# Patient Record
Sex: MALE | Race: BLACK OR AFRICAN AMERICAN | NOT HISPANIC OR LATINO | URBAN - METROPOLITAN AREA
[De-identification: names, ages, dates, MRNs, and addresses within clinical notes are randomized per-mention and may not be internally consistent; named-entity substitution may affect disease eponyms.]

---

## 2023-03-15 LAB — PARATHYRIN INTACT (PG/ML) IN SER/PLAS: 792.5 PG/ML (ref 18.5–88)

## 2023-05-19 ENCOUNTER — HOSPITAL ENCOUNTER (OUTPATIENT)
Dept: DATA CONVERSION | Facility: HOSPITAL | Age: 38
End: 2023-05-19
Attending: SURGERY | Admitting: SURGERY
Payer: COMMERCIAL

## 2023-05-19 DIAGNOSIS — E78.5 HYPERLIPIDEMIA, UNSPECIFIED: ICD-10-CM

## 2023-05-19 DIAGNOSIS — Z87.11 PERSONAL HISTORY OF PEPTIC ULCER DISEASE: ICD-10-CM

## 2023-05-19 DIAGNOSIS — D64.9 ANEMIA, UNSPECIFIED: ICD-10-CM

## 2023-05-19 DIAGNOSIS — Z79.82 LONG TERM (CURRENT) USE OF ASPIRIN: ICD-10-CM

## 2023-05-19 DIAGNOSIS — K21.9 GASTRO-ESOPHAGEAL REFLUX DISEASE WITHOUT ESOPHAGITIS: ICD-10-CM

## 2023-05-19 DIAGNOSIS — I12.0 HYPERTENSIVE CHRONIC KIDNEY DISEASE WITH STAGE 5 CHRONIC KIDNEY DISEASE OR END STAGE RENAL DISEASE (MULTI): ICD-10-CM

## 2023-05-19 DIAGNOSIS — T82.858A STENOSIS OF OTHER VASCULAR PROSTHETIC DEVICES, IMPLANTS AND GRAFTS, INITIAL ENCOUNTER (CMS-HCC): ICD-10-CM

## 2023-05-19 DIAGNOSIS — Z87.891 PERSONAL HISTORY OF NICOTINE DEPENDENCE: ICD-10-CM

## 2023-05-19 DIAGNOSIS — N18.5 CHRONIC KIDNEY DISEASE, STAGE 5 (MULTI): ICD-10-CM

## 2023-05-19 DIAGNOSIS — T82.590A OTHER MECHANICAL COMPLICATION OF SURGICALLY CREATED ARTERIOVENOUS FISTULA, INITIAL ENCOUNTER (CMS-HCC): ICD-10-CM

## 2023-05-19 DIAGNOSIS — G47.33 OBSTRUCTIVE SLEEP APNEA (ADULT) (PEDIATRIC): ICD-10-CM

## 2023-05-19 DIAGNOSIS — Z99.89 DEPENDENCE ON OTHER ENABLING MACHINES AND DEVICES: ICD-10-CM

## 2023-05-22 LAB
MAGNESIUM (MG/DL) IN SER/PLAS: 2.41 MG/DL (ref 1.6–2.4)
PHOSPHATE (MG/DL) IN SER/PLAS: 3.8 MG/DL (ref 2.5–4.9)

## 2023-06-07 LAB
ALBUMIN (G/DL) IN SER/PLAS: 3.2 G/DL (ref 3.4–5)
ANION GAP IN SER/PLAS: 13 MMOL/L (ref 10–20)
APPEARANCE, URINE: CLEAR
BILIRUBIN, URINE: NEGATIVE
BLOOD, URINE: NEGATIVE
CALCIUM (MG/DL) IN SER/PLAS: 8.3 MG/DL (ref 8.6–10.6)
CARBON DIOXIDE, TOTAL (MMOL/L) IN SER/PLAS: 25 MMOL/L (ref 21–32)
CHLORIDE (MMOL/L) IN SER/PLAS: 108 MMOL/L (ref 98–107)
COLOR, URINE: ABNORMAL
CREATININE (MG/DL) IN SER/PLAS: 6.32 MG/DL (ref 0.5–1.3)
CREATININE (MG/DL) IN URINE: 50.7 MG/DL (ref 20–370)
ERYTHROCYTE DISTRIBUTION WIDTH (RATIO) BY AUTOMATED COUNT: 13.9 % (ref 11.5–14.5)
ERYTHROCYTE MEAN CORPUSCULAR HEMOGLOBIN CONCENTRATION (G/DL) BY AUTOMATED: 30.7 G/DL (ref 32–36)
ERYTHROCYTE MEAN CORPUSCULAR VOLUME (FL) BY AUTOMATED COUNT: 103 FL (ref 80–100)
ERYTHROCYTES (10*6/UL) IN BLOOD BY AUTOMATED COUNT: 2.44 X10E12/L (ref 4.5–5.9)
FERRITIN (UG/LL) IN SER/PLAS: 195 UG/L (ref 20–300)
GFR MALE: 11 ML/MIN/1.73M2
GLUCOSE (MG/DL) IN SER/PLAS: 260 MG/DL (ref 74–99)
GLUCOSE, URINE: ABNORMAL MG/DL
HEMATOCRIT (%) IN BLOOD BY AUTOMATED COUNT: 25.1 % (ref 41–52)
HEMOGLOBIN (G/DL) IN BLOOD: 7.7 G/DL (ref 13.5–17.5)
IRON (UG/DL) IN SER/PLAS: 33 UG/DL (ref 35–150)
IRON BINDING CAPACITY (UG/DL) IN SER/PLAS: 203 UG/DL (ref 240–445)
IRON SATURATION (%) IN SER/PLAS: 16 % (ref 25–45)
KETONES, URINE: NEGATIVE MG/DL
LEUKOCYTE ESTERASE, URINE: NEGATIVE
LEUKOCYTES (10*3/UL) IN BLOOD BY AUTOMATED COUNT: 14.1 X10E9/L (ref 4.4–11.3)
NITRITE, URINE: NEGATIVE
NRBC (PER 100 WBCS) BY AUTOMATED COUNT: 0.1 /100 WBC (ref 0–0)
PH, URINE: 5 (ref 5–8)
PHOSPHATE (MG/DL) IN SER/PLAS: 4 MG/DL (ref 2.5–4.9)
PLATELETS (10*3/UL) IN BLOOD AUTOMATED COUNT: 171 X10E9/L (ref 150–450)
POTASSIUM (MMOL/L) IN SER/PLAS: 4 MMOL/L (ref 3.5–5.3)
PROTEIN (MG/DL) IN URINE: 110 MG/DL (ref 5–25)
PROTEIN, URINE: ABNORMAL MG/DL
PROTEIN/CREATININE (MG/MG) IN URINE: 2.17 MG/MG CREAT (ref 0–0.17)
RBC, URINE: <1 /HPF (ref 0–5)
SODIUM (MMOL/L) IN SER/PLAS: 142 MMOL/L (ref 136–145)
SPECIFIC GRAVITY, URINE: 1.01 (ref 1–1.03)
SQUAMOUS EPITHELIAL CELLS, URINE: <1 /HPF
UREA NITROGEN (MG/DL) IN SER/PLAS: 58 MG/DL (ref 6–23)
UROBILINOGEN, URINE: <2 MG/DL (ref 0–1.9)
WBC, URINE: 4 /HPF (ref 0–5)

## 2023-06-09 LAB — PARATHYRIN INTACT (PG/ML) IN SER/PLAS: NORMAL

## 2023-07-14 ENCOUNTER — HOSPITAL ENCOUNTER (OUTPATIENT)
Dept: DATA CONVERSION | Facility: HOSPITAL | Age: 38
End: 2023-07-14

## 2023-08-16 LAB
ALBUMIN (G/DL) IN SER/PLAS: 3.1 G/DL (ref 3.4–5)
ANION GAP IN SER/PLAS: 19 MMOL/L (ref 10–20)
CALCIUM (MG/DL) IN SER/PLAS: 8.3 MG/DL (ref 8.6–10.6)
CARBON DIOXIDE, TOTAL (MMOL/L) IN SER/PLAS: 23 MMOL/L (ref 21–32)
CHLORIDE (MMOL/L) IN SER/PLAS: 102 MMOL/L (ref 98–107)
CREATININE (MG/DL) IN SER/PLAS: 7.82 MG/DL (ref 0.5–1.3)
GFR MALE: 8 ML/MIN/1.73M2
GLUCOSE (MG/DL) IN SER/PLAS: 264 MG/DL (ref 74–99)
PHOSPHATE (MG/DL) IN SER/PLAS: 5.4 MG/DL (ref 2.5–4.9)
POTASSIUM (MMOL/L) IN SER/PLAS: 3.4 MMOL/L (ref 3.5–5.3)
SODIUM (MMOL/L) IN SER/PLAS: 141 MMOL/L (ref 136–145)
UREA NITROGEN (MG/DL) IN SER/PLAS: 75 MG/DL (ref 6–23)

## 2023-08-23 ENCOUNTER — HOSPITAL ENCOUNTER (OUTPATIENT)
Dept: DATA CONVERSION | Facility: HOSPITAL | Age: 38
Discharge: HOME | End: 2023-08-25
Attending: SURGERY | Admitting: SURGERY
Payer: COMMERCIAL

## 2023-08-23 DIAGNOSIS — N18.9 CHRONIC KIDNEY DISEASE, UNSPECIFIED: ICD-10-CM

## 2023-08-23 DIAGNOSIS — T81.30XA DISRUPTION OF WOUND, UNSPECIFIED, INITIAL ENCOUNTER: ICD-10-CM

## 2023-08-23 DIAGNOSIS — Z99.2 DEPENDENCE ON RENAL DIALYSIS (CMS-HCC): ICD-10-CM

## 2023-08-23 DIAGNOSIS — Z79.899 OTHER LONG TERM (CURRENT) DRUG THERAPY: ICD-10-CM

## 2023-08-23 DIAGNOSIS — Z79.82 LONG TERM (CURRENT) USE OF ASPIRIN: ICD-10-CM

## 2023-08-24 LAB
ABO GROUP (TYPE) IN BLOOD: NORMAL
ALBUMIN (G/DL) IN SER/PLAS: 2.8 G/DL (ref 3.4–5)
ANION GAP IN SER/PLAS: 20 MMOL/L (ref 10–20)
ANTIBODY SCREEN: NORMAL
CALCIUM (MG/DL) IN SER/PLAS: 7.5 MG/DL (ref 8.6–10.6)
CARBON DIOXIDE, TOTAL (MMOL/L) IN SER/PLAS: 16 MMOL/L (ref 21–32)
CHLORIDE (MMOL/L) IN SER/PLAS: 104 MMOL/L (ref 98–107)
CREATININE (MG/DL) IN SER/PLAS: 8.94 MG/DL (ref 0.5–1.3)
ERYTHROCYTE DISTRIBUTION WIDTH (RATIO) BY AUTOMATED COUNT: 15.8 % (ref 11.5–14.5)
ERYTHROCYTE MEAN CORPUSCULAR HEMOGLOBIN CONCENTRATION (G/DL) BY AUTOMATED: 31.9 G/DL (ref 32–36)
ERYTHROCYTE MEAN CORPUSCULAR VOLUME (FL) BY AUTOMATED COUNT: 91 FL (ref 80–100)
ERYTHROCYTES (10*6/UL) IN BLOOD BY AUTOMATED COUNT: 2.24 X10E12/L (ref 4.5–5.9)
GFR MALE: 7 ML/MIN/1.73M2
GLUCOSE (MG/DL) IN SER/PLAS: 375 MG/DL (ref 74–99)
HEMATOCRIT (%) IN BLOOD BY AUTOMATED COUNT: 20.4 % (ref 41–52)
HEMOGLOBIN (G/DL) IN BLOOD: 6.5 G/DL (ref 13.5–17.5)
LEUKOCYTES (10*3/UL) IN BLOOD BY AUTOMATED COUNT: 13.4 X10E9/L (ref 4.4–11.3)
MAGNESIUM (MG/DL) IN SER/PLAS: 1.4 MG/DL (ref 1.6–2.4)
NRBC (PER 100 WBCS) BY AUTOMATED COUNT: 0 /100 WBC (ref 0–0)
PHOSPHATE (MG/DL) IN SER/PLAS: 6.1 MG/DL (ref 2.5–4.9)
PLATELETS (10*3/UL) IN BLOOD AUTOMATED COUNT: 207 X10E9/L (ref 150–450)
POTASSIUM (MMOL/L) IN SER/PLAS: 4.1 MMOL/L (ref 3.5–5.3)
RH FACTOR: NORMAL
SODIUM (MMOL/L) IN SER/PLAS: 136 MMOL/L (ref 136–145)
UREA NITROGEN (MG/DL) IN SER/PLAS: 95 MG/DL (ref 6–23)

## 2023-08-25 LAB
ERYTHROCYTE DISTRIBUTION WIDTH (RATIO) BY AUTOMATED COUNT: 15 % (ref 11.5–14.5)
ERYTHROCYTE MEAN CORPUSCULAR HEMOGLOBIN CONCENTRATION (G/DL) BY AUTOMATED: 32.8 G/DL (ref 32–36)
ERYTHROCYTE MEAN CORPUSCULAR VOLUME (FL) BY AUTOMATED COUNT: 91 FL (ref 80–100)
ERYTHROCYTES (10*6/UL) IN BLOOD BY AUTOMATED COUNT: 2.56 X10E12/L (ref 4.5–5.9)
HEMATOCRIT (%) IN BLOOD BY AUTOMATED COUNT: 23.2 % (ref 41–52)
HEMOGLOBIN (G/DL) IN BLOOD: 7.6 G/DL (ref 13.5–17.5)
LEUKOCYTES (10*3/UL) IN BLOOD BY AUTOMATED COUNT: 14.1 X10E9/L (ref 4.4–11.3)
NRBC (PER 100 WBCS) BY AUTOMATED COUNT: 0 /100 WBC (ref 0–0)
PLATELETS (10*3/UL) IN BLOOD AUTOMATED COUNT: 229 X10E9/L (ref 150–450)

## 2023-08-28 PROBLEM — L02.91 ABSCESS: Status: ACTIVE | Noted: 2023-08-28

## 2023-08-28 PROBLEM — E55.9 VITAMIN D DEFICIENCY: Status: ACTIVE | Noted: 2023-08-28

## 2023-08-28 PROBLEM — E78.5 HYPERLIPIDEMIA: Status: ACTIVE | Noted: 2023-08-28

## 2023-08-28 PROBLEM — G47.19 EXCESSIVE DAYTIME SLEEPINESS: Status: ACTIVE | Noted: 2023-08-28

## 2023-08-28 PROBLEM — I10 PRIMARY HYPERTENSION: Status: ACTIVE | Noted: 2023-08-28

## 2023-08-28 PROBLEM — E11.9 TYPE 2 DIABETES MELLITUS (MULTI): Status: ACTIVE | Noted: 2023-08-28

## 2023-08-28 PROBLEM — N25.81 HYPERPARATHYROIDISM, SECONDARY RENAL (MULTI): Status: ACTIVE | Noted: 2023-08-28

## 2023-08-28 PROBLEM — F12.90 CANNABIS USE, UNCOMPLICATED: Status: ACTIVE | Noted: 2023-08-28

## 2023-08-28 PROBLEM — N18.6 ESRD (END STAGE RENAL DISEASE) ON DIALYSIS (MULTI): Status: ACTIVE | Noted: 2023-08-28

## 2023-08-28 PROBLEM — N18.9: Status: ACTIVE | Noted: 2017-06-12

## 2023-08-28 PROBLEM — C64.9 RENAL CELL CARCINOMA (MULTI): Status: ACTIVE | Noted: 2023-08-28

## 2023-08-28 PROBLEM — D64.9 ANEMIA: Status: ACTIVE | Noted: 2023-08-28

## 2023-08-28 PROBLEM — R60.0 EDEMA OF LEFT UPPER EXTREMITY: Status: ACTIVE | Noted: 2023-08-28

## 2023-08-28 PROBLEM — T82.9XXA COMPLICATION OF ARTERIOVENOUS DIALYSIS FISTULA: Status: ACTIVE | Noted: 2023-08-28

## 2023-08-28 PROBLEM — D84.9 IMMUNOCOMPROMISED (MULTI): Status: ACTIVE | Noted: 2023-08-28

## 2023-08-28 PROBLEM — K21.9 GERD (GASTROESOPHAGEAL REFLUX DISEASE): Status: ACTIVE | Noted: 2023-08-28

## 2023-08-28 PROBLEM — G47.33 OBSTRUCTIVE SLEEP APNEA SYNDROME, SEVERE: Status: ACTIVE | Noted: 2023-08-28

## 2023-08-28 PROBLEM — R79.89 LOW VITAMIN D LEVEL: Status: ACTIVE | Noted: 2023-08-28

## 2023-08-28 PROBLEM — M25.50 ARTHRALGIA: Status: ACTIVE | Noted: 2023-08-28

## 2023-08-28 PROBLEM — I77.0 ANEURYSM OF ARTERIOVENOUS FISTULA (CMS-HCC): Status: ACTIVE | Noted: 2023-08-28

## 2023-08-28 PROBLEM — N18.5 STAGE 5 CHRONIC KIDNEY DISEASE (MULTI): Status: ACTIVE | Noted: 2023-08-28

## 2023-08-28 PROBLEM — H53.8 BLURRY VISION, BILATERAL: Status: ACTIVE | Noted: 2023-08-28

## 2023-08-28 PROBLEM — E61.1 IRON DEFICIENCY: Status: ACTIVE | Noted: 2023-08-28

## 2023-08-28 PROBLEM — Z99.2 ESRD (END STAGE RENAL DISEASE) ON DIALYSIS (MULTI): Status: ACTIVE | Noted: 2023-08-28

## 2023-08-28 PROBLEM — T82.898A OTHER SPECIFIED COMPLICATION OF VASCULAR PROSTHETIC DEVICES, IMPLANTS AND GRAFTS, INITIAL ENCOUNTER (CMS-HCC): Status: ACTIVE | Noted: 2017-10-23

## 2023-08-28 PROBLEM — Z01.818 PRE-TRANSPLANT EVALUATION FOR KIDNEY TRANSPLANT: Status: ACTIVE | Noted: 2023-08-28

## 2023-08-28 PROBLEM — E11.319 ADVANCED DIABETIC RETINAL DISEASE (MULTI): Status: ACTIVE | Noted: 2023-08-28

## 2023-08-28 PROBLEM — R06.81 WITNESSED APNEIC SPELLS: Status: ACTIVE | Noted: 2023-08-28

## 2023-08-28 PROBLEM — Z87.11 HISTORY OF GASTRIC ULCER: Status: ACTIVE | Noted: 2023-08-28

## 2023-08-28 RX ORDER — IRON POLYSACCHARIDE COMPLEX 150 MG
1 CAPSULE ORAL NIGHTLY
Status: ON HOLD | COMMUNITY
End: 2023-11-21 | Stop reason: SDUPTHER

## 2023-08-28 RX ORDER — OLMESARTAN MEDOXOMIL 40 MG/1
1 TABLET ORAL DAILY
COMMUNITY
Start: 2023-05-24 | End: 2023-10-03 | Stop reason: ALTCHOICE

## 2023-08-28 RX ORDER — CARVEDILOL 25 MG/1
1 TABLET ORAL 2 TIMES DAILY
Status: ON HOLD | COMMUNITY
Start: 2023-07-24 | End: 2023-11-21 | Stop reason: SDUPTHER

## 2023-08-28 RX ORDER — PREDNISONE 5 MG/1
1 TABLET ORAL DAILY
COMMUNITY
Start: 2023-01-03 | End: 2023-10-03 | Stop reason: ALTCHOICE

## 2023-08-28 RX ORDER — OXYCODONE HYDROCHLORIDE 5 MG/1
1 TABLET ORAL
COMMUNITY
Start: 2023-05-23 | End: 2023-10-03 | Stop reason: ALTCHOICE

## 2023-08-28 RX ORDER — ASPIRIN 81 MG/1
81 TABLET ORAL DAILY
Status: ON HOLD | COMMUNITY
End: 2023-10-17 | Stop reason: ALTCHOICE

## 2023-08-28 RX ORDER — ACETAMINOPHEN 500 MG
2 TABLET ORAL EVERY 8 HOURS PRN
Status: ON HOLD | COMMUNITY
End: 2023-10-17 | Stop reason: ALTCHOICE

## 2023-08-28 RX ORDER — CHOLECALCIFEROL (VITAMIN D3) 125 MCG
1 CAPSULE ORAL DAILY
Status: ON HOLD | COMMUNITY
End: 2023-11-21 | Stop reason: SDUPTHER

## 2023-08-28 RX ORDER — AMLODIPINE BESYLATE 10 MG/1
1 TABLET ORAL DAILY
Status: ON HOLD | COMMUNITY
End: 2023-11-21 | Stop reason: SDUPTHER

## 2023-08-28 RX ORDER — CABOZANTINIB 40 MG/1
TABLET ORAL
Status: ON HOLD | COMMUNITY
Start: 2023-04-24 | End: 2023-10-18 | Stop reason: SDUPTHER

## 2023-08-28 RX ORDER — ATORVASTATIN CALCIUM 20 MG/1
1 TABLET, FILM COATED ORAL NIGHTLY
Status: ON HOLD | COMMUNITY
End: 2023-11-21 | Stop reason: SDUPTHER

## 2023-08-28 RX ORDER — ANTISEPTIC SURGICAL SCRUB 0.04 MG/ML
SOLUTION TOPICAL
Status: ON HOLD | COMMUNITY
Start: 2023-05-11 | End: 2023-10-17 | Stop reason: ALTCHOICE

## 2023-08-28 RX ORDER — HYDRALAZINE HYDROCHLORIDE 100 MG/1
1 TABLET, FILM COATED ORAL 3 TIMES DAILY
Status: ON HOLD | COMMUNITY
End: 2023-11-21 | Stop reason: SDUPTHER

## 2023-08-28 RX ORDER — PREDNISONE 20 MG/1
2 TABLET ORAL DAILY
COMMUNITY
End: 2023-10-03 | Stop reason: ALTCHOICE

## 2023-08-28 RX ORDER — GLIPIZIDE 10 MG/1
1 TABLET, FILM COATED, EXTENDED RELEASE ORAL DAILY
Status: ON HOLD | COMMUNITY
Start: 2022-10-24 | End: 2023-10-17 | Stop reason: ALTCHOICE

## 2023-08-28 RX ORDER — TORSEMIDE 20 MG/1
3 TABLET ORAL DAILY
Status: ON HOLD | COMMUNITY
End: 2023-11-21 | Stop reason: SDUPTHER

## 2023-08-28 RX ORDER — LORATADINE 10 MG/1
1 TABLET ORAL DAILY
Status: ON HOLD | COMMUNITY
End: 2023-11-21 | Stop reason: SDUPTHER

## 2023-08-28 RX ORDER — CALCITRIOL 0.25 UG/1
1 CAPSULE ORAL NIGHTLY
Status: ON HOLD | COMMUNITY
End: 2023-11-21 | Stop reason: SDUPTHER

## 2023-08-28 RX ORDER — FAMOTIDINE 20 MG/1
1 TABLET, FILM COATED ORAL 2 TIMES DAILY PRN
Status: ON HOLD | COMMUNITY
Start: 2023-05-20 | End: 2023-11-21 | Stop reason: SDUPTHER

## 2023-08-28 RX ORDER — LABETALOL 300 MG/1
3 TABLET, FILM COATED ORAL 2 TIMES DAILY
Status: ON HOLD | COMMUNITY
End: 2023-11-21 | Stop reason: WASHOUT

## 2023-08-28 RX ORDER — OMEPRAZOLE 40 MG/1
1 CAPSULE, DELAYED RELEASE ORAL DAILY
Status: ON HOLD | COMMUNITY
Start: 2023-08-18 | End: 2023-11-21 | Stop reason: SDUPTHER

## 2023-08-28 RX ORDER — LEVOTHYROXINE SODIUM 25 UG/1
1 TABLET ORAL DAILY
COMMUNITY
Start: 2023-05-20 | End: 2023-10-03 | Stop reason: ALTCHOICE

## 2023-08-28 RX ORDER — OMEPRAZOLE 20 MG/1
1 CAPSULE, DELAYED RELEASE ORAL
COMMUNITY
End: 2023-10-03 | Stop reason: ALTCHOICE

## 2023-08-28 RX ORDER — LISINOPRIL 40 MG/1
1 TABLET ORAL DAILY
Status: ON HOLD | COMMUNITY
Start: 2016-02-02 | End: 2023-11-21 | Stop reason: SDUPTHER

## 2023-08-28 RX ORDER — CHLORHEXIDINE GLUCONATE ORAL RINSE 1.2 MG/ML
SOLUTION DENTAL
Status: ON HOLD | COMMUNITY
Start: 2023-05-10 | End: 2023-10-17 | Stop reason: ALTCHOICE

## 2023-08-28 RX ORDER — PREDNISONE 10 MG/1
3 TABLET ORAL DAILY
Status: ON HOLD | COMMUNITY
Start: 2023-08-01 | End: 2023-10-16 | Stop reason: SDUPTHER

## 2023-09-07 VITALS — WEIGHT: 197.31 LBS | HEIGHT: 66 IN | BODY MASS INDEX: 31.71 KG/M2

## 2023-09-16 DIAGNOSIS — C64.9 RENAL CELL CARCINOMA, UNSPECIFIED LATERALITY (MULTI): Primary | ICD-10-CM

## 2023-09-29 VITALS
HEIGHT: 66 IN | RESPIRATION RATE: 16 BRPM | SYSTOLIC BLOOD PRESSURE: 145 MMHG | BODY MASS INDEX: 28.98 KG/M2 | WEIGHT: 180.34 LBS | HEART RATE: 85 BPM | DIASTOLIC BLOOD PRESSURE: 86 MMHG | TEMPERATURE: 98.4 F

## 2023-09-30 NOTE — DISCHARGE SUMMARY
Send Summary:   Discharge Summary Providers:  Provider Role Provider Name   · Attending Bernadette Duran   · Referring Bernadette Duran       Note Recipients: Bernadette Duran MD       Discharge:    Summary:   Admission Date: .23-Aug-2023 06:47:00   Discharge Date: 25-Aug-2023   Attending Physician at Discharge: Dr. Duran   Admission Reason: Left upper extremity wound   Final Discharge Diagnoses: Left upper extremity wound   Procedures: Date: 23-Aug-2023 10:27:00  Procedure Name: Left upper extremity wound debridement down to subcutaneous tissue and vessels   Vital Signs:        T   P  R  BP   MAP  SpO2   Value  36.5  74  18  166/97      98%  Date/Time 8/25 7:49 8/25 7:49 8/25 7:49 8/25 7:49    8/25 7:49  Range  (36.5C - 37.4C )  (74 - 86 )  (18 - 20 )  (134 - 166 )/ (74 - 97 )    (95% - 100% )  Highest temp of 37.4 C was recorded at 8/24 14:20    Date:            Weight/Scale Type:  Height:   23-Aug-2023 18:37  81.8  kg / standing  167.5  cm  Physical Exam:    Constitutional: Lying in bed; not in acute distress  Eyes: Sclera non- icteric  Head/Neck: Atraumatic  Resp: nonlabored breathing on room air  CV: regular pulse  GI: Abdomen soft, non-distended, non-tender  : No terry; voiding without difficulty  Ext: Warm and well perfused; LUE incision intact and without drainage or erythema; palpable left radial pulse; palpable thrill  MSK: APONTE x4  Neuro: AAOx3; no focal deficits  Skin: warm & dry  Psych: appropriate mood & behavior   Hospital Course:    Patient is a 38 year old male with past medical history of CKD (pre-dialysis with creation of left radiocephalic fistula in 2017 without use). At clinic follow up,  he was noted to have mild skin breakdown at the previous incision. He underwent, left upper extremity wound debridement. The patient tolerated the procedure well, and he was transferred to the regular nursing floor after recovering in the PACU. While  on the nursing floor, the patient continued to progress as  expected. His pain was controlled. He tolerated a regular diet. He is able to ambulate with issue. The patient was discharged home with home care on ASA. He has outpatient follow up with vascular  surgery as scheduled.    >30 minutes spent on day of discharge assessment and plan including time spent in discharge education and coordination         Discharge Information:    and Continuing Care:   Lab Results - Pending:    None  Radiology Results - Pending: None   Discharge Instructions:    Activity:           activity as tolerated.          May shower..            May not drive until follow-up visit.            No pushing, pulling, or lifting objects greater than 10 pounds until follow-up visit.            Weight-bearing Instructions: weight-bearing as tolerated.      Nutrition/Diet:           resume normal diet    Wound Care:           Wound Site:   left arm          Wound Type:   surgical incision          Instructions:   no lotions, creams, or tub soaks          Other Instructions:   cover incision with xeroform, cover with dry dressing, and wrap with kerlix daily and as needed   pat incision dry after showering          Do NOT allow anyone but the Vascular Surgeon to remove staples or sutures.    Infectious Disease:           PPD Status:   not given          MRSA:   no          VRE:   no          C. Diff:   no          Other Resistant Organism:   no          Isolation Type:   none    Home Care Certification:           Skilled Disciplines Ordered:   RN/LPN    Home Care Services:           Home Care Skilled Service:   assessment,  wound care    Follow Up Appointments:    Follow-Up - Vascular Surgeon:           Physician/Dept./Service:   Vascular Surgeon    Follow-Up Appointment 01:           Physician/Dept/Service:   Dr. Bernadette Duran, vascular surgeon          Reason for Referral:   postop visit          Scheduled Date/Time:   31-Aug-2023 10:00          Location:   [facility] [address], [address] [place] [address]  [place] [address]          Phone Number:   [phone]    Follow-Up Appointment 02:           Physician/Dept/Service:   Dr. Engle - Dermatology          Scheduled Date/Time:   28-Aug-2023 10:15          Location:   [place] [address]          Phone Number:   [phone]    Discharge Medications: Home Medication   amLODIPine 10 mg oral tablet - 1 tab(s) oral once a day  atorvastatin 20 mg oral tablet - 1 tab(s) oral once a day  Vitamin D3 125 mcg (5000 intl units) oral tablet - 1 tab(s) oral once a day  omeprazole 40 mg oral delayed release capsule - 1 cap(s) oral once a day  calcitriol 0.25 mcg oral capsule - 1 cap(s) oral once a day  carvedilol 25 mg oral tablet - 1 tab(s) oral 2 times a day  hydrALAZINE 100 mg oral tablet - 1 tab(s) oral 3 times a day  olmesartan 40 mg oral tablet - 1 tab(s) oral once a day  Synthroid 25 mcg (0.025 mg) oral tablet - 1 tab(s) oral once a day  Cabometyx 40 mg oral tablet - 1 tab(s) orally once a day   doxycycline hyclate 100 mg oral capsule - 1 cap(s) orally once a day   predniSONE 10 mg oral tablet - 3 tab(s) orally once a day  aspirin 81 mg oral tablet - 1 tab(s) orally once a day  torsemide 20 mg oral tablet - 3 tab(s) orally once a day     PRN Medication   acetaminophen 325 mg oral tablet - 2 tab(s) orally every 6 hours, As Needed - Mild (1-3)  oxyCODONE 5 mg oral tablet - 1 tab(s) orally every 6 hours x 3 days, As Needed -for acute postoperative pain G89.18  - .Meds to Beds - .ADOD 8/24/2023 - .Patient Location   loratadine 10 mg oral tablet - 1 tab(s) oral once a day as needed allergies  famotidine 20 mg oral tablet - 1 tab(s) orally 2 times a day, As Needed heartburn     DNR Status:   ·  Code Status Code Status order at time of discharge: Full Code       Electronic Signatures:  Arcelia Costa (APRN-CNP)  (Signed 25-Aug-2023 10:19)   Authored: Send Summary, Summary Content, Ongoing Care,  DNR Status, Note  Completion      Last Updated: 25-Aug-2023 10:19 by Arcelia Costa (APRN-CNP)

## 2023-09-30 NOTE — H&P
History of Present Illness:   History Present Illness:  Reason for surgery: Wound over fistula   HPI:    38M with wound over LUE fistula. Here for wound debridement. Possible flap with plastics if needed.    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 :    Metastatic papillary carcinoma to lymph node:    Intra-abdominal lymphadenopathy:     Allergies:        Allergies:  ·  No Known Allergies :     Home Medication Review:   Home Medications Reviewed: yes     Impression/Procedure:   ·  Impression and Planned Procedure: Wound over LUE fistula; wound debridement, possible flap       ERAS (Enhanced Recovery After Surgery):  ·  ERAS Patient: no     Review of Systems:   Review of Systems:  Constitutional: NEGATIVE: Fever, Chills         Vital Signs:  Temperature C: 36.9 degrees C   Temperature F: 98.4 degrees F   Heart Rate: 85 beats per minute   Respiratory Rate: 16 breath per minute   Blood Pressure Systolic: 145 mm/Hg   Blood Pressure Diastolic: 86 mm/Hg     Physical Exam by System:    Respiratory/Thorax: Patent airways, CTAB, normal  breath sounds with good chest expansion, thorax symmetric   Cardiovascular: Regular, rate and rhythm, no murmurs,  2+ equal pulses of the extremities, normal S 1and S 2     Consent:   COVID-19 Consent:  ·  COVID-19 Risk Consent Surgeon has reviewed key risks related to the risk of murtaza COVID-19 and if they contract COVID-19 what the risks are.     Attestation:   Note Completion:  I am a:  Resident/Fellow   Attending Attestation I saw and evaluated the patient.  I personally obtained the key and critical portions of the history and physical exam or was physically present for key and  critical portions performed by the resident/fellow. I reviewed the resident/fellow?s documentation and discussed the patient with the resident/fellow.  I agree with the resident/fellow?s medical decision making as documented  in the note.     I personally evaluated the patient on 23-Aug-2023         Electronic Signatures:  Bernadette Duran)  (Signed 23-Aug-2023 16:14)   Authored: Note Completion   Co-Signer: History of Present Illness, Past Medical History, Allergies, Home Medication Review, Impression/Procedure, ERAS, Review of Systems,  Physical Exam, Consent, Note Completion  Panchito Mora (Resident))  (Signed 23-Aug-2023 08:35)   Authored: History of Present Illness, Past Medical History,  Allergies, Home Medication Review, Impression/Procedure, ERAS, Review of Systems, Physical Exam, Consent, Note Completion      Last Updated: 23-Aug-2023 16:14 by Bernadette Duran)

## 2023-10-01 NOTE — OP NOTE
PROCEDURE DETAILS    Preoperative Diagnosis:  Left upper extremity wound  Postoperative Diagnosis:  Left upper extremity wound  Surgeon: Bernadette Duran MD   Resident/Fellow/Other Assistant: Panchito Mora MD    Procedure:  Left upper extremity wound debridement down to subcutaneous tissue and vessels  Estimated Blood Loss: 30cc  Findings: Wound with scab. After debridement of wound, underlying tissue healthy with good bleeding. No purulence noted. Good wound approximation. Strong, palpable thrill and palpable radial  pulse.    Additional Details: Wound measures 8 x 2 x 1 cm        Operative Report:   INDICATIONS: 38M with CKD pre-dialysis with creation of a left radiocephalic fistula in 2017 without use. He has metastatic RCC and is on antineoplastic drugs.  He presented to Dr. Daigle with significant aneurysmal degeneration and outflow stenosis s/p aneurysmorrhapy with outflow tract PTA on 5/19/23. On clinic followup, he was noted to have mild skin breakdown at the previous incision. He presents today for  debridement and coverage.    OPERATIVE NOTE: Informed consent obtained from patient and verified in the preoperative holding area. Patient was transported to the surgical theater and transferred to the operating bed in supine position. A time out with nursing, anesthesia, surgical  staff and faculty was performed with confirmation of site laterality (LEFT ARM). Patient was prepped with chloraprep and sterilely draped.    #15 blade used to open the previous incision with removal of prior sutures. Sharp debridement with Metzenbaum scissors used to excise the areas of eschar. There was some fibrinous exudate underneath this area but no gross purulence. The area of previous  bovine pericardial patch was visualized at the more distal end of the incision, but was fully intact with no issues with the suture line. Debridement continued down to healthy tissue including a thin margin of epidermis. There was  robust bleeding from  the skin perforators. There were no areas concerning for necrosis.     At this point, the incision was in a Y-shaped fashion and measured 8 x 2 x 1 cm. Dr. Vazquez of plastic surgery was called in for his opinion on  best closure method with recommendations for flap mobilization and local tissue rearrangement with primary closure. We mobilized some flaps with care for appropriate thickness. Wound was thorougly irrigated with irrisept and electrocautery used for adequate  hemostasis. The flaps were closed with special attention to completely cover the bovine pericardial patch with 3-0 Vicryl suture. Skin was closed with 4-0 Nylon vertical mattress sutures. Xeroform placed over incision followed by Kerlix. Patient tolerated  procedure well and was taken to the recovery room.            VQI Procedure:  no              Attestation:   Note Completion:  Attending Attestation I was present for the entire procedure    I am a: Resident/Fellow         Electronic Signatures:  Bernadette Duran)  (Signed 24-Aug-2023 09:55)   Authored: Post-Operative Note, VQI, Chart Review, Note  Completion   Co-Signer: Post-Operative Note, VQI, Chart Review, Note Completion  Panchito Mora (Resident))  (Signed 23-Aug-2023 10:33)   Authored: Post-Operative Note, Chart Review, Note Completion      Last Updated: 24-Aug-2023 09:55 by Bernadette Duran)

## 2023-10-02 NOTE — OP NOTE
PROCEDURE DETAILS    Preoperative Diagnosis:  History of radiocephalic AVF with aneurysmal degeneration and venous outflow obstruction  Postoperative Diagnosis:  Same  Surgeon: Dr. Daigle.   Resident/Fellow/Other Assistant: Hilario Mallory PGY-6 Vascular Surgery Fellow, Santino Tomas PGY-1     Procedure:  L radiocephalic aneurysmorrhaphy with patch angioplasty, USG PTA/DCB of venous outflow stenosis.   Anesthesia: GETA  Estimated Blood Loss: 50cc  Findings: thrill present at end of case.   Specimens(s) Collected: no,     Complications: No immediate complications.   Patient Returned To/Condition: PACU in stable condition.     Date of Dictation: 19-May-2023  Dictated By: Pilo HAIRSTON Procedure:  no              Attestation:   Note Completion:  Attending Attestation I was present for the entire procedure    I am a: Resident/Fellow         Electronic Signatures:  Val Daigle)  (Signed 22-May-2023 18:27)   Authored: Post-Operative Note, Chart Review, Note Completion   Co-Signer: Post-Operative Note, VQI, Chart Review, Note Completion  Hilario Mallory (Fellow))  (Signed 19-May-2023 14:57)   Authored: Post-Operative Note, VQI, Chart Review, Note  Completion      Last Updated: 22-May-2023 18:27 by Val Daigle)

## 2023-10-03 ENCOUNTER — OFFICE VISIT (OUTPATIENT)
Dept: HEMATOLOGY/ONCOLOGY | Facility: HOSPITAL | Age: 38
End: 2023-10-03
Payer: COMMERCIAL

## 2023-10-03 ENCOUNTER — CLINICAL SUPPORT (OUTPATIENT)
Dept: HEMATOLOGY/ONCOLOGY | Facility: HOSPITAL | Age: 38
End: 2023-10-03
Payer: COMMERCIAL

## 2023-10-03 ENCOUNTER — APPOINTMENT (OUTPATIENT)
Dept: HEMATOLOGY/ONCOLOGY | Facility: HOSPITAL | Age: 38
End: 2023-10-03
Payer: COMMERCIAL

## 2023-10-03 VITALS
WEIGHT: 178.57 LBS | HEART RATE: 83 BPM | HEIGHT: 65 IN | SYSTOLIC BLOOD PRESSURE: 171 MMHG | BODY MASS INDEX: 29.75 KG/M2 | OXYGEN SATURATION: 100 % | TEMPERATURE: 98.2 F | DIASTOLIC BLOOD PRESSURE: 108 MMHG | RESPIRATION RATE: 18 BRPM

## 2023-10-03 DIAGNOSIS — C64.9 RENAL CELL CARCINOMA, UNSPECIFIED LATERALITY (MULTI): Primary | ICD-10-CM

## 2023-10-03 DIAGNOSIS — T50.905A HYPERGLYCEMIA, DRUG-INDUCED: ICD-10-CM

## 2023-10-03 DIAGNOSIS — N18.5 STAGE 5 CHRONIC KIDNEY DISEASE (MULTI): ICD-10-CM

## 2023-10-03 DIAGNOSIS — M79.604 PAIN IN BOTH LOWER EXTREMITIES: ICD-10-CM

## 2023-10-03 DIAGNOSIS — M79.605 PAIN IN BOTH LOWER EXTREMITIES: ICD-10-CM

## 2023-10-03 DIAGNOSIS — G89.3 CANCER ASSOCIATED PAIN: ICD-10-CM

## 2023-10-03 DIAGNOSIS — R73.9 HYPERGLYCEMIA, DRUG-INDUCED: ICD-10-CM

## 2023-10-03 PROCEDURE — 3077F SYST BP >= 140 MM HG: CPT | Performed by: STUDENT IN AN ORGANIZED HEALTH CARE EDUCATION/TRAINING PROGRAM

## 2023-10-03 PROCEDURE — 3008F BODY MASS INDEX DOCD: CPT | Performed by: STUDENT IN AN ORGANIZED HEALTH CARE EDUCATION/TRAINING PROGRAM

## 2023-10-03 PROCEDURE — 3080F DIAST BP >= 90 MM HG: CPT | Performed by: STUDENT IN AN ORGANIZED HEALTH CARE EDUCATION/TRAINING PROGRAM

## 2023-10-03 PROCEDURE — 99215 OFFICE O/P EST HI 40 MIN: CPT | Performed by: STUDENT IN AN ORGANIZED HEALTH CARE EDUCATION/TRAINING PROGRAM

## 2023-10-03 PROCEDURE — 3066F NEPHROPATHY DOC TX: CPT | Performed by: STUDENT IN AN ORGANIZED HEALTH CARE EDUCATION/TRAINING PROGRAM

## 2023-10-03 PROCEDURE — 3046F HEMOGLOBIN A1C LEVEL >9.0%: CPT | Performed by: STUDENT IN AN ORGANIZED HEALTH CARE EDUCATION/TRAINING PROGRAM

## 2023-10-03 PROCEDURE — 1036F TOBACCO NON-USER: CPT | Performed by: STUDENT IN AN ORGANIZED HEALTH CARE EDUCATION/TRAINING PROGRAM

## 2023-10-03 PROCEDURE — 4010F ACE/ARB THERAPY RXD/TAKEN: CPT | Performed by: STUDENT IN AN ORGANIZED HEALTH CARE EDUCATION/TRAINING PROGRAM

## 2023-10-03 RX ORDER — OXYCODONE HYDROCHLORIDE 5 MG/1
5 CAPSULE ORAL EVERY 12 HOURS PRN
Qty: 30 CAPSULE | Refills: 0 | Status: ON HOLD | OUTPATIENT
Start: 2023-10-03 | End: 2023-10-16 | Stop reason: CLARIF

## 2023-10-03 ASSESSMENT — ENCOUNTER SYMPTOMS: MYALGIAS: 1

## 2023-10-03 ASSESSMENT — PAIN SCALES - GENERAL: PAINLEVEL: 6

## 2023-10-03 NOTE — ASSESSMENT & PLAN NOTE
-prednisone 30 mg is not improving pain  -we will do short dose of oxy 5 mg q12 and then phone visit in 1 week to discuss the pain.

## 2023-10-03 NOTE — ASSESSMENT & PLAN NOTE
-Hx of DM and being on insulin 8-9 yrs ago  -currently on prednisone 30 mg [for arthritis 2/2 nivo] which is leading to hyperglycemia  -recently restarted on insulin by an EM physician    Plan:  -continue the same dose as pt is still complaining of LE pain and limited ROS  -continue insulin  -advised to find a PCP who can manage his hyperglycemia.

## 2023-10-03 NOTE — ASSESSMENT & PLAN NOTE
-metastatic papRCC to LN and bones s/p cabo and nivo  -nivo was stopped due to adverse effect of arthritis  -cabo was held in 6/23 due to HS surgery; restarted 9/23  -scans [9/23] continue to show SD     Plan:  -continue with cabo 40 mg; might consider increasing the dose to 60 mg if PD on scans in the future  -continue with prednisone 30 mg.   -phone visit in 1 week to discuss LE pain  -will order surveillance scans at the time of phone visit.

## 2023-10-03 NOTE — PROGRESS NOTES
Patient ID: Siddhartha Orellana is a 38 y.o. male.  Diagnosis: metastatic papillary carcinoma.  Referral:     Past Medical History: Siddhartha has a past medical history of Dorsalgia, unspecified (01/04/2023), End stage renal disease (CMS/HCC) (01/04/2023), Essential (primary) hypertension (01/04/2023), Iron deficiency (02/23/2020), and Other specified postprocedural states.    Past Medical History:   1. Lymphadenopathy and a pre surgical consult for kidney transplant  2. Stage V CKD  3. Type II DM  3. HTN  5. HLD  6. Parathyroidism   7. Anemia with NARGIS component   8. Gastric ulcer  9. SHIVA  10. Vitamin D deficiency   11. Morbid obesity      Oncologic History:   11/7/22 - CT-guided biopsy of retroperitoneal lymph node revealed findings consistent with metastatic papillary carcinoma.  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  2/7/23 - On Prednisone 20mg daily  2/17/23 - Scans show SD (RECIST) with some mild tumor shrinkage RPLN  2/21/23 - Increase Prednisone 40mg daily for continued arthralgia; continue Cabo 40 mg daily  3/15/23 - Continue Pred 40mg PO daily, continue Cabo  End March - hidradenitis suppurativa  4/4/23 - Hold Cabo; prednisone 30 mg daily  4/25/23 - Resume cabo  5/18/23 - Hold Cabo 2/2 vascular fistula repair  5/23/23 - Continue hold due to extensive edema, blistering  6/2023 - resumed cabo. Nivo on hold with PDN 25 mg  8/16/23 - continue cabo, reduce PDN to 20 mg daily  8/23/23 - Left upper extremity wound debridement down to subcutaneous tissue and vessels- Cabo on hold  8/31/23 - continue to hold cabo, PDN back at 30mg   9/15/23 - resumed cabozantinib 40 mg   Mid Sep 23 - admission hidradenitis suppurativa    Family History:    Family History  "  Problem Relation Name Age of Onset    Hypertension Mother      Cancer Father      Hypertension Father      Other (renal transplant recipient) Mother's Brother       Family Oncology History:  Cancer-related family history includes Cancer in his father.    HISTORY OF PRESENT ILLNESS:  Siddhartha Orellana is a 38 y.o. male who was referred by No ref. provider found and presents with Follow-up    HPI  Pt is here for a follow-up visit. He is complaining of b/l leg pain, stating that this the same pain as he experienced after Nivo and the steroids are not improving his pain. He is also concerned about his Hyperglycemia and has been recently started on insulin; wants to discuss stopping/changing prednisone to another medication.  He used to be on insulin 8-9 years ago; lost weight which improved his DM and he has been off insulin until recently.     He denies changes in sleep,appetite, energy. He has no other complaints.    Review of Systems   Musculoskeletal:  Positive for myalgias.        B/l Leg pain   All other systems reviewed and are negative.      OBJECTIVE:    VS / Pain:  BP (!) 171/108 (BP Location: Right arm, Patient Position: Sitting, BP Cuff Size: Adult)   Pulse 83   Temp 36.8 °C (98.2 °F) (Temporal)   Resp 18   Ht 1.639 m (5' 4.53\")   Wt 81 kg (178 lb 9.2 oz)   SpO2 100%   BMI 30.15 kg/m²   BSA: 1.92 meters squared    Performance Status:       Diagnostic Results     === 09/29/23 ===    CT CHEST WO IV CONTRAST  - Impression -  1.  New approximate 4 mm ground-glass nodular density anterior right  upper lobe. Unchanged appearance of pleural-based  nodularity/thickening anteriorly on the left. Focal  consolidation/atelectasis in the lingula. Continued attention on  follow-up imaging is recommended.  2. The focal ground-glass opacity anteriorly on the right present  previously has resolved.  3. There is some dependent material within the trachea and left  mainstem bronchus suggesting sequela of aspiration.  4. " Partially imaged minimal stranding suggested about the paraceliac  region and pancreatic head unchanged in appearance.  5. Similar appearance of multifocal mixed lytic/sclerotic osseous  lesions involving multiple ribs.  6. New subcutaneous nodularity/thickening at the left axillary region  as described, could be sequela of infectious/inflammatory process  such as hidradenitis suppurativa. Underlying malignant/metastatic  involvement cannot be entirely excluded. Clinical correlation and  follow-up advised.  7. Lobulated appearance visualized upper kidneys bilaterally with  suggestion of approximate 11 mm isodense lesion upper pole left  kidney, suboptimally evaluated without IV contrast. Correlation with  ultrasound may be considered for further assessment.  8. Additional findings as above.    MACRO:  None     Assessment/Plan     37yo AA man with hx of stage IV CKD with metastatic papRCC to LNs now on cabo/nivo. He has CKD and planned for kidney dialysis. Known proteinuria +++ (known). We  discussed this with Dr Nguyen (Nephrology) and he has well known diabetic nephropathy with grade 5 CKD with chronic proteinuria. On cabo (nivo on hold d/t arthritis (PDN) 30 mg PDN. SD in recent scans.                 I personally saw patient and counselled all visit  on his diagnosis, maria luisa history and coordination of his care.   Please do not hesitate to reach out with any questions. Thank you.   -------------------------------------------------------------------------------------------------------------------------------  Renan Thomson MD, MSc  Co-Leader Genitourinary () Disease Team  Director of  Medical Oncology Research Program   [facility]  Associate Professor of Medicine  [facility] [address], [address]  [place] [address]  Phone: [phone]  [email or web address]

## 2023-10-03 NOTE — PROGRESS NOTES
Patient ID: Siddhartha Orellana is a 38 y.o. male.  Diagnosis: metastatic papillary carcinoma.  Referral:     Past Medical History: Siddhartha has a past medical history of Dorsalgia, unspecified (01/04/2023), End stage renal disease (CMS/HCC) (01/04/2023), Essential (primary) hypertension (01/04/2023), Iron deficiency (02/23/2020), and Other specified postprocedural states.    Past Medical History:   1. Lymphadenopathy and a pre surgical consult for kidney transplant  2. Stage V CKD  3. Type II DM  3. HTN  5. HLD  6. Parathyroidism   7. Anemia with NARGIS component   8. Gastric ulcer  9. SHIVA  10. Vitamin D deficiency   11. Morbid obesity      Oncologic History:   11/7/22 - CT-guided biopsy of retroperitoneal lymph node revealed findings consistent with metastatic papillary carcinoma.  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  2/7/23 - On Prednisone 20mg daily  2/17/23 - Scans show SD (RECIST) with some mild tumor shrinkage RPLN  2/21/23 - Increase Prednisone 40mg daily for continued arthralgia; continue Cabo 40 mg daily  3/15/23 - Continue Pred 40mg PO daily, continue Cabo  End March - hidradenitis suppurativa  4/4/23 - Hold Cabo; prednisone 30 mg daily  4/25/23 - Resume cabo  5/18/23 - Hold Cabo 2/2 vascular fistula repair  5/23/23 - Continue hold due to extensive edema, blistering  6/2023 - resumed cabo. Nivo on hold with PDN 25 mg  8/16/23 - continue cabo, reduce PDN to 20 mg daily  8/23/23 - Left upper extremity wound debridement down to subcutaneous tissue and vessels- Cabo on hold  8/31/23 - continue to hold cabo, PDN back at 30mg   9/15/23 - resumed cabozantinib 40 mg   Mid Sep 23 - admission hidradenitis suppurativa    Family History:    Family History  "  Problem Relation Name Age of Onset    Hypertension Mother      Cancer Father      Hypertension Father      Other (renal transplant recipient) Mother's Brother       Family Oncology History:  Cancer-related family history includes Cancer in his father.    HISTORY OF PRESENT ILLNESS:  Siddhartha Orellana is a 38 y.o. male who was referred by No ref. provider found and presents with Follow-up    HPI  Pt is here for a follow-up visit. He is complaining of b/l leg pain, stating that this the same pain as he experienced after Nivo and the steroids are not improving his pain. He is also concerned about his Hyperglycemia and has been recently started on insulin; wants to discuss stopping/changing prednisone to another medication.  He used to be on insulin 8-9 years ago; lost weight which improved his DM and he has been off insulin until recently.     He denies changes in sleep,appetite, energy. He has no other complaints.    Review of Systems   Musculoskeletal:  Positive for myalgias.        B/l Leg pain   All other systems reviewed and are negative.      OBJECTIVE:    VS / Pain:  BP (!) 171/108 (BP Location: Right arm, Patient Position: Sitting, BP Cuff Size: Adult)   Pulse 83   Temp 36.8 °C (98.2 °F) (Temporal)   Resp 18   Ht 1.639 m (5' 4.53\")   Wt 81 kg (178 lb 9.2 oz)   SpO2 100%   BMI 30.15 kg/m²   BSA: 1.92 meters squared    Performance Status:       Diagnostic Results     === 09/29/23 ===    CT CHEST WO IV CONTRAST  - Impression -  1.  New approximate 4 mm ground-glass nodular density anterior right  upper lobe. Unchanged appearance of pleural-based  nodularity/thickening anteriorly on the left. Focal  consolidation/atelectasis in the lingula. Continued attention on  follow-up imaging is recommended.  2. The focal ground-glass opacity anteriorly on the right present  previously has resolved.  3. There is some dependent material within the trachea and left  mainstem bronchus suggesting sequela of aspiration.  4. " Partially imaged minimal stranding suggested about the paraceliac  region and pancreatic head unchanged in appearance.  5. Similar appearance of multifocal mixed lytic/sclerotic osseous  lesions involving multiple ribs.  6. New subcutaneous nodularity/thickening at the left axillary region  as described, could be sequela of infectious/inflammatory process  such as hidradenitis suppurativa. Underlying malignant/metastatic  involvement cannot be entirely excluded. Clinical correlation and  follow-up advised.  7. Lobulated appearance visualized upper kidneys bilaterally with  suggestion of approximate 11 mm isodense lesion upper pole left  kidney, suboptimally evaluated without IV contrast. Correlation with  ultrasound may be considered for further assessment.  8. Additional findings as above.    MACRO:  None     Assessment/Plan     39yo AA man with hx of stage IV CKD with metastatic papRCC to LNs now on cabo/nivo. He has CKD and planned for kidney dialysis. Known proteinuria +++ (known). We  discussed this with Dr Nguyen (Nephrology) and he has well known diabetic nephropathy with grade 5 CKD with chronic proteinuria. On cabo (nivo on hold d/t arthritis (PDN) 30 mg PDN. SD in recent scans.   Pt is worried about hyperglycemia due to prednisone and he has continued LE pain.     #metastatic papRCC  Plan:  -cabo was held in 6/23 due to HS surgery; restarted 9/23, continue. might consider increasing the dose to 60 mg if PD on scans in the future  -scans [9/23] continue to show SD   -continue with prednisone 30 mg.   -phone visit in 1 week to discuss LE pain  -will order surveillance scans at the time of phone visit.      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

## 2023-10-04 ENCOUNTER — DOCUMENTATION (OUTPATIENT)
Dept: BEHAVIORAL HEALTH | Facility: CLINIC | Age: 38
End: 2023-10-04
Payer: COMMERCIAL

## 2023-10-05 ENCOUNTER — OFFICE VISIT (OUTPATIENT)
Dept: VASCULAR SURGERY | Facility: HOSPITAL | Age: 38
End: 2023-10-05
Payer: COMMERCIAL

## 2023-10-05 VITALS
SYSTOLIC BLOOD PRESSURE: 151 MMHG | HEIGHT: 66 IN | WEIGHT: 182.4 LBS | OXYGEN SATURATION: 97 % | DIASTOLIC BLOOD PRESSURE: 91 MMHG | BODY MASS INDEX: 29.32 KG/M2 | HEART RATE: 80 BPM

## 2023-10-05 DIAGNOSIS — N18.30 STAGE 3 CHRONIC KIDNEY DISEASE, UNSPECIFIED WHETHER STAGE 3A OR 3B CKD (MULTI): Primary | ICD-10-CM

## 2023-10-05 PROCEDURE — 1036F TOBACCO NON-USER: CPT | Performed by: SURGERY

## 2023-10-05 PROCEDURE — 3080F DIAST BP >= 90 MM HG: CPT | Performed by: SURGERY

## 2023-10-05 PROCEDURE — 3046F HEMOGLOBIN A1C LEVEL >9.0%: CPT | Performed by: SURGERY

## 2023-10-05 PROCEDURE — 3066F NEPHROPATHY DOC TX: CPT | Performed by: SURGERY

## 2023-10-05 PROCEDURE — 3077F SYST BP >= 140 MM HG: CPT | Performed by: SURGERY

## 2023-10-05 PROCEDURE — 99214 OFFICE O/P EST MOD 30 MIN: CPT | Performed by: SURGERY

## 2023-10-05 PROCEDURE — 3008F BODY MASS INDEX DOCD: CPT | Performed by: SURGERY

## 2023-10-05 PROCEDURE — 4010F ACE/ARB THERAPY RXD/TAKEN: CPT | Performed by: SURGERY

## 2023-10-05 ASSESSMENT — LIFESTYLE VARIABLES
AUDIT-C TOTAL SCORE: 0
HOW OFTEN DO YOU HAVE SIX OR MORE DRINKS ON ONE OCCASION: NEVER
HOW OFTEN DO YOU HAVE A DRINK CONTAINING ALCOHOL: NEVER
SKIP TO QUESTIONS 9-10: 1
HOW MANY STANDARD DRINKS CONTAINING ALCOHOL DO YOU HAVE ON A TYPICAL DAY: PATIENT DOES NOT DRINK

## 2023-10-05 ASSESSMENT — PATIENT HEALTH QUESTIONNAIRE - PHQ9
1. LITTLE INTEREST OR PLEASURE IN DOING THINGS: NOT AT ALL
2. FEELING DOWN, DEPRESSED OR HOPELESS: NOT AT ALL
SUM OF ALL RESPONSES TO PHQ9 QUESTIONS 1 & 2: 0

## 2023-10-05 ASSESSMENT — PAIN SCALES - GENERAL: PAINLEVEL: 0-NO PAIN

## 2023-10-05 ASSESSMENT — ENCOUNTER SYMPTOMS
LOSS OF SENSATION IN FEET: 0
DEPRESSION: 0

## 2023-10-05 NOTE — PROGRESS NOTES
38M PMHx CKD IV/V s/p L radiocephalic AVF made in 2017 (never used), subsequent diagnosis of RCC requiring chemotherapy. Developed aneurysmal degeneration of AVF s/p open aneurysmorrhaphy and patch angioplasty, DCB of outflow stenosis with Dr. Daigle 5/19/23. This subsequently healed poorly and ended up with wound overlying AVF that was debrided and closed primarily 8/23/23.   Was followed in clinic for wound healing progression and eventually sutures removed 9/20/23; subsequent clinic visits with continued poor wound healing and now with separation of prior surgical incision.    Denies fevers, chills, nightsweats, pain in L hand or arm. Some mild pain at AVF wound. Recent ED admission end of September for hiddratenitis flare for which he is on clindamycin.     Has resumed cabometyx end of September.     Vitals:    10/05/23 1201   BP: (!) 151/91   Pulse: 80   SpO2: 97%     Physical exam:  Sitting in chair, appears comfortable  NCAT  RRR  Breathing comfortably on room air  Abdomen soft, nontender  LUE with radiocephalic AVF, strong palpable thrill along length of AVF. Wound overlying distal AVF at wrist due to separation of prior surgical incision, visible silk sutures which were removed, partially granulated base with some fibrinous base. No exposed fistula/patch. Proximal and distal aspects of incision healed fairly well.   No associated erythema, drainage    Assessment:  38M PMHx CKD IV/V s/p L radiocephalic AVF made in 2017 (never used), subsequent diagnosis of RCC requiring chemotherapy. Developed aneurysmal degeneration of AVF s/p open aneurysmorrhaphy and patch angioplasty, DCB of outflow stenosis with Dr. Daigle 5/19/23. This subsequently healed poorly and ended up with wound overlying AVF that was debrided and closed primarily 8/23/23.     Continued poor healing, separation of surgical incision and incompletely granulated base. Will benefit from plastic surgical evaluation for possible graft coverage. AVF is  covered and protected currently, remains functional. No signs/symptoms of concurrent infection.     Plan:  Will discuss with plastic surgery regarding coverage options. Will schedule surgical grafting/debridement following plastic surgical assessment. Will call patient with further plans    Amos Deshpande MD  PGY5 - Integrated Vascular Surgery      Patient seen and examined with above student/resident/fellow. Key portions of history and physical exam personally reviewed and verified. I reviewed most updated imaging and objective data related to this patient's hospital stay. Agree with assessment and plan.     ATTENDING ADDENDUM:  Patient was seen today as he is again having trouble healing this incision.  We could feel the fistula just medial to the edge of the wound where its breaking down.  No bleeding.  No signs of gross infection but certainly delayed wound healing which has not improving.  He is back on his chemo medicine.  I gave him instructions to continue his clindamycin which he is on for another issue of hidradenitis in another location.  I will reach out to Dr. Vazquez from plastic surgery to consider proceeding with graft coverage of this.  He will wash the wound with soap and water and apply Promogran and dry dressing daily.  We will give him a call as soon as we come up with a plan.    Val Daigle MD, MHS   Surgery  Vascular Surgery

## 2023-10-05 NOTE — PATIENT INSTRUCTIONS
It was a pleasure taking care of you today and appreciate your seeing us at our [facility].     Today's plan is as follows:  I gave him instructions to continue his clindamycin which he is on for another issue of hidradenitis in another location.  I will reach out to Dr. Vazquez from plastic surgery to consider proceeding with graft coverage of this.  He will wash the wound with soap and water and apply Promogran and dry dressing daily.  We will give him a call as soon as we come up with a plan.      Please call the office with any questions at [phone].   You can speak to our secretaries or our clinical nurses for specific questions.   For [facility] specific questions, you can also call [phone] or email at [email or web address]  If you need coordinating your appointments and testing you can do these at the  or by calling my office shortly after your visit.

## 2023-10-09 ENCOUNTER — TELEPHONE (OUTPATIENT)
Dept: VASCULAR MEDICINE | Facility: HOSPITAL | Age: 38
End: 2023-10-09
Payer: COMMERCIAL

## 2023-10-09 ENCOUNTER — HOSPITAL ENCOUNTER (OUTPATIENT)
Facility: HOSPITAL | Age: 38
Setting detail: OUTPATIENT SURGERY
End: 2023-10-09
Attending: SURGERY | Admitting: SURGERY
Payer: COMMERCIAL

## 2023-10-09 ENCOUNTER — PREP FOR PROCEDURE (OUTPATIENT)
Dept: VASCULAR MEDICINE | Facility: HOSPITAL | Age: 38
End: 2023-10-09
Payer: COMMERCIAL

## 2023-10-09 DIAGNOSIS — N18.6 ESRD (END STAGE RENAL DISEASE) (MULTI): Primary | ICD-10-CM

## 2023-10-09 NOTE — TELEPHONE ENCOUNTER
Patient given pre-op instructions for 10/11 surgery including NPO after midnight night prior to surgery; should take Amlodipine, Carvedilol, Hydralazine, Levothyroxine, Omeprazole, Prednisone, and Torsemide medications with small sip of water the a.m. of surgery; needs transportation.  Patient has labs from 9/22/23.  Patient verbalized understanding of all instructions.  Encouraged him to call with questions/concerns.

## 2023-10-10 ENCOUNTER — OFFICE VISIT (OUTPATIENT)
Dept: HEMATOLOGY/ONCOLOGY | Facility: HOSPITAL | Age: 38
End: 2023-10-10
Payer: COMMERCIAL

## 2023-10-10 DIAGNOSIS — C64.9 RENAL CELL CARCINOMA, UNSPECIFIED LATERALITY (MULTI): Primary | ICD-10-CM

## 2023-10-10 PROCEDURE — 3008F BODY MASS INDEX DOCD: CPT | Performed by: STUDENT IN AN ORGANIZED HEALTH CARE EDUCATION/TRAINING PROGRAM

## 2023-10-10 PROCEDURE — 99213 OFFICE O/P EST LOW 20 MIN: CPT | Performed by: STUDENT IN AN ORGANIZED HEALTH CARE EDUCATION/TRAINING PROGRAM

## 2023-10-10 PROCEDURE — 1036F TOBACCO NON-USER: CPT | Performed by: STUDENT IN AN ORGANIZED HEALTH CARE EDUCATION/TRAINING PROGRAM

## 2023-10-10 PROCEDURE — 4010F ACE/ARB THERAPY RXD/TAKEN: CPT | Performed by: STUDENT IN AN ORGANIZED HEALTH CARE EDUCATION/TRAINING PROGRAM

## 2023-10-10 PROCEDURE — 3046F HEMOGLOBIN A1C LEVEL >9.0%: CPT | Performed by: STUDENT IN AN ORGANIZED HEALTH CARE EDUCATION/TRAINING PROGRAM

## 2023-10-10 PROCEDURE — 3066F NEPHROPATHY DOC TX: CPT | Performed by: STUDENT IN AN ORGANIZED HEALTH CARE EDUCATION/TRAINING PROGRAM

## 2023-10-10 ASSESSMENT — ENCOUNTER SYMPTOMS: MYALGIAS: 1

## 2023-10-10 NOTE — PROGRESS NOTES
Patient ID: Siddhartha Orellana is a 38 y.o. male.  Diagnosis: metastatic papillary carcinoma.  Referral:     Past Medical History: Siddhartha has a past medical history of Dorsalgia, unspecified (01/04/2023), End stage renal disease (CMS/HCC) (01/04/2023), Essential (primary) hypertension (01/04/2023), Iron deficiency (02/23/2020), and Other specified postprocedural states.    Past Medical History:   1. Lymphadenopathy and a pre surgical consult for kidney transplant  2. Stage V CKD  3. Type II DM  3. HTN  5. HLD  6. Parathyroidism   7. Anemia with NARGIS component   8. Gastric ulcer  9. SHIVA  10. Vitamin D deficiency   11. Morbid obesity      Oncologic History:   11/7/22 - CT-guided biopsy of retroperitoneal lymph node revealed findings consistent with metastatic papillary carcinoma.  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  2/7/23 - On Prednisone 20mg daily  2/17/23 - Scans show SD (RECIST) with some mild tumor shrinkage RPLN  2/21/23 - Increase Prednisone 40mg daily for continued arthralgia; continue Cabo 40 mg daily  3/15/23 - Continue Pred 40mg PO daily, continue Cabo  End March - hidradenitis suppurativa  4/4/23 - Hold Cabo; prednisone 30 mg daily  4/25/23 - Resume cabo  5/18/23 - Hold Cabo 2/2 vascular fistula repair  5/23/23 - Continue hold due to extensive edema, blistering  6/2023 - resumed cabo. Nivo on hold with PDN 25 mg  8/16/23 - continue cabo, reduce PDN to 20 mg daily  8/23/23 - Left upper extremity wound debridement down to subcutaneous tissue and vessels- Cabo on hold  8/31/23 - continue to hold cabo, PDN back at 30mg   9/15/23 - resumed cabozantinib 40 mg   Mid Sep 23 - admission hidradenitis suppurativa  10/10/23 - cabo on hold. Will start PDN  20 mg    Family History:    Family History   Problem Relation Name Age of Onset    Hypertension Mother      Cancer Father      Hypertension Father      Other (renal transplant recipient) Mother's Brother       Family Oncology History:  Cancer-related family history includes Cancer in his father.    HISTORY OF PRESENT ILLNESS:  Siddhartha Orellana is a 38 y.o. male who was referred by Renan Thomson MD and presents with No chief complaint on file.    HPI  Pt is here for a follow-up visit. He is complaining of b/l leg pain, stating that this the same pain as he experienced after Nivo and the steroids are not improving his pain. He is also concerned about his Hyperglycemia and has been recently started on insulin; wants to discuss stopping/changing prednisone to another medication.  He used to be on insulin 8-9 years ago; lost weight which improved his DM and he has been off insulin until recently.     He denies changes in sleep,appetite, energy. He has no other complaints.    Review of Systems   Musculoskeletal:  Positive for myalgias.        B/l Leg pain   All other systems reviewed and are negative.      OBJECTIVE:    VS / Pain:  There were no vitals taken for this visit.  BSA: There is no height or weight on file to calculate BSA.    Performance Status:       Diagnostic Results     === 09/29/23 ===    CT CHEST WO IV CONTRAST  - Impression -  1.  New approximate 4 mm ground-glass nodular density anterior right  upper lobe. Unchanged appearance of pleural-based  nodularity/thickening anteriorly on the left. Focal  consolidation/atelectasis in the lingula. Continued attention on  follow-up imaging is recommended.  2. The focal ground-glass opacity anteriorly on the right present  previously has resolved.  3. There is some dependent material within the trachea and left  mainstem bronchus suggesting sequela of aspiration.  4. Partially imaged minimal stranding suggested about the paraceliac  region and pancreatic head  unchanged in appearance.  5. Similar appearance of multifocal mixed lytic/sclerotic osseous  lesions involving multiple ribs.  6. New subcutaneous nodularity/thickening at the left axillary region  as described, could be sequela of infectious/inflammatory process  such as hidradenitis suppurativa. Underlying malignant/metastatic  involvement cannot be entirely excluded. Clinical correlation and  follow-up advised.  7. Lobulated appearance visualized upper kidneys bilaterally with  suggestion of approximate 11 mm isodense lesion upper pole left  kidney, suboptimally evaluated without IV contrast. Correlation with  ultrasound may be considered for further assessment.  8. Additional findings as above.    MACRO:  None     Assessment/Plan   I personally saw patient and counselled all visit  on his diagnosis, maria luisa history and coordination of his care.     37yo AA man with hx of stage IV CKD with metastatic papRCC to LNs now on cabo/nivo. He has CKD and planned for kidney dialysis. Known proteinuria +++ (known). Still with sig joint pains and dealing with hyperglycemias. On cabo (nivo on hold d/t arthritis (PDN) 30 mg PDN. SD in recent scans. Will add opioid meds. And check in few days - we might have to increase steroids if not better   Pain is much better on opioid so will taper off prednisone to 20 mg and check in next week as his arthralgias might not be fully IO-related.   Will go for surgical repair of his wound tomorrow - likely a graft will be needed. Will check with us about 1 week after the surgery to decide when can go back on cabozantinib    I sincerely appreciate the opportunity to see this patient and will contact the referring provider by phone or communicate directly through the electronic chart.  All patient's questions were answered and contact information provided to contact us with any issues.  Renan Thomson MD   in Medicine [employer]  Director Clinical [facility]  Oncology Research Program   [facility]  Cell [phone]  Office [phone]

## 2023-10-11 ENCOUNTER — HOSPITAL ENCOUNTER (OUTPATIENT)
Facility: HOSPITAL | Age: 38
Setting detail: SURGERY ADMIT
Discharge: HOME | End: 2023-10-11
Attending: SURGERY | Admitting: SURGERY
Payer: COMMERCIAL

## 2023-10-11 VITALS
TEMPERATURE: 97.7 F | WEIGHT: 184 LBS | HEIGHT: 66 IN | OXYGEN SATURATION: 99 % | BODY MASS INDEX: 29.57 KG/M2 | HEART RATE: 84 BPM | DIASTOLIC BLOOD PRESSURE: 93 MMHG | SYSTOLIC BLOOD PRESSURE: 158 MMHG

## 2023-10-11 LAB — Lab: NORMAL

## 2023-10-11 PROCEDURE — 2720000007 HC OR 272 NO HCPCS: Performed by: SURGERY

## 2023-10-11 ASSESSMENT — COLUMBIA-SUICIDE SEVERITY RATING SCALE - C-SSRS
1. IN THE PAST MONTH, HAVE YOU WISHED YOU WERE DEAD OR WISHED YOU COULD GO TO SLEEP AND NOT WAKE UP?: NO
2. HAVE YOU ACTUALLY HAD ANY THOUGHTS OF KILLING YOURSELF?: NO
6. HAVE YOU EVER DONE ANYTHING, STARTED TO DO ANYTHING, OR PREPARED TO DO ANYTHING TO END YOUR LIFE?: NO

## 2023-10-16 DIAGNOSIS — G89.3 CANCER ASSOCIATED PAIN: ICD-10-CM

## 2023-10-16 DIAGNOSIS — C64.9 RENAL CELL CARCINOMA, UNSPECIFIED LATERALITY (MULTI): ICD-10-CM

## 2023-10-16 RX ORDER — OXYCODONE HYDROCHLORIDE 5 MG/1
5 TABLET ORAL EVERY 6 HOURS PRN
Qty: 120 TABLET | Refills: 0 | Status: SHIPPED | OUTPATIENT
Start: 2023-10-16 | End: 2023-11-16 | Stop reason: SDUPTHER

## 2023-10-16 RX ORDER — PREDNISONE 10 MG/1
20 TABLET ORAL DAILY
Qty: 50 TABLET | Refills: 2 | Status: SHIPPED | OUTPATIENT
Start: 2023-10-16 | End: 2023-10-25 | Stop reason: DRUGHIGH

## 2023-10-16 NOTE — PROGRESS NOTES
Received email from patient's spouse. Patient almost out of pain medication and steroids. Patient was supposed to be scheduled for follow up with Dr. Thomson this week. Appt request in but was not scheduled. I added patient on to my schedule tomorrow afternoon and sent scripts. Email returned with this information.

## 2023-10-16 NOTE — PROGRESS NOTES
Patient ID: Siddhartha Orellana is a 38 y.o. male.  Diagnosis:  Papillary RCC  MedOnc: Dr. Thomson  Nephrologist: Dr. Grover  Vascular Surgeon: Dr. Daigle    Current Therapy: Cabozantinib     ONCOLOGIC HISTORY  11/7/22 - CT-guided biopsy of retroperitoneal lymph node revealed findings consistent with metastatic papillary carcinoma.  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  2/7/23 - On Prednisone 20mg daily  2/17/23 - Scans show SD (RECIST) with some mild tumor shrinkage RPLN  2/21/23 - Increase Prednisone 40mg daily for continued arthralgia; continue Cabo 40 mg daily  3/15/23 - Continue Pred 40mg PO daily, continue Cabo  End March - hidradenitis suppurativa  4/4/23 - Hold Cabo; prednisone 30 mg daily  4/25/23 - Resume cabo  5/18/23 - Hold Cabo 2/2 vascular fistula repair  5/23/23 - Continue hold due to extensive edema, blistering  6/2023 - resumed cabo. Nivo on hold with PDN 25 mg  8/16/23 - continue cabo, reduce PDN to 20 mg daily  8/23/23 - Left upper extremity wound debridement down to subcutaneous tissue and vessels- Cabo on hold  8/31/23 - continue to hold cabo, PDN back at 30mg   9/15/23 - resumed cabozantinib 40 mg   Mid Sep 23 - admission hidradenitis suppurativa  10/10/23 - cabo on hold. Will start PDN 20 mg  10/17/23 - cabo on hold for graft, PDN 20mg    Oncology History   Renal cell carcinoma (CMS/HCC)   12/1/2022 -  Chemotherapy    Cabozantinib, 28 Day Cycles     8/28/2023 Initial Diagnosis    Renal cell carcinoma (CMS/HCC)        Past Medical History:   1. Lymphadenopathy and a pre surgical consult for kidney transplant  2. Stage V CKD  3. Type II DM  3. HTN  5. HLD  6. Parathyroidism   7. Anemia with NARGIS component   8. Gastric ulcer  9. SHIVA  10.  Vitamin D deficiency   11. Morbid obesity  Past Medical History: Siddhartha has a past medical history of Dorsalgia, unspecified (01/04/2023), End stage renal disease (CMS/HCC) (01/04/2023), Essential (primary) hypertension (01/04/2023), Iron deficiency (02/23/2020), and Other specified postprocedural states.  Surgical History:  Siddhartha has a past surgical history that includes Other surgical history (04/11/2018).  Social History:  Siddhartha reports that he has quit smoking. His smoking use included cigarettes. He has never used smokeless tobacco. He reports that he does not currently use alcohol. He reports current drug use. Drug: Marijuana.  Family History:    Family History   Problem Relation Name Age of Onset    Hypertension Mother      Cancer Father      Hypertension Father      Other (renal transplant recipient) Mother's Brother       Family Oncology History:  Cancer-related family history includes Cancer in his father.    SUBJECTIVE:    History of Present Illness:  Siddhartha Orellana is a 38 y.o. male who presents today for follow up of papillary RCC. Patient of Renan Thomson MD currently on cabozantinib, nivolumab has been on hold since January due to arthralgias. He has had multiple flares of hidradenitis suppurativa and issues with his fistula healing. He has also had issues with elevated blood glucose related to long term steroid use. Now on insulin with dexcom meter. Blood sigars have been controlled on medication. He went down to PDN 20mg and needed to go back up to 30mg due to ankle pain and swelling. Taking oxycodone 2 x a day, ran out. As of today, back on 20mg of PDN and going to try oxycodone if pain returns. He showed up for surgery last Wednesday but was sent home from the intake area without follow up. Still waiting to hear from vascular surgeon team. He is still off cabozantinib. He states the fistula revision site is continuing to heal, now mostly scabbed, no pain, no bleeding. His whole arm is  "swollen. His HS is controlled right now, off doxy. \"Everything is going in the right direction.\"     ROS: All other systems have been reviewed and are negative for complaint unless otherwise stated in the HPI.    Allergies  No Known Allergies     Medications  Current Outpatient Medications   Medication Instructions    amLODIPine (Norvasc) 10 mg tablet 1 tablet, oral, Daily    atorvastatin (Lipitor) 20 mg tablet 1 tablet, oral, Nightly    BD Insulin Syringe Ultra-Fine 0.5 mL 30 gauge x 1/2\" syringe USE AS DIRECTED WITH LANTUS AND INSULIN LISPRO INJECTIONS    Cabometyx 40 mg tablet     cabozantinib (Cabometyx) 40 mg tablet TAKE ONE (1) TABLET BY MOUTH ONCE A DAY    calcitriol (Rocaltrol) 0.25 mcg capsule 1 capsule, oral, Nightly    carvedilol (Coreg) 25 mg tablet 1 tablet, oral, 2 times daily    cholecalciferol (Vitamin D-3) 125 MCG (5000 UT) capsule 1 capsule, oral, Daily    clindamycin (Cleocin T) 1 % lotion     clindamycin (CLEOCIN) 600 mg, oral, Every 8 hours    Dexcom G7  misc FOR CONTINUOUS GLUCOSE MONITORING    Dexcom G7 Sensor device FOR CONTINUOUS GLUCOSE MONITORING, CHANGE EVERY 10 DAYS    doxycycline (Vibra-Tabs) 100 mg tablet 1 TABLET TWICE A DAY BY MOUTH WITH FOOD AND A GLASS OF WATER TAKE FOR 2-3 MONTHS USE SUN PROTECTION    famotidine (Pepcid) 20 mg tablet 1 tablet, oral, 2 times daily PRN    hydrALAZINE (Apresoline) 100 mg tablet 1 tablet, oral, 3 times daily, Take with food    hydroCHLOROthiazide (HYDRODIURIL) 25 mg, oral, Daily RT    hydrocortisone 2.5 % cream 1 APPLICATION TWICE A DAY TO AFFECTED AREA OF FACE (AVOID EYES) TO DARK SPOTS FOR 2-4 WEEKS    insulin lispro (HumaLOG) 100 unit/mL injection PLEASE SEE ATTACHED FOR DETAILED DIRECTIONS    labetalol (Normodyne) 300 mg tablet 3 tablets, 2 times daily, *EMERGENCY REFILL*    Lantus U-100 Insulin 100 unit/mL injection 12 UNIT(S) SUBCUTANEOUS ONCE (AT BEDTIME)    lisinopril 40 mg tablet 1 tablet, oral, Daily    loratadine (Claritin) 10 mg " tablet 1 tablet, oral, Daily    Nu-Iron 150 mg iron capsule 1 capsule, oral, Nightly    omeprazole (PriLOSEC) 40 mg DR capsule 1 capsule, oral, Daily    ondansetron (Zofran) 8 mg tablet     OneTouch Delica Plus Lancet 33 gauge misc FOR GLUCOSE TESTING 3 TIMES DAILY    OneTouch Verio Flex meter misc FOR GLUCOSE TESTING 3 TIMES DAILY    OneTouch Verio test strips strip USE AS DIRECTED 3 TIMES A DAY    oxyCODONE (ROXICODONE) 5 mg, oral, Every 6 hours PRN    polyethylene glycol (Glycolax, Miralax) 17 gram packet 17 GRAM(S) ORALLY ONCE A DAY    predniSONE (DELTASONE) 20 mg, oral, Daily    torsemide (Demadex) 20 mg tablet 3 tablets, oral, Daily    triamcinolone (Kenalog) 0.1 % cream PLEASE SEE ATTACHED FOR DETAILED DIRECTIONS        OBJECTIVE:    VS / Pain:  There were no vitals taken for this visit.  BSA: There is no height or weight on file to calculate BSA.  Wt Readings from Last 5 Encounters:   10/11/23 83.5 kg (184 lb)   10/05/23 82.7 kg (182 lb 6.4 oz)   10/03/23 81 kg (178 lb 9.2 oz)   06/15/23 86.4 kg (190 lb 8 oz)   06/07/23 91.6 kg (202 lb)      Pain Scale: 3    Physical Exam: Alert and oriented x3, no signs of distress, alert and cooperative, voice is clear, no slurred speech, speaking in full sentences with organized thought pattern. No coughing or audible wheezing during phone visit.    Performance Status:  ECOG Score: 1- Restricted in physically strenuous activity.  Carries out light duty.  Karnofsky Score: 80 - Normal activity with effort; some signs or symptoms of disease    Diagnostic Results     No results found for this or any previous visit (from the past 96 hour(s)).    Assessment/Plan   39yo AA man with hx of stage IV CKD with metastatic papRCC to LNs now on cabo/nivo. He has CKD and planned for kidney dialysis. Known proteinuria +++ (known). Still with sig joint pains and dealing with hyperglycemias. On cabo (nivo on hold d/t arthritis (PDN) 30 mg PDN. SD in recent scans. Will add opioid meds. And  check in few days - we might have to increase steroids if not better   Pain is much better on opioid so will taper off prednisone to 20 mg and check in next week as his arthralgias might not be fully IO-related.   Will go for surgical repair of his wound tomorrow - likely a graft will be needed. Will check with us about 1 week after the surgery to decide when can go back on cabozantinib.    Surgery was canceled last week. Patient waiting on follow up from surgery team. I will message Dr. Daigle.   Oxycodone and PND refilled - Try PND 20mg daily and use oxycodone for breakthrough pain.   Cabozantinib on hold. Surgery pending.   Follow up next week with phone visit.      Diagnoses and all orders for this visit:  Renal cell carcinoma, unspecified laterality (CMS/HCC) (Primary)  Arthralgia, unspecified joint      Treatment Plan:  Cabozantinib, 28 Day Cycles      Verbal consent was requested and obtained from patient on this date for a telehealth visit.    Patient verbalizes understanding of above plan. Time provided for patient's questions. All questions answered to patient's satisfaction in office. Patient instructed to reach out for any new concerning issues at [phone].    Tatiana Rodriguez MSN, APRN, A-GNP-C, OCN   Oncology   [facility]  [address] [place] [id number]   [facility]  Phone: [phone]  [email or web address]

## 2023-10-17 ENCOUNTER — PATIENT OUTREACH (OUTPATIENT)
Dept: BEHAVIORAL HEALTH | Facility: CLINIC | Age: 38
End: 2023-10-17
Payer: COMMERCIAL

## 2023-10-17 ENCOUNTER — TELEMEDICINE (OUTPATIENT)
Dept: HEMATOLOGY/ONCOLOGY | Facility: HOSPITAL | Age: 38
End: 2023-10-17
Payer: COMMERCIAL

## 2023-10-17 DIAGNOSIS — C64.9 RENAL CELL CARCINOMA, UNSPECIFIED LATERALITY (MULTI): Primary | ICD-10-CM

## 2023-10-17 DIAGNOSIS — I73.9 PAD (PERIPHERAL ARTERY DISEASE) (CMS-HCC): Primary | ICD-10-CM

## 2023-10-17 DIAGNOSIS — M25.50 ARTHRALGIA, UNSPECIFIED JOINT: ICD-10-CM

## 2023-10-17 PROCEDURE — 99442 PR PHYS/QHP TELEPHONE EVALUATION 11-20 MIN: CPT

## 2023-10-17 RX ORDER — HYDROCHLOROTHIAZIDE 25 MG/1
25 TABLET ORAL
Status: ON HOLD | COMMUNITY
End: 2023-10-18

## 2023-10-17 RX ORDER — INSULIN LISPRO 100 [IU]/ML
INJECTION, SOLUTION INTRAVENOUS; SUBCUTANEOUS
Status: ON HOLD | COMMUNITY
Start: 2023-09-22 | End: 2023-11-21 | Stop reason: SDUPTHER

## 2023-10-17 RX ORDER — BLOOD-GLUCOSE METER
EACH MISCELLANEOUS
Status: ON HOLD | COMMUNITY
Start: 2023-09-22 | End: 2023-11-21 | Stop reason: SDUPTHER

## 2023-10-17 RX ORDER — BLOOD-GLUCOSE,RECEIVER,CONT
EACH MISCELLANEOUS
Status: ON HOLD | COMMUNITY
Start: 2023-09-22 | End: 2023-11-21 | Stop reason: SDUPTHER

## 2023-10-17 RX ORDER — HYDROCORTISONE 25 MG/G
CREAM TOPICAL
Status: ON HOLD | COMMUNITY
Start: 2023-09-24 | End: 2023-11-21 | Stop reason: SDUPTHER

## 2023-10-17 RX ORDER — POLYETHYLENE GLYCOL 3350 17 G/17G
POWDER, FOR SOLUTION ORAL
Status: ON HOLD | COMMUNITY
Start: 2023-09-22 | End: 2023-11-21 | Stop reason: SDUPTHER

## 2023-10-17 RX ORDER — INSULIN GLARGINE 100 [IU]/ML
INJECTION, SOLUTION SUBCUTANEOUS
Status: ON HOLD | COMMUNITY
Start: 2023-09-22 | End: 2023-11-21 | Stop reason: SDUPTHER

## 2023-10-17 RX ORDER — CLINDAMYCIN HYDROCHLORIDE 300 MG/1
600 CAPSULE ORAL EVERY 8 HOURS
Status: ON HOLD | COMMUNITY
Start: 2023-09-22 | End: 2023-10-19 | Stop reason: ALTCHOICE

## 2023-10-17 RX ORDER — LANCETS 30 GAUGE
EACH MISCELLANEOUS
Status: ON HOLD | COMMUNITY
Start: 2023-09-22 | End: 2023-11-21 | Stop reason: SDUPTHER

## 2023-10-17 RX ORDER — TRIAMCINOLONE ACETONIDE 1 MG/G
CREAM TOPICAL
Status: ON HOLD | COMMUNITY
Start: 2023-09-24 | End: 2023-11-21 | Stop reason: SDUPTHER

## 2023-10-17 RX ORDER — CLINDAMYCIN PHOSPHATE 10 UG/ML
LOTION TOPICAL
COMMUNITY
Start: 2023-10-16 | End: 2024-01-24 | Stop reason: HOSPADM

## 2023-10-17 RX ORDER — DOXYCYCLINE HYCLATE 100 MG
TABLET ORAL
Status: ON HOLD | COMMUNITY
Start: 2023-09-25 | End: 2023-11-21 | Stop reason: SDUPTHER

## 2023-10-17 RX ORDER — ONDANSETRON HYDROCHLORIDE 8 MG/1
TABLET, FILM COATED ORAL
Status: ON HOLD | COMMUNITY
Start: 2023-10-12 | End: 2023-11-21 | Stop reason: SDUPTHER

## 2023-10-17 RX ORDER — BLOOD-GLUCOSE SENSOR
EACH MISCELLANEOUS
Status: ON HOLD | COMMUNITY
Start: 2023-09-22 | End: 2023-11-21 | Stop reason: SDUPTHER

## 2023-10-17 RX ORDER — PEN NEEDLE, DIABETIC 29 G X1/2"
NEEDLE, DISPOSABLE MISCELLANEOUS
Status: ON HOLD | COMMUNITY
Start: 2023-09-23 | End: 2023-11-21 | Stop reason: SDUPTHER

## 2023-10-17 RX ORDER — LANCETS 33 GAUGE
EACH MISCELLANEOUS
Status: ON HOLD | COMMUNITY
Start: 2023-09-22 | End: 2023-11-21 | Stop reason: SDUPTHER

## 2023-10-17 NOTE — PROGRESS NOTES
Writer contacted patient via phone to provide engagement related to enrollment in [facility] program.     Patient states that he is feeling pretty good these days. He is polite and allows Writer to explain details of [facility] program, but does not present as interested in participating.     Writer to outreach again should patient's circumstances change.

## 2023-10-18 ENCOUNTER — LAB REQUISITION (OUTPATIENT)
Dept: LAB | Facility: HOSPITAL | Age: 38
End: 2023-10-18
Payer: COMMERCIAL

## 2023-10-18 ENCOUNTER — OFFICE VISIT (OUTPATIENT)
Dept: NEPHROLOGY | Facility: CLINIC | Age: 38
End: 2023-10-18
Payer: COMMERCIAL

## 2023-10-18 ENCOUNTER — LAB (OUTPATIENT)
Dept: LAB | Facility: LAB | Age: 38
End: 2023-10-18
Payer: COMMERCIAL

## 2023-10-18 VITALS
BODY MASS INDEX: 29.7 KG/M2 | HEIGHT: 66 IN | HEART RATE: 79 BPM | DIASTOLIC BLOOD PRESSURE: 88 MMHG | SYSTOLIC BLOOD PRESSURE: 139 MMHG

## 2023-10-18 DIAGNOSIS — N18.6 ESRD (END STAGE RENAL DISEASE) (MULTI): ICD-10-CM

## 2023-10-18 DIAGNOSIS — C64.9 RENAL CELL CARCINOMA, UNSPECIFIED LATERALITY (MULTI): ICD-10-CM

## 2023-10-18 DIAGNOSIS — N18.6 END STAGE RENAL DISEASE (MULTI): ICD-10-CM

## 2023-10-18 DIAGNOSIS — N18.5 CHRONIC KIDNEY DISEASE, STAGE 5 (MULTI): Primary | ICD-10-CM

## 2023-10-18 DIAGNOSIS — N18.5 CHRONIC KIDNEY DISEASE, STAGE 5 (MULTI): ICD-10-CM

## 2023-10-18 LAB
ABO GROUP (TYPE) IN BLOOD: NORMAL
ALBUMIN SERPL BCP-MCNC: 3 G/DL (ref 3.4–5)
ALBUMIN SERPL BCP-MCNC: 3 G/DL (ref 3.4–5)
ALP SERPL-CCNC: 94 U/L (ref 33–120)
ALT SERPL W P-5'-P-CCNC: 24 U/L (ref 10–52)
ANION GAP SERPL CALC-SCNC: 20 MMOL/L (ref 10–20)
ANION GAP SERPL CALC-SCNC: 20 MMOL/L (ref 10–20)
ANTIBODY SCREEN: NORMAL
APPEARANCE UR: CLEAR
AST SERPL W P-5'-P-CCNC: 12 U/L (ref 9–39)
BASOPHILS # BLD AUTO: 0 X10*3/UL (ref 0–0.1)
BASOPHILS NFR BLD AUTO: 0 %
BILIRUB SERPL-MCNC: 0.4 MG/DL (ref 0–1.2)
BILIRUB UR STRIP.AUTO-MCNC: NEGATIVE MG/DL
BUN SERPL-MCNC: 79 MG/DL (ref 6–23)
BUN SERPL-MCNC: 79 MG/DL (ref 6–23)
CALCIUM SERPL-MCNC: 7.3 MG/DL (ref 8.6–10.6)
CALCIUM SERPL-MCNC: 7.3 MG/DL (ref 8.6–10.6)
CHLORIDE SERPL-SCNC: 107 MMOL/L (ref 98–107)
CHLORIDE SERPL-SCNC: 107 MMOL/L (ref 98–107)
CO2 SERPL-SCNC: 21 MMOL/L (ref 21–32)
CO2 SERPL-SCNC: 21 MMOL/L (ref 21–32)
COLOR UR: ABNORMAL
CREAT SERPL-MCNC: 6.87 MG/DL (ref 0.5–1.3)
CREAT SERPL-MCNC: 6.87 MG/DL (ref 0.5–1.3)
CREAT UR-MCNC: 59.4 MG/DL (ref 20–370)
EOSINOPHIL # BLD AUTO: 0.02 X10*3/UL (ref 0–0.7)
EOSINOPHIL NFR BLD AUTO: 0.2 %
ERYTHROCYTE [DISTWIDTH] IN BLOOD BY AUTOMATED COUNT: 16.2 % (ref 11.5–14.5)
FERRITIN SERPL-MCNC: 382 NG/ML (ref 20–300)
GFR SERPL CREATININE-BSD FRML MDRD: 10 ML/MIN/1.73M*2
GFR SERPL CREATININE-BSD FRML MDRD: 10 ML/MIN/1.73M*2
GLUCOSE SERPL-MCNC: 167 MG/DL (ref 74–99)
GLUCOSE SERPL-MCNC: 167 MG/DL (ref 74–99)
GLUCOSE UR STRIP.AUTO-MCNC: ABNORMAL MG/DL
HCT VFR BLD AUTO: 23.9 % (ref 41–52)
HGB BLD-MCNC: 7.7 G/DL (ref 13.5–17.5)
IMM GRANULOCYTES # BLD AUTO: 0.09 X10*3/UL (ref 0–0.7)
IMM GRANULOCYTES NFR BLD AUTO: 1 % (ref 0–0.9)
IRON SATN MFR SERPL: 31 % (ref 25–45)
IRON SERPL-MCNC: 55 UG/DL (ref 35–150)
KETONES UR STRIP.AUTO-MCNC: NEGATIVE MG/DL
LDH SERPL L TO P-CCNC: 452 U/L (ref 84–246)
LEUKOCYTE ESTERASE UR QL STRIP.AUTO: NEGATIVE
LYMPHOCYTES # BLD AUTO: 0.55 X10*3/UL (ref 1.2–4.8)
LYMPHOCYTES NFR BLD AUTO: 6 %
MCHC RBC AUTO-ENTMCNC: 32.2 G/DL (ref 32–36)
MCV RBC AUTO: 91 FL (ref 80–100)
MICROALBUMIN UR-MCNC: 578.1 MG/L
MICROALBUMIN/CREAT UR: 973.2 UG/MG CREAT
MONOCYTES # BLD AUTO: 0.31 X10*3/UL (ref 0.1–1)
MONOCYTES NFR BLD AUTO: 3.4 %
MUCOUS THREADS #/AREA URNS AUTO: NORMAL /LPF
NEUTROPHILS # BLD AUTO: 8.19 X10*3/UL (ref 1.2–7.7)
NEUTROPHILS NFR BLD AUTO: 89.4 %
NITRITE UR QL STRIP.AUTO: NEGATIVE
PH UR STRIP.AUTO: 5 [PH]
PHOSPHATE SERPL-MCNC: 6.6 MG/DL (ref 2.5–4.9)
PLATELET # BLD AUTO: 174 X10*3/UL (ref 150–450)
POTASSIUM SERPL-SCNC: 3.7 MMOL/L (ref 3.5–5.3)
POTASSIUM SERPL-SCNC: 3.7 MMOL/L (ref 3.5–5.3)
PROT SERPL-MCNC: 4.9 G/DL (ref 6.4–8.2)
PROT UR STRIP.AUTO-MCNC: ABNORMAL MG/DL
RBC # BLD AUTO: 2.63 X10*6/UL (ref 4.5–5.9)
RBC # UR STRIP.AUTO: NEGATIVE /UL
RBC #/AREA URNS AUTO: NORMAL /HPF
RH FACTOR (ANTIGEN D): NORMAL
SODIUM SERPL-SCNC: 144 MMOL/L (ref 136–145)
SODIUM SERPL-SCNC: 144 MMOL/L (ref 136–145)
SP GR UR STRIP.AUTO: 1.01
TIBC SERPL-MCNC: 180 UG/DL (ref 240–445)
UIBC SERPL-MCNC: 125 UG/DL (ref 110–370)
UROBILINOGEN UR STRIP.AUTO-MCNC: <2 MG/DL
WBC # BLD AUTO: 9.2 X10*3/UL (ref 4.4–11.3)
WBC #/AREA URNS AUTO: NORMAL /HPF

## 2023-10-18 PROCEDURE — 3079F DIAST BP 80-89 MM HG: CPT | Performed by: INTERNAL MEDICINE

## 2023-10-18 PROCEDURE — 3075F SYST BP GE 130 - 139MM HG: CPT | Performed by: INTERNAL MEDICINE

## 2023-10-18 PROCEDURE — 86901 BLOOD TYPING SEROLOGIC RH(D): CPT

## 2023-10-18 PROCEDURE — 82043 UR ALBUMIN QUANTITATIVE: CPT

## 2023-10-18 PROCEDURE — 82570 ASSAY OF URINE CREATININE: CPT

## 2023-10-18 PROCEDURE — 80053 COMPREHEN METABOLIC PANEL: CPT

## 2023-10-18 PROCEDURE — 36415 COLL VENOUS BLD VENIPUNCTURE: CPT

## 2023-10-18 PROCEDURE — 83540 ASSAY OF IRON: CPT

## 2023-10-18 PROCEDURE — 83550 IRON BINDING TEST: CPT

## 2023-10-18 PROCEDURE — 85025 COMPLETE CBC W/AUTO DIFF WBC: CPT

## 2023-10-18 PROCEDURE — 4010F ACE/ARB THERAPY RXD/TAKEN: CPT | Performed by: INTERNAL MEDICINE

## 2023-10-18 PROCEDURE — 82728 ASSAY OF FERRITIN: CPT

## 2023-10-18 PROCEDURE — 3046F HEMOGLOBIN A1C LEVEL >9.0%: CPT | Performed by: INTERNAL MEDICINE

## 2023-10-18 PROCEDURE — 3066F NEPHROPATHY DOC TX: CPT | Performed by: INTERNAL MEDICINE

## 2023-10-18 PROCEDURE — 83615 LACTATE (LD) (LDH) ENZYME: CPT

## 2023-10-18 PROCEDURE — 86850 RBC ANTIBODY SCREEN: CPT

## 2023-10-18 PROCEDURE — 99215 OFFICE O/P EST HI 40 MIN: CPT | Performed by: INTERNAL MEDICINE

## 2023-10-18 PROCEDURE — 3008F BODY MASS INDEX DOCD: CPT | Performed by: INTERNAL MEDICINE

## 2023-10-18 PROCEDURE — 83970 ASSAY OF PARATHORMONE: CPT

## 2023-10-18 PROCEDURE — 86900 BLOOD TYPING SEROLOGIC ABO: CPT

## 2023-10-18 PROCEDURE — 81001 URINALYSIS AUTO W/SCOPE: CPT

## 2023-10-18 PROCEDURE — 3062F POS MACROALBUMINURIA REV: CPT | Performed by: INTERNAL MEDICINE

## 2023-10-18 PROCEDURE — 1036F TOBACCO NON-USER: CPT | Performed by: INTERNAL MEDICINE

## 2023-10-18 ASSESSMENT — PAIN SCALES - GENERAL: PAINLEVEL: 4

## 2023-10-19 ENCOUNTER — APPOINTMENT (OUTPATIENT)
Dept: VASCULAR MEDICINE | Facility: HOSPITAL | Age: 38
End: 2023-10-19
Payer: COMMERCIAL

## 2023-10-19 ENCOUNTER — HOSPITAL ENCOUNTER (OUTPATIENT)
Dept: VASCULAR MEDICINE | Facility: HOSPITAL | Age: 38
Discharge: HOME | End: 2023-10-19
Payer: COMMERCIAL

## 2023-10-19 ENCOUNTER — OFFICE VISIT (OUTPATIENT)
Dept: VASCULAR SURGERY | Facility: HOSPITAL | Age: 38
End: 2023-10-19
Payer: COMMERCIAL

## 2023-10-19 VITALS
BODY MASS INDEX: 30.07 KG/M2 | HEART RATE: 87 BPM | HEIGHT: 66 IN | WEIGHT: 187.13 LBS | SYSTOLIC BLOOD PRESSURE: 153 MMHG | DIASTOLIC BLOOD PRESSURE: 91 MMHG | OXYGEN SATURATION: 98 %

## 2023-10-19 DIAGNOSIS — T81.30XA WOUND DEHISCENCE: Primary | ICD-10-CM

## 2023-10-19 DIAGNOSIS — D50.8 OTHER IRON DEFICIENCY ANEMIA: Primary | ICD-10-CM

## 2023-10-19 DIAGNOSIS — I73.9 PAD (PERIPHERAL ARTERY DISEASE) (CMS-HCC): ICD-10-CM

## 2023-10-19 DIAGNOSIS — N18.5 CHRONIC KIDNEY DISEASE, STAGE 5 (MULTI): ICD-10-CM

## 2023-10-19 PROBLEM — D50.9 IRON DEFICIENCY ANEMIA: Status: ACTIVE | Noted: 2023-10-19

## 2023-10-19 LAB — PTH-INTACT SERPL-MCNC: 907.4 PG/ML (ref 18.5–88)

## 2023-10-19 PROCEDURE — 3062F POS MACROALBUMINURIA REV: CPT | Performed by: SURGERY

## 2023-10-19 PROCEDURE — 4010F ACE/ARB THERAPY RXD/TAKEN: CPT | Performed by: SURGERY

## 2023-10-19 PROCEDURE — 99214 OFFICE O/P EST MOD 30 MIN: CPT | Performed by: SURGERY

## 2023-10-19 PROCEDURE — 93922 UPR/L XTREMITY ART 2 LEVELS: CPT | Performed by: SURGERY

## 2023-10-19 PROCEDURE — 3080F DIAST BP >= 90 MM HG: CPT | Performed by: SURGERY

## 2023-10-19 PROCEDURE — 3077F SYST BP >= 140 MM HG: CPT | Performed by: SURGERY

## 2023-10-19 PROCEDURE — 3066F NEPHROPATHY DOC TX: CPT | Performed by: SURGERY

## 2023-10-19 PROCEDURE — 93922 UPR/L XTREMITY ART 2 LEVELS: CPT

## 2023-10-19 PROCEDURE — 93930 UPPER EXTREMITY STUDY: CPT | Performed by: SURGERY

## 2023-10-19 PROCEDURE — 3046F HEMOGLOBIN A1C LEVEL >9.0%: CPT | Performed by: SURGERY

## 2023-10-19 PROCEDURE — 3008F BODY MASS INDEX DOCD: CPT | Performed by: SURGERY

## 2023-10-19 RX ORDER — DIPHENHYDRAMINE HYDROCHLORIDE 50 MG/ML
50 INJECTION INTRAMUSCULAR; INTRAVENOUS AS NEEDED
Status: CANCELLED | OUTPATIENT
Start: 2023-10-20

## 2023-10-19 RX ORDER — FAMOTIDINE 10 MG/ML
20 INJECTION INTRAVENOUS ONCE AS NEEDED
Status: CANCELLED | OUTPATIENT
Start: 2023-10-20

## 2023-10-19 ASSESSMENT — ENCOUNTER SYMPTOMS: DEPRESSION: 0

## 2023-10-19 ASSESSMENT — PAIN SCALES - GENERAL: PAINLEVEL: 9

## 2023-10-19 NOTE — PATIENT INSTRUCTIONS
It was a pleasure taking care of you today and appreciate your seeing us at our [facility].     Today's plan is as follows:  1) we will do soap and water to the area of the left arm wound daily - pat it dry  2) apply the medihoney into the wound as shown and then a dry dressing and your tubigrip cover as shown  3) I will confirm any plans for next Wednesday with Dr. Vazquez but for now continue to hold your chemo meds and we will confirm plans      Please call the office with any questions at [phone].   You can speak to our secretaries or our clinical nurses for specific questions.   For [facility] specific questions, you can also call [phone] or email at [email or web address]  If you need coordinating your appointments and testing you can do these at the  or by calling my office shortly after your visit.

## 2023-10-19 NOTE — PROGRESS NOTES
"Siddhartha Orellana is a 38 y.o. male here in follow up for CKD stage 5, not on dialysis    Subjective   Last seen by me a few months ago. 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 Now Cabo on hold. Also on steroids for irAE arthralgia, now down to 20 mg daily. Had a few episodes of hydradenitis suppurativa requiring hospitalization. He sees now dermatology for that and also for Xerosis cutis (Dr. Laura Engle)- prescribed some topicals and doxycycline. Developed insulin dependent diabetes and was started on insult by the ED physician, as patient does not have a PCP currently - the earliest appointment  he could schedule was January; I encouraged him to take it.  Lastly, he was seen in renal treatment clinic once or twice for iron infusion, but cancelled other appointment due to above multiple issues.     His appetite is just not there, but he tries to eat. BM regular, no dysuria, denies hypoglycemic episodes(?), reports good home BP control now. Some increased leg swelling    ROS  -negative x 10 other than above    Objective   Vital signs  /88   Pulse 79   Ht 1.676 m (5' 6\")   BMI 29.70 kg/m²     Physical Exam  Constitutional:  NAD  Psychiatric:  appropriate mood and behavior    HEENT: NC/AT, clear sclerae, moist mucous membranes  Cardiovascular: normal S1, S2, RRR,  no murmurs, gallop or rubs  Pulmonary:  Normal breath sounds  Extremities: no edema  Skin:  warm and dry    Meds      Current Outpatient Medications   Medication Instructions    amLODIPine (Norvasc) 10 mg tablet 1 tablet, oral, Daily    atorvastatin (Lipitor) 20 mg tablet 1 tablet, oral, Nightly    BD Insulin Syringe Ultra-Fine 0.5 mL 30 gauge x 1/2\" syringe USE AS DIRECTED WITH LANTUS AND INSULIN LISPRO INJECTIONS    " cabozantinib (Cabometyx) 40 mg tablet TAKE ONE (1) TABLET BY MOUTH ONCE A DAY    calcitriol (Rocaltrol) 0.25 mcg capsule 1 capsule, oral, Nightly    carvedilol (Coreg) 25 mg tablet 1 tablet, oral, 2 times daily    cholecalciferol (Vitamin D-3) 125 MCG (5000 UT) capsule 1 capsule, oral, Daily    clindamycin (Cleocin T) 1 % lotion     clindamycin (CLEOCIN) 600 mg, oral, Every 8 hours    Dexcom G7  misc FOR CONTINUOUS GLUCOSE MONITORING    Dexcom G7 Sensor device FOR CONTINUOUS GLUCOSE MONITORING, CHANGE EVERY 10 DAYS    doxycycline (Vibra-Tabs) 100 mg tablet 1 TABLET TWICE A DAY BY MOUTH WITH FOOD AND A GLASS OF WATER TAKE FOR 2-3 MONTHS USE SUN PROTECTION    famotidine (Pepcid) 20 mg tablet 1 tablet, oral, 2 times daily PRN    hydrALAZINE (Apresoline) 100 mg tablet 1 tablet, oral, 3 times daily, Take with food    hydrocortisone 2.5 % cream 1 APPLICATION TWICE A DAY TO AFFECTED AREA OF FACE (AVOID EYES) TO DARK SPOTS FOR 2-4 WEEKS    insulin lispro (HumaLOG) 100 unit/mL injection PLEASE SEE ATTACHED FOR DETAILED DIRECTIONS    labetalol (Normodyne) 300 mg tablet 3 tablets, 2 times daily, *EMERGENCY REFILL*    Lantus U-100 Insulin 100 unit/mL injection 12 UNIT(S) SUBCUTANEOUS ONCE (AT BEDTIME)    lisinopril 40 mg tablet 1 tablet, oral, Daily    loratadine (Claritin) 10 mg tablet 1 tablet, oral, Daily    Nu-Iron 150 mg iron capsule 1 capsule, oral, Nightly    omeprazole (PriLOSEC) 40 mg DR capsule 1 capsule, oral, Daily    ondansetron (Zofran) 8 mg tablet     OneTouch Delica Plus Lancet 33 gauge misc FOR GLUCOSE TESTING 3 TIMES DAILY    OneTouch Verio Flex meter misc FOR GLUCOSE TESTING 3 TIMES DAILY    OneTouch Verio test strips strip USE AS DIRECTED 3 TIMES A DAY    oxyCODONE (ROXICODONE) 5 mg, oral, Every 6 hours PRN    polyethylene glycol (Glycolax, Miralax) 17 gram packet 17 GRAM(S) ORALLY ONCE A DAY    predniSONE (DELTASONE) 20 mg, oral, Daily    torsemide (Demadex) 20 mg tablet 3 tablets, oral, Daily     triamcinolone (Kenalog) 0.1 % cream PLEASE SEE ATTACHED FOR DETAILED DIRECTIONS        Allergies  No Known Allergies     Results  No results found for this or any previous visit (from the past 24 hour(s)).   Results from last 7 days   Lab Units 10/18/23  1328   SODIUM mmol/L 144  144   POTASSIUM mmol/L 3.7  3.7   CHLORIDE mmol/L 107  107   CO2 mmol/L 21  21   BUN mg/dL 79*  79*   CREATININE mg/dL 6.87*  6.87*   EGFR mL/min/1.73m*2 10*  10*   GLUCOSE mg/dL 167*  167*   CALCIUM mg/dL 7.3*  7.3*   PHOSPHORUS mg/dL 6.6*     Results from last 7 days   Lab Units 10/18/23  1328   WBC AUTO x10*3/uL 9.2   RBC AUTO x10*6/uL 2.63*   HEMOGLOBIN g/dL 7.7*   HEMATOCRIT % 23.9*     Results from last 7 days   Lab Units 10/18/23  1328   COLOR U  Straw   PH U  5.0   SPEC GRAV UR  1.009   PROTEIN U mg/dL 100 (2+)*   BLOOD UR  NEGATIVE   NITRITE U  NEGATIVE   WBC UR /HPF 1-5       Imaging results  CT chest wo IV contrast    Result Date: 9/29/2023  Interpreted By:  LAURIE NICHOLE DO MRN: [id number] Patient Name: CARMELINA WISEMAN  STUDY: CT CHEST WO CONTRAST;  9/29/2023 3:29 pm    Impression:  1.  New approximate 4 mm ground-glass nodular density anterior right upper lobe. Unchanged appearance of pleural-based nodularity/thickening anteriorly on the left. Focal consolidation/atelectasis in the lingula. Continued attention on follow-up imaging is recommended. 2. The focal ground-glass opacity anteriorly on the right present previously has resolved. 3. There is some dependent material within the trachea and left mainstem bronchus suggesting sequela of aspiration. 4. Partially imaged minimal stranding suggested about the paraceliac region and pancreatic head unchanged in appearance. 5. Similar appearance of multifocal mixed lytic/sclerotic osseous lesions involving multiple ribs. 6. New subcutaneous nodularity/thickening at the left axillary region as described, could be sequela of infectious/inflammatory process such as hidradenitis  suppurativa. Underlying malignant/metastatic involvement cannot be entirely excluded. Clinical correlation and follow-up advised. 7. Lobulated appearance visualized upper kidneys bilaterally with suggestion of approximate 11 mm isodense lesion upper pole left kidney, suboptimally evaluated without IV contrast. Correlation with ultrasound may be considered for further assessment. 8. Additional findings as above.  MACRO: None    CT abdomen pelvis wo IV contrast  Impression:  1. There is continued improvement in gluteal cleft soft tissue swelling suggestive of hidradenitis suppurativa. There is no perineal gas or deep soft tissue extension identified. Incomplete visualization/assessment of the medial thigh regions bilaterally. 2. Similar appearance of retroperitoneal lymphadenopathy and osseous metastatic disease in the setting of known metastatic papillary renal cell carcinoma. 3. Moderate pan colonic stool burden, correlate for constipation. 4. No additional superimposed acute process within the abdomen and pelvis.       Assessment and Plan    1. CKD stage G5/A3, presumed hypertensive nephrosclerosis +/- diabetic renal disease and/or secondary FSGS . Serum creatinine today 6.87 mg/dl, rendering a very low, but stable estimated GFR of ~ 10 ml/min/1.73 m2. Does not endorse any uremic symptoms, though low appetite is concerning. He was advised to call me with any new symptoms. Continue monthly labs in [facility].   S/p L radiocephalic fistula placed by Dr. Parada; s/p revision in May by Dr. Daigle. F/u surgery with Dr. Daigle later this month for repair of skin breakdown near AVF.    2. HTN. BP on target here today. E/o volume overload on exam. Instructed to up the torsemide tabs by 1 tab for 3 days (to help leg edema), then resume current dose. Continue all other antihypertensives as are, along with low salt and exercise.     3. H/o DLD, on low dose Atorvastatin. Continue current regimen.    4. Secondary hyperparathyroidism.  On calcitriol 25 mcg Q HS. PTH today 900. Encourage low phos diet, consider phos binders.    5. Anemia with relative iron deficiency, on Ferrex 150 mg HS. H/H low. Will reorder parenteral iron in renal treatment clinic, and consider starting low dose NIGEL (previously discussed with Dr. Thomson - mutually agreed to try NIGEL for Hemoglobin below 9).     6. Papillary RCC, s/p Nivolumab. Chemo regimen as directed by Dr. Thomson.          7. DM, on insulin per ED physician recent order. Will try urgent referral to endocrinology.         RTC in 1-2 months       Tatiana Grover MD

## 2023-10-20 NOTE — PROGRESS NOTES
Patient ID: Siddhartha Orellana is a 38 y.o. male.  Diagnosis:  Papillary RCC  MedOnc: Dr. Thomson  Nephrologist: Dr. Grover  Vascular Surgeon: Dr. Daigle    Current Therapy: Cabozantinib     ONCOLOGIC HISTORY  11/7/22 - CT-guided biopsy of retroperitoneal lymph node revealed findings consistent with metastatic papillary carcinoma.  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  2/7/23 - On Prednisone 20mg daily  2/17/23 - Scans show SD (RECIST) with some mild tumor shrinkage RPLN  2/21/23 - Increase Prednisone 40mg daily for continued arthralgia; continue Cabo 40 mg daily  3/15/23 - Continue Pred 40mg PO daily, continue Cabo  End March - hidradenitis suppurativa  4/4/23 - Hold Cabo; prednisone 30 mg daily  4/25/23 - Resume cabo  5/18/23 - Hold Cabo 2/2 vascular fistula repair  5/23/23 - Continue hold due to extensive edema, blistering  6/2023 - resumed cabo. Nivo on hold with PDN 25 mg  8/16/23 - continue cabo, reduce PDN to 20 mg daily  8/23/23 - Left upper extremity wound debridement down to subcutaneous tissue and vessels- Cabo on hold  8/31/23 - continue to hold cabo, PDN back at 30mg   9/15/23 - resumed cabozantinib 40 mg   Mid Sep 23 - admission hidradenitis suppurativa  10/10/23 - cabo on hold. Will start PDN 20 mg  10/17/23 - cabo on hold for graft, PDN 20 mg  10/25/23 - cabo on hold for graft, PDN 15 mg daily    Oncology History   Renal cell carcinoma (CMS/HCC)   12/1/2022 -  Chemotherapy    Cabozantinib, 28 Day Cycles     8/28/2023 Initial Diagnosis    Renal cell carcinoma (CMS/HCC)        Past Medical History:   1. Lymphadenopathy and a pre surgical consult for kidney transplant  2. Stage V CKD  3. Type II DM  3. HTN  5. HLD  6. Parathyroidism   7. Anemia  "with NARGIS component   8. Gastric ulcer  9. SHIVA  10. Vitamin D deficiency   11. Morbid obesity  Past Medical History: Siddhartha has a past medical history of Dorsalgia, unspecified (01/04/2023), End stage renal disease (CMS/HCC) (01/04/2023), Essential (primary) hypertension (01/04/2023), Iron deficiency (02/23/2020), and Other specified postprocedural states.  Surgical History:  Siddhartha has a past surgical history that includes Other surgical history (04/11/2018).  Social History:  Siddhartha reports that he has never smoked. He does not have any smokeless tobacco history on file. He reports that he does not drink alcohol and does not use drugs.  Family History:    Family History   Problem Relation Name Age of Onset    Hypertension Mother      Cancer Father      Hypertension Father      Other (renal transplant recipient) Mother's Brother       Family Oncology History:  Cancer-related family history includes Cancer in his father.    SUBJECTIVE:    History of Present Illness:  Siddhartha Orellana is a 38 y.o. male who presents today for follow up of papillary RCC. Patient of Dr. Thomson currently on cabozantinib, nivolumab has been on hold since January due to arthralgias. He has had multiple flares of hidradenitis suppurativa and issues with his fistula healing. Presents today via phone for follow up of fistula healing - checking need for graft. Has been off of cabozantinib for anticipated surgery. Also checking on pain with decreased prednisone and addition of oxycodone. Dr. Daigle recently saw pictures and patient is waiting to hear about surgery vs no surgery. Siddhartha states that the wound is still healing but still open, getting better. He is holding cabozantinib. Prednisone dose was decreased to 20m mg daily  - pain has been okay. Since the additional of pain pills to go with the steroid he is \"pretty much cool\". Still has issues going up steps. Takes oxycodone as needed and it works, about 2-3 times a day depending on how " "much activity he does. Walked through mall 2 hours with no issues. When doing stairs it feels like his legs are bricks, not throbbing pain. Lidocaine patches at night help as well. He has appt with PCP soon, is running out of insulin, likely before that appt. He will call for refill or sooner appt.     ROS: All other systems have been reviewed and are negative for complaint unless otherwise stated in the HPI.    Allergies  No Known Allergies     Medications  Current Outpatient Medications   Medication Instructions    amLODIPine (Norvasc) 10 mg tablet 1 tablet, oral, Daily    atorvastatin (Lipitor) 20 mg tablet 1 tablet, oral, Nightly    BD Insulin Syringe Ultra-Fine 0.5 mL 30 gauge x 1/2\" syringe USE AS DIRECTED WITH LANTUS AND INSULIN LISPRO INJECTIONS    cabozantinib (Cabometyx) 40 mg tablet TAKE ONE (1) TABLET BY MOUTH ONCE A DAY    calcitriol (Rocaltrol) 0.25 mcg capsule 1 capsule, oral, Nightly    carvedilol (Coreg) 25 mg tablet 1 tablet, oral, 2 times daily    cholecalciferol (Vitamin D-3) 125 MCG (5000 UT) capsule 1 capsule, oral, Daily    clindamycin (Cleocin T) 1 % lotion     Dexcom G7  misc FOR CONTINUOUS GLUCOSE MONITORING    Dexcom G7 Sensor device FOR CONTINUOUS GLUCOSE MONITORING, CHANGE EVERY 10 DAYS    doxycycline (Vibra-Tabs) 100 mg tablet 1 TABLET TWICE A DAY BY MOUTH WITH FOOD AND A GLASS OF WATER TAKE FOR 2-3 MONTHS USE SUN PROTECTION    famotidine (Pepcid) 20 mg tablet 1 tablet, oral, 2 times daily PRN    hydrALAZINE (Apresoline) 100 mg tablet 1 tablet, oral, 3 times daily, Take with food    hydrocortisone 2.5 % cream 1 APPLICATION TWICE A DAY TO AFFECTED AREA OF FACE (AVOID EYES) TO DARK SPOTS FOR 2-4 WEEKS    insulin lispro (HumaLOG) 100 unit/mL injection PLEASE SEE ATTACHED FOR DETAILED DIRECTIONS    labetalol (Normodyne) 300 mg tablet 3 tablets, 2 times daily, *EMERGENCY REFILL*    Lantus U-100 Insulin 100 unit/mL injection 12 UNIT(S) SUBCUTANEOUS ONCE (AT BEDTIME)    lidocaine HCL 4 " % adhesive patch,medicated 1 patch, topical (top), Daily    lisinopril 40 mg tablet 1 tablet, oral, Daily    loratadine (Claritin) 10 mg tablet 1 tablet, oral, Daily    Nu-Iron 150 mg iron capsule 1 capsule, oral, Nightly    omeprazole (PriLOSEC) 40 mg DR capsule 1 capsule, oral, Daily    ondansetron (Zofran) 8 mg tablet     OneTouch Delica Plus Lancet 33 gauge misc FOR GLUCOSE TESTING 3 TIMES DAILY    OneTouch Verio Flex meter misc FOR GLUCOSE TESTING 3 TIMES DAILY    OneTouch Verio test strips strip USE AS DIRECTED 3 TIMES A DAY    oxyCODONE (ROXICODONE) 5 mg, oral, Every 6 hours PRN    polyethylene glycol (Glycolax, Miralax) 17 gram packet 17 GRAM(S) ORALLY ONCE A DAY    predniSONE (DELTASONE) 15 mg, oral, Daily    torsemide (Demadex) 20 mg tablet 3 tablets, oral, Daily    triamcinolone (Kenalog) 0.1 % cream PLEASE SEE ATTACHED FOR DETAILED DIRECTIONS        OBJECTIVE:    VS / Pain:  There were no vitals taken for this visit.  BSA: There is no height or weight on file to calculate BSA.  Wt Readings from Last 5 Encounters:   10/11/23 83.5 kg (184 lb)   10/19/23 84.9 kg (187 lb 2 oz)   10/05/23 82.7 kg (182 lb 6.4 oz)   10/03/23 81 kg (178 lb 9.2 oz)   06/15/23 86.4 kg (190 lb 8 oz)      Pain Scale: 3    Physical Exam: Alert and oriented x3, no signs of distress, alert and cooperative, voice is clear, no slurred speech, speaking in full sentences with organized thought pattern. No coughing or audible wheezing during phone visit.    Performance Status:  ECOG Score: 1- Restricted in physically strenuous activity.  Carries out light duty.  Karnofsky Score: 80 - Normal activity with effort; some signs or symptoms of disease    Diagnostic Results     Assessment/Plan   39yo AA man with hx of stage IV CKD with metastatic papRCC to LNs now on cabo/nivo. He has CKD and planned for kidney dialysis. Known proteinuria +++ (known). Still with sig joint pains and dealing with hyperglycemias. On cabo (nivo on hold d/t arthritis  "(PDN) 30 mg PDN. SD in recent scans. Will add opioid meds. And check in few days - we might have to increase steroids if not better   Pain is much better on opioid so will taper off prednisone to 20 mg and check in next week as his arthralgias might not be fully IO-related.    Surgery was canceled. Patient waiting on follow up from surgery team. May be able to hold off on surgery/graft. I have been in touch with Dr. Diagle. He continues to hold cabozantinib. Instructed to restart as soon as he hears \"no surgery\" from Dr. Daigle.     Will decrease prednisone to 15 mg daily. Tolerating 20 mg daily dose this past week with oxycodone for breakthrough.   Sent prednisone 5 mg tabs.       Diagnoses and all orders for this visit:  Arthralgia, unspecified joint (Primary)  -     Discontinue: lidocaine HCL 4 % adhesive patch,medicated; Apply 1 patch topically once daily.  -     Discontinue: predniSONE (Deltasone) 5 mg tablet; Take 3 tablets (15 mg) by mouth once daily.  -     predniSONE (Deltasone) 5 mg tablet; Take 3 tablets (15 mg) by mouth once daily.  -     lidocaine HCL 4 % adhesive patch,medicated; Apply 1 patch topically once daily.  Renal cell carcinoma, unspecified laterality (CMS/HCC)  -     Clinic Appointment Request Follow Up; [facility]; [facility]  -     Clinic Appointment Request Follow Up; [facility] ([facility]); Future  -     Discontinue: lidocaine HCL 4 % adhesive patch,medicated; Apply 1 patch topically once daily.  -     Discontinue: predniSONE (Deltasone) 5 mg tablet; Take 3 tablets (15 mg) by mouth once daily.  -     predniSONE (Deltasone) 5 mg tablet; Take 3 tablets (15 mg) by mouth once daily.  -     lidocaine HCL 4 % adhesive patch,medicated; Apply 1 patch topically once daily.        Treatment Plan:  Cabozantinib, 28 Day Cycles  - Hold cabozantinib in anticipation of surgery  - Start cabozantinib if Dr. Daigle says no surgery is needed  - Decrease to prednisone 15 mg daily  - " Continue oxycodone for breakthrough pain  - Lidocaine patches as needed  - Follow up visit in 3 weeks at [facility].      Verbal consent was requested and obtained from patient on this date for a telehealth visit.    Patient verbalizes understanding of above plan. Time provided for patient's questions. All questions answered to patient's satisfaction in office. Patient instructed to reach out for any new concerning issues at [phone].    Tatiana Rodriguez MSN, APRN, A-GNP-C, OCN   Oncology   [address] [address]   [facility]  Phone: [phone]  [email or web address]\[email or web address]".[facility]

## 2023-10-23 ENCOUNTER — PHARMACY VISIT (OUTPATIENT)
Dept: PHARMACY | Facility: CLINIC | Age: 38
End: 2023-10-23
Payer: MEDICAID

## 2023-10-24 ENCOUNTER — APPOINTMENT (OUTPATIENT)
Dept: HEMATOLOGY/ONCOLOGY | Facility: HOSPITAL | Age: 38
End: 2023-10-24
Payer: COMMERCIAL

## 2023-10-25 ENCOUNTER — TELEMEDICINE (OUTPATIENT)
Dept: HEMATOLOGY/ONCOLOGY | Facility: HOSPITAL | Age: 38
End: 2023-10-25
Payer: COMMERCIAL

## 2023-10-25 ENCOUNTER — TELEPHONE (OUTPATIENT)
Dept: VASCULAR SURGERY | Facility: CLINIC | Age: 38
End: 2023-10-25
Payer: COMMERCIAL

## 2023-10-25 DIAGNOSIS — C64.9 RENAL CELL CARCINOMA, UNSPECIFIED LATERALITY (MULTI): ICD-10-CM

## 2023-10-25 DIAGNOSIS — M25.50 ARTHRALGIA, UNSPECIFIED JOINT: Primary | ICD-10-CM

## 2023-10-25 PROCEDURE — 99442 PR PHYS/QHP TELEPHONE EVALUATION 11-20 MIN: CPT

## 2023-10-25 RX ORDER — PREDNISONE 5 MG/1
15 TABLET ORAL DAILY
Qty: 90 TABLET | Refills: 1 | Status: SHIPPED | OUTPATIENT
Start: 2023-10-25 | End: 2023-11-21 | Stop reason: SDUPTHER

## 2023-10-25 RX ORDER — PREDNISONE 5 MG/1
15 TABLET ORAL DAILY
Qty: 90 TABLET | Refills: 1 | Status: SHIPPED | OUTPATIENT
Start: 2023-10-25 | End: 2023-10-25 | Stop reason: SDUPTHER

## 2023-10-30 ENCOUNTER — OFFICE VISIT (OUTPATIENT)
Dept: DERMATOLOGY | Facility: HOSPITAL | Age: 38
End: 2023-10-30
Payer: COMMERCIAL

## 2023-10-30 DIAGNOSIS — L30.4 INTERTRIGO: Primary | ICD-10-CM

## 2023-10-30 DIAGNOSIS — L73.2 HIDRADENITIS SUPPURATIVA: ICD-10-CM

## 2023-10-30 DIAGNOSIS — R21 RASH AND OTHER NONSPECIFIC SKIN ERUPTION: ICD-10-CM

## 2023-10-30 PROCEDURE — 99214 OFFICE O/P EST MOD 30 MIN: CPT | Performed by: DERMATOLOGY

## 2023-10-30 PROCEDURE — 3008F BODY MASS INDEX DOCD: CPT | Performed by: DERMATOLOGY

## 2023-10-30 PROCEDURE — 3077F SYST BP >= 140 MM HG: CPT | Performed by: DERMATOLOGY

## 2023-10-30 PROCEDURE — 11104 PUNCH BX SKIN SINGLE LESION: CPT | Mod: GC

## 2023-10-30 PROCEDURE — 3062F POS MACROALBUMINURIA REV: CPT | Performed by: DERMATOLOGY

## 2023-10-30 PROCEDURE — 3066F NEPHROPATHY DOC TX: CPT | Performed by: DERMATOLOGY

## 2023-10-30 PROCEDURE — 3046F HEMOGLOBIN A1C LEVEL >9.0%: CPT | Performed by: DERMATOLOGY

## 2023-10-30 PROCEDURE — 88305 TISSUE EXAM BY PATHOLOGIST: CPT | Performed by: DERMATOLOGY

## 2023-10-30 PROCEDURE — 3080F DIAST BP >= 90 MM HG: CPT | Performed by: DERMATOLOGY

## 2023-10-30 PROCEDURE — 11104 PUNCH BX SKIN SINGLE LESION: CPT

## 2023-10-30 PROCEDURE — 99214 OFFICE O/P EST MOD 30 MIN: CPT | Mod: GC,25 | Performed by: DERMATOLOGY

## 2023-10-30 PROCEDURE — 88312 SPECIAL STAINS GROUP 1: CPT | Performed by: DERMATOLOGY

## 2023-10-30 PROCEDURE — 4010F ACE/ARB THERAPY RXD/TAKEN: CPT | Performed by: DERMATOLOGY

## 2023-10-30 RX ORDER — KETOCONAZOLE 20 MG/G
CREAM TOPICAL
Qty: 60 G | Refills: 3 | Status: SHIPPED | OUTPATIENT
Start: 2023-10-30 | End: 2023-11-21 | Stop reason: SDUPTHER

## 2023-10-30 ASSESSMENT — ITCH NUMERIC RATING SCALE
HOW SEVERE IS YOUR ITCHING?: 0
HOW SEVERE IS YOUR ITCHING?: 8

## 2023-10-30 ASSESSMENT — DERMATOLOGY PATIENT ASSESSMENT
HAVE YOU HAD OR DO YOU HAVE A STAPH INFECTION: NO
DO YOU USE A TANNING BED: NO
ARE YOU AN ORGAN TRANSPLANT RECIPIENT: NO
DO YOU HAVE ANY NEW OR CHANGING LESIONS: NO
HAVE YOU HAD OR DO YOU HAVE VASCULAR DISEASE: NO

## 2023-10-30 ASSESSMENT — DERMATOLOGY QUALITY OF LIFE (QOL) ASSESSMENT
ARE THERE EXCLUSIONS OR EXCEPTIONS FOR THE QUALITY OF LIFE ASSESSMENT: NO
DATE THE QUALITY-OF-LIFE ASSESSMENT WAS COMPLETED: 66777
RATE HOW BOTHERED YOU ARE BY SYMPTOMS OF YOUR SKIN PROBLEM (EG, ITCHING, STINGING BURNING, HURTING OR SKIN IRRITATION): 1
RATE HOW EMOTIONALLY BOTHERED YOU ARE BY YOUR SKIN PROBLEM (FOR EXAMPLE, WORRY, EMBARRASSMENT, FRUSTRATION): 1
RATE HOW BOTHERED YOU ARE BY EFFECTS OF YOUR SKIN PROBLEMS ON YOUR ACTIVITIES (EG, GOING OUT, ACCOMPLISHING WHAT YOU WANT, WORK ACTIVITIES OR YOUR RELATIONSHIPS WITH OTHERS): 1

## 2023-10-30 ASSESSMENT — PATIENT GLOBAL ASSESSMENT (PGA): PATIENT GLOBAL ASSESSMENT: PATIENT GLOBAL ASSESSMENT:  2 - MILD

## 2023-10-31 NOTE — PROGRESS NOTES
I was present for the entirety of the procedure(s).  I saw and evaluated the patient. I personally obtained the key and critical portions of the history and physical exam or was physically present for key and critical portions performed by the resident/fellow. I reviewed the resident/fellow's documentation and discussed the patient with the resident/fellow. I agree with the resident/fellow's medical decision making as documented in the note.      Laura Engle MD

## 2023-10-31 NOTE — PROGRESS NOTES
Subjective     Siddhartha Orellana is a 38 y.o. male who presents for the following: Hidradenitis Suppurativa and Rash (Present for 2 wks).     Patient has papillary renal cell carcinoma, previously on Nivolumab and Cabozantinib. Patient was last seen in clinic in August 2023 for Hidradenitis Suppurativa. At that time he was started on doxycycline 100mg BID and topical clindamycin + benzoyl peroxide wash.  Patient was seen in the ED on 9/28/23 and prescribed a 10 day course of Clindamycin for HS. He has not resumed the doxcycline since completing that course of treatment. Since that time patient reports that his HS has been stable without any new lesions appearing.    Patient also presents for evaluation of several rashes that he reports are new. Over the past month the patient states that he has developed a rash on his chest, back and upper legs. The rash developed rather quickly over a couple days. He reports that the rash is very itchy. He has not tried any medications for this, however he has been on prednisone 20mg daily for several weeks.     The patient also reports having a painful rash in his groin that has been present for several weeks. He is currently using a barrier cream that he was told to use, with only mild improvement.     Review of Systems:  No other skin or systemic complaints other than what is documented elsewhere in the note.    The following portions of the chart were reviewed this encounter and updated as appropriate:  Tobacco  Allergies  Meds  Problems  Med Hx  Surg Hx       Skin Cancer History  No skin cancer on file.    Specialty Problems    None    Past Medical History:  Siddhartha Orellana  has a past medical history of Dorsalgia, unspecified (01/04/2023), End stage renal disease (CMS/HCC) (01/04/2023), Essential (primary) hypertension (01/04/2023), Iron deficiency (02/23/2020), and Other specified postprocedural states.    Past Surgical History:  Siddhartha Orellana  has a past surgical history  that includes Other surgical history (04/11/2018).    Family History:  Patient family history includes Cancer in his father; Hypertension in his father and mother; renal transplant recipient in his mother's brother.    Social History:  Siddhartha Orellana  reports that he has never smoked. He does not have any smokeless tobacco history on file. He reports that he does not drink alcohol and does not use drugs.    Allergies:  Patient has no known allergies.    Current Medications / CAM's:    Current Outpatient Medications:     ketoconazole (NIZOral) 2 % cream, Apply twice daily to the painful rash in the groin, Disp: 60 g, Rfl: 3    lidocaine HCL 4 % adhesive patch,medicated, Apply 1 patch topically once daily., Disp: 15 patch, Rfl: 3    oxyCODONE (Roxicodone) 5 mg immediate release tablet, Take 1 tablet (5 mg) by mouth every 6 hours if needed for moderate pain (4 - 6)., Disp: 120 tablet, Rfl: 0    predniSONE (Deltasone) 5 mg tablet, Take 3 tablets (15 mg) by mouth once daily., Disp: 90 tablet, Rfl: 1     Objective   Well appearing patient in no apparent distress; mood and affect are within normal limits.    A full examination was performed including scalp, head, eyes, ears, nose, lips, neck, chest, axillae, abdomen, back, buttocks, bilateral upper extremities, bilateral lower extremities, hands, feet, fingers, toes, fingernails, and toenails. All findings within normal limits unless otherwise noted below.    Assessment/Plan   1. Intertrigo  Left Medial Thigh, Right Medial Thigh  Erythematous  and macerated plaques with satellite papules in the bilateral inguinal folds       Intertrigo - Bilateral Inguinal Folds  - Discussed the chronic, inflammatory, and potentially recurrent nature of the condition.  - START Ketoconazole 2% cream to be applied twice daily to the affected areas until the condition is improved.  - Discussed that the condition is worsened by skin-to-skin contact and moisture. Recommend to keep the area as  dry as possible.  - Advised the patient to discontinue use of topical triamcinolone cream as this can have adverse effects when applied frequently in the skin folds.    Related Medications  ketoconazole (NIZOral) 2 % cream  Apply twice daily to the painful rash in the groin    2. Rash and other nonspecific skin eruption  Left Abdomen (side) - Lower, Left Abdomen (side) - Upper, Left Breast, Left Lower Back, Left Upper Back, Right Abdomen (side) - Lower, Right Abdomen (side) - Upper, Right Breast, Right Lower Back, Right Upper Back  Some annular, some round erythematous to copper brown plaques with areas of central and peripheral scale                      Dermatitis  Clinical differential diagnosis includes SCLE versus pityriasis rosea versus psoriasis versus drug eruption versus syphilis  - Patient has developed a persistent rash that is pruritic while on daily prednisone (at least 20mg based on chart review). The morphology is most suggestive of SCLE, potentially drug-induced. He has been off immunotherapy since Jan 2023, therefore unlikely that this would be an immunotherapy induced cutaneous reaction.  - Discussed the differential diagnosis with the patient and recommended that we obtain a punch biopsy of the rash in order to obtain further diagnostic information.   - Discussed the risks and benefits of a punch biopsy, patient expresses understanding and would like to proceed with a skin biopsy today.    Lesion biopsy - Left Abdomen (side) - Lower, Left Abdomen (side) - Upper, Left Breast, Left Lower Back, Left Upper Back, Right Abdomen (side) - Lower, Right Abdomen (side) - Upper, Right Breast, Right Lower Back, Right Upper Back  Type of biopsy: punch    Informed consent: discussed and consent obtained    Timeout: patient name, date of birth, surgical site, and procedure verified    Procedure prep:  Patient was prepped and draped  Anesthesia: the lesion was anesthetized in a standard fashion    Anesthetic:  1%  lidocaine w/ epinephrine 1-100,000 local infiltration  Punch size:  4 mm  Suture size:  4-0  Suture type: Prolene (polypropylene)    Suture removal (days):  14  Hemostasis achieved with: suture    Outcome: patient tolerated procedure well    Post-procedure details: sterile dressing applied and wound care instructions given    Dressing type: bandage and petrolatum      Specimen 1 - Dermatopathology- DERM LAB  Differential Diagnosis: SCLE versus Pityriasis Rosea versus Psoriasis versus Other Papulosquamous versus Syphilis  Check Margins Yes/No?:    Comments:  39 yo M with hx of kidney cancer previously on immunotherapy and tyrosine kinase inhibitor - off therapy for 1 month  Dermpath Lab: Routine Histopathology (formalin-fixed tissue)    Related Procedures  Follow Up In Dermatology - Established Patient    3. Hidradenitis suppurativa  Left Axilla, Right Axilla  Open sinus tracts present in the bilateral axilla with cribriform scarring.      Hidradenitis Suppurativa - longstanding history of HS  - RESTART doxycycline 100mg BID today  - Discussed with patient that clindamycin taken by mouth as monotherapy is not advised for HS due to high prevalence of antibiotic resistance  - Continue topical clindamycin 1% lotion, to be applied in all affected regions where he has HS  - Continue benzoyl peroxide wash, to be applied to affected areas of HS - advised to avoid in the inguinal folds at this time given his intertrigo, can resume once well-healed  - He is not currently on immunotherapy, which was a triggering factor for his prior flare    RTC in 2 weeks for suture removal and follow up of dermatitis and intertrigo

## 2023-11-01 LAB
LABORATORY COMMENT REPORT: NORMAL
PATH REPORT.FINAL DX SPEC: NORMAL
PATH REPORT.GROSS SPEC: NORMAL
PATH REPORT.RELEVANT HX SPEC: NORMAL
PATH REPORT.TOTAL CANCER: NORMAL

## 2023-11-03 NOTE — PROGRESS NOTES
F/U REASON: wound check    CURRENT ENCOUNTER:  Siddhartha Orellana is 38 y.o. male here for follow up of wound check of left arm AV fistula.   Since his last visit, we started Medihoney.  Patient has sent over images to us noted here in the note.  Things appear to be drying up and doing well.  He remains off his chemo.  I think at this point we can continue to do wound care and close monitoring and try to avoid major flaps or surgeries.  I think overall giving the tissue a little more time with better wound care will be a good solution.  We discussed the risk of failure.  Patient understands and is agreeable with the plan.          Meds:   Current Outpatient Medications:     oxyCODONE (Roxicodone) 5 mg immediate release tablet, Take 1 tablet (5 mg) by mouth every 6 hours if needed for moderate pain (4 - 6)., Disp: 120 tablet, Rfl: 0    ketoconazole (NIZOral) 2 % cream, Apply twice daily to the painful rash in the groin, Disp: 60 g, Rfl: 3    lidocaine HCL 4 % adhesive patch,medicated, Apply 1 patch topically once daily., Disp: 15 patch, Rfl: 3    predniSONE (Deltasone) 5 mg tablet, Take 3 tablets (15 mg) by mouth once daily., Disp: 90 tablet, Rfl: 1    Allergies:   No Known Allergies    ROS:  Review of Systems otherwise unremarkable    Objective:  Vitals:  Vitals:    10/19/23 1538   BP: (!) 153/91   Pulse: 87   SpO2: 98%      Images as above    Labs:  Lab Results   Component Value Date    WBC 9.2 10/18/2023    WBC CANCELED 09/23/2023    WBC 13.8 (H) 09/22/2023    HGB 7.7 (L) 10/18/2023    HGB CANCELED 09/23/2023    HGB 7.6 (L) 09/22/2023    HCT 23.9 (L) 10/18/2023    HCT CANCELED 09/23/2023    HCT 22.8 (L) 09/22/2023    MCV 91 10/18/2023    MCV CANCELED 09/23/2023    MCV 87 09/22/2023     10/18/2023     Lab Results   Component Value Date    CREATININE 6.87 (H) 10/18/2023    CREATININE 6.87 (H) 10/18/2023    CREATININE CANCELED 09/23/2023    BUN 79 (H) 10/18/2023    BUN 79 (H) 10/18/2023    BUN CANCELED 09/23/2023      10/18/2023     10/18/2023    NA CANCELED 09/23/2023    K 3.7 10/18/2023    K 3.7 10/18/2023    K CANCELED 09/23/2023     10/18/2023     10/18/2023    CL CANCELED 09/23/2023    CO2 21 10/18/2023    CO2 21 10/18/2023    CO2 CANCELED 09/23/2023         Assessment & Plan:  Siddhartha Orellana is 38 y.o. male here for follow up of wound check of left arm AV fistula.   Since his last visit, we started Medihoney.  Patient has sent over images to us noted here in the note.  Things appear to be drying up and doing well.  He remains off his chemo.  I think at this point we can continue to do wound care and close monitoring and try to avoid major flaps or surgeries.  I think overall giving the tissue a little more time with better wound care will be a good solution.  We discussed the risk of failure.  Patient understands and is agreeable with the plan.    Will plan to see him in a couple of weeks    Val Daigle MD, MHS, RPVI  , [employer]  Director, [facility], [facility]  Co-Director, Vascular Laboratories, [facility]  Division of Vascular Surgery and Endovascular Therapy  [facility]

## 2023-11-07 ENCOUNTER — TELEPHONE (OUTPATIENT)
Dept: HEMATOLOGY/ONCOLOGY | Facility: HOSPITAL | Age: 38
End: 2023-11-07
Payer: COMMERCIAL

## 2023-11-07 NOTE — TELEPHONE ENCOUNTER
Called patient to check on wound healing at AVF site. Continuing to heal. Off cabozantinib. Scheduled to see me on 11/16. Will assess site and likely restart cabozantinib.   Tatiana Rodriguez, JAIME-CNP

## 2023-11-11 ENCOUNTER — PHARMACY VISIT (OUTPATIENT)
Dept: PHARMACY | Facility: CLINIC | Age: 38
End: 2023-11-11
Payer: MEDICAID

## 2023-11-11 ENCOUNTER — SPECIALTY PHARMACY (OUTPATIENT)
Dept: PHARMACY | Facility: CLINIC | Age: 38
End: 2023-11-11

## 2023-11-11 PROCEDURE — RXMED WILLOW AMBULATORY MEDICATION CHARGE

## 2023-11-13 ENCOUNTER — OFFICE VISIT (OUTPATIENT)
Dept: DERMATOLOGY | Facility: HOSPITAL | Age: 38
End: 2023-11-13
Payer: COMMERCIAL

## 2023-11-13 DIAGNOSIS — L40.9 PSORIASIS: ICD-10-CM

## 2023-11-13 DIAGNOSIS — L73.2 HIDRADENITIS SUPPURATIVA: Primary | ICD-10-CM

## 2023-11-13 PROCEDURE — 3066F NEPHROPATHY DOC TX: CPT | Performed by: DERMATOLOGY

## 2023-11-13 PROCEDURE — 3077F SYST BP >= 140 MM HG: CPT | Performed by: DERMATOLOGY

## 2023-11-13 PROCEDURE — 3080F DIAST BP >= 90 MM HG: CPT | Performed by: DERMATOLOGY

## 2023-11-13 PROCEDURE — 3062F POS MACROALBUMINURIA REV: CPT | Performed by: DERMATOLOGY

## 2023-11-13 PROCEDURE — 99214 OFFICE O/P EST MOD 30 MIN: CPT | Performed by: DERMATOLOGY

## 2023-11-13 PROCEDURE — 3008F BODY MASS INDEX DOCD: CPT | Performed by: DERMATOLOGY

## 2023-11-13 PROCEDURE — 3046F HEMOGLOBIN A1C LEVEL >9.0%: CPT | Performed by: DERMATOLOGY

## 2023-11-13 PROCEDURE — 4010F ACE/ARB THERAPY RXD/TAKEN: CPT | Performed by: DERMATOLOGY

## 2023-11-13 ASSESSMENT — DERMATOLOGY QUALITY OF LIFE (QOL) ASSESSMENT
ARE THERE EXCLUSIONS OR EXCEPTIONS FOR THE QUALITY OF LIFE ASSESSMENT: NO
RATE HOW BOTHERED YOU ARE BY SYMPTOMS OF YOUR SKIN PROBLEM (EG, ITCHING, STINGING BURNING, HURTING OR SKIN IRRITATION): 1
RATE HOW BOTHERED YOU ARE BY EFFECTS OF YOUR SKIN PROBLEMS ON YOUR ACTIVITIES (EG, GOING OUT, ACCOMPLISHING WHAT YOU WANT, WORK ACTIVITIES OR YOUR RELATIONSHIPS WITH OTHERS): 1
RATE HOW EMOTIONALLY BOTHERED YOU ARE BY YOUR SKIN PROBLEM (FOR EXAMPLE, WORRY, EMBARRASSMENT, FRUSTRATION): 1
DATE THE QUALITY-OF-LIFE ASSESSMENT WAS COMPLETED: 66791

## 2023-11-13 ASSESSMENT — DERMATOLOGY PATIENT ASSESSMENT
ARE YOU AN ORGAN TRANSPLANT RECIPIENT: NO
HAVE YOU HAD OR DO YOU HAVE A STAPH INFECTION: YES
DO YOU USE A TANNING BED: NO
DO YOU HAVE ANY NEW OR CHANGING LESIONS: NO
HAVE YOU HAD OR DO YOU HAVE VASCULAR DISEASE: NO

## 2023-11-13 ASSESSMENT — ITCH NUMERIC RATING SCALE: HOW SEVERE IS YOUR ITCHING?: 6

## 2023-11-13 ASSESSMENT — PATIENT GLOBAL ASSESSMENT (PGA): PATIENT GLOBAL ASSESSMENT: PATIENT GLOBAL ASSESSMENT:  1 - CLEAR

## 2023-11-13 NOTE — PATIENT INSTRUCTIONS
Triamcinolone ointment   - keep using 2x per day to any new spots  - reduce to 3x per week ( MWF ) for old areas  - use your moisturizer on other days  - Use Eucerin CREAM

## 2023-11-13 NOTE — PROGRESS NOTES
Subjective     Siddhartha Orellana is a 38 y.o. male who presents for the following: Rash.     Patient has papillary renal cell carcinoma, previously on Nivolumab and Cabozantinib. Nivolumab has been on hold since 1/2023. Cabozanitinib has also been held for graft; last dose 9/15/23    Rash started Sept - Oct 2023. Biopsied 10/30/23 demonstrated parakeratosis and orthokeratosis with neutrophils most consistent with psoriasis based on the clinical presentation. The path could also fit with pityriasis rubra pilaris; this could be reconsidered if he is worsening or not responding.        Review of Systems:  No other skin or systemic complaints other than what is documented elsewhere in the note.    The following portions of the chart were reviewed this encounter and updated as appropriate:  Tobacco  Allergies  Meds  Problems  Med Hx  Surg Hx       Skin Cancer History  No skin cancer on file.    Specialty Problems          Dermatology Problems    Psoriasis     Rash started Sept - Oct 2023. Biopsied 10/30/23 demonstrated parakeratosis and orthokeratosis with neutrophils most consistent with psoriasis based on the clinical presentation. The path could also fit with pityriasis rubra pilaris; this could be reconsidered if he is worsening or not responding    ?Triggered by cabozantinib. Could also consider hypertension medication such as beta-blocker          Past Medical History:  Siddhartha Orellana  has a past medical history of Dorsalgia, unspecified (01/04/2023), End stage renal disease (CMS/HCC) (01/04/2023), Essential (primary) hypertension (01/04/2023), Iron deficiency (02/23/2020), and Other specified postprocedural states.    Past Surgical History:  Siddhartha Orellana  has a past surgical history that includes Other surgical history (04/11/2018).    Family History:  Patient family history includes Cancer in his father; Hypertension in his father and mother; renal transplant recipient in his mother's brother.    Social  History:  Siddhartha Orellana  reports that he has never smoked. He does not have any smokeless tobacco history on file. He reports that he does not drink alcohol and does not use drugs.    Allergies:  Patient has no known allergies.    Current Medications / CAM's:    Current Outpatient Medications:     eucerin cream, Apply topically if needed for dry skin., Disp: 453 g, Rfl: 11    ketoconazole (NIZOral) 2 % cream, Apply twice daily to the painful rash in the groin, Disp: 60 g, Rfl: 3    lidocaine HCL 4 % adhesive patch,medicated, Apply 1 patch topically once daily., Disp: 15 patch, Rfl: 3    oxyCODONE (Roxicodone) 5 mg immediate release tablet, Take 1 tablet (5 mg) by mouth every 6 hours if needed for moderate pain (4 - 6)., Disp: 120 tablet, Rfl: 0    predniSONE (Deltasone) 5 mg tablet, Take 3 tablets (15 mg) by mouth once daily., Disp: 90 tablet, Rfl: 1     Objective   Well appearing patient in no apparent distress; mood and affect are within normal limits.    A full examination was performed including scalp, head, eyes, ears, nose, lips, neck, chest, axillae, abdomen, back, buttocks, bilateral upper extremities, bilateral lower extremities, hands, feet, fingers, toes, fingernails, and toenails. All findings within normal limits unless otherwise noted below.    Assessment/Plan   1. Hidradenitis suppurativa  Intertriginous    Continue plan from last visit of doxy PO, BP wash, and clinda lotion  Eval at next visit    2. Psoriasis  Mostly dry desquamation. A few remaining pink papules on upper arms. Significantly improved. Active lesions involved 2-3% BSA. Dry desquamation involves 10-12% BSA.     Favor drug-induced psoriasis, cabozantinib  - Biopsied 10/30/23 demonstrated parakeratosis and orthokeratosis with neutrophils most consistent with psoriasis based on the clinical presentation. The path could also fit with pityriasis rubra pilaris; this could be reconsidered if he is worsening or not responding  - improving with  topical steroids, he is still on prednisone PO for his graft failure    - decrease the topical steroids as instructed in AVS  - switch eucerin from lotion to cream  - warned that rash may flare with prednisone taper, will monitor closely    Related Procedures  Follow Up In Dermatology - Established Patient  Follow Up In Dermatology - Established Patient    Related Medications  eucerin cream  Apply topically if needed for dry skin.    Return to clinic 4 weeks for close follow up of psoriasis and HS

## 2023-11-13 NOTE — PROGRESS NOTES
Patient ID: Siddhartha Orellana is a 38 y.o. male.  Diagnosis:  Papillary RCC  MedOnc: Dr. Thomson  Nephrologist: Dr. Grover  Vascular Surgeon: Dr. Daigle    Current Therapy: Cabozantinib     ONCOLOGIC HISTORY  11/7/22 - CT-guided biopsy of retroperitoneal lymph node revealed findings consistent with metastatic papillary carcinoma.  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  2/7/23 - On Prednisone 20mg daily  2/17/23 - Scans show SD (RECIST) with some mild tumor shrinkage RPLN  2/21/23 - Increase Prednisone 40mg daily for continued arthralgia; continue Cabo 40 mg daily  3/15/23 - Continue Pred 40mg PO daily, continue Cabo  End March - hidradenitis suppurativa  4/4/23 - Hold Cabo; prednisone 30 mg daily  4/25/23 - Resume cabo  5/18/23 - Hold Cabo 2/2 vascular fistula repair  5/23/23 - Continue hold due to extensive edema, blistering  6/2023 - resumed cabo. Nivo on hold with PDN 25 mg  8/16/23 - continue cabo, reduce PDN to 20 mg daily  8/23/23 - Left upper extremity wound debridement down to subcutaneous tissue and vessels- Cabo on hold  8/31/23 - continue to hold cabo, PDN back at 30mg   9/15/23 - resumed cabozantinib 40 mg   Mid Sep 23 - admission hidradenitis suppurativa  10/10/23 - cabo on hold. Will start PDN 20 mg  10/17/23 - cabo on hold for graft, PDN 20 mg  10/25/23 - cabo on hold for graft, PDN 15 mg daily  11/16/23 - continue cabozantinib hold, PDN 10 mg daily    Oncology History   Renal cell carcinoma (CMS/HCC)   12/1/2022 -  Chemotherapy    Cabozantinib, 28 Day Cycles     8/28/2023 Initial Diagnosis    Renal cell carcinoma (CMS/HCC)        Past Medical History:   1. Lymphadenopathy and a pre surgical consult for kidney transplant  2. Stage V CKD  3.  Type II DM  3. HTN  5. HLD  6. Parathyroidism   7. Anemia with NARGIS component   8. Gastric ulcer  9. SHIVA  10. Vitamin D deficiency   11. Morbid obesity  Past Medical History: Siddhartha has a past medical history of Dorsalgia, unspecified (01/04/2023), End stage renal disease (CMS/HCC) (01/04/2023), Essential (primary) hypertension (01/04/2023), Iron deficiency (02/23/2020), and Other specified postprocedural states.  Surgical History:  Siddhartha has a past surgical history that includes Other surgical history (04/11/2018).  Social History:  Siddhartha reports that he has quit smoking. His smoking use included cigars. He does not have any smokeless tobacco history on file. He reports that he does not drink alcohol and does not use drugs.  Family History:    Family History   Problem Relation Name Age of Onset    Hypertension Mother      Cancer Father      Hypertension Father      Other (renal transplant recipient) Mother's Brother       Family Oncology History:  Cancer-related family history includes Cancer in his father.    SUBJECTIVE:    History of Present Illness:  Siddhartha Orellana is a 38 y.o. male who presents today for follow up of papillary RCC. Patient of Dr. Thomson currently on cabozantinib, nivolumab has been on hold since January due to arthralgias. He has had multiple flares of hidradenitis suppurativa and issues with his fistula healing. Patient presents today continues to be on a break from cabozantinib for poor healing of his fistula repair that was done in May. Cabozantinib hold began in the beginning of October, a graft was anticipated. Following with Dr. Daigle who saw Mr. Orellana today, trying to avoid surgical graft and revision. Wound continues to heal per patient.  Added oxycodone in attempts to decrease prednisone dose to taper off. He has been on prednisone since January, most recently patient was instructed to decrease to 15 mg daily. Had been on 20 mg and pain was controlled with oxycodone. He is struggling  on 15 mg and taking more oxycodone now, 3-4 a day. He ran out of oxycodone so he took 30 mg of prednisone that day. Mostly the pain is in his legs, describes it as feeling like bricks. Especially hard when he needs to do a lot in a day and on stairs.  Lidocaine patches at night help with leg pain. Since our last visit he was diagnosed with psoriasis by Dr. Alexandra. No treatment at this time. His HS is not currently flaring. He is having issues with nausea. Every morning when he wakes up he is nauseated. Zofran doesn't help. He is asking for the antiemetic he had in the hospital but we were unsure of what it was. He states his blood sugars are normally in the 60's when he wakes up, he does not endorse shakiness or diaphoresis, just nausea. We discussed potential causes such as medication induced, CKD, hypoglycemia in the AM, etc. He had been losing weight and having a poor appetite, that has resolved. No current signs and symptoms of infection. His energy level isn't great, no longer working, doesn't get out of the house much.      Review of Systems   Constitutional:  Negative for appetite change, chills, diaphoresis, fatigue, fever and unexpected weight change.   HENT:   Negative for mouth sores, sore throat and voice change.    Eyes:  Negative for eye problems.   Respiratory:  Negative for chest tightness, cough and shortness of breath.    Cardiovascular:  Negative for chest pain and leg swelling.   Gastrointestinal:  Positive for nausea. Negative for abdominal pain, constipation, diarrhea and vomiting.   Musculoskeletal:  Positive for arthralgias, gait problem and myalgias.   Skin:  Positive for wound. Negative for itching and rash.   Neurological:  Positive for extremity weakness and gait problem. Negative for dizziness and light-headedness.   Psychiatric/Behavioral: Negative.         Allergies  No Known Allergies     Medications  Current Outpatient Medications   Medication Instructions    amLODIPine (Norvasc)  "10 mg tablet 1 tablet, oral, Daily    atorvastatin (Lipitor) 20 mg tablet 1 tablet, oral, Nightly    BD Insulin Syringe Ultra-Fine 0.5 mL 30 gauge x 1/2\" syringe USE AS DIRECTED WITH LANTUS AND INSULIN LISPRO INJECTIONS    cabozantinib (Cabometyx) 40 mg tablet TAKE ONE (1) TABLET BY MOUTH ONCE A DAY    calcitriol (Rocaltrol) 0.25 mcg capsule 1 capsule, oral, Nightly    carvedilol (Coreg) 25 mg tablet 1 tablet, oral, 2 times daily    cholecalciferol (Vitamin D-3) 125 MCG (5000 UT) capsule 1 capsule, oral, Daily    clindamycin (Cleocin T) 1 % lotion     Dexcom G7  misc FOR CONTINUOUS GLUCOSE MONITORING    Dexcom G7 Sensor device FOR CONTINUOUS GLUCOSE MONITORING, CHANGE EVERY 10 DAYS    doxycycline (Vibra-Tabs) 100 mg tablet 1 TABLET TWICE A DAY BY MOUTH WITH FOOD AND A GLASS OF WATER TAKE FOR 2-3 MONTHS USE SUN PROTECTION    eucerin cream Topical, As needed    famotidine (Pepcid) 20 mg tablet 1 tablet, oral, 2 times daily PRN    hydrALAZINE (Apresoline) 100 mg tablet 1 tablet, oral, 3 times daily, Take with food    hydrocortisone 2.5 % cream 1 APPLICATION TWICE A DAY TO AFFECTED AREA OF FACE (AVOID EYES) TO DARK SPOTS FOR 2-4 WEEKS    insulin lispro (HumaLOG) 100 unit/mL injection PLEASE SEE ATTACHED FOR DETAILED DIRECTIONS    ketoconazole (NIZOral) 2 % cream Apply twice daily to the painful rash in the groin    labetalol (Normodyne) 300 mg tablet 3 tablets, 2 times daily, *EMERGENCY REFILL*    Lantus U-100 Insulin 100 unit/mL injection 12 UNIT(S) SUBCUTANEOUS ONCE (AT BEDTIME)    lidocaine HCL 4 % adhesive patch,medicated 1 patch, topical (top), Daily    lisinopril 40 mg tablet 1 tablet, oral, Daily    loratadine (Claritin) 10 mg tablet 1 tablet, oral, Daily    Nu-Iron 150 mg iron capsule 1 capsule, oral, Nightly    omeprazole (PriLOSEC) 40 mg DR capsule 1 capsule, oral, Daily    ondansetron (Zofran) 8 mg tablet     OneTouch Delica Plus Lancet 33 gauge misc FOR GLUCOSE TESTING 3 TIMES DAILY    OneTouch Verio " Flex meter misc FOR GLUCOSE TESTING 3 TIMES DAILY    OneTouch Verio test strips strip USE AS DIRECTED 3 TIMES A DAY    oxyCODONE (ROXICODONE) 5 mg, oral, Every 6 hours PRN    polyethylene glycol (Glycolax, Miralax) 17 gram packet 17 GRAM(S) ORALLY ONCE A DAY    predniSONE (DELTASONE) 15 mg, oral, Daily    prochlorperazine (COMPAZINE) 10 mg, oral, Every 6 hours PRN    torsemide (Demadex) 20 mg tablet 3 tablets, oral, Daily    triamcinolone (Kenalog) 0.1 % cream PLEASE SEE ATTACHED FOR DETAILED DIRECTIONS        OBJECTIVE:    VS / Pain:  /82   Temp 36.3 °C (97.3 °F)   Wt 86.6 kg (191 lb)   SpO2 99%   BMI 30.83 kg/m²   BSA: 2.01 meters squared  Wt Readings from Last 5 Encounters:   10/11/23 83.5 kg (184 lb)   11/16/23 86.6 kg (191 lb)   11/16/23 86.2 kg (190 lb)   10/19/23 84.9 kg (187 lb 2 oz)   10/05/23 82.7 kg (182 lb 6.4 oz)      Pain Scale: 3    Physical Exam  Constitutional:       Appearance: Normal appearance. He is normal weight.   HENT:      Head: Normocephalic and atraumatic.   Eyes:      Pupils: Pupils are equal, round, and reactive to light.   Pulmonary:      Effort: Pulmonary effort is normal.   Musculoskeletal:         General: No swelling. Normal range of motion.      Cervical back: Normal range of motion.   Skin:     General: Skin is warm and dry.      Findings: Rash present.      Comments: Left arm wound with dressing, hypopigmentation, HS in groin was not assessed   Neurological:      General: No focal deficit present.      Mental Status: He is alert and oriented to person, place, and time.   Psychiatric:         Mood and Affect: Mood normal.         Behavior: Behavior normal.         Thought Content: Thought content normal.         Judgment: Judgment normal.         Performance Status:  ECOG Score: 1- Restricted in physically strenuous activity.  Carries out light duty.  Karnofsky Score: 80 - Normal activity with effort; some signs or symptoms of disease    Diagnostic Results     No results  found for this or any previous visit (from the past 96 hour(s)).      Assessment/Plan   39yo AA man with hx of stage IV CKD with metastatic papRCC to LNs now on cabo/nivo. He has CKD and planned for kidney dialysis. Known proteinuria +++ (known). Still with sig joint pains and dealing with hyperglycemias. On cabo (nivo on hold d/t arthritis (PDN) 30 mg PDN. SD in recent scans. Will add opioid meds. Pain is much better on opioids so will taper off prednisone as his arthralgias might not be fully IO-related.    Fistula repair site continues to heal off cabozantinib. Still with open wound. Original repair was in May. Cabo on hold since October. Dr. Daigle is following - attempting to avoid graft and revision. We will stay in touch and remain off of cabozantinib.     Tolerating prednisone at 15 mg daily. Using more oxycodone because of the taper, 3-4 times a day. No issues with side effects from oxycodone. Discussed attempted a further taper to 10 mg of prednisone daily. His symptoms of heavy legs have me concerned about muscle loss vs continued IO related arthralgia. Will consider PT. Will also monitor usage of narcotics and consider referral to supportive onc in the future if long- acting pain medication is required.     Hidradenitis suppurativa currently without flare. On antibiotic treatment with Dr. Alexandra. He was recently diagnosed by biopsy with psoriasis. No treatment at this time. He has hypopigmentation, likely from cabozantinib.     He is having issues with AM nausea. Zofran doesn't help. We discussed potential causes such as medication induced, CKD, hypoglycemia in the AM, etc. Will try Compazine. Instructed to attempt to eat upon awakening to bring blood sugars up. Also instructed to take antibiotics, oxycodone, and prednisone with food.     Siddhartha was seen today for follow-up.  Diagnoses and all orders for this visit:  Chemotherapy induced nausea and vomiting (Primary)  -     prochlorperazine (Compazine)  10 mg tablet; Take 1 tablet (10 mg) by mouth every 6 hours if needed for nausea.  Renal cell carcinoma, unspecified laterality (CMS/HCC)  -     Clinic Appointment Request Follow Up; OCT[facility] ([facility])  -     oxyCODONE (Roxicodone) 5 mg immediate release tablet; Take 1 tablet (5 mg) by mouth every 6 hours if needed for moderate pain (4 - 6).  -     prochlorperazine (Compazine) 10 mg tablet; Take 1 tablet (10 mg) by mouth every 6 hours if needed for nausea.  -     CBC and Auto Differential; Future  -     Comprehensive Metabolic Panel; Future  Cancer associated pain  -     oxyCODONE (Roxicodone) 5 mg immediate release tablet; Take 1 tablet (5 mg) by mouth every 6 hours if needed for moderate pain (4 - 6).    Treatment Plan:  Cabozantinib, 28 Day Cycles    # RCC  - Continue to hold cabozantinib   - Monitor fistula repair wound healing   - follow up with Dr. Daigle    # IO related arthralgia   - Decrease prednisone to 10 mg daily  - Continue oxycodone as needed  - Continue lidocaine patches  - Consider long acting narcotics per support onc in the future   - Continue PPI  - Consider PT for muscle weakness    # Nausea  - Continue zofran prn  - Add compazine prn     # Hidradenitis Suppurativa, Psoriasis  - Continue follow up with Dr. Alexandra     Has follow up with Dr. Daigle on 11/30 at [facility].    Follow up with me - phone visit - on 11/30 afternoon; obtain labs CBC and CMP if restarting cabozantinib.    Patient verbalizes understanding of above plan. Time provided for patient's questions. All questions answered to patient's satisfaction in office. Patient instructed to reach out for any new concerning issues at [phone].    Octavia Fraser MSN, APRN, A-GNP-C, OCN   Oncology   [facility] [address] [address]   [facility]  Phone: [phone]  [email or web address]

## 2023-11-16 ENCOUNTER — OFFICE VISIT (OUTPATIENT)
Dept: HEMATOLOGY/ONCOLOGY | Facility: CLINIC | Age: 38
End: 2023-11-16
Payer: COMMERCIAL

## 2023-11-16 ENCOUNTER — OFFICE VISIT (OUTPATIENT)
Dept: VASCULAR SURGERY | Facility: HOSPITAL | Age: 38
End: 2023-11-16
Payer: COMMERCIAL

## 2023-11-16 VITALS
HEART RATE: 84 BPM | SYSTOLIC BLOOD PRESSURE: 151 MMHG | DIASTOLIC BLOOD PRESSURE: 87 MMHG | BODY MASS INDEX: 30.67 KG/M2 | WEIGHT: 190 LBS | OXYGEN SATURATION: 98 %

## 2023-11-16 VITALS
OXYGEN SATURATION: 99 % | WEIGHT: 191 LBS | TEMPERATURE: 97.3 F | SYSTOLIC BLOOD PRESSURE: 134 MMHG | BODY MASS INDEX: 30.83 KG/M2 | DIASTOLIC BLOOD PRESSURE: 82 MMHG

## 2023-11-16 DIAGNOSIS — I12.9 HYPERTENSIVE CHRONIC KIDNEY DISEASE WITH STAGE 1 THROUGH STAGE 4 CHRONIC KIDNEY DISEASE, OR UNSPECIFIED CHRONIC KIDNEY DISEASE: Primary | ICD-10-CM

## 2023-11-16 DIAGNOSIS — T45.1X5A CHEMOTHERAPY INDUCED NAUSEA AND VOMITING: Primary | ICD-10-CM

## 2023-11-16 DIAGNOSIS — C64.9 RENAL CELL CARCINOMA, UNSPECIFIED LATERALITY (MULTI): ICD-10-CM

## 2023-11-16 DIAGNOSIS — G89.3 CANCER ASSOCIATED PAIN: ICD-10-CM

## 2023-11-16 DIAGNOSIS — R11.2 CHEMOTHERAPY INDUCED NAUSEA AND VOMITING: Primary | ICD-10-CM

## 2023-11-16 DIAGNOSIS — T81.30XA WOUND DEHISCENCE: Primary | ICD-10-CM

## 2023-11-16 DIAGNOSIS — N18.30 STAGE 3 CHRONIC KIDNEY DISEASE, UNSPECIFIED WHETHER STAGE 3A OR 3B CKD (MULTI): ICD-10-CM

## 2023-11-16 PROCEDURE — 3077F SYST BP >= 140 MM HG: CPT | Performed by: SURGERY

## 2023-11-16 PROCEDURE — 99214 OFFICE O/P EST MOD 30 MIN: CPT | Performed by: SURGERY

## 2023-11-16 PROCEDURE — 3066F NEPHROPATHY DOC TX: CPT

## 2023-11-16 PROCEDURE — 3046F HEMOGLOBIN A1C LEVEL >9.0%: CPT

## 2023-11-16 PROCEDURE — 3008F BODY MASS INDEX DOCD: CPT

## 2023-11-16 PROCEDURE — 99215 OFFICE O/P EST HI 40 MIN: CPT

## 2023-11-16 PROCEDURE — 3079F DIAST BP 80-89 MM HG: CPT

## 2023-11-16 PROCEDURE — 99214 OFFICE O/P EST MOD 30 MIN: CPT | Mod: 27 | Performed by: SURGERY

## 2023-11-16 PROCEDURE — 3046F HEMOGLOBIN A1C LEVEL >9.0%: CPT | Performed by: SURGERY

## 2023-11-16 PROCEDURE — 3079F DIAST BP 80-89 MM HG: CPT | Performed by: SURGERY

## 2023-11-16 PROCEDURE — 3075F SYST BP GE 130 - 139MM HG: CPT

## 2023-11-16 PROCEDURE — 3008F BODY MASS INDEX DOCD: CPT | Performed by: SURGERY

## 2023-11-16 PROCEDURE — 3062F POS MACROALBUMINURIA REV: CPT

## 2023-11-16 PROCEDURE — 3062F POS MACROALBUMINURIA REV: CPT | Performed by: SURGERY

## 2023-11-16 PROCEDURE — 3066F NEPHROPATHY DOC TX: CPT | Performed by: SURGERY

## 2023-11-16 PROCEDURE — 4010F ACE/ARB THERAPY RXD/TAKEN: CPT | Performed by: SURGERY

## 2023-11-16 PROCEDURE — 4010F ACE/ARB THERAPY RXD/TAKEN: CPT

## 2023-11-16 RX ORDER — OXYCODONE HYDROCHLORIDE 5 MG/1
5 TABLET ORAL EVERY 6 HOURS PRN
Qty: 120 TABLET | Refills: 0 | Status: SHIPPED | OUTPATIENT
Start: 2023-11-16 | End: 2023-11-21 | Stop reason: SDUPTHER

## 2023-11-16 RX ORDER — ASPIRIN 81 MG/1
81 TABLET ORAL DAILY
Status: ON HOLD | COMMUNITY
Start: 2023-10-18 | End: 2023-11-16 | Stop reason: ALTCHOICE

## 2023-11-16 RX ORDER — ASPIRIN 81 MG/1
81 TABLET ORAL DAILY
Qty: 30 TABLET | Refills: 10 | OUTPATIENT
Start: 2023-11-16

## 2023-11-16 RX ORDER — PROCHLORPERAZINE MALEATE 10 MG
10 TABLET ORAL EVERY 6 HOURS PRN
Qty: 30 TABLET | Refills: 3 | Status: SHIPPED | OUTPATIENT
Start: 2023-11-16 | End: 2023-11-21 | Stop reason: SDUPTHER

## 2023-11-16 ASSESSMENT — PATIENT HEALTH QUESTIONNAIRE - PHQ9
2. FEELING DOWN, DEPRESSED OR HOPELESS: NOT AT ALL
SUM OF ALL RESPONSES TO PHQ9 QUESTIONS 1 AND 2: 0
1. LITTLE INTEREST OR PLEASURE IN DOING THINGS: NOT AT ALL

## 2023-11-16 ASSESSMENT — PAIN SCALES - GENERAL
PAINLEVEL: 2
PAINLEVEL: 4

## 2023-11-16 NOTE — PROGRESS NOTES
F/U REASON: wound check    CURRENT ENCOUNTER:  Siddhartha Orellana is 38 y.o. male here for follow up of left arm AV fistula revision wound.  He has continued to do fairly diligent wound care at home.  The base of the wounds are pink and granulating as shown in the images.  Overall size has decreased significantly.  Granulation laterally away from the fistula has continued to improve so I am encouraged by this.  He remains off his chemo oral pills still for this wound healing delays.  Overall I think we have made some good progress and the patient and significant other are also pleased.    MEASURES 1X1.5CM   PINK GRANULATING BASE          Meds:     Current Outpatient Medications:     eucerin cream, Apply topically if needed for dry skin., Disp: 453 g, Rfl: 11    ketoconazole (NIZOral) 2 % cream, Apply twice daily to the painful rash in the groin, Disp: 60 g, Rfl: 3    lidocaine HCL 4 % adhesive patch,medicated, Apply 1 patch topically once daily., Disp: 15 patch, Rfl: 3    predniSONE (Deltasone) 5 mg tablet, Take 3 tablets (15 mg) by mouth once daily., Disp: 90 tablet, Rfl: 1    Allergies:   No Known Allergies    ROS:  Review of Systems otherwise unremarkable    Objective:  Vitals:  Vitals:    11/16/23 1226   BP: 151/87   Pulse: 84   SpO2: 98%        Exam:  Please see images above.  There is a good thrill over the fistula.  The hand is warm and well-perfused.  There is no edema.  There is no signs of infection.        Labs:  Lab Results   Component Value Date    WBC 9.2 10/18/2023    WBC CANCELED 09/23/2023    WBC 13.8 (H) 09/22/2023    HGB 7.7 (L) 10/18/2023    HGB CANCELED 09/23/2023    HGB 7.6 (L) 09/22/2023    HCT 23.9 (L) 10/18/2023    HCT CANCELED 09/23/2023    HCT 22.8 (L) 09/22/2023    MCV 91 10/18/2023    MCV CANCELED 09/23/2023    MCV 87 09/22/2023     10/18/2023     Lab Results   Component Value Date    CREATININE 6.87 (H) 10/18/2023    CREATININE 6.87 (H) 10/18/2023    CREATININE CANCELED 09/23/2023     BUN 79 (H) 10/18/2023    BUN 79 (H) 10/18/2023    BUN CANCELED 09/23/2023     10/18/2023     10/18/2023    NA CANCELED 09/23/2023    K 3.7 10/18/2023    K 3.7 10/18/2023    K CANCELED 09/23/2023     10/18/2023     10/18/2023    CL CANCELED 09/23/2023    CO2 21 10/18/2023    CO2 21 10/18/2023    CO2 CANCELED 09/23/2023           Assessment & Plan:  Siddhartha Orellana is 38 y.o. male here for follow up of left arm AV fistula revision wound.       Overall things are healing well and we are making progress.  No indication for surgical revision.  We will switch from the Medihoney alginate to the Medihoney gel with daily dressings and skin care as instructed to the patient and I will see him back in 2 weeks.    I hope at that time to clear him to go back to his oral chemo medicines.    Val Daigle MD, MHS, RPVI  , [employer]  Director, [facility], [facility]  Co-Director, Vascular Laboratories, [facility] [place]  Division of Vascular Surgery and Endovascular Therapy  [facility]

## 2023-11-16 NOTE — PATIENT INSTRUCTIONS
It was a pleasure taking care of you today and appreciate your seeing us at our [facility].     Today's plan is as follows:  1) I WANT TO SEE YOU BACK ON THE 30TH FOR A WOUND CHECK  2) CONTINUE MEDIHONEY WITH DAILY DRESSINGS AS INSTRUCTED; WASH WITH SOAP AND WATER DAILY   3) CONTINUE TO HOLD YOUR CHEMO MEDS FOR NOW      Please call the office with any questions at [phone].   You can speak to our secretaries or our clinical nurses for specific questions.   For [facility] specific questions, you can also call [phone] or email at [email or web address]  If you need coordinating your appointments and testing you can do these at the  or by calling my office shortly after your visit.

## 2023-11-17 ASSESSMENT — ENCOUNTER SYMPTOMS
CHILLS: 0
PSYCHIATRIC NEGATIVE: 1
DIZZINESS: 0
EYE PROBLEMS: 0
SORE THROAT: 0
VOMITING: 0
WOUND: 1
CHEST TIGHTNESS: 0
LIGHT-HEADEDNESS: 0
ARTHRALGIAS: 1
ABDOMINAL PAIN: 0
MYALGIAS: 1
CONSTIPATION: 0
FATIGUE: 0
COUGH: 0
FEVER: 0
DIAPHORESIS: 0
UNEXPECTED WEIGHT CHANGE: 0
VOICE CHANGE: 0
SHORTNESS OF BREATH: 0
EXTREMITY WEAKNESS: 1
LEG SWELLING: 0
APPETITE CHANGE: 0
NAUSEA: 1
DIARRHEA: 0

## 2023-11-21 ENCOUNTER — OFFICE VISIT (OUTPATIENT)
Dept: PRIMARY CARE | Facility: HOSPITAL | Age: 38
End: 2023-11-21
Payer: COMMERCIAL

## 2023-11-21 VITALS
HEIGHT: 66 IN | WEIGHT: 187 LBS | BODY MASS INDEX: 30.05 KG/M2 | HEART RATE: 82 BPM | OXYGEN SATURATION: 100 % | DIASTOLIC BLOOD PRESSURE: 95 MMHG | TEMPERATURE: 97.5 F | SYSTOLIC BLOOD PRESSURE: 161 MMHG

## 2023-11-21 DIAGNOSIS — I10 PRIMARY HYPERTENSION: ICD-10-CM

## 2023-11-21 DIAGNOSIS — N18.5 CHRONIC KIDNEY DISEASE, STAGE 5 (MULTI): ICD-10-CM

## 2023-11-21 DIAGNOSIS — C64.9 RENAL CELL CARCINOMA, UNSPECIFIED LATERALITY (MULTI): Primary | ICD-10-CM

## 2023-11-21 DIAGNOSIS — L30.4 INTERTRIGO: ICD-10-CM

## 2023-11-21 DIAGNOSIS — R11.2 CHEMOTHERAPY INDUCED NAUSEA AND VOMITING: ICD-10-CM

## 2023-11-21 DIAGNOSIS — L40.9 PSORIASIS: ICD-10-CM

## 2023-11-21 DIAGNOSIS — T50.905A HYPERGLYCEMIA, DRUG-INDUCED: ICD-10-CM

## 2023-11-21 DIAGNOSIS — D64.9 ANEMIA, UNSPECIFIED TYPE: ICD-10-CM

## 2023-11-21 DIAGNOSIS — E55.9 VITAMIN D DEFICIENCY: ICD-10-CM

## 2023-11-21 DIAGNOSIS — G89.3 CANCER ASSOCIATED PAIN: ICD-10-CM

## 2023-11-21 DIAGNOSIS — M25.50 ARTHRALGIA, UNSPECIFIED JOINT: ICD-10-CM

## 2023-11-21 DIAGNOSIS — K21.9 GASTROESOPHAGEAL REFLUX DISEASE, UNSPECIFIED WHETHER ESOPHAGITIS PRESENT: ICD-10-CM

## 2023-11-21 DIAGNOSIS — R73.9 HYPERGLYCEMIA, DRUG-INDUCED: ICD-10-CM

## 2023-11-21 DIAGNOSIS — T45.1X5A CHEMOTHERAPY INDUCED NAUSEA AND VOMITING: ICD-10-CM

## 2023-11-21 DIAGNOSIS — E78.5 HYPERLIPIDEMIA, UNSPECIFIED HYPERLIPIDEMIA TYPE: ICD-10-CM

## 2023-11-21 LAB
CHOLEST SERPL-MCNC: 187 MG/DL (ref 0–199)
CHOLESTEROL/HDL RATIO: 3.6
ERYTHROCYTE [DISTWIDTH] IN BLOOD BY AUTOMATED COUNT: 15.7 % (ref 11.5–14.5)
EST. AVERAGE GLUCOSE BLD GHB EST-MCNC: 166 MG/DL
FERRITIN SERPL-MCNC: 250 NG/ML (ref 20–300)
FOLATE SERPL-MCNC: 10.4 NG/ML
HBA1C MFR BLD: 7.4 %
HCT VFR BLD AUTO: 27 % (ref 41–52)
HDLC SERPL-MCNC: 52.4 MG/DL
HGB BLD-MCNC: 8.4 G/DL (ref 13.5–17.5)
IRON SATN MFR SERPL: 25 % (ref 25–45)
IRON SERPL-MCNC: 62 UG/DL (ref 35–150)
MCH RBC QN AUTO: 29.7 PG (ref 26–34)
MCHC RBC AUTO-ENTMCNC: 31.1 G/DL (ref 32–36)
MCV RBC AUTO: 95 FL (ref 80–100)
NON-HDL CHOLESTEROL: 135 MG/DL (ref 0–149)
NRBC BLD-RTO: 0.2 /100 WBCS (ref 0–0)
PLATELET # BLD AUTO: 192 X10*3/UL (ref 150–450)
RBC # BLD AUTO: 2.83 X10*6/UL (ref 4.5–5.9)
T4 FREE SERPL-MCNC: 0.99 NG/DL (ref 0.78–1.48)
TIBC SERPL-MCNC: 248 UG/DL (ref 240–445)
TSH SERPL-ACNC: 5.4 MIU/L (ref 0.44–3.98)
UIBC SERPL-MCNC: 186 UG/DL (ref 110–370)
VIT B12 SERPL-MCNC: 425 PG/ML (ref 211–911)
WBC # BLD AUTO: 14.4 X10*3/UL (ref 4.4–11.3)

## 2023-11-21 PROCEDURE — 3051F HG A1C>EQUAL 7.0%<8.0%: CPT

## 2023-11-21 PROCEDURE — 83036 HEMOGLOBIN GLYCOSYLATED A1C: CPT

## 2023-11-21 PROCEDURE — 3062F POS MACROALBUMINURIA REV: CPT

## 2023-11-21 PROCEDURE — 99214 OFFICE O/P EST MOD 30 MIN: CPT

## 2023-11-21 PROCEDURE — 82746 ASSAY OF FOLIC ACID SERUM: CPT

## 2023-11-21 PROCEDURE — 85027 COMPLETE CBC AUTOMATED: CPT

## 2023-11-21 PROCEDURE — 83540 ASSAY OF IRON: CPT

## 2023-11-21 PROCEDURE — 84439 ASSAY OF FREE THYROXINE: CPT

## 2023-11-21 PROCEDURE — 84443 ASSAY THYROID STIM HORMONE: CPT

## 2023-11-21 PROCEDURE — 3080F DIAST BP >= 90 MM HG: CPT

## 2023-11-21 PROCEDURE — 82728 ASSAY OF FERRITIN: CPT

## 2023-11-21 PROCEDURE — 4010F ACE/ARB THERAPY RXD/TAKEN: CPT

## 2023-11-21 PROCEDURE — 36415 COLL VENOUS BLD VENIPUNCTURE: CPT

## 2023-11-21 PROCEDURE — 3046F HEMOGLOBIN A1C LEVEL >9.0%: CPT

## 2023-11-21 PROCEDURE — 83718 ASSAY OF LIPOPROTEIN: CPT

## 2023-11-21 PROCEDURE — 3008F BODY MASS INDEX DOCD: CPT

## 2023-11-21 PROCEDURE — 80069 RENAL FUNCTION PANEL: CPT

## 2023-11-21 PROCEDURE — 99214 OFFICE O/P EST MOD 30 MIN: CPT | Mod: GC

## 2023-11-21 PROCEDURE — 3077F SYST BP >= 140 MM HG: CPT

## 2023-11-21 PROCEDURE — 3066F NEPHROPATHY DOC TX: CPT

## 2023-11-21 PROCEDURE — 82465 ASSAY BLD/SERUM CHOLESTEROL: CPT

## 2023-11-21 PROCEDURE — 82607 VITAMIN B-12: CPT

## 2023-11-21 PROCEDURE — 1036F TOBACCO NON-USER: CPT

## 2023-11-21 RX ORDER — CARVEDILOL 25 MG/1
25 TABLET ORAL 2 TIMES DAILY
Qty: 30 TABLET | Refills: 3 | Status: SHIPPED | OUTPATIENT
Start: 2023-11-21 | End: 2024-01-24 | Stop reason: HOSPADM

## 2023-11-21 RX ORDER — PROCHLORPERAZINE MALEATE 10 MG
10 TABLET ORAL EVERY 6 HOURS PRN
Qty: 30 TABLET | Refills: 3 | Status: SHIPPED | OUTPATIENT
Start: 2023-11-21 | End: 2024-01-24 | Stop reason: HOSPADM

## 2023-11-21 RX ORDER — CHOLECALCIFEROL (VITAMIN D3) 125 MCG
125 CAPSULE ORAL DAILY
Qty: 30 CAPSULE | Refills: 3 | Status: SHIPPED | OUTPATIENT
Start: 2023-11-21 | End: 2024-01-24 | Stop reason: HOSPADM

## 2023-11-21 RX ORDER — BLOOD-GLUCOSE,RECEIVER,CONT
EACH MISCELLANEOUS
Qty: 1 EACH | Refills: 3 | Status: SHIPPED | OUTPATIENT
Start: 2023-11-21 | End: 2024-03-13 | Stop reason: ALTCHOICE

## 2023-11-21 RX ORDER — AMLODIPINE BESYLATE 10 MG/1
10 TABLET ORAL DAILY
Qty: 30 TABLET | Refills: 3 | Status: SHIPPED | OUTPATIENT
Start: 2023-11-21 | End: 2024-01-24 | Stop reason: HOSPADM

## 2023-11-21 RX ORDER — HYDRALAZINE HYDROCHLORIDE 100 MG/1
100 TABLET, FILM COATED ORAL 3 TIMES DAILY
Qty: 30 TABLET | Refills: 3 | Status: SHIPPED | OUTPATIENT
Start: 2023-11-21 | End: 2024-01-24 | Stop reason: HOSPADM

## 2023-11-21 RX ORDER — DOXYCYCLINE HYCLATE 100 MG
TABLET ORAL
Qty: 30 TABLET | Refills: 0 | Status: SHIPPED | OUTPATIENT
Start: 2023-11-21 | End: 2023-12-06 | Stop reason: ALTCHOICE

## 2023-11-21 RX ORDER — FAMOTIDINE 20 MG/1
20 TABLET, FILM COATED ORAL 2 TIMES DAILY PRN
Qty: 30 TABLET | Refills: 3 | Status: SHIPPED | OUTPATIENT
Start: 2023-11-21 | End: 2024-01-24 | Stop reason: HOSPADM

## 2023-11-21 RX ORDER — INSULIN LISPRO 100 [IU]/ML
INJECTION, SOLUTION INTRAVENOUS; SUBCUTANEOUS
Qty: 10 ML | Refills: 3 | Status: SHIPPED | OUTPATIENT
Start: 2023-11-21 | End: 2023-11-22

## 2023-11-21 RX ORDER — LANCETS 33 GAUGE
EACH MISCELLANEOUS
Qty: 30 EACH | Refills: 3 | Status: SHIPPED | OUTPATIENT
Start: 2023-11-21 | End: 2024-03-13 | Stop reason: ALTCHOICE

## 2023-11-21 RX ORDER — BLOOD-GLUCOSE METER
EACH MISCELLANEOUS
Qty: 30 STRIP | Refills: 3 | Status: SHIPPED | OUTPATIENT
Start: 2023-11-21 | End: 2024-01-24 | Stop reason: HOSPADM

## 2023-11-21 RX ORDER — ATORVASTATIN CALCIUM 20 MG/1
20 TABLET, FILM COATED ORAL NIGHTLY
Qty: 30 TABLET | Refills: 3 | Status: SHIPPED | OUTPATIENT
Start: 2023-11-21 | End: 2024-01-24 | Stop reason: HOSPADM

## 2023-11-21 RX ORDER — PEN NEEDLE, DIABETIC 29 G X1/2"
NEEDLE, DISPOSABLE MISCELLANEOUS
Qty: 100 EACH | Refills: 3 | Status: SHIPPED | OUTPATIENT
Start: 2023-11-21 | End: 2024-03-13 | Stop reason: ALTCHOICE

## 2023-11-21 RX ORDER — IRON POLYSACCHARIDE COMPLEX 150 MG
150 CAPSULE ORAL NIGHTLY
Qty: 30 CAPSULE | Refills: 3 | Status: SHIPPED | OUTPATIENT
Start: 2023-11-21 | End: 2023-12-06 | Stop reason: ALTCHOICE

## 2023-11-21 RX ORDER — BLOOD-GLUCOSE SENSOR
EACH MISCELLANEOUS
Qty: 3 EACH | Refills: 2 | Status: SHIPPED | OUTPATIENT
Start: 2023-11-21 | End: 2024-03-13 | Stop reason: ALTCHOICE

## 2023-11-21 RX ORDER — OMEPRAZOLE 40 MG/1
40 CAPSULE, DELAYED RELEASE ORAL DAILY
Qty: 30 CAPSULE | Refills: 3 | Status: SHIPPED | OUTPATIENT
Start: 2023-11-21 | End: 2024-01-24 | Stop reason: HOSPADM

## 2023-11-21 RX ORDER — LORATADINE 10 MG/1
10 TABLET ORAL DAILY
Qty: 30 TABLET | Refills: 3 | Status: SHIPPED | OUTPATIENT
Start: 2023-11-21 | End: 2024-01-24 | Stop reason: HOSPADM

## 2023-11-21 RX ORDER — OXYCODONE HYDROCHLORIDE 5 MG/1
5 TABLET ORAL EVERY 6 HOURS PRN
Qty: 120 TABLET | Refills: 0 | Status: SHIPPED | OUTPATIENT
Start: 2023-11-21 | End: 2023-11-30 | Stop reason: SDUPTHER

## 2023-11-21 RX ORDER — KETOCONAZOLE 20 MG/G
CREAM TOPICAL
Qty: 60 G | Refills: 3 | Status: SHIPPED | OUTPATIENT
Start: 2023-11-21 | End: 2024-01-24 | Stop reason: HOSPADM

## 2023-11-21 RX ORDER — LISINOPRIL 40 MG/1
40 TABLET ORAL DAILY
Qty: 30 TABLET | Refills: 3 | Status: SHIPPED | OUTPATIENT
Start: 2023-11-21 | End: 2024-01-24 | Stop reason: HOSPADM

## 2023-11-21 RX ORDER — CALCITRIOL 0.25 UG/1
0.25 CAPSULE ORAL NIGHTLY
Qty: 30 CAPSULE | Refills: 3 | Status: SHIPPED | OUTPATIENT
Start: 2023-11-21 | End: 2024-01-24 | Stop reason: HOSPADM

## 2023-11-21 RX ORDER — HYDROCORTISONE 25 MG/G
CREAM TOPICAL
Qty: 1 G | Refills: 3 | Status: SHIPPED | OUTPATIENT
Start: 2023-11-21 | End: 2024-01-24 | Stop reason: HOSPADM

## 2023-11-21 RX ORDER — POLYETHYLENE GLYCOL 3350 17 G/17G
POWDER, FOR SOLUTION ORAL
Qty: 30 PACKET | Refills: 3 | Status: SHIPPED | OUTPATIENT
Start: 2023-11-21 | End: 2023-12-06 | Stop reason: ALTCHOICE

## 2023-11-21 RX ORDER — ONDANSETRON HYDROCHLORIDE 8 MG/1
TABLET, FILM COATED ORAL
Qty: 20 TABLET | Refills: 3 | Status: SHIPPED | OUTPATIENT
Start: 2023-11-21 | End: 2023-11-22

## 2023-11-21 RX ORDER — INSULIN GLARGINE 100 [IU]/ML
INJECTION, SOLUTION SUBCUTANEOUS
Qty: 10 ML | Refills: 3 | Status: SHIPPED | OUTPATIENT
Start: 2023-11-21 | End: 2023-11-21 | Stop reason: WASHOUT

## 2023-11-21 RX ORDER — LANCETS 30 GAUGE
EACH MISCELLANEOUS
Qty: 30 EACH | Refills: 3 | Status: SHIPPED | OUTPATIENT
Start: 2023-11-21 | End: 2024-03-13 | Stop reason: ALTCHOICE

## 2023-11-21 RX ORDER — TRIAMCINOLONE ACETONIDE 1 MG/G
CREAM TOPICAL
Qty: 30 G | Refills: 3 | Status: SHIPPED | OUTPATIENT
Start: 2023-11-21 | End: 2023-11-22

## 2023-11-21 RX ORDER — TORSEMIDE 20 MG/1
60 TABLET ORAL DAILY
Qty: 30 TABLET | Refills: 3 | Status: SHIPPED | OUTPATIENT
Start: 2023-11-21 | End: 2024-01-24 | Stop reason: HOSPADM

## 2023-11-21 RX ORDER — PREDNISONE 5 MG/1
15 TABLET ORAL DAILY
Qty: 90 TABLET | Refills: 1 | Status: SHIPPED | OUTPATIENT
Start: 2023-11-21 | End: 2023-12-06 | Stop reason: ALTCHOICE

## 2023-11-21 ASSESSMENT — PATIENT HEALTH QUESTIONNAIRE - PHQ9
SUM OF ALL RESPONSES TO PHQ9 QUESTIONS 1 AND 2: 0
2. FEELING DOWN, DEPRESSED OR HOPELESS: NOT AT ALL
1. LITTLE INTEREST OR PLEASURE IN DOING THINGS: NOT AT ALL

## 2023-11-21 ASSESSMENT — ENCOUNTER SYMPTOMS
DEPRESSION: 0
LOSS OF SENSATION IN FEET: 0
OCCASIONAL FEELINGS OF UNSTEADINESS: 0

## 2023-11-21 ASSESSMENT — PAIN SCALES - GENERAL: PAINLEVEL: 6

## 2023-11-21 NOTE — PROGRESS NOTES
Chief complaint:    HPI:  Siddhartha Orellana is a 38 y.o. male with PMH of renal cell carcinoma, Stage V CKD not on dialysis, DM2, HTN, HLD, parathyroidism, anemia, SHIVA who presents to the clinic to establish care.     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 Patient has CKD, and has a L AV fistula for planned dialysis. Patient was previously on cabo/nivo, but was placed on hold due to significant arthritis side effects. Course has been complicated by multiple bouts of hidradenitis suppurativa, but currently is without flare. He states his oncologists is currently attempting to taper prednisone.     Patient denies any worsening of his acute complaints. He reports difficulty ambulating due to arthralgias. He requests a disability placard. He reports occasional nausea that has improved with compazine. He follows closely with his oncologist Dr. Daigle and his nephrologist Dr. Grover.     Health maintenance:    Social- no alcohol, marijuana, no smoking. Lives at home with his wife.    Health Maintenance   Topic Date Due    Yearly Adult Physical  Never done    Hepatitis B Vaccines (1 of 3 - 3-dose series) Never done    Pneumococcal Vaccine: Pediatrics (0 to 5 Years) and At-Risk Patients (6 to 64 Years) (1 - PCV) Never done    Diabetes: Foot Exam  Never done    Diabetes: Retinopathy Screening  Never done    Varicella Vaccines (2 of 2 - 13+ 2-dose series) 05/20/1999    Zoster Vaccines (1 of 2) 03/12/2002    DTaP/Tdap/Td Vaccines (1 - Tdap) Never done    COVID-19 Vaccine (1) 03/06/2022    Influenza Vaccine (1) 09/01/2023    Diabetes: Hemoglobin A1C  12/25/2023    Lipid Panel  03/31/2024    TSH Level  08/16/2024    MMR Vaccines  Completed    HIV Screening  Completed    Hepatitis C Screening  Completed    HIB Vaccines  Aged Out    IPV Vaccines  Aged Out    Hepatitis A Vaccines  Aged Out    Meningococcal Vaccine  Aged Out    Rotavirus Vaccines  Aged Out    HPV Vaccines  Aged Out        Medications:    Current Outpatient Medications:     eucerin cream, Apply topically if needed for dry skin., Disp: 453 g, Rfl: 11    ketoconazole (NIZOral) 2 % cream, Apply twice daily to the painful rash in the groin, Disp: 60 g, Rfl: 3    lidocaine HCL 4 % adhesive patch,medicated, Apply 1 patch topically once daily., Disp: 15 patch, Rfl: 3    oxyCODONE (Roxicodone) 5 mg immediate release tablet, Take 1 tablet (5 mg) by mouth every 6 hours if needed for moderate pain (4 - 6)., Disp: 120 tablet, Rfl: 0    predniSONE (Deltasone) 5 mg tablet, Take 3 tablets (15 mg) by mouth once daily., Disp: 90 tablet, Rfl: 1    prochlorperazine (Compazine) 10 mg tablet, Take 1 tablet (10 mg) by mouth every 6 hours if needed for nausea., Disp: 30 tablet, Rfl: 3    Allergies:  No Known Allergies    Past medical history:  Past Medical History:   Diagnosis Date    Dorsalgia, unspecified 01/04/2023    Back pain    End stage renal disease (CMS/HCC) 01/04/2023    ESRD (end stage renal disease) on dialysis    Essential (primary) hypertension 01/04/2023    Hypertension    Iron deficiency 02/23/2020    Iron deficiency    Other specified postprocedural states     H/O endoscopy       Surgical history:  Past Surgical History:   Procedure Laterality Date    OTHER SURGICAL HISTORY  04/11/2018    Creation Of A-V Graft Fistula For Dialysis       Family history:  Family History   Problem Relation Name Age of Onset    Hypertension Mother      Cancer Father      Hypertension Father      Other (renal transplant recipient) Mother's Brother         Social history:   reports that he has quit smoking. His smoking use included cigars. He has never used smokeless tobacco. He reports current alcohol use. He reports that he does not use drugs.    Social History     Social History Narrative    Not on file       Review of systems:  Constitutional: negative for fevers, chills, weight loss, weight gain, change in appetite, fatigue, weakness.  HEENT: negative  for headache, changes in vision or hearing, congestion, sore throat.  Respiratory: negative for SOB, cough, hemoptysis, wheezing  Cardiovascular: negative for chest pain, palpitations, orthopnea, PND  GI: negative for dysphagia, abdominal pain, nausea, vomiting, diarrhea, constipation, melena, hematochezia, BRBPR  : negative for frequency, urgency, dysuria, hematuria, incontinence  MSK: negative for myalgia, arthralgia, decreased joint ROM, LE swelling  Skin: negative for rash, wounds  Heme/lymph: negative for easy bruising, bleeding, epistaxis  Neuro: negative for LOC, numbness, tingling, tremor, vertigo, dizziness    Vitals:  Vitals:    11/21/23 1325   BP: (!) 161/95   Pulse: 82   Temp: 36.4 °C (97.5 °F)   SpO2: 100%       Physical exam:  Constitutional: Well-developed male in no acute distress.  HEENT: Normocephalic, atraumatic. PERRL. EOMI. No cervical lymphadenopathy.  Respiratory: CTA bilaterally. No wheezes, rales, or rhonchi. Normal respiratory effort.  Cardiovascular: RRR. No murmurs, gallops, or rubs. No JVD. Radial pulses 2+.  Abdominal: Soft, nondistended, nontender to palpation. Bowel sounds present. No hepatosplenomegaly or masses. No CVA tenderness.  Neuro: CN II-XII intact. UE and LE strength 5/5 bilaterally and sensation intact. Normal FTN testing.  MSK: 2+ LE edema bilaterally.  Skin: Warm, dry. No rashes or wounds.  Psych: Appropriate mood and affect.    Labs:  No results found for this or any previous visit (from the past 24 hour(s)).    Imaging:  No results found.    Assessment and plan:  Siddhartha Orellana is a 38 y.o. male with PMH of renal cell carcinoma, Stage V CKD not on dialysis, DM2, HTN, HLD, parathyroidism, anemia, SHIVA who presents to the clinic to establish care.     # HTN  - BP elevated at 161/95 in office  - Pt did not take BP meds this morning  - Home BP readings at 120-130s systolics   - Continue Amlodipine 10 mg, Coreg 25 mg, hydralazine 100 mg, Lisinopril 40 mg, torsemide 20 mg      #Steroid induced hyperglycemia  - Pt with previous history of diabetes, but after 80 pound weight loss he was no longer requiring medications  - Currently on insulin sliding scale after being started on steroids  - Monitors sugars at home with Dexcom  - HgA1c obtained     #Anemia  - Likely related to gastric ulcer vs CKD  - Pt not started on EPO before  - Will obtain iron studies, TSH, B12, folate,   - Continue to follow with nephrology     # RCC  - Continue to hold cabozantinib   - Monitor fistula repair wound healing   - Continue to follow up with Dr. Daigle     # Chemo related arthralgia   - Decrease prednisone to 10 mg daily, continue to taper   - Continue oxycodone as needed  - Continue lidocaine patches  - Continue PPI  - Disability placard ordered      # Health maintenance  Will obtain HgA1c, lipid, CBC, CMP  Infectious: Hep C, HIV negative   Vaccinations: Tdap done within last 10 years, flu 09/23, Covid 2021    Follow-up in 4 months.    Patient and plan discussed with attending physician Dr. Lanier.    Davina Pratt MD  PGY-1 Internal Medicine  [facility]

## 2023-11-21 NOTE — PATIENT INSTRUCTIONS
,    It was our pleasure taking care of you in the [facility] today! Here are the things we discussed:     We talked about your diabetes medicines. We are going to check some lab values before adjusting any of your medications. Hopefully once you come off of the steroids we won't need the medicines anymore.   2. For your anemia, we teresa some labs to check on possible causes. It is likely related to your kidney disease.   3. Your blood pressure was a little high in office, continue taking your medications as stated at home and keep track of your blood pressure.  4. Given your diabetes, we would like for you to see an eye doctor and a foot doctor.     Please reach out to the clinic with any questions or concerns at [phone]. We will see you back in clinic in 4 months.

## 2023-11-22 DIAGNOSIS — T50.905A HYPERGLYCEMIA, DRUG-INDUCED: ICD-10-CM

## 2023-11-22 DIAGNOSIS — N18.5 CHRONIC KIDNEY DISEASE, STAGE 5 (MULTI): Primary | ICD-10-CM

## 2023-11-22 DIAGNOSIS — R73.9 HYPERGLYCEMIA, DRUG-INDUCED: ICD-10-CM

## 2023-11-22 LAB
ALBUMIN SERPL BCP-MCNC: 3.6 G/DL (ref 3.4–5)
ANION GAP SERPL CALC-SCNC: 24 MMOL/L (ref 10–20)
BUN SERPL-MCNC: 91 MG/DL (ref 6–23)
CALCIUM SERPL-MCNC: 8.6 MG/DL (ref 8.6–10.6)
CHLORIDE SERPL-SCNC: 105 MMOL/L (ref 98–107)
CO2 SERPL-SCNC: 23 MMOL/L (ref 21–32)
CREAT SERPL-MCNC: 8.26 MG/DL (ref 0.5–1.3)
GFR SERPL CREATININE-BSD FRML MDRD: 8 ML/MIN/1.73M*2
GLUCOSE SERPL-MCNC: 150 MG/DL (ref 74–99)
PHOSPHATE SERPL-MCNC: 6.3 MG/DL (ref 2.5–4.9)
POTASSIUM SERPL-SCNC: 3.8 MMOL/L (ref 3.5–5.3)
SODIUM SERPL-SCNC: 148 MMOL/L (ref 136–145)

## 2023-11-22 RX ORDER — INSULIN LISPRO 100 [IU]/ML
INJECTION, SOLUTION INTRAVENOUS; SUBCUTANEOUS
Qty: 10 ML | Refills: 0 | Status: SHIPPED | OUTPATIENT
Start: 2023-11-22 | End: 2023-12-06 | Stop reason: ALTCHOICE

## 2023-11-27 DIAGNOSIS — R73.9 HYPERGLYCEMIA, DRUG-INDUCED: ICD-10-CM

## 2023-11-27 DIAGNOSIS — T50.905A HYPERGLYCEMIA, DRUG-INDUCED: ICD-10-CM

## 2023-11-27 RX ORDER — INSULIN LISPRO 100 [IU]/ML
INJECTION, SOLUTION INTRAVENOUS; SUBCUTANEOUS
Qty: 10 ML | Refills: 0 | Status: SHIPPED | OUTPATIENT
Start: 2023-11-27 | End: 2024-01-24 | Stop reason: HOSPADM

## 2023-11-27 NOTE — PROGRESS NOTES
I saw and evaluated the patient. I personally obtained the key and critical portions of the history and physical exam or was physically present for key and critical portions performed by the resident/fellow. I reviewed the resident/fellow's documentation and discussed the patient with the resident/fellow. I agree with the resident/fellow's medical decision making as documented in the note.    ATTENDING TEMPLATE [facility]  NAME: Siddhartha Orellana  HPI:  38 year old male here for followup after a 5 year hiatus as oncologist said come back for med refills.  He has lost weight of 100 pounds over the past year and that helped with his sleep apnea.  He had lifestyle modifications primarily but was also impacted by his cancer treatment.  He was incapable of completing the cancer treatment due to side effects.  His AM sugars have been in the 60s. He did not take his BP med this morning as felt nauseated this AM.    PMX:  renal cell CA, stage 4 CKD, parathyroid, anemia, HTN, SHIVA, hidradenitis suppurative  MEDS: amlodipine, Lipitor, calitriol, coreg, vitamin D3, clindamycin, doxycycline, eucerin, Pepcid, hydralazine, ketoconazole 2%, lantus insulin 12 units QHS, lispro, Lisinopril, Claritin, iron, omeprazole, Zofran, oxycodone, prednisone taper, Compazine, torsemide  ALLERGIES:   SOC HX: no smoke, no drink, on disability, lives with his wife  FAM HX:    PE:  161/95, P=82, hemoglobin 7.7, BUN/Cre=79/6.7  RECOMMEND:  1. ongoing care by oncology as very complicated scenario  2.  His anemia is likely related to his renal status.    3.  He had hidradenitis suppurative flares on his chemo and is on steroids with hyperglycemia  4.  Weaning down the steroids.    5.  Off all insulin at this time  6.  He has CGM  7.  unsure why he has so much nausea but will defer to his cancer team.    8.  immunizations addressed,  prevention current.    9.  Unusual arthralgias felt secondary to chemo.    Ramya Lanier MD

## 2023-11-28 ASSESSMENT — ENCOUNTER SYMPTOMS
DIARRHEA: 0
UNEXPECTED WEIGHT CHANGE: 0
NAUSEA: 1
VOICE CHANGE: 0
EXTREMITY WEAKNESS: 1
WOUND: 1
DIAPHORESIS: 0
DIZZINESS: 0
ABDOMINAL PAIN: 0
LIGHT-HEADEDNESS: 0
PSYCHIATRIC NEGATIVE: 1
COUGH: 0
EYE PROBLEMS: 0
LEG SWELLING: 0
CHILLS: 0
SHORTNESS OF BREATH: 0
FEVER: 0
ARTHRALGIAS: 1
CHEST TIGHTNESS: 0
CONSTIPATION: 0
MYALGIAS: 1
SORE THROAT: 0

## 2023-11-28 NOTE — PROGRESS NOTES
Patient ID: Siddhartha Orellana is a 38 y.o. male.  Diagnosis:  Papillary RCC  MedOnc: Dr. Thomson  Nephrologist: Dr. Grover  Vascular Surgeon: Dr. Dailge    Current Therapy: Cabozantinib     ONCOLOGIC HISTORY  11/7/22 - CT-guided biopsy of retroperitoneal lymph node revealed findings consistent with metastatic papillary carcinoma.  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  2/7/23 - On Prednisone 20mg daily  2/17/23 - Scans show SD (RECIST) with some mild tumor shrinkage RPLN  2/21/23 - Increase Prednisone 40mg daily for continued arthralgia; continue Cabo 40 mg daily  3/15/23 - Continue Pred 40mg PO daily, continue Cabo  End March - hidradenitis suppurativa  4/4/23 - Hold Cabo; prednisone 30 mg daily  4/25/23 - Resume cabo  5/18/23 - Hold Cabo 2/2 vascular fistula repair  5/23/23 - Continue hold due to extensive edema, blistering  6/2023 - resumed cabo. Nivo on hold with PDN 25 mg  8/16/23 - continue cabo, reduce PDN to 20 mg daily  8/23/23 - Left upper extremity wound debridement down to subcutaneous tissue and vessels- Cabo on hold  8/31/23 - continue to hold cabo, PDN back at 30mg   9/15/23 - resumed cabozantinib 40 mg   Mid Sep 23 - admission hidradenitis suppurativa  10/10/23 - cabo on hold. Will start PDN 20 mg  10/17/23 - cabo on hold for graft, PDN 20 mg  10/25/23 - cabo on hold for graft, PDN 15 mg daily  11/16/23 - continue cabozantinib hold, PDN 10 mg daily    Oncology History   Renal cell carcinoma (CMS/HCC)   12/1/2022 -  Chemotherapy    Cabozantinib, 28 Day Cycles     8/28/2023 Initial Diagnosis    Renal cell carcinoma (CMS/HCC)        Past Medical History:   1. Lymphadenopathy and a pre surgical consult for kidney transplant  2. Stage V CKD  3.  Type II DM  3. HTN  5. HLD  6. Parathyroidism   7. Anemia with NARGIS component   8. Gastric ulcer  9. SHIVA  10. Vitamin D deficiency   11. Morbid obesity  Past Medical History: Siddhartha has a past medical history of Dorsalgia, unspecified (01/04/2023), End stage renal disease (CMS/HCC) (01/04/2023), Essential (primary) hypertension (01/04/2023), Iron deficiency (02/23/2020), and Other specified postprocedural states.  Surgical History:  Siddhartha has a past surgical history that includes Other surgical history (04/11/2018).  Social History:  Siddhartha reports that he has quit smoking. His smoking use included cigars. He has never used smokeless tobacco. He reports current alcohol use. He reports that he does not use drugs.  Family History:    Family History   Problem Relation Name Age of Onset    Hypertension Mother      Cancer Father      Hypertension Father      Other (renal transplant recipient) Mother's Brother       Family Oncology History:  Cancer-related family history includes Cancer in his father.    SUBJECTIVE:    History of Present Illness:  Siddhartha Orellana is a 38 y.o. male who presents today for follow up of papillary RCC. Patient of Dr. Thomson currently on cabozantinib, nivolumab has been on hold since January due to arthralgias. He has had multiple flares of hidradenitis suppurativa and issues with his fistula healing. Patient presents today continues to be on a break from cabozantinib for poor healing of his fistula repair that was done in May. Cabozantinib hold began in the beginning of October, a graft was anticipated. Following with Dr. Daigle who saw Mr. Orellana today, trying to avoid surgical graft and revision. Wound continues to heal per patient and Dr. Daigle.  Added oxycodone in attempts to decrease prednisone dose to taper off. Last visit I stress the long term issues with continuing steroids, patient decided to stop prednisone cold turkey. He has now been off for 3 weeks. He is having fatigue and weakness  "and appetite changes. He fell once. Feels like things get better each day. Feels like AVF is almost healed. He is off the HS abx, no flare right now. He continues to have morning nausea and vomiting. Has tried many things with adjusting meds and diet. He was unable to get zofran because of insurance. His blood sugars have been good off of prednisone. His pain is controlled on oxycodone, needing 10 mg at a time sometimes, taking about 3 times a day, \"dealing with it\". No current signs and symptoms of infection. His energy level isn't great but was able to enjoy the holiday.     Review of Systems   Constitutional:  Positive for appetite change and fatigue. Negative for chills, diaphoresis, fever and unexpected weight change.   HENT:   Negative for mouth sores, sore throat and voice change.    Eyes:  Negative for eye problems.   Respiratory:  Negative for chest tightness, cough and shortness of breath.    Cardiovascular:  Negative for chest pain and leg swelling.   Gastrointestinal:  Positive for nausea and vomiting. Negative for abdominal pain, constipation and diarrhea.        Gas pains   Musculoskeletal:  Positive for arthralgias, gait problem and myalgias.   Skin:  Positive for wound. Negative for itching and rash.   Neurological:  Positive for extremity weakness and gait problem. Negative for dizziness and light-headedness.   Psychiatric/Behavioral: Negative.         Allergies  No Known Allergies     Medications  Current Outpatient Medications   Medication Instructions    amLODIPine (NORVASC) 10 mg, oral, Daily    atorvastatin (LIPITOR) 20 mg, oral, Nightly    BD Insulin Syringe Ultra-Fine 0.5 mL 30 gauge x 1/2\" syringe USE AS DIRECTED WITH LANTUS AND INSULIN LISPRO INJECTIONS    cabozantinib (Cabometyx) 40 mg tablet TAKE ONE (1) TABLET BY MOUTH ONCE A DAY    calcitriol (ROCALTROL) 0.25 mcg, oral, Nightly    carvedilol (COREG) 25 mg, oral, 2 times daily    cholecalciferol (VITAMIN D-3) 125 mcg, oral, Daily    " clindamycin (Cleocin T) 1 % lotion     Dexcom G7  misc FOR CONTINUOUS GLUCOSE MONITORING    Dexcom G7 Sensor device FOR CONTINUOUS GLUCOSE MONITORING, CHANGE EVERY 10 DAYS    doxycycline (Vibra-Tabs) 100 mg tablet 1 TABLET TWICE A DAY BY MOUTH WITH FOOD AND A GLASS OF WATER TAKE FOR 2-3 MONTHS USE SUN PROTECTION    eucerin cream Topical, As needed    famotidine (PEPCID) 20 mg, oral, 2 times daily PRN    hydrALAZINE (APRESOLINE) 100 mg, oral, 3 times daily, Take with food    hydrocortisone 2.5 % cream 1 APPLICATION TWICE A DAY TO AFFECTED AREA OF FACE (AVOID EYES) TO DARK SPOTS FOR 2-4 WEEKS    insulin lispro (HumaLOG) 100 unit/mL injection Take as directed per insulin instructions.    insulin lispro (HumaLOG) 100 unit/mL injection TAKE AS DIRECTED PER INSULIN INSTRUCTIONS.    ketoconazole (NIZOral) 2 % cream Apply twice daily to the painful rash in the groin    lidocaine HCL 4 % adhesive patch,medicated 1 patch, topical (top), Daily    lisinopril 40 mg, oral, Daily    loratadine (CLARITIN) 10 mg, oral, Daily    Nu-Iron 150 mg, oral, Nightly    omeprazole (PRILOSEC) 40 mg, oral, Daily    ondansetron (ZOFRAN) 8 mg, oral, 2 times daily PRN    OneTouch Delica Plus Lancet 33 gauge misc FOR GLUCOSE TESTING 3 TIMES DAILY    OneTouch Verio Flex meter misc FOR GLUCOSE TESTING 3 TIMES DAILY    OneTouch Verio test strips strip USE AS DIRECTED 3 TIMES A DAY    oxyCODONE (ROXICODONE) 5 mg, oral, Every 6 hours PRN    polyethylene glycol (Glycolax, Miralax) 17 gram packet 17 GRAM(S) ORALLY ONCE A DAY    predniSONE (DELTASONE) 15 mg, oral, Daily    prochlorperazine (COMPAZINE) 10 mg, oral, Every 6 hours PRN    simethicone 42 mg, oral, 4 times daily PRN    torsemide (DEMADEX) 60 mg, oral, Daily    triamcinolone (Kenalog) 0.1 % cream PLEASE SEE ATTACHED FOR DETAILED DIRECTIONS        OBJECTIVE:    VS / Pain:  /83   Pulse 78   Temp 37 °C (98.6 °F)   Resp 18   Wt 86.9 kg (191 lb 9.3 oz)   SpO2 100%   BMI 30.92 kg/m²    BSA: 2.01 meters squared  Wt Readings from Last 5 Encounters:   10/11/23 83.5 kg (184 lb)   11/30/23 86.9 kg (191 lb 9.3 oz)   11/30/23 85.7 kg (189 lb)   11/21/23 84.8 kg (187 lb)   11/16/23 86.6 kg (191 lb)      Pain Scale: 3    Physical Exam  Constitutional:       Appearance: Normal appearance. He is normal weight.   HENT:      Head: Normocephalic and atraumatic.   Eyes:      Pupils: Pupils are equal, round, and reactive to light.   Pulmonary:      Effort: Pulmonary effort is normal.   Musculoskeletal:         General: No swelling. Normal range of motion.      Cervical back: Normal range of motion.   Skin:     General: Skin is warm and dry.      Findings: Rash present.      Comments: Left arm wound with dressing, hypopigmentation, HS in groin was not assessed   Neurological:      General: No focal deficit present.      Mental Status: He is alert and oriented to person, place, and time.   Psychiatric:         Mood and Affect: Mood normal.         Behavior: Behavior normal.         Thought Content: Thought content normal.         Judgment: Judgment normal.         Performance Status:  ECOG Score: 1- Restricted in physically strenuous activity.  Carries out light duty.  Karnofsky Score: 80 - Normal activity with effort; some signs or symptoms of disease    Diagnostic Results     No results found for this or any previous visit (from the past 96 hour(s)).      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 He has CKD and planned for kidney dialysis. Known proteinuria +++ (known). Still with sig joint pains and dealing with hyperglycemias. On cabo (nivo on hold d/t arthritis (PDN) 30 mg PDN. SD in recent scans. Will add opioid meds. Pain is much better on opioids so will taper off prednisone as his arthralgias might not be fully IO-related.    Fistula repair site continues to heal off cabozantinib. Still with open wound. Original repair was in May. Cabo on hold  since October. Dr. Daigle is following - attempting to avoid graft and revision. We will stay in touch and remain off of cabozantinib.     Tolerating prednisone at 15 mg daily. Using more oxycodone because of the taper, 3-4 times a day. No issues with side effects from oxycodone. Discussed attempted a further taper to 10 mg of prednisone daily. His symptoms of heavy legs have me concerned about muscle loss vs continued IO related arthralgia. Will consider PT. Will also monitor usage of narcotics and consider referral to supportive onc in the future if long- acting pain medication is required. Patient self discontinued his prednisone 3 weeks ago. I will not restart it at this time.     Hidradenitis suppurativa currently without flare. On antibiotic treatment with Dr. Alexandra. He was recently diagnosed by biopsy with psoriasis. No treatment at this time. He has hypopigmentation, likely from cabozantinib.     He is having issues with AM nausea and vomiting, every morning. He has tried diet and medication changes. I suspect it is CKD related at this point.     Siddhartha was seen today for follow-up.  Diagnoses and all orders for this visit:  Cancer associated pain (Primary)  -     oxyCODONE (Roxicodone) 5 mg immediate release tablet; Take 1 tablet (5 mg) by mouth every 6 hours if needed for moderate pain (4 - 6).  Chemotherapy induced nausea and vomiting  -     ondansetron (Zofran) 4 mg tablet; Take 2 tablets (8 mg) by mouth 2 times a day as needed for nausea.  -     simethicone 42 mg chewable tablet; Chew 1 tablet (42 mg) 4 times a day as needed (for gas pains).  Renal cell carcinoma, unspecified laterality (CMS/HCC)  -     ondansetron (Zofran) 4 mg tablet; Take 2 tablets (8 mg) by mouth 2 times a day as needed for nausea.  -     oxyCODONE (Roxicodone) 5 mg immediate release tablet; Take 1 tablet (5 mg) by mouth every 6 hours if needed for moderate pain (4 - 6).  -     simethicone 42 mg chewable tablet; Chew 1 tablet (42  mg) 4 times a day as needed (for gas pains).  -     Clinic Appointment Request Follow Up; MARYLOU THOMSON; [facility] [id number]; Future  Gas pain  -     simethicone 42 mg chewable tablet; Chew 1 tablet (42 mg) 4 times a day as needed (for gas pains).    Treatment Plan:  Cabozantinib, 28 Day Cycles    # RCC  - Continue to hold cabozantinib   - Monitor fistula repair wound healing   - follow up with Dr. Daigle in 2 weeks     # IO related arthralgia   - Self discontinued prednisone 3 weeks ago, stay off  - Continue oxycodone as needed, Ok for 10 mg TID PRN - script sent  - Continue lidocaine patches  - Consider long acting narcotics per support onc in the future   - Continue PPI  - Consider PT for muscle weakness    # Nausea  - Continue zofran prn - new script sent, couldn't get because of insurance   - Add compazine prn     # Hidradenitis Suppurativa, Psoriasis  - Continue follow up with Dr. Alexandra     Has follow up with Dr. Daigle in 2 more weeks      Follow up in 3 weeks with Dr. Thomson.    Patient verbalizes understanding of above plan. Time provided for patient's questions. All questions answered to patient's satisfaction in office. Patient instructed to reach out for any new concerning issues at [phone].    Tatiana Rodriguez MSN, APRN, A-GNP-C, OCN   Oncology   [facility]  [address] [place] [id number]   [facility]  Phone: [phone]  [email or web address]

## 2023-11-30 ENCOUNTER — OFFICE VISIT (OUTPATIENT)
Dept: VASCULAR SURGERY | Facility: HOSPITAL | Age: 38
End: 2023-11-30
Payer: COMMERCIAL

## 2023-11-30 ENCOUNTER — OFFICE VISIT (OUTPATIENT)
Dept: HEMATOLOGY/ONCOLOGY | Facility: CLINIC | Age: 38
End: 2023-11-30
Payer: COMMERCIAL

## 2023-11-30 VITALS
BODY MASS INDEX: 30.37 KG/M2 | WEIGHT: 189 LBS | HEIGHT: 66 IN | HEART RATE: 81 BPM | OXYGEN SATURATION: 98 % | DIASTOLIC BLOOD PRESSURE: 87 MMHG | SYSTOLIC BLOOD PRESSURE: 148 MMHG

## 2023-11-30 VITALS
SYSTOLIC BLOOD PRESSURE: 132 MMHG | DIASTOLIC BLOOD PRESSURE: 83 MMHG | WEIGHT: 191.58 LBS | OXYGEN SATURATION: 100 % | HEART RATE: 78 BPM | TEMPERATURE: 98.6 F | BODY MASS INDEX: 30.92 KG/M2 | RESPIRATION RATE: 18 BRPM

## 2023-11-30 DIAGNOSIS — R11.2 CHEMOTHERAPY INDUCED NAUSEA AND VOMITING: ICD-10-CM

## 2023-11-30 DIAGNOSIS — N18.30 STAGE 3 CHRONIC KIDNEY DISEASE, UNSPECIFIED WHETHER STAGE 3A OR 3B CKD (MULTI): ICD-10-CM

## 2023-11-30 DIAGNOSIS — R14.1 GAS PAIN: ICD-10-CM

## 2023-11-30 DIAGNOSIS — C64.9 RENAL CELL CARCINOMA, UNSPECIFIED LATERALITY (MULTI): ICD-10-CM

## 2023-11-30 DIAGNOSIS — T45.1X5A CHEMOTHERAPY INDUCED NAUSEA AND VOMITING: ICD-10-CM

## 2023-11-30 DIAGNOSIS — G89.3 CANCER ASSOCIATED PAIN: Primary | ICD-10-CM

## 2023-11-30 DIAGNOSIS — T81.30XA WOUND DEHISCENCE: Primary | ICD-10-CM

## 2023-11-30 PROCEDURE — 3075F SYST BP GE 130 - 139MM HG: CPT

## 2023-11-30 PROCEDURE — 3079F DIAST BP 80-89 MM HG: CPT

## 2023-11-30 PROCEDURE — 3051F HG A1C>EQUAL 7.0%<8.0%: CPT | Performed by: SURGERY

## 2023-11-30 PROCEDURE — 3079F DIAST BP 80-89 MM HG: CPT | Performed by: SURGERY

## 2023-11-30 PROCEDURE — 99215 OFFICE O/P EST HI 40 MIN: CPT | Mod: 25,27

## 2023-11-30 PROCEDURE — 3077F SYST BP >= 140 MM HG: CPT | Performed by: SURGERY

## 2023-11-30 PROCEDURE — 4010F ACE/ARB THERAPY RXD/TAKEN: CPT | Performed by: SURGERY

## 2023-11-30 PROCEDURE — 3051F HG A1C>EQUAL 7.0%<8.0%: CPT

## 2023-11-30 PROCEDURE — 3062F POS MACROALBUMINURIA REV: CPT | Performed by: SURGERY

## 2023-11-30 PROCEDURE — 99214 OFFICE O/P EST MOD 30 MIN: CPT | Performed by: SURGERY

## 2023-11-30 PROCEDURE — 3008F BODY MASS INDEX DOCD: CPT | Performed by: SURGERY

## 2023-11-30 PROCEDURE — 1036F TOBACCO NON-USER: CPT | Performed by: SURGERY

## 2023-11-30 PROCEDURE — 3008F BODY MASS INDEX DOCD: CPT

## 2023-11-30 PROCEDURE — 1036F TOBACCO NON-USER: CPT

## 2023-11-30 PROCEDURE — 3066F NEPHROPATHY DOC TX: CPT

## 2023-11-30 PROCEDURE — 99215 OFFICE O/P EST HI 40 MIN: CPT

## 2023-11-30 PROCEDURE — 3062F POS MACROALBUMINURIA REV: CPT

## 2023-11-30 PROCEDURE — 4010F ACE/ARB THERAPY RXD/TAKEN: CPT

## 2023-11-30 PROCEDURE — 3066F NEPHROPATHY DOC TX: CPT | Performed by: SURGERY

## 2023-11-30 RX ORDER — OXYCODONE HYDROCHLORIDE 5 MG/1
5 TABLET ORAL EVERY 6 HOURS PRN
Qty: 120 TABLET | Refills: 0 | Status: SHIPPED | OUTPATIENT
Start: 2023-11-30 | End: 2023-12-01 | Stop reason: DRUGHIGH

## 2023-11-30 RX ORDER — ONDANSETRON 4 MG/1
8 TABLET, FILM COATED ORAL 2 TIMES DAILY PRN
Qty: 30 TABLET | Refills: 3 | Status: SHIPPED | OUTPATIENT
Start: 2023-11-30 | End: 2024-01-24 | Stop reason: HOSPADM

## 2023-11-30 ASSESSMENT — ENCOUNTER SYMPTOMS
APPETITE CHANGE: 1
VOMITING: 1
FATIGUE: 1
ROS GI COMMENTS: GAS PAINS

## 2023-11-30 ASSESSMENT — PAIN SCALES - GENERAL
PAINLEVEL: 0-NO PAIN
PAINLEVEL: 6

## 2023-11-30 NOTE — PROGRESS NOTES
"F/U REASON: wound check    CURRENT ENCOUNTER:  Siddhartha Orellana is 38 y.o. male here for follow up of left arm AV fistula revision wound.  He has continued to do fairly diligent wound care at home.  have essentially healed except for a1x1mm pit that I exposed after removing his medihoney crust. Remainder of wound is epithelializing. AVF remains open with thrill. Remains off chemo meds.     Meds:   Current Outpatient Medications:     amLODIPine (Norvasc) 10 mg tablet, Take 1 tablet (10 mg) by mouth once daily., Disp: 30 tablet, Rfl: 3    atorvastatin (Lipitor) 20 mg tablet, Take 1 tablet (20 mg) by mouth once daily at bedtime., Disp: 30 tablet, Rfl: 3    BD Insulin Syringe Ultra-Fine 0.5 mL 30 gauge x 1/2\" syringe, USE AS DIRECTED WITH LANTUS AND INSULIN LISPRO INJECTIONS, Disp: 100 each, Rfl: 3    calcitriol (Rocaltrol) 0.25 mcg capsule, Take 1 capsule (0.25 mcg) by mouth once daily at bedtime., Disp: 30 capsule, Rfl: 3    carvedilol (Coreg) 25 mg tablet, Take 1 tablet (25 mg) by mouth 2 times a day., Disp: 30 tablet, Rfl: 3    cholecalciferol (Vitamin D-3) 125 MCG (5000 UT) capsule, Take 1 capsule (125 mcg) by mouth once daily., Disp: 30 capsule, Rfl: 3    Dexcom G7  misc, FOR CONTINUOUS GLUCOSE MONITORING, Disp: 1 each, Rfl: 3    Dexcom G7 Sensor device, FOR CONTINUOUS GLUCOSE MONITORING, CHANGE EVERY 10 DAYS, Disp: 3 each, Rfl: 2    doxycycline (Vibra-Tabs) 100 mg tablet, 1 TABLET TWICE A DAY BY MOUTH WITH FOOD AND A GLASS OF WATER TAKE FOR 2-3 MONTHS USE SUN PROTECTION, Disp: 30 tablet, Rfl: 0    eucerin cream, Apply topically if needed for dry skin., Disp: 453 g, Rfl: 11    famotidine (Pepcid) 20 mg tablet, Take 1 tablet (20 mg) by mouth 2 times a day as needed for indigestion or heartburn., Disp: 30 tablet, Rfl: 3    hydrALAZINE (Apresoline) 100 mg tablet, Take 1 tablet (100 mg) by mouth 3 times a day. Take with food, Disp: 30 tablet, Rfl: 3    hydrocortisone 2.5 % cream, 1 APPLICATION TWICE A DAY TO " Nutrition Assessment     Assessment Type: Follow up    Reason for Visit: LOS    Referral Requested By: N/A    Chart Medications Lab Results Reviewed:  yes    Nutritional Risk Factors: Poor PO over LOS    Current Diet Order: Dysphagia II, NTL - presently NPO  Diet Tolerance: tolerated  Food Allergies: No ( does not take gelatein for Uatsdin purposes)   Priority Points: Status 2    Demographic/Anthropometrics Information  Gender: male   Patient Age: 65 year old   Height:    Ht Readings from Last 1 Encounters:   05/14/21 5' 8\" (1.727 m)      Weight:   Wt Readings from Last 1 Encounters:   05/14/21 60.7 kg       BMI:   BMI Readings from Last 1 Encounters:   05/14/21 20.34 kg/m²      Physical Appearance: appropriate for age    Weight Classification: normal     Nutrition Diagnosis (PES)  Inadequate oral intake related to Decreased intake as evidenced by Documented/reported poor oral intake    Nutrition Plan  Recommended Nutrition Intervention: Coordination of nutrition care by a nutrition professional, Meals and snacks  and nutrition supplemental therapy  Monitor: Biochemical data, medical tests, procedures, Food and beverage intake and Weight    Care Plan Discussed With: Family    Goals: Increase oral intake to >/=50%of meals and supplements  Goal Progress: Extended  Timeframe to Achieve Goal: Ongoing    Dietitian Notes/Impressions/Recommendations:  Dx: Left craniotomy for SDH Evacuation 5/10/21  Cerebral angiogram with left MMA embolization with Roxana material  PMH Afib (on coumadin), bilateral glaucoma (left eye blind, right eye slight vision) presenting with left sided subdural hematoma.    Weight Hx: at baseline     NFPE: unremarkable    Diet- currently NPO- prior to this, pt with fluctuating oral intake, avoids Gelatein.     TREATMENT PLAN: Monitoring & Interventions  1. Diet: Recommend as per SLP  - Frequent meals   - Encourage good oral intake.    2. Oral nutrition supplement: Will send Magic cup tid when diet  AFFECTED AREA OF FACE (AVOID EYES) TO DARK SPOTS FOR 2-4 WEEKS, Disp: 1 g, Rfl: 3    insulin lispro (HumaLOG) 100 unit/mL injection, Take as directed per insulin instructions., Disp: 10 mL, Rfl: 0    insulin lispro (HumaLOG) 100 unit/mL injection, TAKE AS DIRECTED PER INSULIN INSTRUCTIONS., Disp: 10 mL, Rfl: 0    ketoconazole (NIZOral) 2 % cream, Apply twice daily to the painful rash in the groin, Disp: 60 g, Rfl: 3    lidocaine HCL 4 % adhesive patch,medicated, Apply 1 patch topically once daily., Disp: 15 patch, Rfl: 3    lisinopril 40 mg tablet, Take 1 tablet (40 mg) by mouth once daily., Disp: 30 tablet, Rfl: 3    loratadine (Claritin) 10 mg tablet, Take 1 tablet (10 mg) by mouth once daily., Disp: 30 tablet, Rfl: 3    Nu-Iron 150 mg iron capsule, Take 1 capsule (150 mg) by mouth once daily at bedtime., Disp: 30 capsule, Rfl: 3    omeprazole (PriLOSEC) 40 mg DR capsule, Take 1 capsule (40 mg) by mouth once daily., Disp: 30 capsule, Rfl: 3    ondansetron (Zofran) 8 mg tablet, PRN FOR NAUSEA, Disp: 20 tablet, Rfl: 0    OneTouch Delica Plus Lancet 33 gauge [facility], FOR GLUCOSE TESTING 3 TIMES DAILY, Disp: 30 each, Rfl: 3    OneTouch Verio Flex meter misc, FOR GLUCOSE TESTING 3 TIMES DAILY, Disp: 30 each, Rfl: 3    OneTouch Verio test strips strip, USE AS DIRECTED 3 TIMES A DAY, Disp: 30 strip, Rfl: 3    oxyCODONE (Roxicodone) 5 mg immediate release tablet, Take 1 tablet (5 mg) by mouth every 6 hours if needed for moderate pain (4 - 6)., Disp: 120 tablet, Rfl: 0    polyethylene glycol (Glycolax, Miralax) 17 gram packet, 17 GRAM(S) ORALLY ONCE A DAY, Disp: 30 packet, Rfl: 3    predniSONE (Deltasone) 5 mg tablet, Take 3 tablets (15 mg) by mouth once daily., Disp: 90 tablet, Rfl: 1    prochlorperazine (Compazine) 10 mg tablet, Take 1 tablet (10 mg) by mouth every 6 hours if needed for nausea., Disp: 30 tablet, Rfl: 3    torsemide (Demadex) 20 mg tablet, Take 3 tablets (60 mg) by mouth once daily., Disp: 30 tablet, Rfl:  restarts    3.  Further recommendations based on clinical course.    Malnutrition Status: at risk    Maegan Muniz RD, LDN  Clinical Dietitian  Contact via Epic secure chat or phone          3    triamcinolone (Kenalog) 0.1 % cream, PLEASE SEE ATTACHED FOR DETAILED DIRECTIONS, Disp: 30 g, Rfl: 0    Allergies:   No Known Allergies    ROS:  Review of Systems otherwise unremarkable    Objective:  Vitals:  Vitals:    11/30/23 0921   BP: 148/87   Pulse: 81   SpO2: 98%        Exam:        Labs:  Lab Results   Component Value Date    WBC 14.4 (H) 11/21/2023    WBC 9.2 10/18/2023    WBC CANCELED 09/23/2023    HGB 8.4 (L) 11/21/2023    HGB 7.7 (L) 10/18/2023    HGB CANCELED 09/23/2023    HCT 27.0 (L) 11/21/2023    HCT 23.9 (L) 10/18/2023    HCT CANCELED 09/23/2023    MCV 95 11/21/2023    MCV 91 10/18/2023    MCV CANCELED 09/23/2023     11/21/2023     Lab Results   Component Value Date    CREATININE 8.26 (H) 11/21/2023    CREATININE 6.87 (H) 10/18/2023    CREATININE 6.87 (H) 10/18/2023    BUN 91 (HH) 11/21/2023    BUN 79 (H) 10/18/2023    BUN 79 (H) 10/18/2023     (H) 11/21/2023     10/18/2023     10/18/2023    K 3.8 11/21/2023    K 3.7 10/18/2023    K 3.7 10/18/2023     11/21/2023     10/18/2023     10/18/2023    CO2 23 11/21/2023    CO2 21 10/18/2023    CO2 21 10/18/2023         Assessment & Plan:  Siddhartha Orellana is 38 y.o. male here for follow up of left arm AV fistula revision wound.      Overall things are healing well and we are making progress.    One more small area left to heal  Will see back in 2 weeks  Will continue isaiah Daigle MD, MHS, RPVI  , [employer]  Director, [facility], [facility]  Co-Director, Vascular Laboratories, [facility]  Division of Vascular Surgery and Endovascular Therapy  [facility]

## 2023-12-01 RX ORDER — OXYCODONE HYDROCHLORIDE 5 MG/1
10 TABLET ORAL EVERY 4 HOURS PRN
Qty: 120 TABLET | Refills: 0 | Status: SHIPPED | OUTPATIENT
Start: 2023-12-14 | End: 2023-12-08 | Stop reason: DRUGHIGH

## 2023-12-06 ENCOUNTER — OFFICE VISIT (OUTPATIENT)
Dept: NEPHROLOGY | Facility: CLINIC | Age: 38
End: 2023-12-06
Payer: COMMERCIAL

## 2023-12-06 VITALS
WEIGHT: 191 LBS | HEART RATE: 82 BPM | DIASTOLIC BLOOD PRESSURE: 82 MMHG | BODY MASS INDEX: 30.83 KG/M2 | SYSTOLIC BLOOD PRESSURE: 137 MMHG

## 2023-12-06 DIAGNOSIS — N18.9 ANEMIA OF CHRONIC RENAL FAILURE, UNSPECIFIED CKD STAGE: ICD-10-CM

## 2023-12-06 DIAGNOSIS — N25.81 SECONDARY HYPERPARATHYROIDISM OF RENAL ORIGIN (MULTI): ICD-10-CM

## 2023-12-06 DIAGNOSIS — D63.1 ANEMIA OF CHRONIC RENAL FAILURE, UNSPECIFIED CKD STAGE: ICD-10-CM

## 2023-12-06 DIAGNOSIS — E87.70 HYPERVOLEMIA, UNSPECIFIED HYPERVOLEMIA TYPE: ICD-10-CM

## 2023-12-06 DIAGNOSIS — D50.8 OTHER IRON DEFICIENCY ANEMIA: Primary | ICD-10-CM

## 2023-12-06 DIAGNOSIS — N18.5 CHRONIC KIDNEY DISEASE, STAGE 5 (MULTI): ICD-10-CM

## 2023-12-06 DIAGNOSIS — N18.5 CHRONIC KIDNEY DISEASE, STAGE 5 (MULTI): Primary | ICD-10-CM

## 2023-12-06 PROCEDURE — 3075F SYST BP GE 130 - 139MM HG: CPT | Performed by: INTERNAL MEDICINE

## 2023-12-06 PROCEDURE — 3008F BODY MASS INDEX DOCD: CPT | Performed by: INTERNAL MEDICINE

## 2023-12-06 PROCEDURE — 4010F ACE/ARB THERAPY RXD/TAKEN: CPT | Performed by: INTERNAL MEDICINE

## 2023-12-06 PROCEDURE — 3051F HG A1C>EQUAL 7.0%<8.0%: CPT | Performed by: INTERNAL MEDICINE

## 2023-12-06 PROCEDURE — 3079F DIAST BP 80-89 MM HG: CPT | Performed by: INTERNAL MEDICINE

## 2023-12-06 PROCEDURE — 99214 OFFICE O/P EST MOD 30 MIN: CPT | Performed by: INTERNAL MEDICINE

## 2023-12-06 PROCEDURE — 3066F NEPHROPATHY DOC TX: CPT | Performed by: INTERNAL MEDICINE

## 2023-12-06 PROCEDURE — 3062F POS MACROALBUMINURIA REV: CPT | Performed by: INTERNAL MEDICINE

## 2023-12-06 PROCEDURE — 1036F TOBACCO NON-USER: CPT | Performed by: INTERNAL MEDICINE

## 2023-12-06 NOTE — PROGRESS NOTES
"Siddhartha Orellana is a 38 y.o. male here in follow up for CKD; comes accompanied by his wife    Subjective   No appetite, feeling weak. Experiencing some nausea and vomiting.  Leg swelling present, though better than a few days ago. Off Prednisone to facilitate arm wound healing and still off Cabozantinib. Due to see Dr. Daigle and also Dr. Thomson on 12/21. No visits to renal treatment clinic since August. Will reorder NIGEL and iron, though likely only for a short duration- we discuss starting dialysis as soon as we receive the \"OK to use access\" from Dr. Daigle, unless miraculously improved clinically and by labs in the interim. I explained the usual hospital admission for 2-3 days to initiate dialysis, incremental, in the hospital. For outpatient dialysis, the patient would prefer [facility].        ROS  All the rest reviewed and negative    Objective     Vital signs    /82   Pulse 82   Wt 86.6 kg (191 lb)   BMI 30.83 kg/m²     Physical Exam  Constitutional:  well appearing, in NAD  Psychiatric:  appropriate mood and behavior    HEENT: NC/AT, clear sclerae, moist mucous membranes  Cardiovascular: regular, tachycardic, no pericardial friction rub  Pulmonary:  Normal breath sounds bilateral  Extremities: no edema  Skin:  warm and dry; fistula revision wound dry  ACCESS: LFA AVF good thrill    Meds    Current Outpatient Medications:     amLODIPine (Norvasc) 10 mg tablet, Take 1 tablet (10 mg) by mouth once daily., Disp: 30 tablet, Rfl: 3    atorvastatin (Lipitor) 20 mg tablet, Take 1 tablet (20 mg) by mouth once daily at bedtime., Disp: 30 tablet, Rfl: 3    BD Insulin Syringe Ultra-Fine 0.5 mL 30 gauge x 1/2\" syringe, USE AS DIRECTED WITH LANTUS AND INSULIN LISPRO INJECTIONS, Disp: 100 each, Rfl: 3    calcitriol (Rocaltrol) 0.25 mcg capsule, Take 1 capsule (0.25 mcg) by mouth once daily at bedtime., Disp: 30 capsule, Rfl: 3    carvedilol (Coreg) 25 mg tablet, Take 1 tablet (25 mg) by mouth 2 times a day., Disp: " 30 tablet, Rfl: 3    cholecalciferol (Vitamin D-3) 125 MCG (5000 UT) capsule, Take 1 capsule (125 mcg) by mouth once daily., Disp: 30 capsule, Rfl: 3    Dexcom G7  misc, FOR CONTINUOUS GLUCOSE MONITORING, Disp: 1 each, Rfl: 3    Dexcom G7 Sensor device, FOR CONTINUOUS GLUCOSE MONITORING, CHANGE EVERY 10 DAYS, Disp: 3 each, Rfl: 2    eucerin cream, Apply topically if needed for dry skin., Disp: 453 g, Rfl: 11    famotidine (Pepcid) 20 mg tablet, Take 1 tablet (20 mg) by mouth 2 times a day as needed for indigestion or heartburn., Disp: 30 tablet, Rfl: 3    hydrALAZINE (Apresoline) 100 mg tablet, Take 1 tablet (100 mg) by mouth 3 times a day. Take with food, Disp: 30 tablet, Rfl: 3    hydrocortisone 2.5 % cream, 1 APPLICATION TWICE A DAY TO AFFECTED AREA OF FACE (AVOID EYES) TO DARK SPOTS FOR 2-4 WEEKS, Disp: 1 g, Rfl: 3    insulin lispro (HumaLOG) 100 unit/mL injection, TAKE AS DIRECTED PER INSULIN INSTRUCTIONS., Disp: 10 mL, Rfl: 0    ketoconazole (NIZOral) 2 % cream, Apply twice daily to the painful rash in the groin, Disp: 60 g, Rfl: 3    lidocaine HCL 4 % adhesive patch,medicated, Apply 1 patch topically once daily., Disp: 15 patch, Rfl: 3    lisinopril 40 mg tablet, Take 1 tablet (40 mg) by mouth once daily., Disp: 30 tablet, Rfl: 3    loratadine (Claritin) 10 mg tablet, Take 1 tablet (10 mg) by mouth once daily., Disp: 30 tablet, Rfl: 3    omeprazole (PriLOSEC) 40 mg DR capsule, Take 1 capsule (40 mg) by mouth once daily., Disp: 30 capsule, Rfl: 3    ondansetron (Zofran) 4 mg tablet, Take 2 tablets (8 mg) by mouth 2 times a day as needed for nausea., Disp: 30 tablet, Rfl: 3    OneTouch Delica Plus Lancet 33 gauge misc, FOR GLUCOSE TESTING 3 TIMES DAILY, Disp: 30 each, Rfl: 3    OneTouch Verio Flex meter misc, FOR GLUCOSE TESTING 3 TIMES DAILY, Disp: 30 each, Rfl: 3    OneTouch Verio test strips strip, USE AS DIRECTED 3 TIMES A DAY, Disp: 30 strip, Rfl: 3    [START ON 12/14/2023] oxyCODONE (Roxicodone) 5  mg immediate release tablet, Take 2 tablets (10 mg) by mouth every 4 hours if needed for moderate pain (4 - 6). Do not start before December 14, 2023., Disp: 120 tablet, Rfl: 0    prochlorperazine (Compazine) 10 mg tablet, Take 1 tablet (10 mg) by mouth every 6 hours if needed for nausea., Disp: 30 tablet, Rfl: 3    simethicone 42 mg chewable tablet, Chew 1 tablet (42 mg) 4 times a day as needed (for gas pains)., Disp: 30 tablet, Rfl: 3    torsemide (Demadex) 20 mg tablet, Take 3 tablets (60 mg) by mouth once daily., Disp: 30 tablet, Rfl: 3    triamcinolone (Kenalog) 0.1 % cream, PLEASE SEE ATTACHED FOR DETAILED DIRECTIONS, Disp: 30 g, Rfl: 0     Allergies  No Known Allergies     Results  Recent Results (from the past 672 hour(s))   CBC    Collection Time: 11/21/23  2:48 PM   Result Value Ref Range    WBC 14.4 (H) 4.4 - 11.3 x10*3/uL    nRBC 0.2 (H) 0.0 - 0.0 /100 WBCs    RBC 2.83 (L) 4.50 - 5.90 x10*6/uL    Hemoglobin 8.4 (L) 13.5 - 17.5 g/dL    Hematocrit 27.0 (L) 41.0 - 52.0 %    MCV 95 80 - 100 fL    MCH 29.7 26.0 - 34.0 pg    MCHC 31.1 (L) 32.0 - 36.0 g/dL    RDW 15.7 (H) 11.5 - 14.5 %    Platelets 192 150 - 450 x10*3/uL   Hemoglobin A1C    Collection Time: 11/21/23  2:48 PM   Result Value Ref Range    Hemoglobin A1C 7.4 (H) see below %    Estimated Average Glucose 166 Not Established mg/dL   Lipid Panel Non-Fasting    Collection Time: 11/21/23  2:48 PM   Result Value Ref Range    Cholesterol 187 0 - 199 mg/dL    HDL-Cholesterol 52.4 mg/dL    Cholesterol/HDL Ratio 3.6     Non-HDL Cholesterol 135 0 - 149 mg/dL   TSH with reflex to Free T4 if abnormal    Collection Time: 11/21/23  2:48 PM   Result Value Ref Range    Thyroid Stimulating Hormone 5.40 (H) 0.44 - 3.98 mIU/L   Iron and TIBC    Collection Time: 11/21/23  2:48 PM   Result Value Ref Range    Iron 62 35 - 150 ug/dL    UIBC 186 110 - 370 ug/dL    TIBC 248 240 - 445 ug/dL    % Saturation 25 25 - 45 %   Vitamin B12    Collection Time: 11/21/23  2:48 PM  "  Result Value Ref Range    Vitamin B12 425 211 - 911 pg/mL   Folate    Collection Time: 11/21/23  2:48 PM   Result Value Ref Range    Folate, Serum 10.4 >5.0 ng/mL   Ferritin    Collection Time: 11/21/23  2:48 PM   Result Value Ref Range    Ferritin 250 20 - 300 ng/mL   Thyroxine, Free    Collection Time: 11/21/23  2:48 PM   Result Value Ref Range    Thyroxine, Free 0.99 0.78 - 1.48 ng/dL   Renal function panel    Collection Time: 11/21/23  2:48 PM   Result Value Ref Range    Glucose 150 (H) 74 - 99 mg/dL    Sodium 148 (H) 136 - 145 mmol/L    Potassium 3.8 3.5 - 5.3 mmol/L    Chloride 105 98 - 107 mmol/L    Bicarbonate 23 21 - 32 mmol/L    Anion Gap 24 (H) 10 - 20 mmol/L    Urea Nitrogen 91 (HH) 6 - 23 mg/dL    Creatinine 8.26 (H) 0.50 - 1.30 mg/dL    eGFR 8 (L) >60 mL/min/1.73m*2    Calcium 8.6 8.6 - 10.6 mg/dL    Phosphorus 6.3 (H) 2.5 - 4.9 mg/dL    Albumin 3.6 3.4 - 5.0 g/dL     Assessment and Plan     1. CKD stage G5/A3, presumed hypertensive nephrosclerosis +/- diabetic renal disease and/or secondary FSGS . Serum creatinine 11/21 at 8.26 mg/dl, rendering a very low estimated GFR of ~ 8 ml/min/1.73 m2. C/f uremic symptoms: low appetite, N/V, fatigue. Wt stable.   S/p L radiocephalic fistula placed by Dr. Parada; s/p revision in May by Dr. Daigle. F/u surgery with Dr. Daigle later this month for repair of skin breakdown near AVF. Awaiting complete healing and OK to use from Dr. Daigle. Explained to patient we may start dialysis immediately after Annika if getting the \"OK to use\". They are in agreement.      2. HTN. BP on target here today. E/o volume overload on exam. Instructed to up the torsemide tabs by 1 tab for 3 days (to help leg edema), then resume current dose. Continue all other antihypertensives as are, along with low salt and exercise.      3. H/o DLD, on low dose Atorvastatin. Continue current regimen.     4. Secondary hyperparathyroidism. On calcitriol 0.25 mcg Q HS. PTH  900 in October. Encourage " low phos diet, consider phos binders.     5. Anemia with relative iron deficiency. H/H low. Will reorder parenteral iron in renal treatment clinic, and low dose NIGEL (previously discussed with Dr. Thomson - mutually agreed to try NIGEL for Hemoglobin below 9).      6. Papillary RCC, s/p Nivolumab. Chemo regimen as directed by Dr. Thomson.     RTC 1/31/2024    Tatiana Grover MD

## 2023-12-06 NOTE — PATIENT INSTRUCTIONS
I will try to set you up for iron and NIGEL in renal treatment clinic  Labs later this month, when seeing Janel Daigle and Berto.  Start IHD once fistula OK to use, by Dr. Daigle.  See you back in follow up in late January.

## 2023-12-08 ENCOUNTER — TELEPHONE (OUTPATIENT)
Dept: HEMATOLOGY/ONCOLOGY | Facility: HOSPITAL | Age: 38
End: 2023-12-08
Payer: COMMERCIAL

## 2023-12-08 DIAGNOSIS — C64.9 RENAL CELL CARCINOMA, UNSPECIFIED LATERALITY (MULTI): ICD-10-CM

## 2023-12-08 DIAGNOSIS — G89.3 CANCER ASSOCIATED PAIN: ICD-10-CM

## 2023-12-08 DIAGNOSIS — C64.9 RENAL CELL CARCINOMA, UNSPECIFIED LATERALITY (MULTI): Primary | ICD-10-CM

## 2023-12-08 RX ORDER — OXYCODONE HYDROCHLORIDE 5 MG/1
10 TABLET ORAL EVERY 4 HOURS PRN
Qty: 360 TABLET | Refills: 0 | Status: SHIPPED | OUTPATIENT
Start: 2023-12-08 | End: 2023-12-08 | Stop reason: SDUPTHER

## 2023-12-08 RX ORDER — OXYCODONE HYDROCHLORIDE 10 MG/1
10 TABLET ORAL EVERY 4 HOURS PRN
Qty: 180 TABLET | Refills: 0 | Status: SHIPPED | OUTPATIENT
Start: 2023-12-08 | End: 2023-12-10 | Stop reason: SDUPTHER

## 2023-12-08 NOTE — TELEPHONE ENCOUNTER
Wife states that insurance would not cover the early fill. I attempted to get [facility] to do a prior auth. Meanwhile wife called insurance company and received approval for an early fill due to cancer pain. Script was resent back to [facility]. Changed to 10 mg tabs 1 tab every 4 hours as needed for a total of 180 tabs instead of 5 mg tabs with a total of 360 tabs in hopes to get this script filled and covered today. We increased his dose at my last office visit with him on 11/30/23 in order to keep him off of steroids.   Tatiana Rodriguez, APRN-CNP

## 2023-12-08 NOTE — TELEPHONE ENCOUNTER
Per email from patient's wife patient is out of oxycodone due to increasing dose and frequency as prescribed by me. Calling [facility] to see if we can do an early script or short course until script is available on 12/14/23. Can send a new script with a note about the dose change please fill early and they can do it. Sending a new script now. Wife will be updated by email. Patient should not restart steroids.   Tatiana Rodriguez, JAIME-CNP

## 2023-12-10 DIAGNOSIS — C64.9 RENAL CELL CARCINOMA, UNSPECIFIED LATERALITY (MULTI): ICD-10-CM

## 2023-12-10 DIAGNOSIS — G89.3 CANCER ASSOCIATED PAIN: ICD-10-CM

## 2023-12-10 RX ORDER — OXYCODONE HYDROCHLORIDE 5 MG/1
10 TABLET ORAL EVERY 4 HOURS PRN
Qty: 180 TABLET | Refills: 0 | Status: SHIPPED | OUTPATIENT
Start: 2023-12-10 | End: 2024-01-02 | Stop reason: SDUPTHER

## 2023-12-11 NOTE — TELEPHONE ENCOUNTER
Email received from wife that [facility] does not carry oxycodone 10 mg tabs. I am now resending the script with 5 mg tabs, take 2 tabs every 4 hours as needed for pain. I'm sending only 180 tabs in hopes to get this filled and covered for the patient. It will only be a 15 day supply if patient is requiring max dose.   Tatiana Rodriguez, APRN-CNP

## 2023-12-22 ENCOUNTER — OFFICE VISIT (OUTPATIENT)
Dept: HEMATOLOGY/ONCOLOGY | Facility: CLINIC | Age: 38
End: 2023-12-22
Payer: COMMERCIAL

## 2023-12-22 VITALS
HEART RATE: 70 BPM | SYSTOLIC BLOOD PRESSURE: 138 MMHG | DIASTOLIC BLOOD PRESSURE: 86 MMHG | RESPIRATION RATE: 18 BRPM | TEMPERATURE: 97 F | WEIGHT: 187.83 LBS | OXYGEN SATURATION: 100 % | BODY MASS INDEX: 30.32 KG/M2

## 2023-12-22 DIAGNOSIS — C64.9 RENAL CELL CARCINOMA, UNSPECIFIED LATERALITY (MULTI): Primary | ICD-10-CM

## 2023-12-22 PROCEDURE — 3066F NEPHROPATHY DOC TX: CPT | Performed by: STUDENT IN AN ORGANIZED HEALTH CARE EDUCATION/TRAINING PROGRAM

## 2023-12-22 PROCEDURE — 4010F ACE/ARB THERAPY RXD/TAKEN: CPT | Performed by: STUDENT IN AN ORGANIZED HEALTH CARE EDUCATION/TRAINING PROGRAM

## 2023-12-22 PROCEDURE — 1036F TOBACCO NON-USER: CPT | Performed by: STUDENT IN AN ORGANIZED HEALTH CARE EDUCATION/TRAINING PROGRAM

## 2023-12-22 PROCEDURE — 99214 OFFICE O/P EST MOD 30 MIN: CPT | Performed by: STUDENT IN AN ORGANIZED HEALTH CARE EDUCATION/TRAINING PROGRAM

## 2023-12-22 PROCEDURE — 3008F BODY MASS INDEX DOCD: CPT | Performed by: STUDENT IN AN ORGANIZED HEALTH CARE EDUCATION/TRAINING PROGRAM

## 2023-12-22 PROCEDURE — 3051F HG A1C>EQUAL 7.0%<8.0%: CPT | Performed by: STUDENT IN AN ORGANIZED HEALTH CARE EDUCATION/TRAINING PROGRAM

## 2023-12-22 PROCEDURE — 3075F SYST BP GE 130 - 139MM HG: CPT | Performed by: STUDENT IN AN ORGANIZED HEALTH CARE EDUCATION/TRAINING PROGRAM

## 2023-12-22 PROCEDURE — 3079F DIAST BP 80-89 MM HG: CPT | Performed by: STUDENT IN AN ORGANIZED HEALTH CARE EDUCATION/TRAINING PROGRAM

## 2023-12-22 PROCEDURE — 3062F POS MACROALBUMINURIA REV: CPT | Performed by: STUDENT IN AN ORGANIZED HEALTH CARE EDUCATION/TRAINING PROGRAM

## 2023-12-22 ASSESSMENT — ENCOUNTER SYMPTOMS
LIGHT-HEADEDNESS: 0
PSYCHIATRIC NEGATIVE: 1
VOICE CHANGE: 0
DIAPHORESIS: 0
APPETITE CHANGE: 1
LOSS OF SENSATION IN FEET: 0
FATIGUE: 1
LEG SWELLING: 0
CHEST TIGHTNESS: 0
EYE PROBLEMS: 0
CHILLS: 0
SORE THROAT: 0
EXTREMITY WEAKNESS: 1
COUGH: 0
CONSTIPATION: 0
DIARRHEA: 0
WOUND: 1
DIZZINESS: 0
ABDOMINAL PAIN: 0
UNEXPECTED WEIGHT CHANGE: 0
MYALGIAS: 1
ARTHRALGIAS: 1
DEPRESSION: 0
NAUSEA: 1
SHORTNESS OF BREATH: 0
ROS GI COMMENTS: GAS PAINS
FEVER: 0
VOMITING: 1

## 2023-12-22 ASSESSMENT — PAIN SCALES - GENERAL: PAINLEVEL: 6

## 2023-12-22 NOTE — PROGRESS NOTES
Patient ID: Siddhartha Orellana is a 38 y.o. male.  Diagnosis:  Papillary RCC  MedOnc: Dr. Thomson  Nephrologist: Dr. Grover  Vascular Surgeon: Dr. Daigle    Current Therapy: Cabozantinib     ONCOLOGIC HISTORY  11/7/22 - CT-guided biopsy of retroperitoneal lymph node revealed findings consistent with metastatic papillary carcinoma.  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  2/7/23 - On Prednisone 20mg daily  2/17/23 - Scans show SD (RECIST) with some mild tumor shrinkage RPLN  2/21/23 - Increase Prednisone 40mg daily for continued arthralgia; continue Cabo 40 mg daily  3/15/23 - Continue Pred 40mg PO daily, continue Cabo  End March - hidradenitis suppurativa  4/4/23 - Hold Cabo; prednisone 30 mg daily  4/25/23 - Resume cabo  5/18/23 - Hold Cabo 2/2 vascular fistula repair  5/23/23 - Continue hold due to extensive edema, blistering  6/2023 - resumed cabo. Nivo on hold with PDN 25 mg  8/16/23 - continue cabo, reduce PDN to 20 mg daily  8/23/23 - Left upper extremity wound debridement down to subcutaneous tissue and vessels- Cabo on hold  8/31/23 - continue to hold cabo, PDN back at 30mg   9/15/23 - resumed cabozantinib 40 mg   Mid Sep 23 - admission hidradenitis suppurativa  10/10/23 - cabo on hold. Will start PDN 20 mg  10/17/23 - cabo on hold for graft, PDN 20 mg  10/25/23 - cabo on hold for graft, PDN 15 mg daily  11/16/23 - continue cabozantinib hold, PDN 10 mg daily    Oncology History   Renal cell carcinoma (CMS/HCC)   12/1/2022 -  Chemotherapy    Cabozantinib, 28 Day Cycles     8/28/2023 Initial Diagnosis    Renal cell carcinoma (CMS/HCC)        Past Medical History:   1. Lymphadenopathy and a pre surgical consult for kidney transplant  2. Stage V CKD  3.  Type II DM  3. HTN  5. HLD  6. Parathyroidism   7. Anemia with NARGIS component   8. Gastric ulcer  9. SHIVA  10. Vitamin D deficiency   11. Morbid obesity  Past Medical History: Siddhartha has a past medical history of Dorsalgia, unspecified (01/04/2023), End stage renal disease (CMS/HCC) (01/04/2023), Essential (primary) hypertension (01/04/2023), Iron deficiency (02/23/2020), and Other specified postprocedural states.  Surgical History:  Siddhartha has a past surgical history that includes Other surgical history (04/11/2018).  Social History:  Siddhartha reports that he has quit smoking. His smoking use included cigars. He has never used smokeless tobacco. He reports current alcohol use. He reports that he does not use drugs.  Family History:    Family History   Problem Relation Name Age of Onset    Hypertension Mother      Cancer Father      Hypertension Father      Other (renal transplant recipient) Mother's Brother       Family Oncology History:  Cancer-related family history includes Cancer in his father.    SUBJECTIVE:    History of Present Illness:  Siddhartha Orellana is a 38 y.o. male who presents today for follow up of papillary RCC. Patient of Dr. Thomson currently on cabozantinib, nivolumab has been on hold since January due to arthralgias. He has had multiple flares of hidradenitis suppurativa and issues with his fistula healing. Patient presents today continues to be on a break from cabozantinib for poor healing of his fistula repair that was done in May. Cabozantinib hold began in the beginning of October, a graft was anticipated. Following with Dr. Daigle who saw Mr. Orellana today, trying to avoid surgical graft and revision. Wound continues to heal per patient and Dr. Daigle.  Added oxycodone in attempts to decrease prednisone dose to taper off. Last visit I stress the long term issues with continuing steroids, patient decided to stop prednisone cold turkey. He has now been off for 3 weeks. He is having fatigue and weakness  "and appetite changes. He fell once. Feels like things get better each day. Feels like AVF is almost healed. He is off the HS abx, no flare right now. He continues to have morning nausea and vomiting. Has tried many things with adjusting meds and diet. He was unable to get zofran because of insurance. His blood sugars have been good off of prednisone. His pain is controlled on oxycodone, needing 10 mg at a time sometimes, taking about 3 times a day, \"dealing with it\". No current signs and symptoms of infection. His energy level isn't great but was able to enjoy the holiday.     Review of Systems   Constitutional:  Positive for appetite change and fatigue. Negative for chills, diaphoresis, fever and unexpected weight change.   HENT:   Negative for mouth sores, sore throat and voice change.    Eyes:  Negative for eye problems.   Respiratory:  Negative for chest tightness, cough and shortness of breath.    Cardiovascular:  Negative for chest pain and leg swelling.   Gastrointestinal:  Positive for nausea and vomiting. Negative for abdominal pain, constipation and diarrhea.        Gas pains   Musculoskeletal:  Positive for arthralgias, gait problem and myalgias.   Skin:  Positive for wound. Negative for itching and rash.   Neurological:  Positive for extremity weakness and gait problem. Negative for dizziness and light-headedness.   Psychiatric/Behavioral: Negative.         Allergies  No Known Allergies     Medications  Current Outpatient Medications   Medication Instructions    amLODIPine (NORVASC) 10 mg, oral, Daily    atorvastatin (LIPITOR) 20 mg, oral, Nightly    BD Insulin Syringe Ultra-Fine 0.5 mL 30 gauge x 1/2\" syringe USE AS DIRECTED WITH LANTUS AND INSULIN LISPRO INJECTIONS    cabozantinib (Cabometyx) 40 mg tablet TAKE ONE (1) TABLET BY MOUTH ONCE A DAY    calcitriol (ROCALTROL) 0.25 mcg, oral, Nightly    carvedilol (COREG) 25 mg, oral, 2 times daily    cholecalciferol (VITAMIN D-3) 125 mcg, oral, Daily    " clindamycin (Cleocin T) 1 % lotion     Dexcom G7  misc FOR CONTINUOUS GLUCOSE MONITORING    Dexcom G7 Sensor device FOR CONTINUOUS GLUCOSE MONITORING, CHANGE EVERY 10 DAYS    eucerin cream Topical, As needed    famotidine (PEPCID) 20 mg, oral, 2 times daily PRN    hydrALAZINE (APRESOLINE) 100 mg, oral, 3 times daily, Take with food    hydrocortisone 2.5 % cream 1 APPLICATION TWICE A DAY TO AFFECTED AREA OF FACE (AVOID EYES) TO DARK SPOTS FOR 2-4 WEEKS    insulin lispro (HumaLOG) 100 unit/mL injection TAKE AS DIRECTED PER INSULIN INSTRUCTIONS.    ketoconazole (NIZOral) 2 % cream Apply twice daily to the painful rash in the groin    lidocaine HCL 4 % adhesive patch,medicated 1 patch, topical (top), Daily    lisinopril 40 mg, oral, Daily    loratadine (CLARITIN) 10 mg, oral, Daily    omeprazole (PRILOSEC) 40 mg, oral, Daily    ondansetron (ZOFRAN) 8 mg, oral, 2 times daily PRN    OneTouch Delica Plus Lancet 33 gauge misc FOR GLUCOSE TESTING 3 TIMES DAILY    OneTouch Verio Flex meter misc FOR GLUCOSE TESTING 3 TIMES DAILY    OneTouch Verio test strips strip USE AS DIRECTED 3 TIMES A DAY    oxyCODONE (ROXICODONE) 10 mg, oral, Every 4 hours PRN    prochlorperazine (COMPAZINE) 10 mg, oral, Every 6 hours PRN    simethicone 42 mg, oral, 4 times daily PRN    torsemide (DEMADEX) 60 mg, oral, Daily    triamcinolone (Kenalog) 0.1 % cream PLEASE SEE ATTACHED FOR DETAILED DIRECTIONS        OBJECTIVE:    VS / Pain:  /86   Pulse 70   Temp 36.1 °C (97 °F)   Resp 18   Wt 85.2 kg (187 lb 13.3 oz) Comment: pt refused to take shoes off  SpO2 100%   BMI 30.32 kg/m²   BSA: 1.99 meters squared  Wt Readings from Last 5 Encounters:   10/11/23 83.5 kg (184 lb)   12/22/23 85.2 kg (187 lb 13.3 oz)   12/06/23 86.6 kg (191 lb)   11/30/23 86.9 kg (191 lb 9.3 oz)   11/30/23 85.7 kg (189 lb)      Pain Scale: 3    Physical Exam  Constitutional:       Appearance: Normal appearance. He is normal weight.   HENT:      Head:  Normocephalic and atraumatic.   Eyes:      Pupils: Pupils are equal, round, and reactive to light.   Pulmonary:      Effort: Pulmonary effort is normal.   Musculoskeletal:         General: No swelling. Normal range of motion.      Cervical back: Normal range of motion.   Skin:     General: Skin is warm and dry.      Findings: Rash present.      Comments: Left arm wound with dressing, hypopigmentation, HS in groin was not assessed   Neurological:      General: No focal deficit present.      Mental Status: He is alert and oriented to person, place, and time.   Psychiatric:         Mood and Affect: Mood normal.         Behavior: Behavior normal.         Thought Content: Thought content normal.         Judgment: Judgment normal.         Performance Status:  ECOG Score: 1- Restricted in physically strenuous activity.  Carries out light duty.  Karnofsky Score: 80 - Normal activity with effort; some signs or symptoms of disease    Diagnostic Results     No results found for this or any previous visit (from the past 96 hour(s)).      Assessment/Plan   I personally saw patient and counselled all visit  on his diagnosis, maria luisa history and coordination of his care.     37yo AA man with hx of stage IV CKD with metastatic papRCC to LNs now on cabo/nivo. He has CKD and planned for kidney dialysis. Known proteinuria +++ (known). Still with sig joint pains and dealing with hyperglycemias. On cabo on hold due to healing fistula and nivo on hold d/t arthritis (PDN) on 30 mg PDN BUT pt self discontinued his prednisone and arthralgias are actually better. Pain is under control on opioids and arthralgias might not be fully IO-related. Scans soon. He will remain off RCC treatment until disease progression.   Starting dialysis early 2024.   I sincerely appreciate the opportunity to see this patient and will contact the referring provider by phone or communicate directly through the electronic chart.  All patient's questions were answered  and contact information provided to contact us with any issues.  Renan Thomson MD MSc FACP   in Medicine [facility] [employer]  Director Clinical  Medical Oncology Research Program   [facility] / [facility]  Cell [phone]  Office [phone]

## 2024-01-02 DIAGNOSIS — C64.9 RENAL CELL CARCINOMA, UNSPECIFIED LATERALITY (MULTI): ICD-10-CM

## 2024-01-02 DIAGNOSIS — G89.3 CANCER ASSOCIATED PAIN: ICD-10-CM

## 2024-01-02 RX ORDER — OXYCODONE HYDROCHLORIDE 5 MG/1
10 TABLET ORAL EVERY 4 HOURS PRN
Qty: 180 TABLET | Refills: 0 | Status: SHIPPED | OUTPATIENT
Start: 2024-01-02 | End: 2024-01-18 | Stop reason: SDUPTHER

## 2024-01-17 ENCOUNTER — ANCILLARY PROCEDURE (OUTPATIENT)
Dept: RADIOLOGY | Facility: CLINIC | Age: 39
End: 2024-01-17
Payer: COMMERCIAL

## 2024-01-17 DIAGNOSIS — C64.9 RENAL CELL CARCINOMA, UNSPECIFIED LATERALITY (MULTI): ICD-10-CM

## 2024-01-17 PROCEDURE — 74176 CT ABD & PELVIS W/O CONTRAST: CPT | Performed by: RADIOLOGY

## 2024-01-17 PROCEDURE — 74176 CT ABD & PELVIS W/O CONTRAST: CPT

## 2024-01-17 PROCEDURE — 71250 CT THORAX DX C-: CPT | Performed by: RADIOLOGY

## 2024-01-17 ASSESSMENT — ENCOUNTER SYMPTOMS
COUGH: 0
DIARRHEA: 0
ARTHRALGIAS: 1
DIAPHORESIS: 0
UNEXPECTED WEIGHT CHANGE: 0
MYALGIAS: 1
EXTREMITY WEAKNESS: 1
VOMITING: 1
SORE THROAT: 0
CHILLS: 0
FEVER: 0
WOUND: 1
APPETITE CHANGE: 1
ABDOMINAL PAIN: 0
DIZZINESS: 0
SHORTNESS OF BREATH: 0
CONSTIPATION: 0
PSYCHIATRIC NEGATIVE: 1
VOICE CHANGE: 0
EYE PROBLEMS: 0
FATIGUE: 1
CHEST TIGHTNESS: 0
NAUSEA: 1
LIGHT-HEADEDNESS: 0
LEG SWELLING: 0
ROS GI COMMENTS: GAS PAINS

## 2024-01-17 NOTE — PROGRESS NOTES
Patient ID: Siddhartha Orellana is a 38 y.o. male.  Diagnosis:  Papillary RCC  MedOnc: Dr. Thomson  Nephrologist: Dr. Grover  Vascular Surgeon: Dr. Daigle    Current Therapy: Cabozantinib     ONCOLOGIC HISTORY  11/7/22 - CT-guided biopsy of retroperitoneal lymph node revealed findings consistent with metastatic papillary carcinoma.  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  2/7/23 - On Prednisone 20mg daily  2/17/23 - Scans show SD (RECIST) with some mild tumor shrinkage RPLN  2/21/23 - Increase Prednisone 40mg daily for continued arthralgia; continue Cabo 40 mg daily  3/15/23 - Continue Pred 40mg PO daily, continue Cabo  End March - hidradenitis suppurativa  4/4/23 - Hold Cabo; prednisone 30 mg daily  4/25/23 - Resume cabo  5/18/23 - Hold Cabo 2/2 vascular fistula repair  5/23/23 - Continue hold due to extensive edema, blistering  6/2023 - resumed cabo. Nivo on hold with PDN 25 mg  8/16/23 - continue cabo, reduce PDN to 20 mg daily  8/23/23 - Left upper extremity wound debridement down to subcutaneous tissue and vessels- Cabo on hold  8/31/23 - continue to hold cabo, PDN back at 30mg   9/15/23 - resumed cabozantinib 40 mg   Mid Sep 23 - admission hidradenitis suppurativa  10/10/23 - cabo on hold. Will start PDN 20 mg  10/17/23 - cabo on hold for graft, PDN 20 mg  10/25/23 - cabo on hold for graft, PDN 15 mg daily  11/16/23 - continue cabozantinib hold, PDN 10 mg daily  1/18/24 - mild inguinal/iliac LN growth + stable bone mets in staging scans.     Oncology History   Renal cell carcinoma (CMS/HCC)   12/1/2022 -  Chemotherapy    Cabozantinib, 28 Day Cycles     8/28/2023 Initial Diagnosis    Renal cell carcinoma (CMS/HCC)        Past Medical History:   1.  Lymphadenopathy and a pre surgical consult for kidney transplant  2. Stage V CKD  3. Type II DM  3. HTN  5. HLD  6. Parathyroidism   7. Anemia with NARGIS component   8. Gastric ulcer  9. SHIVA  10. Vitamin D deficiency   11. Morbid obesity  Past Medical History: Siddhartha has a past medical history of Dorsalgia, unspecified (01/04/2023), End stage renal disease (CMS/HCC) (01/04/2023), Essential (primary) hypertension (01/04/2023), Iron deficiency (02/23/2020), and Other specified postprocedural states.  Surgical History:  Siddhartha has a past surgical history that includes Other surgical history (04/11/2018).  Social History:  Siddhartha reports that he has quit smoking. His smoking use included cigars. He has never used smokeless tobacco. He reports current alcohol use. He reports that he does not use drugs.  Family History:    Family History   Problem Relation Name Age of Onset    Hypertension Mother      Cancer Father      Hypertension Father      Other (renal transplant recipient) Mother's Brother       Family Oncology History:  Cancer-related family history includes Cancer in his father.    SUBJECTIVE:    History of Present Illness:  Siddhartha Orellana is a 38 y.o. male who presents today for follow up of papillary RCC. Patient of Dr. Thomson currently on cabozantinib, nivolumab has been on hold since January due to arthralgias. He has had multiple flares of hidradenitis suppurativa and issues with his fistula healing. Patient presents today continues to be on a break from cabozantinib for poor healing of his fistula repair that was done in May. Cabozantinib hold began in the beginning of October, a graft was anticipated. Following with Dr. Daigle who saw Mr. Orellana today, trying to avoid surgical graft and revision. Wound continues to heal per patient and Dr. Daigle.  Added oxycodone in attempts to decrease prednisone dose to taper off. Last visit I stress the long term issues with continuing steroids, patient decided to stop  "prednisone cold turkey. He has now been off for 3 weeks. He is having fatigue and weakness and appetite changes. He fell once. Feels like things get better each day. Feels like AVF is almost healed. He is off the HS abx, no flare right now. He continues to have morning nausea and vomiting. Has tried many things with adjusting meds and diet. He was unable to get zofran because of insurance. His blood sugars have been good off of prednisone. His pain is controlled on oxycodone, needing 10 mg at a time sometimes, taking about 3 times a day, \"dealing with it\". No current signs and symptoms of infection. His energy level isn't great but was able to enjoy the holiday.     Review of Systems   Constitutional:  Positive for appetite change and fatigue. Negative for chills, diaphoresis, fever and unexpected weight change.   HENT:   Negative for mouth sores, sore throat and voice change.    Eyes:  Negative for eye problems.   Respiratory:  Negative for chest tightness, cough and shortness of breath.    Cardiovascular:  Negative for chest pain and leg swelling.   Gastrointestinal:  Positive for nausea and vomiting. Negative for abdominal pain, constipation and diarrhea.        Gas pains   Musculoskeletal:  Positive for arthralgias, gait problem and myalgias.   Skin:  Positive for wound. Negative for itching and rash.   Neurological:  Positive for extremity weakness and gait problem. Negative for dizziness and light-headedness.   Psychiatric/Behavioral: Negative.         Allergies  No Known Allergies     Medications  Current Outpatient Medications   Medication Instructions    amLODIPine (NORVASC) 10 mg, oral, Daily    atorvastatin (LIPITOR) 20 mg, oral, Nightly    BD Insulin Syringe Ultra-Fine 0.5 mL 30 gauge x 1/2\" syringe USE AS DIRECTED WITH LANTUS AND INSULIN LISPRO INJECTIONS    cabozantinib (Cabometyx) 40 mg tablet TAKE ONE (1) TABLET BY MOUTH ONCE A DAY    calcitriol (ROCALTROL) 0.25 mcg, oral, Nightly    carvedilol " (COREG) 25 mg, oral, 2 times daily    cholecalciferol (VITAMIN D-3) 125 mcg, oral, Daily    clindamycin (Cleocin T) 1 % lotion     Dexcom G7  misc FOR CONTINUOUS GLUCOSE MONITORING    Dexcom G7 Sensor device FOR CONTINUOUS GLUCOSE MONITORING, CHANGE EVERY 10 DAYS    eucerin cream Topical, As needed    famotidine (PEPCID) 20 mg, oral, 2 times daily PRN    hydrALAZINE (APRESOLINE) 100 mg, oral, 3 times daily, Take with food    hydrocortisone 2.5 % cream 1 APPLICATION TWICE A DAY TO AFFECTED AREA OF FACE (AVOID EYES) TO DARK SPOTS FOR 2-4 WEEKS    insulin lispro (HumaLOG) 100 unit/mL injection TAKE AS DIRECTED PER INSULIN INSTRUCTIONS.    ketoconazole (NIZOral) 2 % cream Apply twice daily to the painful rash in the groin    lidocaine HCL 4 % adhesive patch,medicated 1 patch, topical (top), Daily    lisinopril 40 mg, oral, Daily    loratadine (CLARITIN) 10 mg, oral, Daily    omeprazole (PRILOSEC) 40 mg, oral, Daily    ondansetron (ZOFRAN) 8 mg, oral, 2 times daily PRN    OneTouch Delica Plus Lancet 33 gauge misc FOR GLUCOSE TESTING 3 TIMES DAILY    OneTouch Verio Flex meter misc FOR GLUCOSE TESTING 3 TIMES DAILY    OneTouch Verio test strips strip USE AS DIRECTED 3 TIMES A DAY    oxyCODONE (ROXICODONE) 10 mg, oral, Every 4 hours PRN    prochlorperazine (COMPAZINE) 10 mg, oral, Every 6 hours PRN    simethicone 42 mg, oral, 4 times daily PRN    torsemide (DEMADEX) 60 mg, oral, Daily    triamcinolone (Kenalog) 0.1 % cream PLEASE SEE ATTACHED FOR DETAILED DIRECTIONS        OBJECTIVE:    VS / Pain:  There were no vitals taken for this visit.  BSA: There is no height or weight on file to calculate BSA.  Wt Readings from Last 5 Encounters:   10/11/23 83.5 kg (184 lb)   12/22/23 85.2 kg (187 lb 13.3 oz)   12/06/23 86.6 kg (191 lb)   11/30/23 86.9 kg (191 lb 9.3 oz)   11/30/23 85.7 kg (189 lb)      Pain Scale: 3    Physical Exam  Constitutional:       Appearance: Normal appearance. He is normal weight.   HENT:      Head:  Normocephalic and atraumatic.   Eyes:      Pupils: Pupils are equal, round, and reactive to light.   Pulmonary:      Effort: Pulmonary effort is normal.   Musculoskeletal:         General: No swelling. Normal range of motion.      Cervical back: Normal range of motion.   Skin:     General: Skin is warm and dry.      Findings: Rash present.      Comments: Left arm wound with dressing, hypopigmentation, HS in groin was not assessed   Neurological:      General: No focal deficit present.      Mental Status: He is alert and oriented to person, place, and time.   Psychiatric:         Mood and Affect: Mood normal.         Behavior: Behavior normal.         Thought Content: Thought content normal.         Judgment: Judgment normal.         Performance Status:  ECOG Score: 1- Restricted in physically strenuous activity.  Carries out light duty.  Karnofsky Score: 80 - Normal activity with effort; some signs or symptoms of disease    Diagnostic Results     No results found for this or any previous visit (from the past 96 hour(s)).      Assessment/Plan   I personally saw patient and counselled all visit  on his diagnosis, maria luisa history and coordination of his care.   39yo AA man with hx of stage IV CKD with metastatic papRCC to LNs now on cabo/nivo. He has CKD and planned for kidney dialysis. Known proteinuria +++ (known). Still with sig joint pains and dealing with hyperglycemias. On cabo on hold due to healing fistula and nivo on hold d/t arthritis (PDN) on 30 mg PDN BUT pt self discontinued his prednisone and arthralgias are actually better. Pain is under control on opioids and arthralgias might not be fully IO-related. Scans showing overall disease stable. His fistula-related wound in left forearm almost 100% healed. Will give him another 2-3 weeks and then resume cabozantinib. Will start dialysis soon.     Scans soon. He will remain off RCC treatment until disease progression.   Starting dialysis early 2024.   I sincerely  appreciate the opportunity to see this patient and will contact the referring provider by phone or communicate directly through the electronic chart.  All patient's questions were answered and contact information provided to contact us with any issues.  Renan Thomson MD MSc FACP   in Medicine [facility] [employer]  Director Clinical  Medical Oncology Research Program   [facility] / [facility]  Cell [phone]  Office [phone]

## 2024-01-18 ENCOUNTER — OFFICE VISIT (OUTPATIENT)
Dept: VASCULAR SURGERY | Facility: HOSPITAL | Age: 39
End: 2024-01-18
Payer: COMMERCIAL

## 2024-01-18 ENCOUNTER — SPECIALTY PHARMACY (OUTPATIENT)
Dept: PHARMACY | Facility: CLINIC | Age: 39
End: 2024-01-18

## 2024-01-18 ENCOUNTER — OFFICE VISIT (OUTPATIENT)
Dept: HEMATOLOGY/ONCOLOGY | Facility: CLINIC | Age: 39
End: 2024-01-18
Payer: COMMERCIAL

## 2024-01-18 VITALS
OXYGEN SATURATION: 99 % | SYSTOLIC BLOOD PRESSURE: 141 MMHG | WEIGHT: 189.15 LBS | DIASTOLIC BLOOD PRESSURE: 80 MMHG | RESPIRATION RATE: 18 BRPM | BODY MASS INDEX: 30.53 KG/M2 | HEART RATE: 72 BPM | TEMPERATURE: 98.2 F

## 2024-01-18 VITALS
SYSTOLIC BLOOD PRESSURE: 156 MMHG | BODY MASS INDEX: 30.37 KG/M2 | DIASTOLIC BLOOD PRESSURE: 91 MMHG | RESPIRATION RATE: 16 BRPM | OXYGEN SATURATION: 97 % | WEIGHT: 189 LBS | HEIGHT: 66 IN | HEART RATE: 78 BPM

## 2024-01-18 DIAGNOSIS — S41.102S: ICD-10-CM

## 2024-01-18 DIAGNOSIS — Z99.2 ESRD (END STAGE RENAL DISEASE) ON DIALYSIS (MULTI): Primary | ICD-10-CM

## 2024-01-18 DIAGNOSIS — C64.9 RENAL CELL CARCINOMA, UNSPECIFIED LATERALITY (MULTI): ICD-10-CM

## 2024-01-18 DIAGNOSIS — G89.3 CANCER ASSOCIATED PAIN: ICD-10-CM

## 2024-01-18 DIAGNOSIS — N18.6 ESRD (END STAGE RENAL DISEASE) ON DIALYSIS (MULTI): Primary | ICD-10-CM

## 2024-01-18 PROBLEM — I73.9 PERIPHERAL ARTERIAL DISEASE (CMS-HCC): Status: ACTIVE | Noted: 2023-10-19

## 2024-01-18 PROBLEM — T82.9XXA COMPLICATION ASSOCIATED WITH VASCULAR DEVICE: Status: ACTIVE | Noted: 2017-10-23

## 2024-01-18 PROBLEM — L73.2 HIDRADENITIS SUPPURATIVA: Status: ACTIVE | Noted: 2023-03-31

## 2024-01-18 PROBLEM — Z20.822 CONTACT WITH AND (SUSPECTED) EXPOSURE TO COVID-19: Status: ACTIVE | Noted: 2023-04-02

## 2024-01-18 PROBLEM — Z99.89 DEPENDENCE ON ENABLING MACHINE: Status: ACTIVE | Noted: 2023-05-19

## 2024-01-18 PROBLEM — M79.602 PAIN OF LEFT UPPER EXTREMITY: Status: ACTIVE | Noted: 2023-08-31

## 2024-01-18 PROBLEM — R11.2 CHEMOTHERAPY-INDUCED NAUSEA AND VOMITING: Status: ACTIVE | Noted: 2023-11-16

## 2024-01-18 PROBLEM — R06.83 SNORING: Status: ACTIVE | Noted: 2024-01-18

## 2024-01-18 PROBLEM — G89.18 ACUTE POSTOPERATIVE PAIN: Status: ACTIVE | Noted: 2024-01-18

## 2024-01-18 PROBLEM — R21 RASH AND OTHER NONSPECIFIC SKIN ERUPTION: Status: ACTIVE | Noted: 2023-10-30

## 2024-01-18 PROBLEM — F19.21 HISTORY OF SUBSTANCE DEPENDENCE (MULTI): Status: ACTIVE | Noted: 2023-05-19

## 2024-01-18 PROBLEM — M54.9 BACK PAIN: Status: ACTIVE | Noted: 2024-01-18

## 2024-01-18 PROBLEM — T45.1X5A CHEMOTHERAPY-INDUCED NAUSEA AND VOMITING: Status: ACTIVE | Noted: 2023-11-16

## 2024-01-18 PROBLEM — T81.30XA DEHISCENCE OF WOUND: Status: ACTIVE | Noted: 2023-08-25

## 2024-01-18 PROBLEM — M79.673 PAIN OF FOOT: Status: ACTIVE | Noted: 2024-01-18

## 2024-01-18 PROBLEM — J30.2 SEASONAL ALLERGIES: Status: ACTIVE | Noted: 2024-01-18

## 2024-01-18 PROBLEM — F17.290 CIGAR SMOKER: Status: ACTIVE | Noted: 2024-01-18

## 2024-01-18 PROBLEM — R14.1 ABDOMINAL GAS PAIN: Status: ACTIVE | Noted: 2023-11-30

## 2024-01-18 PROBLEM — L30.4 INTERTRIGO: Status: ACTIVE | Noted: 2023-10-30

## 2024-01-18 PROBLEM — R25.2 MUSCLE CRAMPS: Status: ACTIVE | Noted: 2024-01-18

## 2024-01-18 PROCEDURE — 4010F ACE/ARB THERAPY RXD/TAKEN: CPT | Performed by: STUDENT IN AN ORGANIZED HEALTH CARE EDUCATION/TRAINING PROGRAM

## 2024-01-18 PROCEDURE — 3066F NEPHROPATHY DOC TX: CPT | Performed by: STUDENT IN AN ORGANIZED HEALTH CARE EDUCATION/TRAINING PROGRAM

## 2024-01-18 PROCEDURE — 3079F DIAST BP 80-89 MM HG: CPT | Performed by: STUDENT IN AN ORGANIZED HEALTH CARE EDUCATION/TRAINING PROGRAM

## 2024-01-18 PROCEDURE — 1036F TOBACCO NON-USER: CPT | Performed by: STUDENT IN AN ORGANIZED HEALTH CARE EDUCATION/TRAINING PROGRAM

## 2024-01-18 PROCEDURE — 3008F BODY MASS INDEX DOCD: CPT | Performed by: STUDENT IN AN ORGANIZED HEALTH CARE EDUCATION/TRAINING PROGRAM

## 2024-01-18 PROCEDURE — 3077F SYST BP >= 140 MM HG: CPT | Performed by: SURGERY

## 2024-01-18 PROCEDURE — 99214 OFFICE O/P EST MOD 30 MIN: CPT | Performed by: STUDENT IN AN ORGANIZED HEALTH CARE EDUCATION/TRAINING PROGRAM

## 2024-01-18 PROCEDURE — 99214 OFFICE O/P EST MOD 30 MIN: CPT | Performed by: SURGERY

## 2024-01-18 PROCEDURE — 3008F BODY MASS INDEX DOCD: CPT | Performed by: SURGERY

## 2024-01-18 PROCEDURE — 4010F ACE/ARB THERAPY RXD/TAKEN: CPT | Performed by: SURGERY

## 2024-01-18 PROCEDURE — 3080F DIAST BP >= 90 MM HG: CPT | Performed by: SURGERY

## 2024-01-18 PROCEDURE — 3066F NEPHROPATHY DOC TX: CPT | Performed by: SURGERY

## 2024-01-18 PROCEDURE — 1036F TOBACCO NON-USER: CPT | Performed by: SURGERY

## 2024-01-18 PROCEDURE — 3077F SYST BP >= 140 MM HG: CPT | Performed by: STUDENT IN AN ORGANIZED HEALTH CARE EDUCATION/TRAINING PROGRAM

## 2024-01-18 RX ORDER — OXYCODONE HYDROCHLORIDE 5 MG/1
10 TABLET ORAL EVERY 4 HOURS PRN
Qty: 180 TABLET | Refills: 0 | Status: SHIPPED | OUTPATIENT
Start: 2024-01-18 | End: 2024-01-24 | Stop reason: HOSPADM

## 2024-01-18 ASSESSMENT — PAIN SCALES - GENERAL
PAINLEVEL: 8
PAINLEVEL: 6

## 2024-01-18 NOTE — PATIENT INSTRUCTIONS
It was a pleasure taking care of you today and appreciate your seeing us at our [facility] and [facility].     Today's plan is as follows:  1) you are ok to start using your fistula as marked and cleared for use.   2) you are also ok to resume your renal cancer medications as well  3) see me or call me with any issues.       Please call the office with any questions at [phone].   You can speak to our secretaries or our clinical nurses for specific questions.   For [facility] specific questions, you can also call [phone] or email at [email or web address]  If you need coordinating your appointments and testing you can do these at the  or by calling my office shortly after your visit.

## 2024-01-22 DIAGNOSIS — N18.6 END-STAGE RENAL DISEASE NEEDING DIALYSIS (MULTI): Primary | ICD-10-CM

## 2024-01-22 DIAGNOSIS — Z99.2 END-STAGE RENAL DISEASE NEEDING DIALYSIS (MULTI): Primary | ICD-10-CM

## 2024-01-23 PROCEDURE — 99222 1ST HOSP IP/OBS MODERATE 55: CPT | Performed by: NURSE PRACTITIONER

## 2024-01-23 SDOH — SOCIAL STABILITY: SOCIAL INSECURITY: DO YOU FEEL UNSAFE GOING BACK TO THE PLACE WHERE YOU ARE LIVING?: NO

## 2024-01-23 SDOH — SOCIAL STABILITY: SOCIAL INSECURITY: DOES ANYONE TRY TO KEEP YOU FROM HAVING/CONTACTING OTHER FRIENDS OR DOING THINGS OUTSIDE YOUR HOME?: NO

## 2024-01-23 SDOH — SOCIAL STABILITY: SOCIAL INSECURITY: HAVE YOU HAD THOUGHTS OF HARMING ANYONE ELSE?: NO

## 2024-01-23 SDOH — SOCIAL STABILITY: SOCIAL INSECURITY: HAS ANYONE EVER THREATENED TO HURT YOUR FAMILY OR YOUR PETS?: NO

## 2024-01-23 SDOH — SOCIAL STABILITY: SOCIAL INSECURITY: ARE YOU OR HAVE YOU BEEN THREATENED OR ABUSED PHYSICALLY, EMOTIONALLY, OR SEXUALLY BY ANYONE?: NO

## 2024-01-23 SDOH — SOCIAL STABILITY: SOCIAL INSECURITY: ARE THERE ANY APPARENT SIGNS OF INJURIES/BEHAVIORS THAT COULD BE RELATED TO ABUSE/NEGLECT?: NO

## 2024-01-23 SDOH — SOCIAL STABILITY: SOCIAL INSECURITY: ABUSE: ADULT

## 2024-01-23 SDOH — SOCIAL STABILITY: SOCIAL INSECURITY: DO YOU FEEL ANYONE HAS EXPLOITED OR TAKEN ADVANTAGE OF YOU FINANCIALLY OR OF YOUR PERSONAL PROPERTY?: NO

## 2024-01-23 SDOH — SOCIAL STABILITY: SOCIAL INSECURITY: WERE YOU ABLE TO COMPLETE ALL THE BEHAVIORAL HEALTH SCREENINGS?: YES

## 2024-01-23 ASSESSMENT — ENCOUNTER SYMPTOMS
NAUSEA: 0
SHORTNESS OF BREATH: 0
DIARRHEA: 0
ABDOMINAL PAIN: 0
FREQUENCY: 0
HEMATURIA: 0
CHILLS: 0
FEVER: 0
CONSTIPATION: 0
PALPITATIONS: 0
DYSURIA: 0
FLANK PAIN: 0
VOMITING: 0

## 2024-01-23 ASSESSMENT — ACTIVITIES OF DAILY LIVING (ADL)
HEARING - RIGHT EAR: FUNCTIONAL
HEARING - LEFT EAR: FUNCTIONAL
GROOMING: INDEPENDENT
LACK_OF_TRANSPORTATION: NO
WALKS IN HOME: INDEPENDENT
JUDGMENT_ADEQUATE_SAFELY_COMPLETE_DAILY_ACTIVITIES: YES
FEEDING YOURSELF: INDEPENDENT
ADEQUATE_TO_COMPLETE_ADL: YES
DRESSING YOURSELF: NEEDS ASSISTANCE
TOILETING: INDEPENDENT
PATIENT'S MEMORY ADEQUATE TO SAFELY COMPLETE DAILY ACTIVITIES?: YES
BATHING: INDEPENDENT

## 2024-01-23 ASSESSMENT — COGNITIVE AND FUNCTIONAL STATUS - GENERAL
CLIMB 3 TO 5 STEPS WITH RAILING: A LITTLE
MOBILITY SCORE: 23
DAILY ACTIVITIY SCORE: 23
PATIENT BASELINE BEDBOUND: NO
DRESSING REGULAR LOWER BODY CLOTHING: A LITTLE

## 2024-01-23 ASSESSMENT — PATIENT HEALTH QUESTIONNAIRE - PHQ9
1. LITTLE INTEREST OR PLEASURE IN DOING THINGS: SEVERAL DAYS
2. FEELING DOWN, DEPRESSED OR HOPELESS: NOT AT ALL
SUM OF ALL RESPONSES TO PHQ9 QUESTIONS 1 & 2: 1

## 2024-01-23 ASSESSMENT — LIFESTYLE VARIABLES
HOW OFTEN DO YOU HAVE 6 OR MORE DRINKS ON ONE OCCASION: NEVER
AUDIT-C TOTAL SCORE: 0
AUDIT-C TOTAL SCORE: 0
SKIP TO QUESTIONS 9-10: 1
HOW MANY STANDARD DRINKS CONTAINING ALCOHOL DO YOU HAVE ON A TYPICAL DAY: PATIENT DOES NOT DRINK
HOW OFTEN DO YOU HAVE A DRINK CONTAINING ALCOHOL: NEVER

## 2024-01-23 ASSESSMENT — PAIN SCALES - GENERAL: PAINLEVEL_OUTOF10: 8

## 2024-01-23 ASSESSMENT — PAIN - FUNCTIONAL ASSESSMENT: PAIN_FUNCTIONAL_ASSESSMENT: 0-10

## 2024-01-23 NOTE — PROGRESS NOTES
"F/U REASON: eval of avf wound and marking     CURRENT ENCOUNTER:  Siddhartha Orellana is 38 y.o. male here for follow up of  eval of avf wound and marking .    Has been doing very well.  Did not to follow-up 2 weeks following last visit so slight delay today.  His wound has completely healed.  It appears that he has still not returned to his chemo medication.  He is nearing initiation of dialysis and is also here to dai the fistula.  He has not had a maturation evaluation in some time.    Meds:   Current Outpatient Medications:     amLODIPine (Norvasc) 10 mg tablet, Take 1 tablet (10 mg) by mouth once daily., Disp: 30 tablet, Rfl: 3    atorvastatin (Lipitor) 20 mg tablet, Take 1 tablet (20 mg) by mouth once daily at bedtime., Disp: 30 tablet, Rfl: 3    BD Insulin Syringe Ultra-Fine 0.5 mL 30 gauge x 1/2\" syringe, USE AS DIRECTED WITH LANTUS AND INSULIN LISPRO INJECTIONS, Disp: 100 each, Rfl: 3    calcitriol (Rocaltrol) 0.25 mcg capsule, Take 1 capsule (0.25 mcg) by mouth once daily at bedtime., Disp: 30 capsule, Rfl: 3    carvedilol (Coreg) 25 mg tablet, Take 1 tablet (25 mg) by mouth 2 times a day., Disp: 30 tablet, Rfl: 3    cholecalciferol (Vitamin D-3) 125 MCG (5000 UT) capsule, Take 1 capsule (125 mcg) by mouth once daily., Disp: 30 capsule, Rfl: 3    Dexcom G7  misc, FOR CONTINUOUS GLUCOSE MONITORING, Disp: 1 each, Rfl: 3    Dexcom G7 Sensor device, FOR CONTINUOUS GLUCOSE MONITORING, CHANGE EVERY 10 DAYS, Disp: 3 each, Rfl: 2    eucerin cream, Apply topically if needed for dry skin., Disp: 453 g, Rfl: 11    famotidine (Pepcid) 20 mg tablet, Take 1 tablet (20 mg) by mouth 2 times a day as needed for indigestion or heartburn., Disp: 30 tablet, Rfl: 3    hydrALAZINE (Apresoline) 100 mg tablet, Take 1 tablet (100 mg) by mouth 3 times a day. Take with food, Disp: 30 tablet, Rfl: 3    hydrocortisone 2.5 % cream, 1 APPLICATION TWICE A DAY TO AFFECTED AREA OF FACE (AVOID EYES) TO DARK SPOTS FOR 2-4 WEEKS, Disp: 1 " g, Rfl: 3    insulin lispro (HumaLOG) 100 unit/mL injection, TAKE AS DIRECTED PER INSULIN INSTRUCTIONS., Disp: 10 mL, Rfl: 0    ketoconazole (NIZOral) 2 % cream, Apply twice daily to the painful rash in the groin, Disp: 60 g, Rfl: 3    lidocaine HCL 4 % adhesive patch,medicated, Apply 1 patch topically once daily., Disp: 15 patch, Rfl: 3    lisinopril 40 mg tablet, Take 1 tablet (40 mg) by mouth once daily., Disp: 30 tablet, Rfl: 3    loratadine (Claritin) 10 mg tablet, Take 1 tablet (10 mg) by mouth once daily., Disp: 30 tablet, Rfl: 3    omeprazole (PriLOSEC) 40 mg DR capsule, Take 1 capsule (40 mg) by mouth once daily., Disp: 30 capsule, Rfl: 3    ondansetron (Zofran) 4 mg tablet, Take 2 tablets (8 mg) by mouth 2 times a day as needed for nausea., Disp: 30 tablet, Rfl: 3    OneTouch Delica Plus Lancet 33 gauge misc, FOR GLUCOSE TESTING 3 TIMES DAILY, Disp: 30 each, Rfl: 3    OneTouch Verio Flex meter misc, FOR GLUCOSE TESTING 3 TIMES DAILY, Disp: 30 each, Rfl: 3    OneTouch Verio test strips strip, USE AS DIRECTED 3 TIMES A DAY, Disp: 30 strip, Rfl: 3    oxyCODONE (Roxicodone) 5 mg immediate release tablet, Take 2 tablets (10 mg) by mouth every 4 hours if needed for moderate pain (4 - 6)., Disp: 180 tablet, Rfl: 0    simethicone 42 mg chewable tablet, Chew 1 tablet (42 mg) 4 times a day as needed (for gas pains)., Disp: 30 tablet, Rfl: 3    torsemide (Demadex) 20 mg tablet, Take 3 tablets (60 mg) by mouth once daily., Disp: 30 tablet, Rfl: 3    triamcinolone (Kenalog) 0.1 % cream, PLEASE SEE ATTACHED FOR DETAILED DIRECTIONS, Disp: 30 g, Rfl: 0    Allergies:   No Known Allergies    ROS:  Review of Systems    otherwise unremarkable    Objective:  Vitals:  Vitals:    01/18/24 1248   BP: (!) 156/91   Pulse: 78   Resp: 16   SpO2: 97%        Exam:  No distress.  Been comfortably.  Left arm without edema.  Wound well-healed as shown in images.  His AV fistula was marked after evaluating it clinically and with ultrasound  at the bedside as images show below.  Neuro intact.            Labs:  Lab Results   Component Value Date    WBC 14.4 (H) 11/21/2023    WBC 9.2 10/18/2023    WBC CANCELED 09/23/2023    HGB 8.4 (L) 11/21/2023    HGB 7.7 (L) 10/18/2023    HGB CANCELED 09/23/2023    HCT 27.0 (L) 11/21/2023    HCT 23.9 (L) 10/18/2023    HCT CANCELED 09/23/2023    MCV 95 11/21/2023    MCV 91 10/18/2023    MCV CANCELED 09/23/2023     11/21/2023     Lab Results   Component Value Date    CREATININE 8.26 (H) 11/21/2023    CREATININE 6.87 (H) 10/18/2023    CREATININE 6.87 (H) 10/18/2023    BUN 91 (HH) 11/21/2023    BUN 79 (H) 10/18/2023    BUN 79 (H) 10/18/2023     (H) 11/21/2023     10/18/2023     10/18/2023    K 3.8 11/21/2023    K 3.7 10/18/2023    K 3.7 10/18/2023     11/21/2023     10/18/2023     10/18/2023    CO2 23 11/21/2023    CO2 21 10/18/2023    CO2 21 10/18/2023         Assessment & Plan:  Siddhartha Orellana is 38 y.o. male here for follow up of left arm AV fistula revision wound.     5/19/2023 Procedure:  L radiocephalic aneurysmorrhaphy with patch angioplasty, USG PTA/DCB of venous outflow stenosis.   8/23/23  Procedure:  Left upper extremity wound debridement down to subcutaneous tissue and vessels    1) you are ok to start using your fistula as marked and cleared for use.   2) you are also ok to resume your renal cancer medications as well  3) see me or call me with any issues.     Val Daigle MD, MHS, RPVI  , [employer]  Director, [facility], [facility]  Co-Director, Vascular Laboratories, [facility]  Division of Vascular Surgery and Endovascular Therapy  [facility]

## 2024-01-24 ENCOUNTER — HOSPITAL ENCOUNTER (INPATIENT)
Facility: HOSPITAL | Age: 39
LOS: 5 days | Discharge: HOME | End: 2024-01-29
Attending: STUDENT IN AN ORGANIZED HEALTH CARE EDUCATION/TRAINING PROGRAM | Admitting: NURSE PRACTITIONER
Payer: COMMERCIAL

## 2024-01-24 ENCOUNTER — APPOINTMENT (OUTPATIENT)
Dept: DIALYSIS | Facility: HOSPITAL | Age: 39
End: 2024-01-24
Payer: COMMERCIAL

## 2024-01-24 DIAGNOSIS — R14.1 GAS PAIN: ICD-10-CM

## 2024-01-24 DIAGNOSIS — C64.9 RENAL CELL CARCINOMA, UNSPECIFIED LATERALITY (MULTI): ICD-10-CM

## 2024-01-24 DIAGNOSIS — E78.5 HYPERLIPIDEMIA, UNSPECIFIED HYPERLIPIDEMIA TYPE: ICD-10-CM

## 2024-01-24 DIAGNOSIS — G89.3 CANCER ASSOCIATED PAIN: ICD-10-CM

## 2024-01-24 DIAGNOSIS — I10 PRIMARY HYPERTENSION: ICD-10-CM

## 2024-01-24 DIAGNOSIS — E55.9 VITAMIN D DEFICIENCY: ICD-10-CM

## 2024-01-24 DIAGNOSIS — L73.2 HIDRADENITIS SUPPURATIVA: ICD-10-CM

## 2024-01-24 DIAGNOSIS — N18.6 ESRD (END STAGE RENAL DISEASE) (MULTI): Primary | ICD-10-CM

## 2024-01-24 DIAGNOSIS — R11.2 CHEMOTHERAPY INDUCED NAUSEA AND VOMITING: ICD-10-CM

## 2024-01-24 DIAGNOSIS — L40.9 PSORIASIS: ICD-10-CM

## 2024-01-24 DIAGNOSIS — T45.1X5A CHEMOTHERAPY INDUCED NAUSEA AND VOMITING: ICD-10-CM

## 2024-01-24 DIAGNOSIS — K21.9 GASTROESOPHAGEAL REFLUX DISEASE, UNSPECIFIED WHETHER ESOPHAGITIS PRESENT: ICD-10-CM

## 2024-01-24 LAB
ABO GROUP (TYPE) IN BLOOD: NORMAL
ANION GAP SERPL CALC-SCNC: 16 MMOL/L (ref 10–20)
ANION GAP SERPL CALC-SCNC: 17 MMOL/L (ref 10–20)
ANTIBODY SCREEN: NORMAL
BUN SERPL-MCNC: 64 MG/DL (ref 6–23)
BUN SERPL-MCNC: 64 MG/DL (ref 6–23)
CALCIUM SERPL-MCNC: 8 MG/DL (ref 8.6–10.6)
CALCIUM SERPL-MCNC: 8.2 MG/DL (ref 8.6–10.6)
CHLORIDE SERPL-SCNC: 105 MMOL/L (ref 98–107)
CHLORIDE SERPL-SCNC: 106 MMOL/L (ref 98–107)
CO2 SERPL-SCNC: 24 MMOL/L (ref 21–32)
CO2 SERPL-SCNC: 24 MMOL/L (ref 21–32)
CREAT SERPL-MCNC: 10.37 MG/DL (ref 0.5–1.3)
CREAT SERPL-MCNC: 10.94 MG/DL (ref 0.5–1.3)
EGFRCR SERPLBLD CKD-EPI 2021: 6 ML/MIN/1.73M*2
EGFRCR SERPLBLD CKD-EPI 2021: 6 ML/MIN/1.73M*2
ERYTHROCYTE [DISTWIDTH] IN BLOOD BY AUTOMATED COUNT: 13.5 % (ref 11.5–14.5)
ERYTHROCYTE [DISTWIDTH] IN BLOOD BY AUTOMATED COUNT: 13.6 % (ref 11.5–14.5)
FERRITIN SERPL-MCNC: 189 NG/ML (ref 20–300)
GLUCOSE BLD MANUAL STRIP-MCNC: 106 MG/DL (ref 74–99)
GLUCOSE BLD MANUAL STRIP-MCNC: 109 MG/DL (ref 74–99)
GLUCOSE BLD MANUAL STRIP-MCNC: 88 MG/DL (ref 74–99)
GLUCOSE SERPL-MCNC: 113 MG/DL (ref 74–99)
GLUCOSE SERPL-MCNC: 89 MG/DL (ref 74–99)
HBV SURFACE AB SER-ACNC: <3.1 MIU/ML
HBV SURFACE AG SERPL QL IA: NONREACTIVE
HCT VFR BLD AUTO: 12.1 % (ref 41–52)
HCT VFR BLD AUTO: 21.1 % (ref 41–52)
HGB BLD-MCNC: 3.7 G/DL (ref 13.5–17.5)
HGB BLD-MCNC: 6.4 G/DL (ref 13.5–17.5)
IRON SATN MFR SERPL: 16 % (ref 25–45)
IRON SERPL-MCNC: 28 UG/DL (ref 35–150)
MCH RBC QN AUTO: 27.6 PG (ref 26–34)
MCH RBC QN AUTO: 28.2 PG (ref 26–34)
MCHC RBC AUTO-ENTMCNC: 30.3 G/DL (ref 32–36)
MCHC RBC AUTO-ENTMCNC: 30.6 G/DL (ref 32–36)
MCV RBC AUTO: 91 FL (ref 80–100)
MCV RBC AUTO: 92 FL (ref 80–100)
NRBC BLD-RTO: 0 /100 WBCS (ref 0–0)
NRBC BLD-RTO: 0 /100 WBCS (ref 0–0)
PLATELET # BLD AUTO: 217 X10*3/UL (ref 150–450)
PLATELET # BLD AUTO: 271 X10*3/UL (ref 150–450)
POTASSIUM SERPL-SCNC: 3.2 MMOL/L (ref 3.5–5.3)
POTASSIUM SERPL-SCNC: 3.5 MMOL/L (ref 3.5–5.3)
RBC # BLD AUTO: 1.31 X10*6/UL (ref 4.5–5.9)
RBC # BLD AUTO: 2.32 X10*6/UL (ref 4.5–5.9)
RH FACTOR (ANTIGEN D): NORMAL
SODIUM SERPL-SCNC: 142 MMOL/L (ref 136–145)
SODIUM SERPL-SCNC: 143 MMOL/L (ref 136–145)
TIBC SERPL-MCNC: 179 UG/DL (ref 240–445)
UIBC SERPL-MCNC: 151 UG/DL (ref 110–370)
WBC # BLD AUTO: 11.4 X10*3/UL (ref 4.4–11.3)
WBC # BLD AUTO: 15.6 X10*3/UL (ref 4.4–11.3)

## 2024-01-24 PROCEDURE — 5A1D70Z PERFORMANCE OF URINARY FILTRATION, INTERMITTENT, LESS THAN 6 HOURS PER DAY: ICD-10-PCS | Performed by: INTERNAL MEDICINE

## 2024-01-24 PROCEDURE — 2500000004 HC RX 250 GENERAL PHARMACY W/ HCPCS (ALT 636 FOR OP/ED): Mod: JW | Performed by: STUDENT IN AN ORGANIZED HEALTH CARE EDUCATION/TRAINING PROGRAM

## 2024-01-24 PROCEDURE — 36415 COLL VENOUS BLD VENIPUNCTURE: CPT | Performed by: NURSE PRACTITIONER

## 2024-01-24 PROCEDURE — 86901 BLOOD TYPING SEROLOGIC RH(D): CPT | Performed by: NURSE PRACTITIONER

## 2024-01-24 PROCEDURE — 85027 COMPLETE CBC AUTOMATED: CPT | Performed by: NURSE PRACTITIONER

## 2024-01-24 PROCEDURE — 99222 1ST HOSP IP/OBS MODERATE 55: CPT | Performed by: INTERNAL MEDICINE

## 2024-01-24 PROCEDURE — 80048 BASIC METABOLIC PNL TOTAL CA: CPT | Performed by: NURSE PRACTITIONER

## 2024-01-24 PROCEDURE — 86920 COMPATIBILITY TEST SPIN: CPT

## 2024-01-24 PROCEDURE — 86706 HEP B SURFACE ANTIBODY: CPT | Performed by: INTERNAL MEDICINE

## 2024-01-24 PROCEDURE — 87340 HEPATITIS B SURFACE AG IA: CPT | Performed by: INTERNAL MEDICINE

## 2024-01-24 PROCEDURE — 2500000001 HC RX 250 WO HCPCS SELF ADMINISTERED DRUGS (ALT 637 FOR MEDICARE OP): Performed by: NURSE PRACTITIONER

## 2024-01-24 PROCEDURE — 1200000002 HC GENERAL ROOM WITH TELEMETRY DAILY

## 2024-01-24 PROCEDURE — 83540 ASSAY OF IRON: CPT | Performed by: INTERNAL MEDICINE

## 2024-01-24 PROCEDURE — 2500000004 HC RX 250 GENERAL PHARMACY W/ HCPCS (ALT 636 FOR OP/ED): Performed by: NURSE PRACTITIONER

## 2024-01-24 PROCEDURE — 82947 ASSAY GLUCOSE BLOOD QUANT: CPT

## 2024-01-24 PROCEDURE — 99233 SBSQ HOSP IP/OBS HIGH 50: CPT | Performed by: INTERNAL MEDICINE

## 2024-01-24 PROCEDURE — 82728 ASSAY OF FERRITIN: CPT | Performed by: INTERNAL MEDICINE

## 2024-01-24 RX ORDER — POLYETHYLENE GLYCOL 3350 17 G/17G
17 POWDER, FOR SOLUTION ORAL DAILY PRN
Status: DISCONTINUED | OUTPATIENT
Start: 2024-01-24 | End: 2024-01-29 | Stop reason: HOSPADM

## 2024-01-24 RX ORDER — LORATADINE 10 MG/1
10 TABLET ORAL DAILY
Status: DISCONTINUED | OUTPATIENT
Start: 2024-01-24 | End: 2024-01-29 | Stop reason: HOSPADM

## 2024-01-24 RX ORDER — PANTOPRAZOLE SODIUM 40 MG/1
40 TABLET, DELAYED RELEASE ORAL
Status: DISCONTINUED | OUTPATIENT
Start: 2024-01-24 | End: 2024-01-29 | Stop reason: HOSPADM

## 2024-01-24 RX ORDER — DEXTROSE MONOHYDRATE 100 MG/ML
0.3 INJECTION, SOLUTION INTRAVENOUS ONCE AS NEEDED
Status: DISCONTINUED | OUTPATIENT
Start: 2024-01-24 | End: 2024-01-29 | Stop reason: HOSPADM

## 2024-01-24 RX ORDER — ACETAMINOPHEN 500 MG
10 TABLET ORAL DAILY PRN
Status: DISCONTINUED | OUTPATIENT
Start: 2024-01-24 | End: 2024-01-29 | Stop reason: HOSPADM

## 2024-01-24 RX ORDER — TORSEMIDE 20 MG/1
60 TABLET ORAL DAILY
Status: DISCONTINUED | OUTPATIENT
Start: 2024-01-24 | End: 2024-01-29 | Stop reason: HOSPADM

## 2024-01-24 RX ORDER — ACETAMINOPHEN 325 MG/1
650 TABLET ORAL EVERY 4 HOURS PRN
Status: DISCONTINUED | OUTPATIENT
Start: 2024-01-24 | End: 2024-01-29 | Stop reason: HOSPADM

## 2024-01-24 RX ORDER — CALCITRIOL 0.25 UG/1
0.25 CAPSULE ORAL NIGHTLY
Status: DISCONTINUED | OUTPATIENT
Start: 2024-01-24 | End: 2024-01-29 | Stop reason: HOSPADM

## 2024-01-24 RX ORDER — PROCHLORPERAZINE MALEATE 10 MG
10 TABLET ORAL EVERY 6 HOURS PRN
Status: DISCONTINUED | OUTPATIENT
Start: 2024-01-24 | End: 2024-01-29 | Stop reason: HOSPADM

## 2024-01-24 RX ORDER — SIMETHICONE 80 MG
40 TABLET,CHEWABLE ORAL 4 TIMES DAILY PRN
Status: DISCONTINUED | OUTPATIENT
Start: 2024-01-24 | End: 2024-01-29 | Stop reason: HOSPADM

## 2024-01-24 RX ORDER — LISINOPRIL 40 MG/1
40 TABLET ORAL DAILY
Status: DISCONTINUED | OUTPATIENT
Start: 2024-01-24 | End: 2024-01-29 | Stop reason: HOSPADM

## 2024-01-24 RX ORDER — DEXTROSE 50 % IN WATER (D50W) INTRAVENOUS SYRINGE
25
Status: DISCONTINUED | OUTPATIENT
Start: 2024-01-24 | End: 2024-01-29 | Stop reason: HOSPADM

## 2024-01-24 RX ORDER — OXYCODONE HYDROCHLORIDE 5 MG/1
10 TABLET ORAL EVERY 4 HOURS PRN
Status: DISCONTINUED | OUTPATIENT
Start: 2024-01-24 | End: 2024-01-29 | Stop reason: HOSPADM

## 2024-01-24 RX ORDER — ACETAMINOPHEN 160 MG/5ML
650 SOLUTION ORAL EVERY 4 HOURS PRN
Status: DISCONTINUED | OUTPATIENT
Start: 2024-01-24 | End: 2024-01-29 | Stop reason: HOSPADM

## 2024-01-24 RX ORDER — HYDRALAZINE HYDROCHLORIDE 50 MG/1
100 TABLET, FILM COATED ORAL 3 TIMES DAILY
Status: DISCONTINUED | OUTPATIENT
Start: 2024-01-24 | End: 2024-01-29 | Stop reason: HOSPADM

## 2024-01-24 RX ORDER — ONDANSETRON 4 MG/1
8 TABLET, FILM COATED ORAL 2 TIMES DAILY PRN
Status: DISCONTINUED | OUTPATIENT
Start: 2024-01-24 | End: 2024-01-29 | Stop reason: HOSPADM

## 2024-01-24 RX ORDER — ACETAMINOPHEN 650 MG/1
650 SUPPOSITORY RECTAL EVERY 4 HOURS PRN
Status: DISCONTINUED | OUTPATIENT
Start: 2024-01-24 | End: 2024-01-29 | Stop reason: HOSPADM

## 2024-01-24 RX ORDER — INSULIN LISPRO 100 [IU]/ML
0-5 INJECTION, SOLUTION INTRAVENOUS; SUBCUTANEOUS
Status: DISCONTINUED | OUTPATIENT
Start: 2024-01-24 | End: 2024-01-29 | Stop reason: HOSPADM

## 2024-01-24 RX ORDER — AMLODIPINE BESYLATE 10 MG/1
10 TABLET ORAL DAILY
Status: DISCONTINUED | OUTPATIENT
Start: 2024-01-24 | End: 2024-01-29 | Stop reason: HOSPADM

## 2024-01-24 RX ORDER — CARVEDILOL 25 MG/1
25 TABLET ORAL 2 TIMES DAILY
Status: DISCONTINUED | OUTPATIENT
Start: 2024-01-24 | End: 2024-01-29 | Stop reason: HOSPADM

## 2024-01-24 RX ORDER — ATORVASTATIN CALCIUM 20 MG/1
20 TABLET, FILM COATED ORAL NIGHTLY
Status: DISCONTINUED | OUTPATIENT
Start: 2024-01-24 | End: 2024-01-29 | Stop reason: HOSPADM

## 2024-01-24 RX ADMIN — OXYCODONE HYDROCHLORIDE 10 MG: 5 TABLET ORAL at 18:10

## 2024-01-24 RX ADMIN — CARVEDILOL 25 MG: 25 TABLET, FILM COATED ORAL at 20:36

## 2024-01-24 RX ADMIN — OXYCODONE HYDROCHLORIDE 10 MG: 5 TABLET ORAL at 00:47

## 2024-01-24 RX ADMIN — CARVEDILOL 25 MG: 25 TABLET, FILM COATED ORAL at 08:24

## 2024-01-24 RX ADMIN — AMLODIPINE BESYLATE 10 MG: 10 TABLET ORAL at 08:24

## 2024-01-24 RX ADMIN — PANTOPRAZOLE SODIUM 40 MG: 40 TABLET, DELAYED RELEASE ORAL at 06:50

## 2024-01-24 RX ADMIN — OXYCODONE HYDROCHLORIDE 10 MG: 5 TABLET ORAL at 06:53

## 2024-01-24 RX ADMIN — ATORVASTATIN CALCIUM 20 MG: 20 TABLET, FILM COATED ORAL at 20:36

## 2024-01-24 RX ADMIN — LISINOPRIL 40 MG: 40 TABLET ORAL at 08:24

## 2024-01-24 RX ADMIN — ACETAMINOPHEN 650 MG: 325 TABLET ORAL at 20:37

## 2024-01-24 RX ADMIN — EPOETIN ALFA 5000 UNITS: 10000 SOLUTION INTRAVENOUS; SUBCUTANEOUS at 20:49

## 2024-01-24 RX ADMIN — CALCITRIOL CAPSULES 0.25 MCG 0.25 MCG: 0.25 CAPSULE ORAL at 20:37

## 2024-01-24 RX ADMIN — OXYCODONE HYDROCHLORIDE 10 MG: 5 TABLET ORAL at 13:07

## 2024-01-24 RX ADMIN — TORSEMIDE 60 MG: 20 TABLET ORAL at 08:23

## 2024-01-24 ASSESSMENT — PAIN DESCRIPTION - LOCATION
LOCATION: LEG
LOCATION: FOOT
LOCATION: LEG

## 2024-01-24 ASSESSMENT — PAIN SCALES - GENERAL
PAINLEVEL_OUTOF10: 8
PAINLEVEL_OUTOF10: 6
PAINLEVEL_OUTOF10: 9
PAINLEVEL_OUTOF10: 6
PAINLEVEL_OUTOF10: 5 - MODERATE PAIN
PAINLEVEL_OUTOF10: 8
PAINLEVEL_OUTOF10: 7
PAINLEVEL_OUTOF10: 8
PAINLEVEL_OUTOF10: 3

## 2024-01-24 ASSESSMENT — COGNITIVE AND FUNCTIONAL STATUS - GENERAL
CLIMB 3 TO 5 STEPS WITH RAILING: A LITTLE
MOBILITY SCORE: 23
DAILY ACTIVITIY SCORE: 24
CLIMB 3 TO 5 STEPS WITH RAILING: A LITTLE
DAILY ACTIVITIY SCORE: 24
DAILY ACTIVITIY SCORE: 24
MOBILITY SCORE: 23
CLIMB 3 TO 5 STEPS WITH RAILING: A LITTLE
DAILY ACTIVITIY SCORE: 24
CLIMB 3 TO 5 STEPS WITH RAILING: A LITTLE
MOBILITY SCORE: 23
DAILY ACTIVITIY SCORE: 24
DAILY ACTIVITIY SCORE: 24
MOBILITY SCORE: 23

## 2024-01-24 ASSESSMENT — PAIN - FUNCTIONAL ASSESSMENT
PAIN_FUNCTIONAL_ASSESSMENT: 0-10

## 2024-01-24 ASSESSMENT — PAIN DESCRIPTION - ORIENTATION
ORIENTATION: RIGHT;LEFT

## 2024-01-24 NOTE — SIGNIFICANT EVENT
Patient appears to have not been discharged in EMR from Oct 2023 visit. I completed the DC summary and entered the order to DC in an attempt to resolve the issue, as this patient is my current patient.

## 2024-01-24 NOTE — CARE PLAN
The patient's goals for the shift include      The clinical goals for the shift include Patient will be free from injury and falls this shift.    Over the shift, the patient did not make progress toward the following goals. Barriers to progression include . Recommendations to address these barriers include .

## 2024-01-24 NOTE — PROGRESS NOTES
"Siddhartha Orellana is a 38 y.o. male on day 0 of admission presenting with ESRD (end stage renal disease) (CMS/[facility]).    Subjective   Lying in bed. No distress.  Reports been having fatigue and some lower extremity swelling.  Some nausea and vomiting.  Right now feels okay.  Denies any active bleeding.        Objective     General: Lying in bed without distress.  Cooperative.  Skin: No rashes or ulcerations.  HEENT: Sclera is white.  Mucous membranes moist.  Cardiac: Regular rate and rhythm, S1/S2 normal.  Lungs: Clear to auscultation bilaterally, no wheezing or crackles, no accessory muscle use at rest.  Abdomen: Soft, nontender, nondistended, BS +  Extremities: No cyanosis.  Mild bilateral lower extremity edema.  Neurologic: Alert and oriented x3.  No focal deficits.  Psychiatric: Appropriate mood and behavior.  Currently no agitation.    Last Recorded Vitals  Blood pressure (!) 155/96, pulse 77, temperature 37.1 °C (98.8 °F), temperature source Tympanic, resp. rate 17, height 1.677 m (5' 6.02\"), weight 85.8 kg (189 lb 2.5 oz), SpO2 98 %.  On room air.    Intake/Output last 3 Shifts:  No intake/output data recorded.    Relevant Results  amLODIPine, 10 mg, oral, Daily  atorvastatin, 20 mg, oral, Nightly  calcitriol, 0.25 mcg, oral, Nightly  carvedilol, 25 mg, oral, BID  hydrALAZINE, 100 mg, oral, TID  insulin lispro, 0-5 Units, subcutaneous, TID with meals  lisinopril, 40 mg, oral, Daily  loratadine, 10 mg, oral, Daily  pantoprazole, 40 mg, oral, Daily before breakfast  torsemide, 60 mg, oral, Daily           PRN medications: acetaminophen **OR** acetaminophen **OR** acetaminophen, dextrose 10 % in water (D10W), dextrose, glucagon, melatonin, ondansetron, oxyCODONE, polyethylene glycol, prochlorperazine, simethicone     Results for orders placed or performed during the hospital encounter of 01/24/24 (from the past 24 hour(s))   Basic metabolic panel   Result Value Ref Range    Glucose 113 (H) 74 - 99 mg/dL    Sodium 143 " 136 - 145 mmol/L    Potassium 3.2 (L) 3.5 - 5.3 mmol/L    Chloride 105 98 - 107 mmol/L    Bicarbonate 24 21 - 32 mmol/L    Anion Gap 17 10 - 20 mmol/L    Urea Nitrogen 64 (H) 6 - 23 mg/dL    Creatinine 10.94 (H) 0.50 - 1.30 mg/dL    eGFR 6 (L) >60 mL/min/1.73m*2    Calcium 8.2 (L) 8.6 - 10.6 mg/dL   CBC   Result Value Ref Range    WBC 15.6 (H) 4.4 - 11.3 x10*3/uL    nRBC 0.0 0.0 - 0.0 /100 WBCs    RBC 1.31 (L) 4.50 - 5.90 x10*6/uL    Hemoglobin 3.7 (LL) 13.5 - 17.5 g/dL    Hematocrit 12.1 (L) 41.0 - 52.0 %    MCV 92 80 - 100 fL    MCH 28.2 26.0 - 34.0 pg    MCHC 30.6 (L) 32.0 - 36.0 g/dL    RDW 13.5 11.5 - 14.5 %    Platelets 271 150 - 450 x10*3/uL   CBC   Result Value Ref Range    WBC 11.4 (H) 4.4 - 11.3 x10*3/uL    nRBC 0.0 0.0 - 0.0 /100 WBCs    RBC 2.32 (L) 4.50 - 5.90 x10*6/uL    Hemoglobin 6.4 (LL) 13.5 - 17.5 g/dL    Hematocrit 21.1 (L) 41.0 - 52.0 %    MCV 91 80 - 100 fL    MCH 27.6 26.0 - 34.0 pg    MCHC 30.3 (L) 32.0 - 36.0 g/dL    RDW 13.6 11.5 - 14.5 %    Platelets 217 150 - 450 x10*3/uL   Basic metabolic panel   Result Value Ref Range    Glucose 89 74 - 99 mg/dL    Sodium 142 136 - 145 mmol/L    Potassium 3.5 3.5 - 5.3 mmol/L    Chloride 106 98 - 107 mmol/L    Bicarbonate 24 21 - 32 mmol/L    Anion Gap 16 10 - 20 mmol/L    Urea Nitrogen 64 (H) 6 - 23 mg/dL    Creatinine 10.37 (H) 0.50 - 1.30 mg/dL    eGFR 6 (L) >60 mL/min/1.73m*2    Calcium 8.0 (L) 8.6 - 10.6 mg/dL   POCT GLUCOSE   Result Value Ref Range    POCT Glucose 109 (H) 74 - 99 mg/dL         Hospital Course:  Siddhartha Orellana is a 38 y.o. male with a past medical history of CKD V/ESRD, T2DM (a1c 7.4 11/21/2023), HTN, HLD, parathyroidism, anemia, SHIVA, morbid obesity, and renal cell carcinoma with mets who presented to [facility] as a direct admission to establish hemodialysis.     Assessment/Plan     ESRD  Renal cell carcinoma  -Fistula cleared for use by Dr. Daigle on 01/18/2024.  -Patient still makes urine, mild lower extremity edema so far.   Electrolytes appears to be stable on lab work.  -Consulted nephrology.  -Renal diet  -Continue oxycodone 10mg Q4HP, Compazine 10mg PO Q6HP  -Follow up with oncology on DC as per previous schedule     T2DM  -Controlled  -ADA diet  -Low SSI  -Hypoglycemic protocol     HTN  -Systolic blood pressure currently 140s to 160s.  -Resumed on home medication milligrams 10 mg daily, Coreg 25 mg twice a day, hydralazine 100 mg 3 times a day, lisinopril 40 mg daily, torsemide 60 mg daily.  -Continue home medications.     Acute on chronic anemia  -Hgb 6.4 on admission.  Baseline hemoglobin appears to be between 7-8.  -Patient currently HDS.  No active bleeding.  -Discussed with patient who states he wants to defer getting a blood transfusion for today and would prefer to recheck blood work tomorrow.    Disposition: Nephrology consulted, await further input on treatment plan, monitor hemoglobin, monitor electrolytes.    Awa Hamlin MD

## 2024-01-24 NOTE — NURSING NOTE
Patient directly admitted to [place] [address] from home this evening. Admission screenings completed and patient oriented to the environment. He lives at home with his wife and children. He stated he came in to start dialysis. He feels safe at home and anticipates returning there at discharge time. Will continue to assess for discharge needs.

## 2024-01-24 NOTE — DISCHARGE SUMMARY
"Discharge Diagnosis      Issues Requiring Follow-Up      Test Results Pending At Discharge  Pending Labs       Order Current Status    POCT Glucose In process            Hospital Course    This DC summary was created in an attempt to resolve an issue with conflicting old encounters and current. I did not evaluate the patient in Oct 2023, however he is my current patient.         Pertinent Physical Exam At Time of Discharge  Physical Exam    Home Medications     Medication List      CONTINUE taking these medications     BD Insulin Syringe Ultra-Fine 30G X 1/2\" 0.5 mL syringe; Generic drug:   insulin syringe-needle U-100; USE AS DIRECTED WITH LANTUS AND INSULIN   LISPRO INJECTIONS   Dexcom G7  misc; Generic drug: Dexcom G4 platinum ; FOR   CONTINUOUS GLUCOSE MONITORING   Dexcom G7 Sensor device; Generic drug: blood-glucose sensor; FOR   CONTINUOUS GLUCOSE MONITORING, CHANGE EVERY 10 DAYS   OneTouch Delica Plus Lancet 33 gauge misc; Generic drug: lancets; FOR   GLUCOSE TESTING 3 TIMES DAILY   OneTouch Verio Flex meter misc; Generic drug: blood-glucose meter; FOR   GLUCOSE TESTING 3 TIMES DAILY     STOP taking these medications     amLODIPine 10 mg tablet; Commonly known as: Norvasc   atorvastatin 20 mg tablet; Commonly known as: Lipitor   Cabometyx 40 mg tablet; Generic drug: cabozantinib   calcitriol 0.25 mcg capsule; Commonly known as: Rocaltrol   carvedilol 25 mg tablet; Commonly known as: Coreg   cholecalciferol 125 MCG (5000 UT) capsule; Commonly known as: Vitamin   D-3   clindamycin 1 % lotion; Commonly known as: Cleocin T   doxycycline 100 mg tablet; Commonly known as: Vibra-Tabs   famotidine 20 mg tablet; Commonly known as: Pepcid   hydrALAZINE 100 mg tablet; Commonly known as: Apresoline   hydrocortisone 2.5 % cream   Lantus U-100 Insulin 100 unit/mL injection; Generic drug: insulin   glargine   lisinopril 40 mg tablet   loratadine 10 mg tablet; Commonly known as: Claritin   Nu-Iron 150 mg iron " capsule; Generic drug: iron polysaccharides   omeprazole 40 mg DR capsule; Commonly known as: PriLOSEC   ondansetron 8 mg tablet; Commonly known as: Zofran   OneTouch Verio test strips strip; Generic drug: blood sugar diagnostic   polyethylene glycol 17 gram packet; Commonly known as: Glycolax, Miralax   predniSONE 10 mg tablet; Commonly known as: Deltasone   torsemide 20 mg tablet; Commonly known as: Demadex   triamcinolone 0.1 % cream; Commonly known as: Kenalog       Outpatient Follow-Up  Future Appointments   Date Time Provider Department Center   1/31/2024 12:00 PM Tatiana Grover MD [id number] Academic   2/5/2024 10:00 AM [facility] VASC 2 [id number] [facility]   2/8/2024  2:20 PM Renan Thomson MD [id number] Am[facility]ume, APRN-CNP

## 2024-01-24 NOTE — PROGRESS NOTES
[facility] and [facility] at [facility]. Per [facility], [facility] is closed until the first week of February. [facility] will add him to their outreach list for when [facility] opens. TCC updated.  Pt is also a new HD patient. This SW will continue to monitor for pt plan to assist in setting up outpatient HD chair.    - Ashanti Roman MSW, LSW

## 2024-01-24 NOTE — H&P
History Of Present Illness  Siddhartha Orellana is a 38 y.o. male with a past medical history of CKD V/ESRD, T2DM (a1c 7.4 11/21/2023), HTN, HLD, parathyroidism, anemia, SHIVA, morbid obesity, and renal cell carcinoma with mets who presented to [facility] as a direct admission to establish hemodialysis. According to records, he was diagnosed with RCC with mets via biopsy on 11/22 and has undergone several treatments until recently when his chemo was placed on hold due to issues with his fistula site healing. He arrived to [facility] without incident, and on exam in [id number], he reports feeling in his usual state of health. He denies any fever, chills, chest pain, palpitation, shortness of breath, leg swelling, nausea, or vomiting.      Past Medical History  Past Medical History:   Diagnosis Date    Dorsalgia, unspecified 01/04/2023    Back pain    End stage renal disease (CMS/HCC) 01/04/2023    ESRD (end stage renal disease) on dialysis    Essential (primary) hypertension 01/04/2023    Hypertension    Iron deficiency 02/23/2020    Iron deficiency    Other specified postprocedural states     H/O endoscopy       Surgical History  Past Surgical History:   Procedure Laterality Date    OTHER SURGICAL HISTORY  04/11/2018    Creation Of A-V Graft Fistula For Dialysis        Social History  He reports that he has quit smoking. His smoking use included cigars. He has never used smokeless tobacco. He reports current alcohol use. He reports that he does not use drugs.    Family History  Family History   Problem Relation Name Age of Onset    Hypertension Mother      Cancer Father      Hypertension Father      Other (renal transplant recipient) Mother's Brother          Allergies  Patient has no known allergies.    Review of Systems   Constitutional:  Negative for chills and fever.   Respiratory:  Negative for shortness of breath.    Cardiovascular:  Negative for chest pain and palpitations.   Gastrointestinal:  Negative for abdominal pain,  constipation, diarrhea, nausea and vomiting.   Genitourinary:  Negative for dysuria, flank pain, frequency, hematuria and urgency.   All other systems reviewed and are negative.       Physical Exam  Vitals reviewed.   HENT:      Head: Normocephalic and atraumatic.   Cardiovascular:      Rate and Rhythm: Normal rate and regular rhythm.      Heart sounds: Normal heart sounds.      Comments: L forearm AVF +b/+t  Pulmonary:      Effort: Pulmonary effort is normal.      Breath sounds: Normal air entry.   Abdominal:      General: Bowel sounds are normal.      Palpations: Abdomen is soft.      Tenderness: There is no abdominal tenderness.   Musculoskeletal:         General: No deformity.   Skin:     General: Skin is warm and dry.   Neurological:      General: No focal deficit present.      Mental Status: He is alert and oriented to person, place, and time.   Psychiatric:         Mood and Affect: Mood normal.         Behavior: Behavior normal.          Last Recorded Vitals  Blood pressure (!) 157/104, pulse 79, temperature 37.2 °C (99 °F), temperature source Temporal, resp. rate 18, SpO2 100 %.    Relevant Results         Assessment/Plan   Principal Problem:    ESRD (end stage renal disease) (CMS/[facility])      #ESRD  #RCC  -Fistula cleared for use by Dr. Daigle on 01/18/2024  -Telemetry  -CBC, BMP on arrival  -Consult nephrology in AM for HD initiation  -Renal diet  -Renal dosing where indicated  -Continue oxycodone 10mg Q4HP, Compazine 10mg PO Q6HP  -Follow up with oncology on DC as per previous schedule    #T2DM  -Controlled  -ADA diet  -Low SSI  -Hypoglycemic protocol    #HTN  -Chronic  -Continue home medications    #Anemia  -Hgb 6.4 on admission  -Could be a dilutional component  -Patient currently HDS  -Will defer transfusion at this time to during HD  -T&S added on           JAIME Mtz-CNP

## 2024-01-24 NOTE — CONSULTS
Reason For Consult  ESRD - initiation of HD     History Of Present Illness  Siddhartha Orellana is a 38 y.o. male with PMHX of CKD V/ESRD, T2DM (a1c 7.4 11/21/2023), HTN, HLD, parathyroidism, anemia, SHIVA, morbid obesity, and renal cell carcinoma with mets who presented to [facility] as a direct admission to establish hemodialysis. He was diagnosed with RCC on 11/22 with mets via biopsy and has undergone several treatments until recently when chemo was put on hold due to issues with fistula healing. Patient was initially on transplant list, however, was precluded due to ongoing malignancy    Patient seen and examined at bedside  Complains of poor appetite, weight loss, nausea, fatigue and malaise   Denies metallic taste  Outpatient nephrologist - Dr. Grover      Past Medical History  He has a past medical history of Dorsalgia, unspecified (01/04/2023), End stage renal disease (CMS/HCC) (01/04/2023), Essential (primary) hypertension (01/04/2023), Iron deficiency (02/23/2020), and Other specified postprocedural states.    Surgical History  He has a past surgical history that includes Other surgical history (04/11/2018).     Social History  He reports that he has quit smoking. His smoking use included cigars. He has never used smokeless tobacco. He reports current alcohol use. He reports that he does not use drugs.    Family History  Family History   Problem Relation Name Age of Onset    Hypertension Mother      Cancer Father      Hypertension Father      Other (renal transplant recipient) Mother's Brother          Allergies  Patient has no known allergies.    Physical Exam  AAO x3  No asterixis  LUE AVF   + peripheral edema   CTA b/l       I&O 24HR    Intake/Output Summary (Last 24 hours) at 1/24/2024 1617  Last data filed at 1/24/2024 1100  Gross per 24 hour   Intake 720 ml   Output --   Net 720 ml       Vitals 24HR  Heart Rate:  [76-82]   Temp:  [36.4 °C (97.5 °F)-37.2 °C (99 °F)]   Resp:  [16-18]   BP: (149-168)/()  "  Height:  [167.7 cm (5' 6.02\")]   Weight:  [85.8 kg (189 lb 2.5 oz)]   SpO2:  [96 %-100 %]         Relevant Results  Admission on 01/24/2024   Component Date Value Ref Range Status    Glucose 01/24/2024 113 (H)  74 - 99 mg/dL Final    Sodium 01/24/2024 143  136 - 145 mmol/L Final    Potassium 01/24/2024 3.2 (L)  3.5 - 5.3 mmol/L Final    Chloride 01/24/2024 105  98 - 107 mmol/L Final    Bicarbonate 01/24/2024 24  21 - 32 mmol/L Final    Anion Gap 01/24/2024 17  10 - 20 mmol/L Final    Urea Nitrogen 01/24/2024 64 (H)  6 - 23 mg/dL Final    Creatinine 01/24/2024 10.94 (H)  0.50 - 1.30 mg/dL Final    eGFR 01/24/2024 6 (L)  >60 mL/min/1.73m*2 Final    Calculations of estimated GFR are performed using the 2021 CKD-EPI Study Refit equation without the race variable for the IDMS-Traceable creatinine methods.  https://jasn.asnjournals.org/content/early/2021/09/22/ASN.0964047382    Calcium 01/24/2024 8.2 (L)  8.6 - 10.6 mg/dL Final    WBC 01/24/2024 15.6 (H)  4.4 - 11.3 x10*3/uL Final    nRBC 01/24/2024 0.0  0.0 - 0.0 /100 WBCs Final    RBC 01/24/2024 1.31 (L)  4.50 - 5.90 x10*6/uL Final    Hemoglobin 01/24/2024 3.7 (LL)  13.5 - 17.5 g/dL Final    Hematocrit 01/24/2024 12.1 (L)  41.0 - 52.0 % Final    MCV 01/24/2024 92  80 - 100 fL Final    MCH 01/24/2024 28.2  26.0 - 34.0 pg Final    MCHC 01/24/2024 30.6 (L)  32.0 - 36.0 g/dL Final    RDW 01/24/2024 13.5  11.5 - 14.5 % Final    Platelets 01/24/2024 271  150 - 450 x10*3/uL Final    WBC 01/24/2024 11.4 (H)  4.4 - 11.3 x10*3/uL Final    nRBC 01/24/2024 0.0  0.0 - 0.0 /100 WBCs Final    RBC 01/24/2024 2.32 (L)  4.50 - 5.90 x10*6/uL Final    Hemoglobin 01/24/2024 6.4 (LL)  13.5 - 17.5 g/dL Final    Hematocrit 01/24/2024 21.1 (L)  41.0 - 52.0 % Final    MCV 01/24/2024 91  80 - 100 fL Final    MCH 01/24/2024 27.6  26.0 - 34.0 pg Final    MCHC 01/24/2024 30.3 (L)  32.0 - 36.0 g/dL Final    RDW 01/24/2024 13.6  11.5 - 14.5 % Final    Platelets 01/24/2024 217  150 - 450 x10*3/uL " Final    Glucose 01/24/2024 89  74 - 99 mg/dL Final    Sodium 01/24/2024 142  136 - 145 mmol/L Final    Potassium 01/24/2024 3.5  3.5 - 5.3 mmol/L Final    Chloride 01/24/2024 106  98 - 107 mmol/L Final    Bicarbonate 01/24/2024 24  21 - 32 mmol/L Final    Anion Gap 01/24/2024 16  10 - 20 mmol/L Final    Urea Nitrogen 01/24/2024 64 (H)  6 - 23 mg/dL Final    Creatinine 01/24/2024 10.37 (H)  0.50 - 1.30 mg/dL Final    eGFR 01/24/2024 6 (L)  >60 mL/min/1.73m*2 Final    Calculations of estimated GFR are performed using the 2021 CKD-EPI Study Refit equation without the race variable for the IDMS-Traceable creatinine methods.  https://jasn.asnjournals.org/content/early/2021/09/22/ASN.2939860139    Calcium 01/24/2024 8.0 (L)  8.6 - 10.6 mg/dL Final    ABO TYPE 01/24/2024 A   Final    Rh TYPE 01/24/2024 POS   Final    ANTIBODY SCREEN 01/24/2024 NEG   Final    POCT Glucose 01/24/2024 109 (H)  74 - 99 mg/dL Final    POCT Glucose 01/24/2024 88  74 - 99 mg/dL Final    POCT Glucose 01/24/2024 106 (H)  74 - 99 mg/dL Final         Assessment/Plan     Siddhartha Orellana is a 38 y.o. male with PMHX of CKD V/ESRD, T2DM (a1c 7.4 11/21/2023), HTN, HLD, parathyroidism, anemia, SHIVA, morbid obesity, and renal cell carcinoma with mets who presented to [facility] as a direct admission to establish hemodialysis. He was diagnosed with RCC on 11/22 (s/p nivolumab, follows with Dr. Thomson) with mets via biopsy and has undergone several treatments until recently when chemo was put on hold due to issues with fistula healing. Patient was initially on transplant list, however, was precluded due to ongoing malignancy. Nephrology is following for HD initiation and management     Principal Problem:    ESRD (end stage renal disease) (CMS/[facility])    Recommendations:  - plan for HD today using AVF (cleared by vascular surgery on 01/18/24). Will do slow start protocol.  - recommend SW consult to assist with dialysis chair and times in the outpatient setting   -  hepatitis labs - pending   - Anemia - iron labs from 2 months ago % sat 25, ferritin 250, was taking PO iron in outpatient setting. Recommend repeat labs, iron sucrose 200 mg x 5 doses  - Will initiate on NIGEL - 5000 units EPO three times per week  -  Secondary HPT - on calcitriol 0.25 mcg at bedtime, repeat PTH level, phos binder TID with meals  - renal MVI  - HTN - e/o mild volume overload on exam. Will start removing fluid tomorrow and that should aid in HTN management. Continue amlidpine, coreg, hydralazine, lisinopril   - non oliguric - continue torsemide     To be followed by dialysis team in the AM (Dr. Shane)       Cecelia Skinner,     I saw and evaluated the patient. I personally obtained the key and critical portions of the history and physical exam or was physically present for key and critical portions performed by the resident/fellow. I reviewed the resident/fellow's documentation and discussed the patient with the resident/fellow. I agree with the resident/fellow's medical decision making as documented in the note.      CKD stage 5, with uremic symptoms and anemia, admitted to start dialysis. First gentle session today using LUE AVF, next tomorrow to be follow by dialysis team  Adding epogen, repeat cbc tomorrow, getting iron studies   Will need out-pt dialysis placement     Teofilo Simmons MD  Nephrology and Hypertension

## 2024-01-24 NOTE — NURSING NOTE
Made dr aware that patients hemoglobin came back at 6.4. dr said he will check with attending if he will need transfused, or will we wait until he gets dialysis today to give blood.

## 2024-01-24 NOTE — PROGRESS NOTES
1/24/24 0134 Transitional Care Coordinator Notes:    Met with patient and spouse to discuss discharge needs. Patient is independent. Denies any home care needs. Patient will need new dialysis chair, SW notified. CHW will be notified for assistance with ordering supplies needed at home. Patient receives primary care at [facility]. Family will transport patient home at discharge.                    Assessment/Plan   Principal Problem:    ESRD (end stage renal disease) (CMS/[facility])    Discharge Plans: discharge to home           Randi Ibrahim RN

## 2024-01-24 NOTE — CARE PLAN
Problem: Safety - Adult  Goal: Free from fall injury  Outcome: Progressing     Problem: Discharge Planning  Goal: Discharge to home or other facility with appropriate resources  Outcome: Progressing     Problem: Chronic Conditions and Co-morbidities  Goal: Patient's chronic conditions and co-morbidity symptoms are monitored and maintained or improved  Outcome: Progressing     Problem: Fall/Injury  Goal: Not fall by end of shift  Outcome: Progressing  Goal: Be free from injury by end of the shift  Outcome: Progressing  Goal: Verbalize understanding of personal risk factors for fall in the hospital  Outcome: Progressing  Goal: Verbalize understanding of risk factor reduction measures to prevent injury from fall in the home  Outcome: Progressing  Goal: Use assistive devices by end of the shift  Outcome: Progressing  Goal: Pace activities to prevent fatigue by end of the shift  Outcome: Progressing     Problem: Diabetes  Goal: Achieve decreasing blood glucose levels by end of shift  Outcome: Progressing  Goal: Increase stability of blood glucose readings by end of shift  Outcome: Progressing  Goal: Decrease in ketones present in urine by end of shift  Outcome: Progressing  Goal: Maintain electrolyte levels within acceptable range throughout shift  Outcome: Progressing  Goal: Maintain glucose levels >70mg/dl to <250mg/dl throughout shift  Outcome: Progressing  Goal: No changes in neurological exam by end of shift  Outcome: Progressing  Goal: Learn about and adhere to nutrition recommendations by end of shift  Outcome: Progressing  Goal: Vital signs within normal range for age by end of shift  Outcome: Progressing  Goal: Increase self care and/or family involovement by end of shift  Outcome: Progressing  Goal: Receive DSME education by end of shift  Outcome: Progressing

## 2024-01-25 ENCOUNTER — APPOINTMENT (OUTPATIENT)
Dept: DIALYSIS | Facility: HOSPITAL | Age: 39
End: 2024-01-25
Payer: COMMERCIAL

## 2024-01-25 LAB
ALBUMIN SERPL BCP-MCNC: 3 G/DL (ref 3.4–5)
ANION GAP SERPL CALC-SCNC: 12 MMOL/L (ref 10–20)
BLOOD EXPIRATION DATE: NORMAL
BUN SERPL-MCNC: 39 MG/DL (ref 6–23)
CALCIUM SERPL-MCNC: 8.8 MG/DL (ref 8.6–10.6)
CHLORIDE SERPL-SCNC: 104 MMOL/L (ref 98–107)
CO2 SERPL-SCNC: 29 MMOL/L (ref 21–32)
CREAT SERPL-MCNC: 8.18 MG/DL (ref 0.5–1.3)
DISPENSE STATUS: NORMAL
EGFRCR SERPLBLD CKD-EPI 2021: 8 ML/MIN/1.73M*2
ERYTHROCYTE [DISTWIDTH] IN BLOOD BY AUTOMATED COUNT: 13.4 % (ref 11.5–14.5)
GLUCOSE BLD MANUAL STRIP-MCNC: 113 MG/DL (ref 74–99)
GLUCOSE BLD MANUAL STRIP-MCNC: 119 MG/DL (ref 74–99)
GLUCOSE BLD MANUAL STRIP-MCNC: 96 MG/DL (ref 74–99)
GLUCOSE SERPL-MCNC: 74 MG/DL (ref 74–99)
HCT VFR BLD AUTO: 20.2 % (ref 41–52)
HCT VFR BLD AUTO: 25 % (ref 41–52)
HGB BLD-MCNC: 6.3 G/DL (ref 13.5–17.5)
HGB BLD-MCNC: 8 G/DL (ref 13.5–17.5)
MCH RBC QN AUTO: 27.6 PG (ref 26–34)
MCHC RBC AUTO-ENTMCNC: 31.2 G/DL (ref 32–36)
MCV RBC AUTO: 89 FL (ref 80–100)
NRBC BLD-RTO: 0 /100 WBCS (ref 0–0)
PHOSPHATE SERPL-MCNC: 4.4 MG/DL (ref 2.5–4.9)
PLATELET # BLD AUTO: 220 X10*3/UL (ref 150–450)
POTASSIUM SERPL-SCNC: 3 MMOL/L (ref 3.5–5.3)
PRODUCT BLOOD TYPE: 6200
PRODUCT CODE: NORMAL
RBC # BLD AUTO: 2.28 X10*6/UL (ref 4.5–5.9)
SODIUM SERPL-SCNC: 142 MMOL/L (ref 136–145)
UNIT ABO: NORMAL
UNIT NUMBER: NORMAL
UNIT RH: NORMAL
UNIT VOLUME: 350
WBC # BLD AUTO: 11.4 X10*3/UL (ref 4.4–11.3)
XM INTEP: NORMAL

## 2024-01-25 PROCEDURE — 80069 RENAL FUNCTION PANEL: CPT | Performed by: INTERNAL MEDICINE

## 2024-01-25 PROCEDURE — 2500000001 HC RX 250 WO HCPCS SELF ADMINISTERED DRUGS (ALT 637 FOR MEDICARE OP): Performed by: STUDENT IN AN ORGANIZED HEALTH CARE EDUCATION/TRAINING PROGRAM

## 2024-01-25 PROCEDURE — 85027 COMPLETE CBC AUTOMATED: CPT | Performed by: INTERNAL MEDICINE

## 2024-01-25 PROCEDURE — 8010000001 HC DIALYSIS - HEMODIALYSIS PER DAY

## 2024-01-25 PROCEDURE — P9016 RBC LEUKOCYTES REDUCED: HCPCS

## 2024-01-25 PROCEDURE — 2500000001 HC RX 250 WO HCPCS SELF ADMINISTERED DRUGS (ALT 637 FOR MEDICARE OP): Performed by: NURSE PRACTITIONER

## 2024-01-25 PROCEDURE — 36415 COLL VENOUS BLD VENIPUNCTURE: CPT | Performed by: INTERNAL MEDICINE

## 2024-01-25 PROCEDURE — 99233 SBSQ HOSP IP/OBS HIGH 50: CPT | Performed by: INTERNAL MEDICINE

## 2024-01-25 PROCEDURE — 2500000004 HC RX 250 GENERAL PHARMACY W/ HCPCS (ALT 636 FOR OP/ED): Performed by: NURSE PRACTITIONER

## 2024-01-25 PROCEDURE — 36430 TRANSFUSION BLD/BLD COMPNT: CPT

## 2024-01-25 PROCEDURE — 2500000005 HC RX 250 GENERAL PHARMACY W/O HCPCS: Performed by: NURSE PRACTITIONER

## 2024-01-25 PROCEDURE — 82947 ASSAY GLUCOSE BLOOD QUANT: CPT

## 2024-01-25 PROCEDURE — 1200000002 HC GENERAL ROOM WITH TELEMETRY DAILY

## 2024-01-25 PROCEDURE — 2500000004 HC RX 250 GENERAL PHARMACY W/ HCPCS (ALT 636 FOR OP/ED): Performed by: INTERNAL MEDICINE

## 2024-01-25 PROCEDURE — 36415 COLL VENOUS BLD VENIPUNCTURE: CPT | Performed by: STUDENT IN AN ORGANIZED HEALTH CARE EDUCATION/TRAINING PROGRAM

## 2024-01-25 PROCEDURE — 85014 HEMATOCRIT: CPT | Performed by: STUDENT IN AN ORGANIZED HEALTH CARE EDUCATION/TRAINING PROGRAM

## 2024-01-25 PROCEDURE — 90935 HEMODIALYSIS ONE EVALUATION: CPT | Performed by: INTERNAL MEDICINE

## 2024-01-25 RX ORDER — POTASSIUM CHLORIDE 20 MEQ/1
20 TABLET, EXTENDED RELEASE ORAL ONCE
Status: COMPLETED | OUTPATIENT
Start: 2024-01-25 | End: 2024-01-25

## 2024-01-25 RX ORDER — DOXYCYCLINE HYCLATE 100 MG
100 TABLET ORAL EVERY 12 HOURS SCHEDULED
Status: DISCONTINUED | OUTPATIENT
Start: 2024-01-25 | End: 2024-01-29 | Stop reason: HOSPADM

## 2024-01-25 RX ADMIN — ATORVASTATIN CALCIUM 20 MG: 20 TABLET, FILM COATED ORAL at 20:37

## 2024-01-25 RX ADMIN — OXYCODONE HYDROCHLORIDE 10 MG: 5 TABLET ORAL at 20:37

## 2024-01-25 RX ADMIN — PANTOPRAZOLE SODIUM 40 MG: 40 TABLET, DELAYED RELEASE ORAL at 06:22

## 2024-01-25 RX ADMIN — AMLODIPINE BESYLATE 10 MG: 10 TABLET ORAL at 08:34

## 2024-01-25 RX ADMIN — CARVEDILOL 25 MG: 25 TABLET, FILM COATED ORAL at 20:37

## 2024-01-25 RX ADMIN — CARVEDILOL 25 MG: 25 TABLET, FILM COATED ORAL at 08:34

## 2024-01-25 RX ADMIN — POTASSIUM CHLORIDE 20 MEQ: 1500 TABLET, EXTENDED RELEASE ORAL at 15:37

## 2024-01-25 RX ADMIN — OXYCODONE HYDROCHLORIDE 10 MG: 5 TABLET ORAL at 01:52

## 2024-01-25 RX ADMIN — DOXYCYCLINE HYCLATE 100 MG: 100 TABLET, FILM COATED ORAL at 22:25

## 2024-01-25 RX ADMIN — OXYCODONE HYDROCHLORIDE 10 MG: 5 TABLET ORAL at 07:56

## 2024-01-25 RX ADMIN — OXYCODONE HYDROCHLORIDE 10 MG: 5 TABLET ORAL at 15:27

## 2024-01-25 RX ADMIN — IRON SUCROSE 200 MG: 20 INJECTION, SOLUTION INTRAVENOUS at 17:26

## 2024-01-25 RX ADMIN — TORSEMIDE 60 MG: 20 TABLET ORAL at 08:34

## 2024-01-25 RX ADMIN — Medication 10 MG: at 20:37

## 2024-01-25 RX ADMIN — CALCITRIOL CAPSULES 0.25 MCG 0.25 MCG: 0.25 CAPSULE ORAL at 20:37

## 2024-01-25 RX ADMIN — LISINOPRIL 40 MG: 40 TABLET ORAL at 08:34

## 2024-01-25 RX ADMIN — ONDANSETRON HYDROCHLORIDE 8 MG: 4 TABLET, FILM COATED ORAL at 07:56

## 2024-01-25 ASSESSMENT — COGNITIVE AND FUNCTIONAL STATUS - GENERAL
CLIMB 3 TO 5 STEPS WITH RAILING: A LITTLE
DAILY ACTIVITIY SCORE: 24
MOBILITY SCORE: 23
CLIMB 3 TO 5 STEPS WITH RAILING: A LITTLE
MOBILITY SCORE: 23
MOBILITY SCORE: 23
DAILY ACTIVITIY SCORE: 24
CLIMB 3 TO 5 STEPS WITH RAILING: A LITTLE
CLIMB 3 TO 5 STEPS WITH RAILING: A LITTLE
DAILY ACTIVITIY SCORE: 24
MOBILITY SCORE: 23
DAILY ACTIVITIY SCORE: 24

## 2024-01-25 ASSESSMENT — PAIN SCALES - GENERAL
PAINLEVEL_OUTOF10: 6
PAINLEVEL_OUTOF10: 8
PAINLEVEL_OUTOF10: 7
PAINLEVEL_OUTOF10: 9
PAINLEVEL_OUTOF10: 7
PAINLEVEL_OUTOF10: 7
PAINLEVEL_OUTOF10: 8
PAINLEVEL_OUTOF10: 4

## 2024-01-25 ASSESSMENT — PAIN - FUNCTIONAL ASSESSMENT
PAIN_FUNCTIONAL_ASSESSMENT: 0-10
PAIN_FUNCTIONAL_ASSESSMENT: NO/DENIES PAIN
PAIN_FUNCTIONAL_ASSESSMENT: 0-10

## 2024-01-25 ASSESSMENT — PAIN DESCRIPTION - LOCATION
LOCATION: LEG
LOCATION: LEG

## 2024-01-25 ASSESSMENT — PAIN DESCRIPTION - ORIENTATION
ORIENTATION: RIGHT;LEFT
ORIENTATION: RIGHT;LEFT

## 2024-01-25 NOTE — PROGRESS NOTES
"This SW submitted referral to Dina B at [facility] via email for new outpatient HD chair. Will continue to monitor for chair time.    UPDATE (0495): This SW attempted to speak with pt at bedside regarding dialysis chair time preference however pt was off floor.    UPDATE ([id number]): This SW met with pt bedside to discuss outpatient HD. Pt stated [facility] would be best and has a preference of MWF \"as early as possible.\" This SW updated Dina B through email of pt preference.    - Ashanti Roman MSW, LSW      "

## 2024-01-25 NOTE — PROGRESS NOTES
"Siddhartha Orellana is a 38 y.o. male on day 1 of admission presenting with ESRD (end stage renal disease) (CMS/[facility]).    Subjective   Lying in bed. No distress.  Reports feeling mildly better.         Objective     General: Lying in bed without distress.  Cooperative.  Skin: No rashes or ulcerations.  HEENT: Sclera is white.  Mucous membranes moist.  Cardiac: Regular rate and rhythm, S1/S2 normal.  Lungs: Clear to auscultation bilaterally, no wheezing or crackles, no accessory muscle use at rest.  Abdomen: Soft, nontender, nondistended, BS +  Extremities: No cyanosis.  Mild bilateral lower extremity edema.  Neurologic: Alert and oriented x3.  No focal deficits.  Psychiatric: Appropriate mood and behavior.  Currently no agitation.    Last Recorded Vitals  Blood pressure (!) 163/102, pulse 78, temperature 37 °C (98.6 °F), temperature source Skin, resp. rate 16, height 1.677 m (5' 6.02\"), weight 85.8 kg (189 lb 2.5 oz), SpO2 99 %.  On room air.    Intake/Output last 3 Shifts:  I/O last 3 completed shifts:  In: 2400 (28 mL/kg) [P.O.:1200; I.V.:800 (9.3 mL/kg); Other:400]  Out: 400 (4.7 mL/kg) [Other:400]  Weight: 85.8 kg     Relevant Results  amLODIPine, 10 mg, oral, Daily  atorvastatin, 20 mg, oral, Nightly  calcitriol, 0.25 mcg, oral, Nightly  carvedilol, 25 mg, oral, BID  epoetin mannie or biosimilar, 5,000 Units, subcutaneous, Once per day on Mon Wed Fri  hydrALAZINE, 100 mg, oral, TID  insulin lispro, 0-5 Units, subcutaneous, TID with meals  iron sucrose, 200 mg, intravenous, Daily  lisinopril, 40 mg, oral, Daily  loratadine, 10 mg, oral, Daily  pantoprazole, 40 mg, oral, Daily before breakfast  potassium chloride CR, 20 mEq, oral, Once  torsemide, 60 mg, oral, Daily           PRN medications: acetaminophen **OR** acetaminophen **OR** acetaminophen, dextrose 10 % in water (D10W), dextrose, glucagon, melatonin, ondansetron, oxyCODONE, polyethylene glycol, prochlorperazine, simethicone     Results for orders placed or " performed during the hospital encounter of 01/24/24 (from the past 24 hour(s))   Hepatitis B surface antibody   Result Value Ref Range    Hepatitis B Surface AB <3.1 <10.0 mIU/mL   Hepatitis B surface antigen   Result Value Ref Range    Hepatitis B Surface AG Nonreactive Nonreactive   Iron and TIBC   Result Value Ref Range    Iron 28 (L) 35 - 150 ug/dL    UIBC 151 110 - 370 ug/dL    TIBC 179 (L) 240 - 445 ug/dL    % Saturation 16 (L) 25 - 45 %   Ferritin   Result Value Ref Range    Ferritin 189 20 - 300 ng/mL   POCT GLUCOSE   Result Value Ref Range    POCT Glucose 106 (H) 74 - 99 mg/dL   CBC   Result Value Ref Range    WBC 11.4 (H) 4.4 - 11.3 x10*3/uL    nRBC 0.0 0.0 - 0.0 /100 WBCs    RBC 2.28 (L) 4.50 - 5.90 x10*6/uL    Hemoglobin 6.3 (LL) 13.5 - 17.5 g/dL    Hematocrit 20.2 (L) 41.0 - 52.0 %    MCV 89 80 - 100 fL    MCH 27.6 26.0 - 34.0 pg    MCHC 31.2 (L) 32.0 - 36.0 g/dL    RDW 13.4 11.5 - 14.5 %    Platelets 220 150 - 450 x10*3/uL   Renal Function Panel   Result Value Ref Range    Glucose 74 74 - 99 mg/dL    Sodium 142 136 - 145 mmol/L    Potassium 3.0 (L) 3.5 - 5.3 mmol/L    Chloride 104 98 - 107 mmol/L    Bicarbonate 29 21 - 32 mmol/L    Anion Gap 12 10 - 20 mmol/L    Urea Nitrogen 39 (H) 6 - 23 mg/dL    Creatinine 8.18 (H) 0.50 - 1.30 mg/dL    eGFR 8 (L) >60 mL/min/1.73m*2    Calcium 8.8 8.6 - 10.6 mg/dL    Phosphorus 4.4 2.5 - 4.9 mg/dL    Albumin 3.0 (L) 3.4 - 5.0 g/dL   POCT GLUCOSE   Result Value Ref Range    POCT Glucose 113 (H) 74 - 99 mg/dL         Hospital Course:  Siddhartha Orellana is a 38 y.o. male with a past medical history of CKD V/ESRD, T2DM (a1c 7.4 11/21/2023), HTN, HLD, parathyroidism, anemia, SHIVA, morbid obesity, and renal cell carcinoma with mets who presented to [facility] as a direct admission to establish hemodialysis.  Nephrology consulted and patient had initiation of hemodialysis on 1/24.    Assessment/Plan     ESRD  Renal cell carcinoma  -Fistula cleared for use by Dr. Daigle on  01/18/2024.  -Patient still makes urine, mild lower extremity edema so far.  Electrolytes appears to be stable on lab work.  -Consulted nephrology.  First episode dialysis started on 1/24.  -Renal diet  -Continue oxycodone 10mg Q4HP, Compazine 10mg PO Q6HP  -Follow up with oncology on DC as per previous schedule     T2DM  -Controlled  -ADA diet  -Low SSI  -Hypoglycemic protocol     HTN  -Systolic blood pressure currently 140s to 160s.  -Resumed on home medication milligrams 10 mg daily, Coreg 25 mg twice a day, hydralazine 100 mg 3 times a day, lisinopril 40 mg daily, torsemide 60 mg daily.  -Continue home medications.     Acute on chronic anemia  -Hgb 6.4 on admission.  Baseline hemoglobin appears to be between 7-8.  -Patient currently HDS.  No active bleeding.  -Patient initially deferred blood transfusion on 1/24.  Repeat hemoglobin 6.3 today.  Iron sucrose recommended by nephrology, ordered.  EPO has been started by nephrology.  Discussed with patient who is agreeable to 1 unit PRBC today.  Repeat CBC in the morning.    Disposition: Await further recommendations from nephrology about dialysis plan, waiting on  to get dialysis chair, monitor lab work.    Awa Hamlin MD

## 2024-01-25 NOTE — CARE PLAN
The patient's goals for the shift include      The clinical goals for the shift include patient will remain HDS throughout the shift

## 2024-01-25 NOTE — NURSING NOTE
Report to Receiving RN:    Report To: Jennifer  Time Report Called: 1440  Hand-Off Communication: Pt was able to remove 1 L of fluid today and jose well. BP remains high at 164/98, P 70  Complications During Treatment: No  Ultrafiltration Treatment: Yes  Medications Administered During Dialysis: No  Blood Products Administered During Dialysis: No  Labs Sent During Dialysis: No  Heparin Drip Rate Changes: N/A        Last Updated: 2:38 PM by JANNETH CASTELAN

## 2024-01-25 NOTE — CARE PLAN
Problem: Pain - Adult  Goal: Verbalizes/displays adequate comfort level or baseline comfort level  Outcome: Progressing     Problem: Safety - Adult  Goal: Free from fall injury  Outcome: Progressing     Problem: Discharge Planning  Goal: Discharge to home or other facility with appropriate resources  Outcome: Progressing     Problem: Chronic Conditions and Co-morbidities  Goal: Patient's chronic conditions and co-morbidity symptoms are monitored and maintained or improved  Outcome: Progressing     Problem: Fall/Injury  Goal: Not fall by end of shift  Outcome: Progressing  Goal: Be free from injury by end of the shift  Outcome: Progressing  Goal: Verbalize understanding of personal risk factors for fall in the hospital  Outcome: Progressing  Goal: Verbalize understanding of risk factor reduction measures to prevent injury from fall in the home  Outcome: Progressing  Goal: Use assistive devices by end of the shift  Outcome: Progressing  Goal: Pace activities to prevent fatigue by end of the shift  Outcome: Progressing     Problem: Diabetes  Goal: Achieve decreasing blood glucose levels by end of shift  Outcome: Progressing  Goal: Increase stability of blood glucose readings by end of shift  Outcome: Progressing  Goal: Decrease in ketones present in urine by end of shift  Outcome: Progressing  Goal: Maintain electrolyte levels within acceptable range throughout shift  Outcome: Progressing  Goal: Maintain glucose levels >70mg/dl to <250mg/dl throughout shift  Outcome: Progressing  Goal: No changes in neurological exam by end of shift  Outcome: Progressing  Goal: Learn about and adhere to nutrition recommendations by end of shift  Outcome: Progressing  Goal: Vital signs within normal range for age by end of shift  Outcome: Progressing  Goal: Increase self care and/or family involovement by end of shift  Outcome: Progressing  Goal: Receive DSME education by end of shift  Outcome: Progressing       The clinical goals for  the shift include Patient will receive dialysis this shift

## 2024-01-25 NOTE — NURSING NOTE
Report from Sending RN:    Report From: Jennifer ( RN)  Recent Surgery of Procedure: No  Baseline Level of Consciousness (LOC): A/O x 4  Oxygen Use: Yes  Type: none  Diabetic: Yes, 113  Last BP Med Given Day of Dialysis: yes, coreg 25 mg, 0834 am, Norvasc 10 mg, lisinopril 40 mg 0834 am  Last Pain Med Given: yes, oxycodone 10 mg 0756 am  Lab Tests to be Obtained with Dialysis: No  Blood Transfusion to be Given During Dialysis: No, H&H is 6.3/20.2; floor nurse notified PCP; awaiting on the provider to place orders  Available IV Access: No  Medications to be Administered During Dialysis: No  Continuous IV Infusion Running: No  Restraints on Currently or in the Last 24 Hours: No  Hand-Off Communication: No acute overnight or morning events; vss; Pt did take morning medications; No labs needed; Pt may go off unit without telemetry; Pt is a full code; Maggie oRse RN.

## 2024-01-25 NOTE — PROGRESS NOTES
Renal Staff HD Note Late Note    Patient seen, examined on dialysis   Tolerating treatment without event  127/79 AVF  3K DW 77 gain 2.1 54.1 kg    Continue treatment per submitted orders  Gain 2.1 L

## 2024-01-26 ENCOUNTER — APPOINTMENT (OUTPATIENT)
Dept: DIALYSIS | Facility: HOSPITAL | Age: 39
End: 2024-01-26
Payer: COMMERCIAL

## 2024-01-26 LAB
ALBUMIN SERPL BCP-MCNC: 2.8 G/DL (ref 3.4–5)
ANION GAP SERPL CALC-SCNC: 14 MMOL/L (ref 10–20)
BUN SERPL-MCNC: 27 MG/DL (ref 6–23)
CALCIUM SERPL-MCNC: 8.9 MG/DL (ref 8.6–10.6)
CHLORIDE SERPL-SCNC: 104 MMOL/L (ref 98–107)
CO2 SERPL-SCNC: 30 MMOL/L (ref 21–32)
CREAT SERPL-MCNC: 6.44 MG/DL (ref 0.5–1.3)
EGFRCR SERPLBLD CKD-EPI 2021: 10 ML/MIN/1.73M*2
ERYTHROCYTE [DISTWIDTH] IN BLOOD BY AUTOMATED COUNT: 15.9 % (ref 11.5–14.5)
GLUCOSE BLD MANUAL STRIP-MCNC: 130 MG/DL (ref 74–99)
GLUCOSE BLD MANUAL STRIP-MCNC: 133 MG/DL (ref 74–99)
GLUCOSE BLD MANUAL STRIP-MCNC: 99 MG/DL (ref 74–99)
GLUCOSE SERPL-MCNC: 85 MG/DL (ref 74–99)
HCT VFR BLD AUTO: 24.2 % (ref 41–52)
HGB BLD-MCNC: 7.4 G/DL (ref 13.5–17.5)
MCH RBC QN AUTO: 26.2 PG (ref 26–34)
MCHC RBC AUTO-ENTMCNC: 30.6 G/DL (ref 32–36)
MCV RBC AUTO: 86 FL (ref 80–100)
NRBC BLD-RTO: 0 /100 WBCS (ref 0–0)
PHOSPHATE SERPL-MCNC: 4.6 MG/DL (ref 2.5–4.9)
PLATELET # BLD AUTO: 218 X10*3/UL (ref 150–450)
POTASSIUM SERPL-SCNC: 3.2 MMOL/L (ref 3.5–5.3)
RBC # BLD AUTO: 2.82 X10*6/UL (ref 4.5–5.9)
SODIUM SERPL-SCNC: 145 MMOL/L (ref 136–145)
WBC # BLD AUTO: 11 X10*3/UL (ref 4.4–11.3)

## 2024-01-26 PROCEDURE — 84520 ASSAY OF UREA NITROGEN: CPT | Performed by: INTERNAL MEDICINE

## 2024-01-26 PROCEDURE — 8010000001 HC DIALYSIS - HEMODIALYSIS PER DAY

## 2024-01-26 PROCEDURE — 2500000001 HC RX 250 WO HCPCS SELF ADMINISTERED DRUGS (ALT 637 FOR MEDICARE OP): Performed by: STUDENT IN AN ORGANIZED HEALTH CARE EDUCATION/TRAINING PROGRAM

## 2024-01-26 PROCEDURE — 2500000004 HC RX 250 GENERAL PHARMACY W/ HCPCS (ALT 636 FOR OP/ED): Mod: JW | Performed by: STUDENT IN AN ORGANIZED HEALTH CARE EDUCATION/TRAINING PROGRAM

## 2024-01-26 PROCEDURE — 99232 SBSQ HOSP IP/OBS MODERATE 35: CPT | Performed by: INTERNAL MEDICINE

## 2024-01-26 PROCEDURE — 2500000005 HC RX 250 GENERAL PHARMACY W/O HCPCS: Performed by: NURSE PRACTITIONER

## 2024-01-26 PROCEDURE — 2500000001 HC RX 250 WO HCPCS SELF ADMINISTERED DRUGS (ALT 637 FOR MEDICARE OP): Performed by: NURSE PRACTITIONER

## 2024-01-26 PROCEDURE — 1100000001 HC PRIVATE ROOM DAILY

## 2024-01-26 PROCEDURE — 85027 COMPLETE CBC AUTOMATED: CPT | Performed by: INTERNAL MEDICINE

## 2024-01-26 PROCEDURE — 2500000004 HC RX 250 GENERAL PHARMACY W/ HCPCS (ALT 636 FOR OP/ED): Performed by: NURSE PRACTITIONER

## 2024-01-26 PROCEDURE — 2500000004 HC RX 250 GENERAL PHARMACY W/ HCPCS (ALT 636 FOR OP/ED): Performed by: INTERNAL MEDICINE

## 2024-01-26 PROCEDURE — 82947 ASSAY GLUCOSE BLOOD QUANT: CPT

## 2024-01-26 PROCEDURE — 36415 COLL VENOUS BLD VENIPUNCTURE: CPT | Performed by: INTERNAL MEDICINE

## 2024-01-26 RX ORDER — POTASSIUM CHLORIDE 20 MEQ/1
40 TABLET, EXTENDED RELEASE ORAL ONCE
Status: COMPLETED | OUTPATIENT
Start: 2024-01-26 | End: 2024-01-26

## 2024-01-26 RX ADMIN — IRON SUCROSE 200 MG: 20 INJECTION, SOLUTION INTRAVENOUS at 06:51

## 2024-01-26 RX ADMIN — HYDRALAZINE HYDROCHLORIDE 100 MG: 50 TABLET, FILM COATED ORAL at 11:27

## 2024-01-26 RX ADMIN — PANTOPRAZOLE SODIUM 40 MG: 40 TABLET, DELAYED RELEASE ORAL at 05:31

## 2024-01-26 RX ADMIN — EPOETIN ALFA 5000 UNITS: 10000 SOLUTION INTRAVENOUS; SUBCUTANEOUS at 11:30

## 2024-01-26 RX ADMIN — ATORVASTATIN CALCIUM 20 MG: 20 TABLET, FILM COATED ORAL at 20:07

## 2024-01-26 RX ADMIN — DOXYCYCLINE HYCLATE 100 MG: 100 TABLET, FILM COATED ORAL at 11:27

## 2024-01-26 RX ADMIN — TORSEMIDE 60 MG: 20 TABLET ORAL at 11:27

## 2024-01-26 RX ADMIN — LISINOPRIL 40 MG: 40 TABLET ORAL at 11:28

## 2024-01-26 RX ADMIN — POTASSIUM CHLORIDE 40 MEQ: 1500 TABLET, EXTENDED RELEASE ORAL at 11:29

## 2024-01-26 RX ADMIN — ONDANSETRON HYDROCHLORIDE 8 MG: 4 TABLET, FILM COATED ORAL at 09:54

## 2024-01-26 RX ADMIN — CALCITRIOL CAPSULES 0.25 MCG 0.25 MCG: 0.25 CAPSULE ORAL at 20:07

## 2024-01-26 RX ADMIN — OXYCODONE HYDROCHLORIDE 10 MG: 5 TABLET ORAL at 05:43

## 2024-01-26 RX ADMIN — DOXYCYCLINE HYCLATE 100 MG: 100 TABLET, FILM COATED ORAL at 20:07

## 2024-01-26 RX ADMIN — HYDRALAZINE HYDROCHLORIDE 100 MG: 50 TABLET, FILM COATED ORAL at 15:39

## 2024-01-26 RX ADMIN — OXYCODONE HYDROCHLORIDE 10 MG: 5 TABLET ORAL at 20:07

## 2024-01-26 RX ADMIN — CARVEDILOL 25 MG: 25 TABLET, FILM COATED ORAL at 20:07

## 2024-01-26 RX ADMIN — AMLODIPINE BESYLATE 10 MG: 10 TABLET ORAL at 11:28

## 2024-01-26 RX ADMIN — OXYCODONE HYDROCHLORIDE 10 MG: 5 TABLET ORAL at 14:35

## 2024-01-26 RX ADMIN — Medication 10 MG: at 20:07

## 2024-01-26 RX ADMIN — CARVEDILOL 25 MG: 25 TABLET, FILM COATED ORAL at 09:00

## 2024-01-26 ASSESSMENT — COGNITIVE AND FUNCTIONAL STATUS - GENERAL
MOBILITY SCORE: 23
MOVING TO AND FROM BED TO CHAIR: A LOT
DAILY ACTIVITIY SCORE: 24
CLIMB 3 TO 5 STEPS WITH RAILING: A LITTLE
TURNING FROM BACK TO SIDE WHILE IN FLAT BAD: A LITTLE
DRESSING REGULAR LOWER BODY CLOTHING: A LITTLE
MOBILITY SCORE: 23
DAILY ACTIVITIY SCORE: 24
CLIMB 3 TO 5 STEPS WITH RAILING: A LITTLE
PERSONAL GROOMING: A LITTLE
MOBILITY SCORE: 23
DAILY ACTIVITIY SCORE: 24
CLIMB 3 TO 5 STEPS WITH RAILING: TOTAL
CLIMB 3 TO 5 STEPS WITH RAILING: A LITTLE
DAILY ACTIVITIY SCORE: 24
DAILY ACTIVITIY SCORE: 24
HELP NEEDED FOR BATHING: A LITTLE
STANDING UP FROM CHAIR USING ARMS: A LOT
MOBILITY SCORE: 12
TOILETING: A LOT
DRESSING REGULAR UPPER BODY CLOTHING: A LITTLE
MOBILITY SCORE: 23
MOBILITY SCORE: 23
EATING MEALS: A LITTLE
DAILY ACTIVITIY SCORE: 17
MOVING FROM LYING ON BACK TO SITTING ON SIDE OF FLAT BED WITH BEDRAILS: A LITTLE
CLIMB 3 TO 5 STEPS WITH RAILING: A LITTLE
WALKING IN HOSPITAL ROOM: TOTAL

## 2024-01-26 ASSESSMENT — PAIN - FUNCTIONAL ASSESSMENT
PAIN_FUNCTIONAL_ASSESSMENT: 0-10
PAIN_FUNCTIONAL_ASSESSMENT: NO/DENIES PAIN

## 2024-01-26 ASSESSMENT — PAIN DESCRIPTION - ORIENTATION
ORIENTATION: RIGHT;LEFT
ORIENTATION: RIGHT;LEFT

## 2024-01-26 ASSESSMENT — PAIN SCALES - GENERAL
PAINLEVEL_OUTOF10: 0 - NO PAIN
PAINLEVEL_OUTOF10: 0 - NO PAIN
PAINLEVEL_OUTOF10: 5 - MODERATE PAIN
PAINLEVEL_OUTOF10: 7
PAINLEVEL_OUTOF10: 7
PAINLEVEL_OUTOF10: 6
PAINLEVEL_OUTOF10: 3

## 2024-01-26 NOTE — CONSULTS
Nutrition Diet Education  Provider consult order (Renal diet education)     Please see education tab in Epic for details of education.    Education Documentation  Renal Diet Education, taught by Elis Sharma RDN, NIKUNJ at 1/26/2024 10:01 AM.  Learner: Patient  Readiness: Eager  Method: Explanation, Handout  Response: Verbalizes Understanding  Comment: Patient engaged in education.          Follow Up:     Time Spent (min): 30 minutes  Last Date of Nutrition Visit: 01/26/24  Nutrition Follow-Up Needed?: Dietitian to reassess per policy  Follow up Comment: Diet education. No need for follow up at this time. Please re-consult if needed.

## 2024-01-26 NOTE — PROGRESS NOTES
This SW requested updated from Dina at [facility] for outpatient HD chair referral to [facility]. Will continue to monitor for chair time.    UPDATE (1561): This SW sent requested notes to Dina at [facility].  Idna states that [facility] does not have an opening but [facility] does and they can transfer to [facility] once spot is open. Chair time is MWF 0600. This SW spoke with pt who is agreeable to [facility] to start but did inquire if there is a later time. This SW asked Dina at [facility] about time. If not, pt is agreeable to MWF 0600 at [facility]. Team updated.    UPDATE ([id number]): Team had inquired about outpatient start date for Monday. This SW followed up with Dina who said [facility] does not have chair available for pt until Wednesday. At this time, [facility] also does not have an earlier start time than 0600 AM. Team updated.    - Ashanti Roman MSW, LSW

## 2024-01-26 NOTE — NURSING NOTE
Report to Receiving RN:    Report To: Estefani RN  Time Report Called: 1101  Hand-Off Communication: HD completed and tolerated well. Removed 1 L. /102 HR 69. Medicated for nausea with PO Zofran.  Complications During Treatment: No  Ultrafiltration Treatment: Yes  Medications Administered During Dialysis: Yes, see MAR   Blood Products Administered During Dialysis: No  Labs Sent During Dialysis: No  Heparin Drip Rate Changes: N/A           <<-----Click here for Discharge Medication Review

## 2024-01-26 NOTE — CARE PLAN
Problem: Pain - Adult  Goal: Verbalizes/displays adequate comfort level or baseline comfort level  Outcome: Progressing     Problem: Safety - Adult  Goal: Free from fall injury  Outcome: Progressing     Problem: Diabetes  Goal: Maintain glucose levels >70mg/dl to <250mg/dl throughout shift  Outcome: Progressing     Problem: Diabetes  Goal: Maintain electrolyte levels within acceptable range throughout shift  Outcome: Progressing      Problem: Diabetes  Goal: Vital signs within normal range for age by end of shift  Outcome: Progressing     The clinical goals for the shift include Pt's BP, HR, and blood glucose will remain WNL throughout shift.

## 2024-01-26 NOTE — PROGRESS NOTES
"Siddhartha Orellana is a 38 y.o. male on day 2 of admission presenting with ESRD (end stage renal disease) (CMS/[facility]).    Subjective   Lying in bed. No distress.  Reports feeling better.  States he wants to go home.  Reports he does not understand why he has to wait until Monday.        Objective     General: Lying in bed without distress.  Cooperative.  Skin: No rashes or ulcerations.  HEENT: Sclera is white.  Mucous membranes moist.  Cardiac: Regular rate and rhythm, S1/S2 normal.  Lungs: Clear to auscultation bilaterally, no wheezing or crackles, no accessory muscle use at rest.  Abdomen: Soft, nontender, nondistended, BS +  Extremities: No cyanosis.  Mild bilateral lower extremity edema.  Neurologic: Alert and oriented x3.  No focal deficits.  Psychiatric: Appropriate mood and behavior.  Currently no agitation.    Last Recorded Vitals  Blood pressure (!) 153/98, pulse 76, temperature 36.3 °C (97.3 °F), resp. rate 13, height 1.677 m (5' 6.02\"), weight 85.8 kg (189 lb 2.5 oz), SpO2 100 %.  On room air.    Intake/Output last 3 Shifts:  I/O last 3 completed shifts:  In: 3432.1 (40 mL/kg) [P.O.:1080; I.V.:1200 (14 mL/kg); Blood:352.1; Other:800]  Out: 3200 (37.3 mL/kg) [Urine:1800 (0.6 mL/kg/hr); Other:1400]  Weight: 85.8 kg     Relevant Results  amLODIPine, 10 mg, oral, Daily  atorvastatin, 20 mg, oral, Nightly  calcitriol, 0.25 mcg, oral, Nightly  carvedilol, 25 mg, oral, BID  doxycylcine, 100 mg, oral, q12h LEILA  epoetin mannie or biosimilar, 5,000 Units, subcutaneous, Once per day on Mon Wed Fri  hydrALAZINE, 100 mg, oral, TID  insulin lispro, 0-5 Units, subcutaneous, TID with meals  iron sucrose, 200 mg, intravenous, Daily  lisinopril, 40 mg, oral, Daily  loratadine, 10 mg, oral, Daily  pantoprazole, 40 mg, oral, Daily before breakfast  torsemide, 60 mg, oral, Daily           PRN medications: acetaminophen **OR** acetaminophen **OR** acetaminophen, dextrose 10 % in water (D10W), dextrose, glucagon, melatonin, ondansetron, " oxyCODONE, polyethylene glycol, prochlorperazine, simethicone     Results for orders placed or performed during the hospital encounter of 01/24/24 (from the past 24 hour(s))   POCT GLUCOSE   Result Value Ref Range    POCT Glucose 119 (H) 74 - 99 mg/dL   Hemoglobin and hematocrit, blood   Result Value Ref Range    Hemoglobin 8.0 (L) 13.5 - 17.5 g/dL    Hematocrit 25.0 (L) 41.0 - 52.0 %   CBC   Result Value Ref Range    WBC 11.0 4.4 - 11.3 x10*3/uL    nRBC 0.0 0.0 - 0.0 /100 WBCs    RBC 2.82 (L) 4.50 - 5.90 x10*6/uL    Hemoglobin 7.4 (L) 13.5 - 17.5 g/dL    Hematocrit 24.2 (L) 41.0 - 52.0 %    MCV 86 80 - 100 fL    MCH 26.2 26.0 - 34.0 pg    MCHC 30.6 (L) 32.0 - 36.0 g/dL    RDW 15.9 (H) 11.5 - 14.5 %    Platelets 218 150 - 450 x10*3/uL   Renal Function Panel   Result Value Ref Range    Glucose 85 74 - 99 mg/dL    Sodium 145 136 - 145 mmol/L    Potassium 3.2 (L) 3.5 - 5.3 mmol/L    Chloride 104 98 - 107 mmol/L    Bicarbonate 30 21 - 32 mmol/L    Anion Gap 14 10 - 20 mmol/L    Urea Nitrogen 27 (H) 6 - 23 mg/dL    Creatinine 6.44 (H) 0.50 - 1.30 mg/dL    eGFR 10 (L) >60 mL/min/1.73m*2    Calcium 8.9 8.6 - 10.6 mg/dL    Phosphorus 4.6 2.5 - 4.9 mg/dL    Albumin 2.8 (L) 3.4 - 5.0 g/dL   POCT GLUCOSE   Result Value Ref Range    POCT Glucose 99 74 - 99 mg/dL         Hospital Course:  Siddhartha Orellana is a 38 y.o. male with a past medical history of CKD V/ESRD, T2DM (a1c 7.4 11/21/2023), HTN, HLD, parathyroidism, anemia, SHIVA, morbid obesity, and renal cell carcinoma with mets who presented to [facility] as a direct admission to establish hemodialysis.  Nephrology consulted and patient had initiation of hemodialysis on 1/24.  Patient had dialysis on 1/25 and 1/26.  Clinically reports feeling better.   has obtained a hemodialysis chair for patient at [facility], but they cannot start with him until Wednesday.  In discussion with nephrology patient will plan for discharge this coming Monday 1/29 after he gets his  dialysis.    Assessment/Plan     ESRD  Renal cell carcinoma  -Fistula cleared for use by Dr. Daigle on 01/18/2024.  -Patient still makes urine, mild lower extremity edema so far.  Electrolytes appears to be stable on lab work.  -Consulted nephrology.  First episode dialysis started on 1/24.  Since then patient has received dialysis on 1/25 and 1/26.  -Renal diet.  Dietitian has given him education.  -Continue oxycodone 10mg Q4HP, Compazine 10mg PO Q6HP  -Follow up with oncology on DC as per previous schedule    Hypokalemia  -Mildly decreased at 3.2.  Patient given oral replacement.     T2DM  -Controlled  -ADA diet  -Low SSI  -Hypoglycemic protocol     HTN  -Systolic blood pressure currently 140s to 160s.  -Resumed on home medication milligrams 10 mg daily, Coreg 25 mg twice a day, hydralazine 100 mg 3 times a day, lisinopril 40 mg daily, torsemide 60 mg daily.  -Continue home medications.     Acute on chronic anemia  -Hgb 6.4 on admission.  Baseline hemoglobin appears to be between 7-8.  -Patient currently HDS.  No active bleeding.  -Status post 1 unit PRBC on 1/25.  -Repeat hemoglobin 7.4 this morning.  -Patient is currently getting iron sucrose and EPO with dialysis.    Disposition: Plan to discharge on Monday after dialysis session.    Awa Hamlin MD

## 2024-01-26 NOTE — CARE PLAN
The patient's goals for the shift include      The clinical goals for the shift include Pt's BP, HR, and blood glucose will remain WNL throughout shift.      Problem: Pain - Adult  Goal: Verbalizes/displays adequate comfort level or baseline comfort level  Outcome: Progressing     Problem: Discharge Planning  Goal: Discharge to home or other facility with appropriate resources  Outcome: Progressing     Problem: Safety - Adult  Goal: Free from fall injury  Outcome: Progressing     Problem: Chronic Conditions and Co-morbidities  Goal: Patient's chronic conditions and co-morbidity symptoms are monitored and maintained or improved  Outcome: Progressing

## 2024-01-26 NOTE — NURSING NOTE
Report from Sending RN:    Report From: RUTH Whelan  Recent Surgery of Procedure: No  Baseline Level of Consciousness (LOC): x4  Oxygen Use: No  Type:   Diabetic: No  Last BP Med Given Day of Dialysis:   Last Pain Med Given: 10mg Oxycodone 0543  Lab Tests to be Obtained with Dialysis: No  Blood Transfusion to be Given During Dialysis: No  Available IV Access: Yes  Medications to be Administered During Dialysis: No  Continuous IV Infusion Running: No  Restraints on Currently or in the Last 24 Hours: No  Hand-Off Communication:   Patient complaint pain and received pain medication.  Will come in cart.

## 2024-01-27 LAB
ALBUMIN SERPL BCP-MCNC: 2.9 G/DL (ref 3.4–5)
ANION GAP SERPL CALC-SCNC: 13 MMOL/L (ref 10–20)
BUN SERPL-MCNC: 16 MG/DL (ref 6–23)
CALCIUM SERPL-MCNC: 9 MG/DL (ref 8.6–10.6)
CHLORIDE SERPL-SCNC: 102 MMOL/L (ref 98–107)
CO2 SERPL-SCNC: 30 MMOL/L (ref 21–32)
CREAT SERPL-MCNC: 6.01 MG/DL (ref 0.5–1.3)
EGFRCR SERPLBLD CKD-EPI 2021: 11 ML/MIN/1.73M*2
GLUCOSE BLD MANUAL STRIP-MCNC: 106 MG/DL (ref 74–99)
GLUCOSE BLD MANUAL STRIP-MCNC: 110 MG/DL (ref 74–99)
GLUCOSE BLD MANUAL STRIP-MCNC: 117 MG/DL (ref 74–99)
GLUCOSE SERPL-MCNC: 84 MG/DL (ref 74–99)
PHOSPHATE SERPL-MCNC: 3.5 MG/DL (ref 2.5–4.9)
POTASSIUM SERPL-SCNC: 3.5 MMOL/L (ref 3.5–5.3)
SODIUM SERPL-SCNC: 141 MMOL/L (ref 136–145)

## 2024-01-27 PROCEDURE — 2500000004 HC RX 250 GENERAL PHARMACY W/ HCPCS (ALT 636 FOR OP/ED): Performed by: INTERNAL MEDICINE

## 2024-01-27 PROCEDURE — 1100000001 HC PRIVATE ROOM DAILY

## 2024-01-27 PROCEDURE — 2500000001 HC RX 250 WO HCPCS SELF ADMINISTERED DRUGS (ALT 637 FOR MEDICARE OP): Performed by: NURSE PRACTITIONER

## 2024-01-27 PROCEDURE — 80069 RENAL FUNCTION PANEL: CPT | Performed by: INTERNAL MEDICINE

## 2024-01-27 PROCEDURE — 82947 ASSAY GLUCOSE BLOOD QUANT: CPT

## 2024-01-27 PROCEDURE — 2500000001 HC RX 250 WO HCPCS SELF ADMINISTERED DRUGS (ALT 637 FOR MEDICARE OP): Performed by: STUDENT IN AN ORGANIZED HEALTH CARE EDUCATION/TRAINING PROGRAM

## 2024-01-27 PROCEDURE — 99232 SBSQ HOSP IP/OBS MODERATE 35: CPT | Performed by: INTERNAL MEDICINE

## 2024-01-27 PROCEDURE — 2500000004 HC RX 250 GENERAL PHARMACY W/ HCPCS (ALT 636 FOR OP/ED): Performed by: NURSE PRACTITIONER

## 2024-01-27 PROCEDURE — 36415 COLL VENOUS BLD VENIPUNCTURE: CPT | Performed by: INTERNAL MEDICINE

## 2024-01-27 RX ORDER — TORSEMIDE 20 MG/1
60 TABLET ORAL DAILY
Start: 2024-01-27 | End: 2024-03-13 | Stop reason: WASHOUT

## 2024-01-27 RX ORDER — OMEPRAZOLE 40 MG/1
40 CAPSULE, DELAYED RELEASE ORAL DAILY
Start: 2024-01-27 | End: 2024-02-19

## 2024-01-27 RX ORDER — LORATADINE 10 MG/1
10 TABLET ORAL DAILY
Start: 2024-01-27 | End: 2024-05-03 | Stop reason: SDUPTHER

## 2024-01-27 RX ORDER — CALCITRIOL 0.25 UG/1
0.25 CAPSULE ORAL NIGHTLY
Start: 2024-01-27 | End: 2024-03-13 | Stop reason: ALTCHOICE

## 2024-01-27 RX ORDER — CARVEDILOL 25 MG/1
25 TABLET ORAL 2 TIMES DAILY
Start: 2024-01-27 | End: 2024-02-19

## 2024-01-27 RX ORDER — AMLODIPINE BESYLATE 10 MG/1
10 TABLET ORAL DAILY
Start: 2024-01-27 | End: 2024-03-13 | Stop reason: ALTCHOICE

## 2024-01-27 RX ORDER — LISINOPRIL 40 MG/1
40 TABLET ORAL DAILY
Start: 2024-01-27 | End: 2024-03-13 | Stop reason: WASHOUT

## 2024-01-27 RX ORDER — HYDRALAZINE HYDROCHLORIDE 100 MG/1
100 TABLET, FILM COATED ORAL 3 TIMES DAILY
Start: 2024-01-27 | End: 2024-03-13 | Stop reason: ALTCHOICE

## 2024-01-27 RX ORDER — DOXYCYCLINE 100 MG/1
100 CAPSULE ORAL EVERY 12 HOURS SCHEDULED
Start: 2024-01-27 | End: 2024-03-21 | Stop reason: SDUPTHER

## 2024-01-27 RX ORDER — OXYCODONE HYDROCHLORIDE 5 MG/1
10 TABLET ORAL EVERY 4 HOURS PRN
Qty: 180 TABLET | Refills: 0
Start: 2024-01-27 | End: 2024-02-06 | Stop reason: SDUPTHER

## 2024-01-27 RX ORDER — ONDANSETRON 4 MG/1
8 TABLET, FILM COATED ORAL 2 TIMES DAILY PRN
Qty: 30 TABLET | Refills: 3
Start: 2024-01-27 | End: 2024-05-01 | Stop reason: SDUPTHER

## 2024-01-27 RX ORDER — ATORVASTATIN CALCIUM 20 MG/1
20 TABLET, FILM COATED ORAL NIGHTLY
Start: 2024-01-27

## 2024-01-27 RX ORDER — PROCHLORPERAZINE MALEATE 10 MG
10 TABLET ORAL EVERY 6 HOURS PRN
Qty: 30 TABLET | Refills: 3
Start: 2024-01-27 | End: 2024-05-01 | Stop reason: SDUPTHER

## 2024-01-27 RX ADMIN — CARVEDILOL 25 MG: 25 TABLET, FILM COATED ORAL at 08:37

## 2024-01-27 RX ADMIN — LISINOPRIL 40 MG: 40 TABLET ORAL at 08:36

## 2024-01-27 RX ADMIN — CARVEDILOL 25 MG: 25 TABLET, FILM COATED ORAL at 20:17

## 2024-01-27 RX ADMIN — DOXYCYCLINE HYCLATE 100 MG: 100 TABLET, FILM COATED ORAL at 20:17

## 2024-01-27 RX ADMIN — OXYCODONE HYDROCHLORIDE 10 MG: 5 TABLET ORAL at 20:18

## 2024-01-27 RX ADMIN — HYDRALAZINE HYDROCHLORIDE 100 MG: 50 TABLET, FILM COATED ORAL at 08:36

## 2024-01-27 RX ADMIN — HYDRALAZINE HYDROCHLORIDE 100 MG: 50 TABLET, FILM COATED ORAL at 15:28

## 2024-01-27 RX ADMIN — AMLODIPINE BESYLATE 10 MG: 10 TABLET ORAL at 08:36

## 2024-01-27 RX ADMIN — DOXYCYCLINE HYCLATE 100 MG: 100 TABLET, FILM COATED ORAL at 08:36

## 2024-01-27 RX ADMIN — CALCITRIOL CAPSULES 0.25 MCG 0.25 MCG: 0.25 CAPSULE ORAL at 20:18

## 2024-01-27 RX ADMIN — HYDRALAZINE HYDROCHLORIDE 100 MG: 50 TABLET, FILM COATED ORAL at 20:18

## 2024-01-27 RX ADMIN — OXYCODONE HYDROCHLORIDE 10 MG: 5 TABLET ORAL at 06:14

## 2024-01-27 RX ADMIN — TORSEMIDE 60 MG: 20 TABLET ORAL at 08:36

## 2024-01-27 RX ADMIN — PANTOPRAZOLE SODIUM 40 MG: 40 TABLET, DELAYED RELEASE ORAL at 06:14

## 2024-01-27 RX ADMIN — IRON SUCROSE 200 MG: 20 INJECTION, SOLUTION INTRAVENOUS at 06:14

## 2024-01-27 RX ADMIN — ATORVASTATIN CALCIUM 20 MG: 20 TABLET, FILM COATED ORAL at 20:18

## 2024-01-27 ASSESSMENT — COGNITIVE AND FUNCTIONAL STATUS - GENERAL
DAILY ACTIVITIY SCORE: 24
CLIMB 3 TO 5 STEPS WITH RAILING: A LITTLE
DAILY ACTIVITIY SCORE: 24
MOBILITY SCORE: 23
MOBILITY SCORE: 24
CLIMB 3 TO 5 STEPS WITH RAILING: A LITTLE

## 2024-01-27 ASSESSMENT — PAIN DESCRIPTION - ORIENTATION: ORIENTATION: RIGHT;LEFT

## 2024-01-27 ASSESSMENT — PAIN SCALES - GENERAL
PAINLEVEL_OUTOF10: 2
PAINLEVEL_OUTOF10: 8
PAINLEVEL_OUTOF10: 0 - NO PAIN
PAINLEVEL_OUTOF10: 6

## 2024-01-27 ASSESSMENT — PAIN - FUNCTIONAL ASSESSMENT
PAIN_FUNCTIONAL_ASSESSMENT: 0-10

## 2024-01-27 ASSESSMENT — PAIN DESCRIPTION - LOCATION: LOCATION: LEG

## 2024-01-27 NOTE — CARE PLAN
Problem: Pain - Adult  Goal: Verbalizes/displays adequate comfort level or baseline comfort level  Outcome: Progressing     Problem: Fall/Injury  Goal: Be free from injury by end of the shift  Outcome: Progressing     Problem: Diabetes  Goal: Maintain glucose levels >70mg/dl to <250mg/dl throughout shift  Outcome: Progressing     Problem: Diabetes  Goal: Maintain electrolyte levels within acceptable range throughout shift  Outcome: Progressing     Problem: Diabetes  Goal: Vital signs within normal range for age by end of shift  Outcome: Progressing    The clinical goals for the shift include Pt's BP, HR, and blood glucose will remain WNL throughout shift.

## 2024-01-27 NOTE — PROGRESS NOTES
"Siddhartha Orellana is a 38 y.o. male on day 3 of admission presenting with ESRD (end stage renal disease) (CMS/[facility]).    Subjective   Lying in bed. No distress.  Reports feeling better compared to admission.          Objective     General: Lying in bed without distress.  Cooperative.  Skin: No rashes or ulcerations.  HEENT: Sclera is white.  Mucous membranes moist.  Cardiac: Regular rate and rhythm, S1/S2 normal.  Lungs: Clear to auscultation bilaterally, no wheezing or crackles, no accessory muscle use at rest.  Abdomen: Soft, nontender, nondistended, BS +  Extremities: No cyanosis.  Mild bilateral lower extremity edema.  Neurologic: Alert and oriented x3.  No focal deficits.  Psychiatric: Appropriate mood and behavior.  Currently no agitation.    Last Recorded Vitals  Blood pressure 129/84, pulse 80, temperature 36.6 °C (97.9 °F), temperature source Temporal, resp. rate 15, height 1.677 m (5' 6.02\"), weight 85.8 kg (189 lb 2.5 oz), SpO2 100 %.  On room air.    Intake/Output last 3 Shifts:  I/O last 3 completed shifts:  In: 1952.1 (22.8 mL/kg) [I.V.:800 (9.3 mL/kg); Blood:352.1; Other:800]  Out: 3850 (44.9 mL/kg) [Urine:1050 (0.3 mL/kg/hr); Other:2800]  Weight: 85.8 kg     Relevant Results  amLODIPine, 10 mg, oral, Daily  atorvastatin, 20 mg, oral, Nightly  calcitriol, 0.25 mcg, oral, Nightly  carvedilol, 25 mg, oral, BID  doxycylcine, 100 mg, oral, q12h LEILA  epoetin mannie or biosimilar, 5,000 Units, subcutaneous, Once per day on Mon Wed Fri  hydrALAZINE, 100 mg, oral, TID  insulin lispro, 0-5 Units, subcutaneous, TID with meals  iron sucrose, 200 mg, intravenous, Daily  lisinopril, 40 mg, oral, Daily  loratadine, 10 mg, oral, Daily  pantoprazole, 40 mg, oral, Daily before breakfast  torsemide, 60 mg, oral, Daily           PRN medications: acetaminophen **OR** acetaminophen **OR** acetaminophen, dextrose 10 % in water (D10W), dextrose, glucagon, melatonin, ondansetron, oxyCODONE, polyethylene glycol, prochlorperazine, " simethicone     Results for orders placed or performed during the hospital encounter of 01/24/24 (from the past 24 hour(s))   POCT GLUCOSE   Result Value Ref Range    POCT Glucose 130 (H) 74 - 99 mg/dL   POCT GLUCOSE   Result Value Ref Range    POCT Glucose 133 (H) 74 - 99 mg/dL   Renal Function Panel   Result Value Ref Range    Glucose 84 74 - 99 mg/dL    Sodium 141 136 - 145 mmol/L    Potassium 3.5 3.5 - 5.3 mmol/L    Chloride 102 98 - 107 mmol/L    Bicarbonate 30 21 - 32 mmol/L    Anion Gap 13 10 - 20 mmol/L    Urea Nitrogen 16 6 - 23 mg/dL    Creatinine 6.01 (H) 0.50 - 1.30 mg/dL    eGFR 11 (L) >60 mL/min/1.73m*2    Calcium 9.0 8.6 - 10.6 mg/dL    Phosphorus 3.5 2.5 - 4.9 mg/dL    Albumin 2.9 (L) 3.4 - 5.0 g/dL   POCT GLUCOSE   Result Value Ref Range    POCT Glucose 117 (H) 74 - 99 mg/dL   POCT GLUCOSE   Result Value Ref Range    POCT Glucose 106 (H) 74 - 99 mg/dL         Hospital Course:  Siddhartha Orellana is a 38 y.o. male with a past medical history of CKD V/ESRD, T2DM (a1c 7.4 11/21/2023), HTN, HLD, parathyroidism, anemia, SHIVA, morbid obesity, and renal cell carcinoma with mets who presented to [facility] as a direct admission to establish hemodialysis.  Nephrology consulted and patient had initiation of hemodialysis on 1/24.  Patient had dialysis on 1/25 and 1/26.  Clinically reports feeling better.   has obtained a hemodialysis chair for patient at [facility], but they cannot start with him until Wednesday.  In discussion with nephrology patient will plan for discharge this coming Monday 1/29 after he gets his dialysis.  During hospitalization patient noted to be anemic.  Initial hemoglobin 6.4 but patient did not want blood transfusion at that time.  Patient started on iron and EPO.  Hemoglobin still remains low and patient agreeable to blood transfusion and received 1 unit PRBC on 1/25.  No active bleeding.    Assessment/Plan     ESRD, now on hemodialysis with plans to be  Monday/Wednesday/Friday  Renal cell carcinoma  -Fistula cleared for use by Dr. Daigle on 01/18/2024.  -Patient still makes urine, mild lower extremity edema so far.  Electrolytes appears to be stable on lab work.  -Consulted nephrology.  First episode dialysis started on 1/24.  Since then patient has received dialysis on 1/25 and 1/26.  Per  he has an HD chair at [facility], but for start of service is this coming Wednesday.  Due to this he will need to have dialysis on Monday and then be discharged home.  -Renal diet.  Dietitian has given him education.  -Continue oxycodone 10mg Q4HP, Compazine 10mg PO Q6HP  -Follow up with oncology on DC as per previous schedule    Hypokalemia  -Resolved.     T2DM  -Controlled  -ADA diet  -Low SSI  -Hypoglycemic protocol     HTN  -Systolic blood pressure currently 120s to 150s.  -Resumed on home medication amlodipine 10 mg daily, Coreg 25 mg twice a day, hydralazine 100 mg 3 times a day, lisinopril 40 mg daily, torsemide 60 mg daily.     Acute on chronic anemia  -Hgb 6.4 on admission.  Baseline hemoglobin appears to be between 7-8.  -Patient currently HDS.  No active bleeding.  -Status post 1 unit PRBC on 1/25.  -Last hemoglobin 7.4.  Monitor as needed only to preserve blood  -Patient is currently getting iron sucrose and EPO with dialysis.    Disposition: Plan to discharge on Monday after dialysis session.    Awa Hamlin MD

## 2024-01-28 LAB
GLUCOSE BLD MANUAL STRIP-MCNC: 120 MG/DL (ref 74–99)
GLUCOSE BLD MANUAL STRIP-MCNC: 126 MG/DL (ref 74–99)
GLUCOSE BLD MANUAL STRIP-MCNC: 145 MG/DL (ref 74–99)
GLUCOSE BLD MANUAL STRIP-MCNC: 95 MG/DL (ref 74–99)

## 2024-01-28 PROCEDURE — 2500000001 HC RX 250 WO HCPCS SELF ADMINISTERED DRUGS (ALT 637 FOR MEDICARE OP): Performed by: STUDENT IN AN ORGANIZED HEALTH CARE EDUCATION/TRAINING PROGRAM

## 2024-01-28 PROCEDURE — 2500000004 HC RX 250 GENERAL PHARMACY W/ HCPCS (ALT 636 FOR OP/ED): Performed by: INTERNAL MEDICINE

## 2024-01-28 PROCEDURE — 1100000001 HC PRIVATE ROOM DAILY

## 2024-01-28 PROCEDURE — 2500000001 HC RX 250 WO HCPCS SELF ADMINISTERED DRUGS (ALT 637 FOR MEDICARE OP): Performed by: NURSE PRACTITIONER

## 2024-01-28 PROCEDURE — 82947 ASSAY GLUCOSE BLOOD QUANT: CPT

## 2024-01-28 PROCEDURE — 2500000004 HC RX 250 GENERAL PHARMACY W/ HCPCS (ALT 636 FOR OP/ED): Performed by: NURSE PRACTITIONER

## 2024-01-28 PROCEDURE — 99232 SBSQ HOSP IP/OBS MODERATE 35: CPT | Performed by: INTERNAL MEDICINE

## 2024-01-28 RX ADMIN — OXYCODONE HYDROCHLORIDE 10 MG: 5 TABLET ORAL at 17:34

## 2024-01-28 RX ADMIN — CALCITRIOL CAPSULES 0.25 MCG 0.25 MCG: 0.25 CAPSULE ORAL at 20:45

## 2024-01-28 RX ADMIN — HYDRALAZINE HYDROCHLORIDE 100 MG: 50 TABLET, FILM COATED ORAL at 08:26

## 2024-01-28 RX ADMIN — HYDRALAZINE HYDROCHLORIDE 100 MG: 50 TABLET, FILM COATED ORAL at 14:35

## 2024-01-28 RX ADMIN — OXYCODONE HYDROCHLORIDE 10 MG: 5 TABLET ORAL at 11:34

## 2024-01-28 RX ADMIN — DOXYCYCLINE HYCLATE 100 MG: 100 TABLET, FILM COATED ORAL at 20:26

## 2024-01-28 RX ADMIN — DOXYCYCLINE HYCLATE 100 MG: 100 TABLET, FILM COATED ORAL at 08:26

## 2024-01-28 RX ADMIN — AMLODIPINE BESYLATE 10 MG: 10 TABLET ORAL at 08:26

## 2024-01-28 RX ADMIN — PANTOPRAZOLE SODIUM 40 MG: 40 TABLET, DELAYED RELEASE ORAL at 06:03

## 2024-01-28 RX ADMIN — OXYCODONE HYDROCHLORIDE 10 MG: 5 TABLET ORAL at 22:37

## 2024-01-28 RX ADMIN — CARVEDILOL 25 MG: 25 TABLET, FILM COATED ORAL at 08:26

## 2024-01-28 RX ADMIN — ATORVASTATIN CALCIUM 20 MG: 20 TABLET, FILM COATED ORAL at 20:26

## 2024-01-28 RX ADMIN — HYDRALAZINE HYDROCHLORIDE 100 MG: 50 TABLET, FILM COATED ORAL at 20:26

## 2024-01-28 RX ADMIN — LISINOPRIL 40 MG: 40 TABLET ORAL at 08:26

## 2024-01-28 RX ADMIN — CARVEDILOL 25 MG: 25 TABLET, FILM COATED ORAL at 20:26

## 2024-01-28 RX ADMIN — TORSEMIDE 60 MG: 20 TABLET ORAL at 08:26

## 2024-01-28 RX ADMIN — IRON SUCROSE 200 MG: 20 INJECTION, SOLUTION INTRAVENOUS at 05:55

## 2024-01-28 RX ADMIN — ACETAMINOPHEN 650 MG: 325 TABLET ORAL at 06:01

## 2024-01-28 ASSESSMENT — PAIN SCALES - GENERAL
PAINLEVEL_OUTOF10: 8
PAINLEVEL_OUTOF10: 0 - NO PAIN
PAINLEVEL_OUTOF10: 6
PAINLEVEL_OUTOF10: 9

## 2024-01-28 ASSESSMENT — PAIN - FUNCTIONAL ASSESSMENT
PAIN_FUNCTIONAL_ASSESSMENT: 0-10

## 2024-01-28 ASSESSMENT — PAIN DESCRIPTION - DESCRIPTORS
DESCRIPTORS: ACHING
DESCRIPTORS: ACHING

## 2024-01-28 ASSESSMENT — PAIN DESCRIPTION - ORIENTATION: ORIENTATION: LEFT

## 2024-01-28 NOTE — PROGRESS NOTES
"Siddhartha Orellana is a 38 y.o. male on day 4 of admission presenting with ESRD (end stage renal disease) (CMS/[facility]).    Subjective   Lying in bed. No distress.  Reports feeling better compared to admission.  Today frustrated about his renal diet.        Objective     General: Lying in bed without distress.  Cooperative.  Skin: No rashes or ulcerations.  HEENT: Sclera is white.  Mucous membranes moist.  Cardiac: Regular rate and rhythm, S1/S2 normal.  Lungs: Clear to auscultation bilaterally, no wheezing or crackles, no accessory muscle use at rest.  Abdomen: Soft, nontender, nondistended, BS +  Extremities: No cyanosis.  Mild bilateral lower extremity edema.  Neurologic: Alert and oriented x3.  No focal deficits.  Psychiatric: Appropriate mood and behavior.  Currently no agitation.    Last Recorded Vitals  Blood pressure 127/79, pulse 79, temperature 37 °C (98.6 °F), resp. rate 16, height 1.677 m (5' 6.02\"), weight 85.8 kg (189 lb 2.5 oz), SpO2 98 %.  On room air.    Intake/Output last 3 Shifts:  I/O last 3 completed shifts:  In: 1320 (15.4 mL/kg) [P.O.:1320]  Out: 900 (10.5 mL/kg) [Urine:900 (0.3 mL/kg/hr)]  Weight: 85.8 kg     Relevant Results  amLODIPine, 10 mg, oral, Daily  atorvastatin, 20 mg, oral, Nightly  calcitriol, 0.25 mcg, oral, Nightly  carvedilol, 25 mg, oral, BID  doxycylcine, 100 mg, oral, q12h LEILA  epoetin mannie or biosimilar, 5,000 Units, subcutaneous, Once per day on Mon Wed Fri  hydrALAZINE, 100 mg, oral, TID  insulin lispro, 0-5 Units, subcutaneous, TID with meals  iron sucrose, 200 mg, intravenous, Daily  lisinopril, 40 mg, oral, Daily  loratadine, 10 mg, oral, Daily  pantoprazole, 40 mg, oral, Daily before breakfast  torsemide, 60 mg, oral, Daily           PRN medications: acetaminophen **OR** acetaminophen **OR** acetaminophen, dextrose 10 % in water (D10W), dextrose, glucagon, melatonin, ondansetron, oxyCODONE, polyethylene glycol, prochlorperazine, simethicone     Results for orders placed or " performed during the hospital encounter of 01/24/24 (from the past 24 hour(s))   POCT GLUCOSE   Result Value Ref Range    POCT Glucose 110 (H) 74 - 99 mg/dL   POCT GLUCOSE   Result Value Ref Range    POCT Glucose 95 74 - 99 mg/dL   POCT GLUCOSE   Result Value Ref Range    POCT Glucose 126 (H) 74 - 99 mg/dL         Hospital Course:  Siddhartha Orellana is a 38 y.o. male with a past medical history of CKD V/ESRD, T2DM (a1c 7.4 11/21/2023), HTN, HLD, parathyroidism, anemia, SHIVA, morbid obesity, and renal cell carcinoma with mets who presented to [facility] as a direct admission to establish hemodialysis.  Nephrology consulted and patient had initiation of hemodialysis on 1/24.  Patient had dialysis on 1/25 and 1/26.  Clinically reports feeling better.   has obtained a hemodialysis chair for patient at [facility], but they cannot start with him until Wednesday.  In discussion with nephrology patient will plan for discharge this coming Monday 1/29 after he gets his dialysis.  During hospitalization patient noted to be anemic.  Initial hemoglobin 6.4 but patient did not want blood transfusion at that time.  Patient started on iron and EPO.  Hemoglobin still remains low and patient agreeable to blood transfusion and received 1 unit PRBC on 1/25.  No active bleeding.  Hemoglobin improved.    Assessment/Plan     ESRD, now on hemodialysis with plans to be Monday/Wednesday/Friday  Renal cell carcinoma  -Fistula cleared for use by Dr. Daigle on 01/18/2024.  -Patient still makes urine, mild lower extremity edema so far.  Electrolytes appears to be stable on lab work.  -Consulted nephrology.  First episode dialysis started on 1/24.  Since then patient has received dialysis on 1/25 and 1/26.  Per  he has an HD chair at [facility], but for start of service is this coming Wednesday.  Due to this he will need to have dialysis on Monday and then be discharged home after dialysis.  -Dietitian has given him education.   Patient is upset about renal diet.  Threatened to leave AMA.  Since his last lab work showed decent electrolyte control will change diet to for now just a low potassium.  -Continue oxycodone 10mg Q4HP, Compazine 10mg PO Q6HP  -Follow up with oncology on DC as per previous schedule    Hypokalemia  -Resolved.     T2DM  -Controlled  -ADA diet  -Low SSI  -Hypoglycemic protocol     HTN  -Systolic blood pressure currently 120s to 130s.  -Resumed on home medication amlodipine 10 mg daily, Coreg 25 mg twice a day, hydralazine 100 mg 3 times a day, lisinopril 40 mg daily, torsemide 60 mg daily.     Acute on chronic anemia  -Hgb 6.4 on admission.  Baseline hemoglobin appears to be between 7-8.  -Patient currently HDS.  No active bleeding.  -Status post 1 unit PRBC on 1/25.  -Last hemoglobin 7.4.  Monitor as needed only to preserve blood.  -Patient is currently getting iron sucrose and EPO with dialysis.    Disposition: Plan to discharge on Monday after dialysis session.    Awa Hamlin MD

## 2024-01-28 NOTE — CARE PLAN
Problem: Pain - Adult  Goal: Verbalizes/displays adequate comfort level or baseline comfort level  Outcome: Progressing     Problem: Discharge Planning  Goal: Discharge to home or other facility with appropriate resources  Outcome: Progressing     Problem: Chronic Conditions and Co-morbidities  Goal: Patient's chronic conditions and co-morbidity symptoms are monitored and maintained or improved  Outcome: Progressing     Problem: Fall/Injury  Goal: Not fall by end of shift  Outcome: Met   The patient's goals for the shift include The patient will get out bed and ambulate at least 3x a day     The clinical goals for the shift include Pt's BP, HR, and blood glucose will remain WNL throughout shift.    Over the shift, the patient did not make progress toward the following goals. Barriers to progression include Patient leg pain. Recommendations to address these barriers include Pre medication of leg pain.

## 2024-01-28 NOTE — CARE PLAN
Problem: Pain - Adult  Goal: Verbalizes/displays adequate comfort level or baseline comfort level  Outcome: Progressing     Problem: Safety - Adult  Goal: Free from fall injury  Outcome: Met     Problem: Discharge Planning  Goal: Discharge to home or other facility with appropriate resources  Outcome: Progressing     Problem: Diabetes  Goal: Maintain electrolyte levels within acceptable range throughout shift  Outcome: Progressing  Goal: Maintain glucose levels >70mg/dl to <250mg/dl throughout shift  Outcome: Progressing   The patient's goals for the shift include  Patient will not exhibit any s/s of hypo/ hyperglycemia.     The clinical goals for the shift include Pt's BP, HR, and blood glucose will remain WNL throughout shift.    Over the shift, the patient did not make progress toward the following goals. Barriers to progression include Patient not being able to identify s/s of hyperglycemia . Recommendations to address these barriers include Educating of diabetes management.

## 2024-01-29 VITALS
RESPIRATION RATE: 18 BRPM | HEART RATE: 86 BPM | OXYGEN SATURATION: 100 % | BODY MASS INDEX: 30.4 KG/M2 | SYSTOLIC BLOOD PRESSURE: 134 MMHG | TEMPERATURE: 98.6 F | WEIGHT: 189.15 LBS | HEIGHT: 66 IN | DIASTOLIC BLOOD PRESSURE: 80 MMHG

## 2024-01-29 DIAGNOSIS — N18.6 ESRD ON HEMODIALYSIS (MULTI): Primary | ICD-10-CM

## 2024-01-29 DIAGNOSIS — Z00.00 HEALTHCARE MAINTENANCE: ICD-10-CM

## 2024-01-29 DIAGNOSIS — Z99.2 ESRD ON HEMODIALYSIS (MULTI): Primary | ICD-10-CM

## 2024-01-29 LAB
GLUCOSE BLD MANUAL STRIP-MCNC: 102 MG/DL (ref 74–99)
GLUCOSE BLD MANUAL STRIP-MCNC: 90 MG/DL (ref 74–99)

## 2024-01-29 PROCEDURE — 82947 ASSAY GLUCOSE BLOOD QUANT: CPT

## 2024-01-29 PROCEDURE — 2500000001 HC RX 250 WO HCPCS SELF ADMINISTERED DRUGS (ALT 637 FOR MEDICARE OP): Performed by: NURSE PRACTITIONER

## 2024-01-29 PROCEDURE — 90935 HEMODIALYSIS ONE EVALUATION: CPT | Performed by: INTERNAL MEDICINE

## 2024-01-29 PROCEDURE — 2500000001 HC RX 250 WO HCPCS SELF ADMINISTERED DRUGS (ALT 637 FOR MEDICARE OP): Performed by: STUDENT IN AN ORGANIZED HEALTH CARE EDUCATION/TRAINING PROGRAM

## 2024-01-29 PROCEDURE — 2500000004 HC RX 250 GENERAL PHARMACY W/ HCPCS (ALT 636 FOR OP/ED): Performed by: INTERNAL MEDICINE

## 2024-01-29 PROCEDURE — 99239 HOSP IP/OBS DSCHRG MGMT >30: CPT | Performed by: INTERNAL MEDICINE

## 2024-01-29 PROCEDURE — 2500000004 HC RX 250 GENERAL PHARMACY W/ HCPCS (ALT 636 FOR OP/ED): Performed by: NURSE PRACTITIONER

## 2024-01-29 PROCEDURE — 8010000001 HC DIALYSIS - HEMODIALYSIS PER DAY

## 2024-01-29 RX ORDER — LIDOCAINE AND PRILOCAINE 25; 25 MG/G; MG/G
CREAM TOPICAL
Qty: 30 G | Refills: 11 | Status: SHIPPED | OUTPATIENT
Start: 2024-01-29 | End: 2024-01-29 | Stop reason: HOSPADM

## 2024-01-29 RX ADMIN — PANTOPRAZOLE SODIUM 40 MG: 40 TABLET, DELAYED RELEASE ORAL at 06:20

## 2024-01-29 RX ADMIN — LISINOPRIL 40 MG: 40 TABLET ORAL at 08:41

## 2024-01-29 RX ADMIN — CARVEDILOL 25 MG: 25 TABLET, FILM COATED ORAL at 08:40

## 2024-01-29 RX ADMIN — HYDRALAZINE HYDROCHLORIDE 100 MG: 50 TABLET, FILM COATED ORAL at 08:40

## 2024-01-29 RX ADMIN — OXYCODONE HYDROCHLORIDE 10 MG: 5 TABLET ORAL at 08:39

## 2024-01-29 RX ADMIN — DOXYCYCLINE HYCLATE 100 MG: 100 TABLET, FILM COATED ORAL at 08:40

## 2024-01-29 RX ADMIN — IRON SUCROSE 200 MG: 20 INJECTION, SOLUTION INTRAVENOUS at 06:09

## 2024-01-29 RX ADMIN — AMLODIPINE BESYLATE 10 MG: 10 TABLET ORAL at 08:40

## 2024-01-29 RX ADMIN — TORSEMIDE 60 MG: 20 TABLET ORAL at 08:40

## 2024-01-29 ASSESSMENT — PAIN SCALES - GENERAL
PAINLEVEL_OUTOF10: 8
PAINLEVEL_OUTOF10: 0 - NO PAIN

## 2024-01-29 ASSESSMENT — PAIN DESCRIPTION - DESCRIPTORS: DESCRIPTORS: ACHING

## 2024-01-29 ASSESSMENT — COGNITIVE AND FUNCTIONAL STATUS - GENERAL
MOBILITY SCORE: 24
DAILY ACTIVITIY SCORE: 24

## 2024-01-29 ASSESSMENT — PAIN - FUNCTIONAL ASSESSMENT
PAIN_FUNCTIONAL_ASSESSMENT: 0-10
PAIN_FUNCTIONAL_ASSESSMENT: 0-10

## 2024-01-29 ASSESSMENT — PAIN DESCRIPTION - ORIENTATION: ORIENTATION: LEFT

## 2024-01-29 NOTE — PROGRESS NOTES
1/29/24 1103 Transitional Care Coordinator Notes:    Patient will discharge to home after his dialysis session today. No other discharge needs stated.               Assessment/Plan   Principal Problem:    ESRD (end stage renal disease) (CMS/[facility])    Discharge Plans: discharge home with outpatient dialysis chair           Randi Ibrahim RN

## 2024-01-29 NOTE — CARE PLAN
The patient was discharged to home this evening. He verbalized an understanding of the AVS after review with the nurse. His bedside nurse Julisa MORALES removed his IV. His wife packed up all his belongings. No prescriptions were needed. His wife transported him home.      Problem: Pain - Adult  Goal: Verbalizes/displays adequate comfort level or baseline comfort level  Outcome: Adequate for Discharge     Problem: Discharge Planning  Goal: Discharge to home or other facility with appropriate resources  Outcome: Adequate for Discharge     Problem: Chronic Conditions and Co-morbidities  Goal: Patient's chronic conditions and co-morbidity symptoms are monitored and maintained or improved  Outcome: Adequate for Discharge     Problem: Fall/Injury  Goal: Be free from injury by end of the shift  Outcome: Adequate for Discharge  Goal: Verbalize understanding of personal risk factors for fall in the hospital  Outcome: Adequate for Discharge  Goal: Verbalize understanding of risk factor reduction measures to prevent injury from fall in the home  Outcome: Adequate for Discharge  Goal: Use assistive devices by end of the shift  Outcome: Adequate for Discharge  Goal: Pace activities to prevent fatigue by end of the shift  Outcome: Adequate for Discharge     Problem: Diabetes  Goal: Achieve decreasing blood glucose levels by end of shift  Outcome: Adequate for Discharge  Goal: Increase stability of blood glucose readings by end of shift  Outcome: Adequate for Discharge  Goal: Decrease in ketones present in urine by end of shift  Outcome: Adequate for Discharge  Goal: Maintain electrolyte levels within acceptable range throughout shift  Outcome: Adequate for Discharge  Goal: Maintain glucose levels >70mg/dl to <250mg/dl throughout shift  Outcome: Adequate for Discharge  Goal: No changes in neurological exam by end of shift  Outcome: Adequate for Discharge  Goal: Learn about and adhere to nutrition recommendations by end of  shift  Outcome: Adequate for Discharge  Goal: Vital signs within normal range for age by end of shift  Outcome: Adequate for Discharge  Goal: Increase self care and/or family involovement by end of shift  Outcome: Adequate for Discharge  Goal: Receive DSME education by end of shift  Outcome: Adequate for Discharge

## 2024-01-29 NOTE — PROGRESS NOTES
Renal Staff HD Note    Patient seen, examined on dialysis   Tolerating treatment without event  Standing weight 75 kg  AVF 3K  138/88     Continue treatment per submitted orders  500 cc

## 2024-01-29 NOTE — NURSING NOTE
Report from Sending RN:    Report From: Julisa ( RUTH)  Recent Surgery of Procedure: No  Baseline Level of Consciousness (LOC): A/O x 4  Oxygen Use: No  Type: none  Diabetic: Yes, 104  Last BP Med Given Day of Dialysis: yes  Last Pain Med Given: oxy 10 mg 0800 am  Lab Tests to be Obtained with Dialysis: RFP  Blood Transfusion to be Given During Dialysis: No  Available IV Access: Yes  Medications to be Administered During Dialysis: No  Continuous IV Infusion Running: No  Restraints on Currently or in the Last 24 Hours: No  Hand-Off Communication: No acute overnight or morning events; vss; Pt did take morning medications; Pt will not need labs; Pt is a  ull code; Maggie Rose RN.

## 2024-01-31 ENCOUNTER — APPOINTMENT (OUTPATIENT)
Dept: NEPHROLOGY | Facility: CLINIC | Age: 39
End: 2024-01-31
Payer: COMMERCIAL

## 2024-02-05 ENCOUNTER — HOSPITAL ENCOUNTER (OUTPATIENT)
Dept: VASCULAR MEDICINE | Facility: HOSPITAL | Age: 39
Discharge: HOME | End: 2024-02-05
Payer: COMMERCIAL

## 2024-02-05 ENCOUNTER — TELEPHONE (OUTPATIENT)
Dept: TRANSPLANT | Facility: HOSPITAL | Age: 39
End: 2024-02-05
Payer: COMMERCIAL

## 2024-02-05 DIAGNOSIS — Z99.2 ESRD (END STAGE RENAL DISEASE) ON DIALYSIS (MULTI): ICD-10-CM

## 2024-02-05 DIAGNOSIS — N18.6 ESRD (END STAGE RENAL DISEASE) ON DIALYSIS (MULTI): ICD-10-CM

## 2024-02-05 NOTE — TELEPHONE ENCOUNTER
Talked to GRISELDA Alegria.    Pt is [facility], prob d/t fluid overload per hospital admit notes.    Airam said he recently started dialysis there.    She asked for tubes and labels to be sent to the dialysis unit.

## 2024-02-06 DIAGNOSIS — G89.3 CANCER ASSOCIATED PAIN: ICD-10-CM

## 2024-02-06 DIAGNOSIS — Z99.2: ICD-10-CM

## 2024-02-06 DIAGNOSIS — Z99.2 ESRD (END STAGE RENAL DISEASE) ON DIALYSIS (MULTI): Primary | ICD-10-CM

## 2024-02-06 DIAGNOSIS — N18.6 ESRD (END STAGE RENAL DISEASE) ON DIALYSIS (MULTI): Primary | ICD-10-CM

## 2024-02-06 DIAGNOSIS — C64.9 RENAL CELL CARCINOMA, UNSPECIFIED LATERALITY (MULTI): ICD-10-CM

## 2024-02-06 RX ORDER — LIDOCAINE AND PRILOCAINE 25; 25 MG/G; MG/G
CREAM TOPICAL
Qty: 30 G | Refills: 11 | Status: SHIPPED | OUTPATIENT
Start: 2024-02-06 | End: 2024-03-13 | Stop reason: SDUPTHER

## 2024-02-06 RX ORDER — OXYCODONE HYDROCHLORIDE 5 MG/1
10 TABLET ORAL EVERY 4 HOURS PRN
Qty: 180 TABLET | Refills: 0 | Status: SHIPPED | OUTPATIENT
Start: 2024-02-06 | End: 2024-02-21 | Stop reason: SDUPTHER

## 2024-02-07 ASSESSMENT — ENCOUNTER SYMPTOMS
CONSTIPATION: 0
MYALGIAS: 1
SHORTNESS OF BREATH: 0
EXTREMITY WEAKNESS: 1
EYE PROBLEMS: 0
FEVER: 0
ROS GI COMMENTS: GAS PAINS
SORE THROAT: 0
ARTHRALGIAS: 1
VOMITING: 1
FATIGUE: 1
VOICE CHANGE: 0
ABDOMINAL PAIN: 0
APPETITE CHANGE: 1
WOUND: 1
DIAPHORESIS: 0
DIARRHEA: 0
UNEXPECTED WEIGHT CHANGE: 0
NAUSEA: 1
CHILLS: 0
COUGH: 0
LEG SWELLING: 0
DIZZINESS: 0
PSYCHIATRIC NEGATIVE: 1
LIGHT-HEADEDNESS: 0
CHEST TIGHTNESS: 0

## 2024-02-07 NOTE — PROGRESS NOTES
Patient ID: Siddhartha Orellana is a 38 y.o. male.  Diagnosis:  Papillary RCC  MedOnc: Dr. Thomson  Nephrologist: Dr. Grover  Vascular Surgeon: Dr. Daigle    Current Therapy: Cabozantinib     ONCOLOGIC HISTORY  11/7/22 - CT-guided biopsy of retroperitoneal lymph node revealed findings consistent with metastatic papillary carcinoma.  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  2/7/23 - On Prednisone 20mg daily  2/17/23 - Scans show SD (RECIST) with some mild tumor shrinkage RPLN  2/21/23 - Increase Prednisone 40mg daily for continued arthralgia; continue Cabo 40 mg daily  3/15/23 - Continue Pred 40mg PO daily, continue Cabo  End March - hidradenitis suppurativa  4/4/23 - Hold Cabo; prednisone 30 mg daily  4/25/23 - Resume cabo  5/18/23 - Hold Cabo 2/2 vascular fistula repair  5/23/23 - Continue hold due to extensive edema, blistering  6/2023 - resumed cabo. Nivo on hold with PDN 25 mg  8/16/23 - continue cabo, reduce PDN to 20 mg daily  8/23/23 - Left upper extremity wound debridement down to subcutaneous tissue and vessels- Cabo on hold  8/31/23 - continue to hold cabo, PDN back at 30mg   9/15/23 - resumed cabozantinib 40 mg   Mid Sep 23 - admission hidradenitis suppurativa  10/10/23 - cabo on hold. Will start PDN 20 mg  10/17/23 - cabo on hold for graft, PDN 20 mg  10/25/23 - cabo on hold for graft, PDN 15 mg daily  11/16/23 - continue cabozantinib hold, PDN 10 mg daily  1/18/24 - mild inguinal/iliac LN growth + stable bone mets in staging scans.   01/2024 - dialysis started    Oncology History   Renal cell carcinoma (CMS/HCC)   12/1/2022 -  Chemotherapy    Cabozantinib, 28 Day Cycles     8/28/2023 Initial Diagnosis    Renal cell carcinoma (CMS/HCC)        Past  Medical History:   1. Lymphadenopathy and a pre surgical consult for kidney transplant  2. Stage V CKD  3. Type II DM  3. HTN  5. HLD  6. Parathyroidism   7. Anemia with NARGIS component   8. Gastric ulcer  9. SHIVA  10. Vitamin D deficiency   11. Morbid obesity    Past Medical History: Siddhartha has a past medical history of Dorsalgia, unspecified (01/04/2023), End stage renal disease (CMS/HCC) (01/04/2023), Essential (primary) hypertension (01/04/2023), Iron deficiency (02/23/2020), and Other specified postprocedural states.  Surgical History:  Siddhartha has a past surgical history that includes Other surgical history (04/11/2018).  Social History:  Siddhartha reports that he has quit smoking. His smoking use included cigars. He has never used smokeless tobacco. He reports current alcohol use. He reports that he does not use drugs.  Family History:    Family History   Problem Relation Name Age of Onset    Hypertension Mother      Cancer Father      Hypertension Father      Other (renal transplant recipient) Mother's Brother       Family Oncology History:  Cancer-related family history includes Cancer in his father.    SUBJECTIVE:    History of Present Illness:  Siddhartha Orellana is a 38 y.o. male who presents today for follow up of papillary RCC. Patient of Dr. Thomson currently on cabozantinib, nivolumab has been on hold since January due to arthralgias. He has had multiple flares of hidradenitis suppurativa and issues with his fistula healing. Patient presents today continues to be on a break from cabozantinib for poor healing of his fistula repair that was done in May. Cabozantinib hold began in the beginning of October, a graft was anticipated. Following with Dr. Daigle who saw Mr. Orellana today, trying to avoid surgical graft and revision. Wound continues to heal per patient and Dr. Daigle.  Added oxycodone in attempts to decrease prednisone dose to taper off. Last visit I stress the long term issues with continuing steroids,  "patient decided to stop prednisone cold turkey. He has now been off for 3 weeks. He is having fatigue and weakness and appetite changes. He fell once. Feels like things get better each day. Feels like AVF is almost healed. He is off the HS abx, no flare right now. He continues to have morning nausea and vomiting. Has tried many things with adjusting meds and diet. He was unable to get zofran because of insurance. His blood sugars have been good off of prednisone. His pain is controlled on oxycodone, needing 10 mg at a time sometimes, taking about 3 times a day, \"dealing with it\". No current signs and symptoms of infection. His energy level isn't great but was able to enjoy the holiday.     Review of Systems   Constitutional:  Positive for appetite change and fatigue. Negative for chills, diaphoresis, fever and unexpected weight change.   HENT:   Negative for mouth sores, sore throat and voice change.    Eyes:  Negative for eye problems.   Respiratory:  Negative for chest tightness, cough and shortness of breath.    Cardiovascular:  Negative for chest pain and leg swelling.   Gastrointestinal:  Positive for nausea and vomiting. Negative for abdominal pain, constipation and diarrhea.        Gas pains   Musculoskeletal:  Positive for arthralgias, gait problem and myalgias.   Skin:  Positive for wound. Negative for itching and rash.   Neurological:  Positive for extremity weakness and gait problem. Negative for dizziness and light-headedness.   Psychiatric/Behavioral: Negative.       Allergies  No Known Allergies     Medications  Current Outpatient Medications   Medication Instructions    amLODIPine (NORVASC) 10 mg, oral, Daily    atorvastatin (LIPITOR) 20 mg, oral, Nightly    B complex-vitamin C-folic acid (Nephrocaps) 1 mg capsule 1 capsule, oral, Daily    BD Insulin Syringe Ultra-Fine 0.5 mL 30 gauge x 1/2\" syringe USE AS DIRECTED WITH LANTUS AND INSULIN LISPRO INJECTIONS    calcitriol (ROCALTROL) 0.25 mcg, oral, " Nightly    carvedilol (COREG) 25 mg, oral, 2 times daily    Dexcom G7  misc FOR CONTINUOUS GLUCOSE MONITORING    Dexcom G7 Sensor device FOR CONTINUOUS GLUCOSE MONITORING, CHANGE EVERY 10 DAYS    doxycycline (VIBRAMYCIN) 100 mg, oral, Every 12 hours scheduled, Take with a full glass of water and do not lie down for at least 30 minutes after.    hydrALAZINE (APRESOLINE) 100 mg, oral, 3 times daily, Take with food    lidocaine-prilocaine (Emla) 2.5-2.5 % cream Apply thick layer 2 hours prior to dialysis site, cover with plastic wrap.    lisinopril 40 mg, oral, Daily    loratadine (CLARITIN) 10 mg, oral, Daily    omeprazole (PRILOSEC) 40 mg, oral, Daily    ondansetron (ZOFRAN) 8 mg, oral, 2 times daily PRN    OneTouch Delica Plus Lancet 33 gauge misc FOR GLUCOSE TESTING 3 TIMES DAILY    OneTouch Verio Flex meter misc FOR GLUCOSE TESTING 3 TIMES DAILY    oxyCODONE (ROXICODONE) 10 mg, oral, Every 4 hours PRN    prochlorperazine (COMPAZINE) 10 mg, oral, Every 6 hours PRN    torsemide (DEMADEX) 60 mg, oral, Daily        OBJECTIVE:    VS / Pain:  There were no vitals taken for this visit.  BSA: There is no height or weight on file to calculate BSA.  Wt Readings from Last 5 Encounters:   01/23/24 85.8 kg (189 lb 2.5 oz)   10/11/23 83.5 kg (184 lb)   01/18/24 85.8 kg (189 lb 2.5 oz)   01/18/24 85.7 kg (189 lb)   12/22/23 85.2 kg (187 lb 13.3 oz)      Pain Scale: 3    Physical Exam  Constitutional:       Appearance: Normal appearance. He is normal weight.   HENT:      Head: Normocephalic and atraumatic.   Eyes:      Pupils: Pupils are equal, round, and reactive to light.   Pulmonary:      Effort: Pulmonary effort is normal.   Musculoskeletal:         General: No swelling. Normal range of motion.      Cervical back: Normal range of motion.   Skin:     General: Skin is warm and dry.      Findings: Rash present.      Comments: Left arm wound with dressing, hypopigmentation, HS in groin was not assessed   Neurological:       General: No focal deficit present.      Mental Status: He is alert and oriented to person, place, and time.   Psychiatric:         Mood and Affect: Mood normal.         Behavior: Behavior normal.         Thought Content: Thought content normal.         Judgment: Judgment normal.         Performance Status:  ECOG Score: 1- Restricted in physically strenuous activity.  Carries out light duty.  Karnofsky Score: 80 - Normal activity with effort; some signs or symptoms of disease    Diagnostic Results     No results found for this or any previous visit (from the past 96 hour(s)).      Assessment/Plan   I personally saw patient and counselled all visit  on his diagnosis, maria luisa history and coordination of his care.   37yo AA man with hx of stage IV CKD with metastatic papRCC to LNs now on cabo/nivo. He has CKD and planned for kidney dialysis. Known proteinuria +++ (known). Still with sig joint pains and dealing with hyperglycemias. On cabo on hold due to healing fistula and nivo on hold d/t arthritis (PDN) on 30 mg PDN BUT pt self discontinued his prednisone and arthralgias are actually better. Pain is under control on opioids and arthralgias might not be fully IO-related. Scans showing overall disease stable. His fistula-related wound in left forearm almost 100% healed. Will resume cabo by end of the week. On dialysis now.   I sincerely appreciate the opportunity to see this patient and will contact the referring provider by phone or communicate directly through the electronic chart.  All patient's questions were answered and contact information provided to contact us with any issues.  Renan Thomson MD MSc FACP   in Medicine [facility] [employer] [employer]  Director Clinical  Medical Oncology Research Program   [facility] / [facility]  Cell [phone]  Office [phone]

## 2024-02-08 ENCOUNTER — TELEMEDICINE (OUTPATIENT)
Dept: HEMATOLOGY/ONCOLOGY | Facility: CLINIC | Age: 39
End: 2024-02-08
Payer: COMMERCIAL

## 2024-02-08 DIAGNOSIS — C64.9 RENAL CELL CARCINOMA, UNSPECIFIED LATERALITY (MULTI): ICD-10-CM

## 2024-02-08 DIAGNOSIS — C64.9 RENAL CELL CARCINOMA, UNSPECIFIED LATERALITY (MULTI): Primary | ICD-10-CM

## 2024-02-08 DIAGNOSIS — G89.3 CANCER ASSOCIATED PAIN: ICD-10-CM

## 2024-02-08 PROCEDURE — 3008F BODY MASS INDEX DOCD: CPT | Performed by: STUDENT IN AN ORGANIZED HEALTH CARE EDUCATION/TRAINING PROGRAM

## 2024-02-08 PROCEDURE — 1036F TOBACCO NON-USER: CPT | Performed by: STUDENT IN AN ORGANIZED HEALTH CARE EDUCATION/TRAINING PROGRAM

## 2024-02-08 PROCEDURE — 99214 OFFICE O/P EST MOD 30 MIN: CPT | Performed by: STUDENT IN AN ORGANIZED HEALTH CARE EDUCATION/TRAINING PROGRAM

## 2024-02-08 PROCEDURE — 4010F ACE/ARB THERAPY RXD/TAKEN: CPT | Performed by: STUDENT IN AN ORGANIZED HEALTH CARE EDUCATION/TRAINING PROGRAM

## 2024-02-12 ENCOUNTER — DOCUMENTATION (OUTPATIENT)
Dept: HEMATOLOGY/ONCOLOGY | Facility: HOSPITAL | Age: 39
End: 2024-02-12
Payer: COMMERCIAL

## 2024-02-12 NOTE — PROGRESS NOTES
Email received regarding administration of cabozantinib on dialysis. Confirmed with pharmacy - okay to take cabozantinib before or after dialysis as it is filter through the liver. Must be taken without food. This information was relayed to the patient's spouse via email.   Tatiana Rodriguez, JAIME-CNP

## 2024-02-16 ENCOUNTER — SPECIALTY PHARMACY (OUTPATIENT)
Dept: PHARMACY | Facility: CLINIC | Age: 39
End: 2024-02-16

## 2024-02-16 ENCOUNTER — PHARMACY VISIT (OUTPATIENT)
Dept: PHARMACY | Facility: CLINIC | Age: 39
End: 2024-02-16
Payer: MEDICAID

## 2024-02-16 DIAGNOSIS — R79.89 LOW VITAMIN D LEVEL: Primary | ICD-10-CM

## 2024-02-16 DIAGNOSIS — I10 PRIMARY HYPERTENSION: ICD-10-CM

## 2024-02-16 DIAGNOSIS — K21.9 GASTROESOPHAGEAL REFLUX DISEASE, UNSPECIFIED WHETHER ESOPHAGITIS PRESENT: ICD-10-CM

## 2024-02-16 PROCEDURE — RXMED WILLOW AMBULATORY MEDICATION CHARGE

## 2024-02-19 RX ORDER — OMEPRAZOLE 40 MG/1
40 CAPSULE, DELAYED RELEASE ORAL DAILY
Qty: 30 CAPSULE | Refills: 10 | Status: SHIPPED | OUTPATIENT
Start: 2024-02-19

## 2024-02-19 RX ORDER — CARVEDILOL 25 MG/1
25 TABLET ORAL 2 TIMES DAILY
Qty: 60 TABLET | Refills: 10 | Status: SHIPPED | OUTPATIENT
Start: 2024-02-19

## 2024-02-19 RX ORDER — CHOLECALCIFEROL (VITAMIN D3) 125 MCG
125 CAPSULE ORAL DAILY
Qty: 30 CAPSULE | Refills: 10 | Status: SHIPPED | OUTPATIENT
Start: 2024-02-19 | End: 2024-03-13 | Stop reason: ALTCHOICE

## 2024-02-19 RX ORDER — IRON POLYSACCHARIDE COMPLEX 150 MG
150 CAPSULE ORAL NIGHTLY
Qty: 30 CAPSULE | Refills: 10 | Status: SHIPPED | OUTPATIENT
Start: 2024-02-19 | End: 2024-02-19 | Stop reason: ALTCHOICE

## 2024-02-21 ENCOUNTER — SPECIALTY PHARMACY (OUTPATIENT)
Dept: HEMATOLOGY/ONCOLOGY | Facility: HOSPITAL | Age: 39
End: 2024-02-21
Payer: COMMERCIAL

## 2024-02-21 DIAGNOSIS — G89.3 CANCER ASSOCIATED PAIN: ICD-10-CM

## 2024-02-21 DIAGNOSIS — C64.9 RENAL CELL CARCINOMA, UNSPECIFIED LATERALITY (MULTI): ICD-10-CM

## 2024-02-21 DIAGNOSIS — C64.9 RENAL CELL CARCINOMA, UNSPECIFIED LATERALITY (MULTI): Primary | ICD-10-CM

## 2024-02-21 RX ORDER — OXYCODONE HYDROCHLORIDE 5 MG/1
10 TABLET ORAL EVERY 4 HOURS PRN
Qty: 180 TABLET | Refills: 0 | Status: SHIPPED | OUTPATIENT
Start: 2024-02-21 | End: 2024-03-07 | Stop reason: SDUPTHER

## 2024-02-21 NOTE — TELEPHONE ENCOUNTER
Oxycodone refill sent per request. Reviewed the patient's OARRS report. Pain medicine prescribed according to recommendations for chronic pain in cancer patients.  Tatiana Rodriguez, APRN-CNP

## 2024-02-21 NOTE — PROGRESS NOTES
[facility] Clinical Note    Siddhartha Orellana is a 38 y.o. male, who is on the specialty pharmacy service for management of: Oncology Core with status of: (Enrolled)     Siddhartha was contacted on 2/21/2024.    Refer to the encounter summary report for documentation details about patient counseling and education.    [facility] Clinical Note    Siddhartha Orellana is a 38 y.o. male, who is on the specialty pharmacy service for management of: Oncology Core with status of: (Enrolled)     Siddhartha was contacted on 2/21/2024.    Refer to the encounter summary report for documentation details about patient counseling and education.      Medication Adherence  The importance of adherence was discussed with the patient and they were advised to take the medication as prescribed by their provider. Siddhartha was encouraged to call his physician's office if they have a question regarding a missed dose.        Patient advised to contact the pharmacy if there are any changes to her medication list, including prescriptions, OTC medications, herbal products, or supplements. Patient was advised of [facility]’s dispensing process, refill timeline, contact information ([phone]), and patient management follow up. Patient confirmed understanding of education conducted during assessment. All patient questions and concerns were addressed to the best of my ability. Patient was encouraged to contact the specialty pharmacy with any questions or concerns.    Confirmed follow-up outreaches are properly scheduled. Reviewed goals of therapy in the program targets.    Aiden Weiner, VarshaD

## 2024-02-26 DIAGNOSIS — L40.9 PSORIASIS: Primary | ICD-10-CM

## 2024-02-28 RX ORDER — HYDROCORTISONE 25 MG/G
CREAM TOPICAL
Qty: 56.7 G | Refills: 3 | Status: SHIPPED | OUTPATIENT
Start: 2024-02-28

## 2024-03-03 NOTE — DISCHARGE SUMMARY
Discharge Diagnosis  ESRD (end stage renal disease) (CMS/[facility])  Hypokalemia  HTN  Type 2 DM  Hx of renal cell carcinoma  Anemia of the chronic disease       Hospital Course  Siddhartha Orellana is a 38 y.o. male with a past medical history of CKD V/ESRD, T2DM (a1c 7.4 11/21/2023), HTN, HLD, parathyroidism, anemia, SHIVA, morbid obesity, and renal cell carcinoma with mets who presented to [facility] as a direct admission to establish hemodialysis.  Nephrology consulted and patient had initiation of hemodialysis on 1/24.  Patient had dialysis on 1/25 and 1/26.  Clinically reports feeling better.   has obtained a hemodialysis chair for patient at [facility], but they cannot start with him until Wednesday.  In discussion with nephrology patient will plan for discharge this coming Monday 1/29 after he gets his dialysis.  During hospitalization patient noted to be anemic.  Initial hemoglobin 6.4 but patient did not want blood transfusion at that time.  Patient started on iron and EPO.  Hemoglobin still remains low and patient agreeable to blood transfusion and received 1 unit PRBC on 1/25.  No active bleeding.  Hemoglobin improved. Patient received 3 dialysis sessions and got a dialysis chair and was discharged in stable condition        Pertinent Physical Exam At Time of Discharge  Physical Exam  General: Lying in bed without distress.  Cooperative.  Skin: No rashes or ulcerations.  HEENT: Sclera is white.  Mucous membranes moist.  Cardiac: Regular rate and rhythm, S1/S2 normal.  Lungs: Clear to auscultation bilaterally, no wheezing or crackles, no accessory muscle use at rest.  Abdomen: Soft, nontender, nondistended, BS +  Extremities: No cyanosis.  Mild bilateral lower extremity edema.  Neurologic: Alert and oriented x3.  No focal deficits.  Psychiatric: Appropriate mood and behavior.  Currently no agitation.  Home Medications     Medication List      START taking these medications     doxycycline 100 mg capsule;  "Commonly known as: Vibramycin; Take 1   capsule (100 mg) by mouth every 12 hours. Take with a full glass of water   and do not lie down for at least 30 minutes after.     CONTINUE taking these medications     amLODIPine 10 mg tablet; Commonly known as: Norvasc; Take 1 tablet (10   mg) by mouth once daily.   atorvastatin 20 mg tablet; Commonly known as: Lipitor; Take 1 tablet (20   mg) by mouth once daily at bedtime.   BD Insulin Syringe Ultra-Fine 30G X 1/2\" 0.5 mL syringe; Generic drug:   insulin syringe-needle U-100; USE AS DIRECTED WITH LANTUS AND INSULIN   LISPRO INJECTIONS   calcitriol 0.25 mcg capsule; Commonly known as: Rocaltrol; Take 1   capsule (0.25 mcg) by mouth once daily at bedtime.   Dexcom G7  misc; Generic drug: blood-glucose meter,continuous;   FOR CONTINUOUS GLUCOSE MONITORING   Dexcom G7 Sensor device; Generic drug: blood-glucose sensor; FOR   CONTINUOUS GLUCOSE MONITORING, CHANGE EVERY 10 DAYS   hydrALAZINE 100 mg tablet; Commonly known as: Apresoline; Take 1 tablet   (100 mg) by mouth 3 times a day. Take with food   lisinopril 40 mg tablet; Take 1 tablet (40 mg) by mouth once daily.   loratadine 10 mg tablet; Commonly known as: Claritin; Take 1 tablet (10   mg) by mouth once daily.   ondansetron 4 mg tablet; Commonly known as: Zofran; Take 2 tablets (8   mg) by mouth 2 times a day as needed for nausea.   OneTouch Delica Plus Lancet 33 gauge misc; Generic drug: lancets; FOR   GLUCOSE TESTING 3 TIMES DAILY   OneTouch Verio Flex meter misc; Generic drug: blood-glucose meter; FOR   GLUCOSE TESTING 3 TIMES DAILY   prochlorperazine 10 mg tablet; Commonly known as: Compazine; Take 1   tablet (10 mg) by mouth every 6 hours if needed for nausea.   torsemide 20 mg tablet; Commonly known as: Demadex; Take 3 tablets (60   mg) by mouth once daily.     STOP taking these medications     carvedilol 25 mg tablet; Commonly known as: Coreg   omeprazole 40 mg DR capsule; Commonly known as: PriLOSEC   " oxyCODONE 5 mg immediate release tablet; Commonly known as: Roxicodone       Outpatient Follow-Up  Future Appointments   Date Time Provider Department Center   3/7/2024  3:20 PM Renan Thomson MD [id number] [facility]   3/13/2024 12:00 PM Tatiana Grover MD [id number] [facility]     35 minutes spent coordinating the care of the patient     Nichole Salas DO

## 2024-03-07 ENCOUNTER — OFFICE VISIT (OUTPATIENT)
Dept: HEMATOLOGY/ONCOLOGY | Facility: CLINIC | Age: 39
End: 2024-03-07
Payer: COMMERCIAL

## 2024-03-07 ENCOUNTER — LAB (OUTPATIENT)
Dept: LAB | Facility: CLINIC | Age: 39
End: 2024-03-07
Payer: COMMERCIAL

## 2024-03-07 VITALS
WEIGHT: 169.75 LBS | RESPIRATION RATE: 18 BRPM | TEMPERATURE: 98.1 F | BODY MASS INDEX: 27.38 KG/M2 | HEART RATE: 84 BPM | DIASTOLIC BLOOD PRESSURE: 67 MMHG | OXYGEN SATURATION: 96 % | SYSTOLIC BLOOD PRESSURE: 103 MMHG

## 2024-03-07 DIAGNOSIS — C64.9 RENAL CELL CARCINOMA, UNSPECIFIED LATERALITY (MULTI): ICD-10-CM

## 2024-03-07 DIAGNOSIS — G89.3 CANCER ASSOCIATED PAIN: ICD-10-CM

## 2024-03-07 DIAGNOSIS — C64.9 RENAL CELL CARCINOMA, UNSPECIFIED LATERALITY (MULTI): Primary | ICD-10-CM

## 2024-03-07 LAB
ALBUMIN SERPL BCP-MCNC: 3.7 G/DL (ref 3.4–5)
ALP SERPL-CCNC: 84 U/L (ref 33–120)
ALT SERPL W P-5'-P-CCNC: 18 U/L (ref 10–52)
ANION GAP SERPL CALC-SCNC: 18 MMOL/L (ref 10–20)
AST SERPL W P-5'-P-CCNC: 25 U/L (ref 9–39)
BASOPHILS # BLD AUTO: 0.07 X10*3/UL (ref 0–0.1)
BASOPHILS NFR BLD AUTO: 0.8 %
BILIRUB SERPL-MCNC: 0.4 MG/DL (ref 0–1.2)
BUN SERPL-MCNC: 34 MG/DL (ref 6–23)
CALCIUM SERPL-MCNC: 9 MG/DL (ref 8.6–10.6)
CHLORIDE SERPL-SCNC: 97 MMOL/L (ref 98–107)
CO2 SERPL-SCNC: 30 MMOL/L (ref 21–32)
CREAT SERPL-MCNC: 9.02 MG/DL (ref 0.5–1.3)
EGFRCR SERPLBLD CKD-EPI 2021: 7 ML/MIN/1.73M*2
EOSINOPHIL # BLD AUTO: 0.36 X10*3/UL (ref 0–0.7)
EOSINOPHIL NFR BLD AUTO: 3.9 %
ERYTHROCYTE [DISTWIDTH] IN BLOOD BY AUTOMATED COUNT: 17.9 % (ref 11.5–14.5)
GLUCOSE SERPL-MCNC: 103 MG/DL (ref 74–99)
HCT VFR BLD AUTO: 38.4 % (ref 41–52)
HGB BLD-MCNC: 11.7 G/DL (ref 13.5–17.5)
IMM GRANULOCYTES # BLD AUTO: 0 X10*3/UL (ref 0–0.7)
IMM GRANULOCYTES NFR BLD AUTO: 0 % (ref 0–0.9)
LYMPHOCYTES # BLD AUTO: 2.36 X10*3/UL (ref 1.2–4.8)
LYMPHOCYTES NFR BLD AUTO: 25.4 %
MCH RBC QN AUTO: 27.4 PG (ref 26–34)
MCHC RBC AUTO-ENTMCNC: 30.5 G/DL (ref 32–36)
MCV RBC AUTO: 90 FL (ref 80–100)
MONOCYTES # BLD AUTO: 0.56 X10*3/UL (ref 0.1–1)
MONOCYTES NFR BLD AUTO: 6 %
NEUTROPHILS # BLD AUTO: 5.95 X10*3/UL (ref 1.2–7.7)
NEUTROPHILS NFR BLD AUTO: 63.9 %
NRBC BLD-RTO: ABNORMAL /100{WBCS}
PLATELET # BLD AUTO: 255 X10*3/UL (ref 150–450)
POTASSIUM SERPL-SCNC: 4.9 MMOL/L (ref 3.5–5.3)
PROT SERPL-MCNC: 7.2 G/DL (ref 6.4–8.2)
RBC # BLD AUTO: 4.27 X10*6/UL (ref 4.5–5.9)
SODIUM SERPL-SCNC: 140 MMOL/L (ref 136–145)
T4 FREE SERPL-MCNC: 0.91 NG/DL (ref 0.78–1.48)
TSH SERPL-ACNC: 13.24 MIU/L (ref 0.44–3.98)
WBC # BLD AUTO: 9.3 X10*3/UL (ref 4.4–11.3)

## 2024-03-07 PROCEDURE — 3078F DIAST BP <80 MM HG: CPT | Performed by: STUDENT IN AN ORGANIZED HEALTH CARE EDUCATION/TRAINING PROGRAM

## 2024-03-07 PROCEDURE — 99214 OFFICE O/P EST MOD 30 MIN: CPT | Performed by: STUDENT IN AN ORGANIZED HEALTH CARE EDUCATION/TRAINING PROGRAM

## 2024-03-07 PROCEDURE — 36415 COLL VENOUS BLD VENIPUNCTURE: CPT

## 2024-03-07 PROCEDURE — 4010F ACE/ARB THERAPY RXD/TAKEN: CPT | Performed by: STUDENT IN AN ORGANIZED HEALTH CARE EDUCATION/TRAINING PROGRAM

## 2024-03-07 PROCEDURE — 84443 ASSAY THYROID STIM HORMONE: CPT

## 2024-03-07 PROCEDURE — 1036F TOBACCO NON-USER: CPT | Performed by: STUDENT IN AN ORGANIZED HEALTH CARE EDUCATION/TRAINING PROGRAM

## 2024-03-07 PROCEDURE — 84439 ASSAY OF FREE THYROXINE: CPT

## 2024-03-07 PROCEDURE — 3008F BODY MASS INDEX DOCD: CPT | Performed by: STUDENT IN AN ORGANIZED HEALTH CARE EDUCATION/TRAINING PROGRAM

## 2024-03-07 PROCEDURE — 80053 COMPREHEN METABOLIC PANEL: CPT

## 2024-03-07 PROCEDURE — 85025 COMPLETE CBC W/AUTO DIFF WBC: CPT

## 2024-03-07 PROCEDURE — 3074F SYST BP LT 130 MM HG: CPT | Performed by: STUDENT IN AN ORGANIZED HEALTH CARE EDUCATION/TRAINING PROGRAM

## 2024-03-07 RX ORDER — OXYCODONE HYDROCHLORIDE 5 MG/1
10 TABLET ORAL EVERY 4 HOURS PRN
Qty: 180 TABLET | Refills: 0 | Status: SHIPPED | OUTPATIENT
Start: 2024-03-07 | End: 2024-03-21 | Stop reason: SDUPTHER

## 2024-03-07 ASSESSMENT — ENCOUNTER SYMPTOMS
FEVER: 0
LIGHT-HEADEDNESS: 0
COUGH: 0
DIZZINESS: 0
WOUND: 1
ABDOMINAL PAIN: 0
CONSTIPATION: 0
CHILLS: 0
ROS GI COMMENTS: GAS PAINS
APPETITE CHANGE: 1
CHEST TIGHTNESS: 0
EYE PROBLEMS: 0
VOICE CHANGE: 0
VOMITING: 1
SHORTNESS OF BREATH: 0
PSYCHIATRIC NEGATIVE: 1
MYALGIAS: 1
FATIGUE: 1
UNEXPECTED WEIGHT CHANGE: 0
DIAPHORESIS: 0
NAUSEA: 1
ARTHRALGIAS: 1
LEG SWELLING: 0
DIARRHEA: 0
EXTREMITY WEAKNESS: 1
SORE THROAT: 0

## 2024-03-07 ASSESSMENT — PAIN SCALES - GENERAL: PAINLEVEL: 8

## 2024-03-07 NOTE — PROGRESS NOTES
Patient ID: Siddhartha Orellana is a 38 y.o. male.  Diagnosis:  Papillary RCC  MedOnc: Dr. Thomson  Nephrologist: Dr. Grover  Vascular Surgeon: Dr. Daigle    Current Therapy: Cabozantinib     ONCOLOGIC HISTORY  11/7/22 - CT-guided biopsy of retroperitoneal lymph node revealed findings consistent with metastatic papillary carcinoma.  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  2/7/23 - On Prednisone 20mg daily  2/17/23 - Scans show SD (RECIST) with some mild tumor shrinkage RPLN  2/21/23 - Increase Prednisone 40mg daily for continued arthralgia; continue Cabo 40 mg daily  3/15/23 - Continue Pred 40mg PO daily, continue Cabo  End March - hidradenitis suppurativa  4/4/23 - Hold Cabo; prednisone 30 mg daily  4/25/23 - Resume cabo  5/18/23 - Hold Cabo 2/2 vascular fistula repair  5/23/23 - Continue hold due to extensive edema, blistering  6/2023 - resumed cabo. Nivo on hold with PDN 25 mg  8/16/23 - continue cabo, reduce PDN to 20 mg daily  8/23/23 - Left upper extremity wound debridement down to subcutaneous tissue and vessels- Cabo on hold  8/31/23 - continue to hold cabo, PDN back at 30mg   9/15/23 - resumed cabozantinib 40 mg   Mid Sep 23 - admission hidradenitis suppurativa  10/10/23 - cabo on hold. Will start PDN 20 mg  10/17/23 - cabo on hold for graft, PDN 20 mg  10/25/23 - cabo on hold for graft, PDN 15 mg daily  11/16/23 - continue cabozantinib hold, PDN 10 mg daily  1/18/24 - mild inguinal/iliac LN growth + stable bone mets in staging scans.   01/2024 - dialysis started  3/7/24 - he got back on cabozantinib 2-3 w ago    Oncology History   Renal cell carcinoma (CMS/HCC)   12/1/2022 -  Chemotherapy    Cabozantinib, 28 Day Cycles     8/28/2023 Initial Diagnosis     Renal cell carcinoma (CMS/HCC)        Past Medical History:   1. Lymphadenopathy and a pre surgical consult for kidney transplant  2. Stage V CKD  3. Type II DM  3. HTN  5. HLD  6. Parathyroidism   7. Anemia with NARGIS component   8. Gastric ulcer  9. SHIVA  10. Vitamin D deficiency   11. Morbid obesity    Past Medical History: Siddhartha has a past medical history of Dorsalgia, unspecified (01/04/2023), End stage renal disease (CMS/HCC) (01/04/2023), Essential (primary) hypertension (01/04/2023), Iron deficiency (02/23/2020), and Other specified postprocedural states.  Surgical History:  Siddhartha has a past surgical history that includes Other surgical history (04/11/2018).  Social History:  Siddhartha reports that he has quit smoking. His smoking use included cigars. He has never used smokeless tobacco. He reports current alcohol use. He reports that he does not use drugs.  Family History:    Family History   Problem Relation Name Age of Onset    Hypertension Mother      Cancer Father      Hypertension Father      Other (renal transplant recipient) Mother's Brother       Family Oncology History:  Cancer-related family history includes Cancer in his father.    SUBJECTIVE:    History of Present Illness:  Siddhartha Orellana is a 38 y.o. male who presents today for follow up of papillary RCC.    Review of Systems   Constitutional:  Positive for appetite change and fatigue. Negative for chills, diaphoresis, fever and unexpected weight change.   HENT:   Negative for mouth sores, sore throat and voice change.    Eyes:  Negative for eye problems.   Respiratory:  Negative for chest tightness, cough and shortness of breath.    Cardiovascular:  Negative for chest pain and leg swelling.   Gastrointestinal:  Positive for nausea and vomiting. Negative for abdominal pain, constipation and diarrhea.        Gas pains   Musculoskeletal:  Positive for arthralgias, gait problem and myalgias.   Skin:  Positive for wound. Negative for itching and  "rash.   Neurological:  Positive for extremity weakness and gait problem. Negative for dizziness and light-headedness.   Psychiatric/Behavioral: Negative.       Allergies  No Known Allergies     Medications  Current Outpatient Medications   Medication Instructions    amLODIPine (NORVASC) 10 mg, oral, Daily    atorvastatin (LIPITOR) 20 mg, oral, Nightly    B complex-vitamin C-folic acid (Nephrocaps) 1 mg capsule 1 capsule, oral, Daily    BD Insulin Syringe Ultra-Fine 0.5 mL 30 gauge x 1/2\" syringe USE AS DIRECTED WITH LANTUS AND INSULIN LISPRO INJECTIONS    cabozantinib (Cabometyx) 40 mg tablet TAKE ONE (1) TABLET BY MOUTH ONCE A DAY    calcitriol (ROCALTROL) 0.25 mcg, oral, Nightly    carvedilol (COREG) 25 mg, oral, 2 times daily    cholecalciferol (VITAMIN D-3) 125 mcg, oral, Daily    Dexcom G7  misc FOR CONTINUOUS GLUCOSE MONITORING    Dexcom G7 Sensor device FOR CONTINUOUS GLUCOSE MONITORING, CHANGE EVERY 10 DAYS    doxycycline (VIBRAMYCIN) 100 mg, oral, Every 12 hours scheduled, Take with a full glass of water and do not lie down for at least 30 minutes after.    hydrALAZINE (APRESOLINE) 100 mg, oral, 3 times daily, Take with food    hydrocortisone 2.5 % cream 1 APPLICATION TWICE A DAY TO AFFECTED AREA OF FACE (AVOID EYES) TO DARK SPOTS FOR 2-4 WEEKS    lidocaine-prilocaine (Emla) 2.5-2.5 % cream Apply thick layer 2 hours prior to dialysis site, cover with plastic wrap.    lisinopril 40 mg, oral, Daily    loratadine (CLARITIN) 10 mg, oral, Daily    omeprazole (PRILOSEC) 40 mg, oral, Daily    ondansetron (ZOFRAN) 8 mg, oral, 2 times daily PRN    OneTouch Delica Plus Lancet 33 gauge misc FOR GLUCOSE TESTING 3 TIMES DAILY    OneTouch Verio Flex meter misc FOR GLUCOSE TESTING 3 TIMES DAILY    oxyCODONE (ROXICODONE) 10 mg, oral, Every 4 hours PRN    prochlorperazine (COMPAZINE) 10 mg, oral, Every 6 hours PRN    torsemide (DEMADEX) 60 mg, oral, Daily        OBJECTIVE:    VS / Pain:  /67 (BP Location: " Right arm, Patient Position: Sitting, BP Cuff Size: Adult)   Pulse 84   Temp 36.7 °C (98.1 °F) (Core)   Resp 18   Wt 77 kg (169 lb 12.1 oz)   SpO2 96%   BMI 27.38 kg/m²   BSA: 1.89 meters squared  Wt Readings from Last 5 Encounters:   03/07/24 77 kg (169 lb 12.1 oz)   01/23/24 85.8 kg (189 lb 2.5 oz)   01/18/24 85.8 kg (189 lb 2.5 oz)   01/18/24 85.7 kg (189 lb)   12/22/23 85.2 kg (187 lb 13.3 oz)      Pain Scale: 3    Physical Exam  Constitutional:       Appearance: Normal appearance. He is normal weight.   HENT:      Head: Normocephalic and atraumatic.   Eyes:      Pupils: Pupils are equal, round, and reactive to light.   Pulmonary:      Effort: Pulmonary effort is normal.   Musculoskeletal:         General: No swelling. Normal range of motion.      Cervical back: Normal range of motion.   Skin:     General: Skin is warm and dry.      Findings: Rash present.      Comments: Left arm wound with dressing, hypopigmentation, HS in groin was not assessed   Neurological:      General: No focal deficit present.      Mental Status: He is alert and oriented to person, place, and time.   Psychiatric:         Mood and Affect: Mood normal.         Behavior: Behavior normal.         Thought Content: Thought content normal.         Judgment: Judgment normal.     Performance Status:  ECOG Score: 1- Restricted in physically strenuous activity.  Carries out light duty.  Karnofsky Score: 80 - Normal activity with effort; some signs or symptoms of disease    Diagnostic Results   No results found for this or any previous visit (from the past 96 hour(s)).    Assessment/Plan   I personally saw patient and counselled all visit  on his diagnosis, maria luisa history and coordination of his care.   39yo AA man with hx of stage IV CKD with metastatic papRCC to LNs now on cabo/nivo. He has CKD and planned for kidney dialysis. Known proteinuria +++ (known). Still with sig joint pains and dealing with hyperglycemias. On cabo on hold due to  healing fistula and nivo on hold d/t arthritis (PDN) on 30 mg PDN BUT pt self discontinued his prednisone and arthralgias are actually better. Pain is under control on opioids and arthralgias might not be fully IO-related. Scans showing overall disease stable. His fistula-related wound in left forearm almost 100% healed. Will resume cabo by end of the week. On dialysis now. Will adjust BP medicine since it has been running lower (likely related to dialysis). Scans for next visit.     I sincerely appreciate the opportunity to see this patient and will contact the referring provider by phone or communicate directly through the electronic chart.  All patient's questions were answered and contact information provided to contact us with any issues.  Renan Thomson MD MSc FACP   in Medicine [employer]  Director Clinical  Medical Oncology Research Program   [facility]  Cell [phone]  Office [phone]

## 2024-03-13 ENCOUNTER — OFFICE VISIT (OUTPATIENT)
Dept: NEPHROLOGY | Facility: CLINIC | Age: 39
End: 2024-03-13
Payer: COMMERCIAL

## 2024-03-13 VITALS
BODY MASS INDEX: 26.2 KG/M2 | HEIGHT: 66 IN | HEART RATE: 86 BPM | WEIGHT: 163 LBS | DIASTOLIC BLOOD PRESSURE: 88 MMHG | SYSTOLIC BLOOD PRESSURE: 128 MMHG

## 2024-03-13 DIAGNOSIS — Z99.2: ICD-10-CM

## 2024-03-13 DIAGNOSIS — N19 HYPERPHOSPHATEMIA ASSOCIATED WITH RENAL FAILURE: Primary | ICD-10-CM

## 2024-03-13 DIAGNOSIS — Z99.2 ESRD (END STAGE RENAL DISEASE) ON DIALYSIS (MULTI): ICD-10-CM

## 2024-03-13 DIAGNOSIS — E83.39 HYPERPHOSPHATEMIA ASSOCIATED WITH RENAL FAILURE: Primary | ICD-10-CM

## 2024-03-13 DIAGNOSIS — N18.6 ESRD (END STAGE RENAL DISEASE) ON DIALYSIS (MULTI): ICD-10-CM

## 2024-03-13 PROCEDURE — 3008F BODY MASS INDEX DOCD: CPT | Performed by: INTERNAL MEDICINE

## 2024-03-13 PROCEDURE — NCVST PR NC VISIT: Performed by: INTERNAL MEDICINE

## 2024-03-13 PROCEDURE — 3079F DIAST BP 80-89 MM HG: CPT | Performed by: INTERNAL MEDICINE

## 2024-03-13 PROCEDURE — 1036F TOBACCO NON-USER: CPT | Performed by: INTERNAL MEDICINE

## 2024-03-13 PROCEDURE — 3074F SYST BP LT 130 MM HG: CPT | Performed by: INTERNAL MEDICINE

## 2024-03-13 RX ORDER — SEVELAMER HYDROCHLORIDE 800 MG/1
800 TABLET, FILM COATED ORAL
Qty: 270 TABLET | Refills: 3 | Status: SHIPPED | OUTPATIENT
Start: 2024-03-13 | End: 2024-05-14 | Stop reason: SDUPTHER

## 2024-03-13 RX ORDER — LIDOCAINE AND PRILOCAINE 25; 25 MG/G; MG/G
CREAM TOPICAL
Qty: 30 G | Refills: 11 | Status: SHIPPED | OUTPATIENT
Start: 2024-03-13 | End: 2025-03-13

## 2024-03-13 NOTE — PROGRESS NOTES
"ESRD OUTPATIENT PROGRESS NOTE      Here in follow up for dialysis care. Doing overall pretty good.  Follows regularly with his medical care team and attends dialysis 3 x a week. No issues with access cannulation or intradialytic cramps or hypotension. Appetite much better. Denies chest pain, dyspnea, leg swelling. Saw Dr. Martin recently, BP was low, so he instructed  patinet to stop all BP meds except Carvedilol until he sees me to further discuss. This morning he comes from dialysis. Did not take any meds today > last Carvedilol last evening.        ROS  All the rest reviewed and negative.         Objective   /88 (BP Location: Right arm)   Pulse 86   Ht 1.676 m (5' 6\")   Wt 73.9 kg (163 lb)   BMI 26.31 kg/m²         Physical Exam  Constitutional:  well appearing, in NAD  Psychiatric:  appropriate mood and behavior    HEENT: NC/AT, clear sclerae, moist mucous membranes  Cardiovascular: distant S1, S2, RRR,  no murmurs, gallop or rubs  Pulmonary:  Normal breath sounds  Extremities: no edema  Skin:  warm and dry  ACCESS: LFA AVF     No Known Allergies  Current Outpatient Medications   Medication Instructions    atorvastatin (LIPITOR) 20 mg, oral, Nightly    B complex-vitamin C-folic acid (Nephrocaps) 1 mg capsule 1 capsule, oral, Daily    cabozantinib (Cabometyx) 40 mg tablet TAKE ONE (1) TABLET BY MOUTH ONCE A DAY    carvedilol (COREG) 25 mg, oral, 2 times daily    doxycycline (VIBRAMYCIN) 100 mg, oral, Every 12 hours scheduled, Take with a full glass of water and do not lie down for at least 30 minutes after.    hydrocortisone 2.5 % cream 1 APPLICATION TWICE A DAY TO AFFECTED AREA OF FACE (AVOID EYES) TO DARK SPOTS FOR 2-4 WEEKS    lidocaine-prilocaine (Emla) 2.5-2.5 % cream Apply thick layer 2 hours prior to dialysis site, cover with plastic wrap.    loratadine (CLARITIN) 10 mg, oral, Daily    omeprazole (PRILOSEC) 40 mg, oral, Daily    ondansetron (ZOFRAN) 8 mg, oral, 2 times daily PRN    oxyCODONE " (ROXICODONE) 10 mg, oral, Every 4 hours PRN    prochlorperazine (COMPAZINE) 10 mg, oral, Every 6 hours PRN       Results  March 6, 2024:  KT/V urea 1.43  BUN/cr 39/10.2    Albumin 3.7  Ca 8.6, phos 8.0    H/H 11.7/36.4    January : 25D = 80 ng/ml       Assessment and Plan  1. ESRD secondary to diabetic nephropathy on dialysis at [facility] on a MWF schedule, via LFA AVF. Last 6 treatments pre and post BP reviewed and average: 133/91 and 131/100 respectively. Average post dialysis wt 74.8 kg , EDW of 75 kg acceptable. Chemistries for March good. No access cannulation problems. Continue current dialysis prescription. BP good here today, off meds. Will discontinue all BP meds except for Carvedilol.     2. Anemia of CKD. H/H on target; adjust Mircera and iron per unit protocol.     3. Mineral metabolism. PTH, Ca on target. Phos up. Will order phosphate binder; no acitve vitamin D at this [facility].  Last 25D level 80 in January. Hold nutritional vitamin D supplements.     RTC in 4 months.

## 2024-03-13 NOTE — PATIENT INSTRUCTIONS
Continue on Carvedilol. Stop all other BP meds.  Stop Vitamin D for now.  See you back in 4 moths.

## 2024-03-19 ENCOUNTER — SPECIALTY PHARMACY (OUTPATIENT)
Dept: PHARMACY | Facility: CLINIC | Age: 39
End: 2024-03-19

## 2024-03-19 PROCEDURE — RXMED WILLOW AMBULATORY MEDICATION CHARGE

## 2024-03-19 RX ORDER — HYDRALAZINE HYDROCHLORIDE 100 MG/1
100 TABLET, FILM COATED ORAL 3 TIMES DAILY
Qty: 90 TABLET | Refills: 10 | OUTPATIENT
Start: 2024-03-19

## 2024-03-21 DIAGNOSIS — G89.3 CANCER ASSOCIATED PAIN: ICD-10-CM

## 2024-03-21 DIAGNOSIS — C64.9 RENAL CELL CARCINOMA, UNSPECIFIED LATERALITY (MULTI): ICD-10-CM

## 2024-03-21 DIAGNOSIS — L73.2 HIDRADENITIS SUPPURATIVA: ICD-10-CM

## 2024-03-21 RX ORDER — DOXYCYCLINE 100 MG/1
100 CAPSULE ORAL 2 TIMES DAILY
Qty: 14 CAPSULE | Refills: 0 | Status: SHIPPED | OUTPATIENT
Start: 2024-03-21

## 2024-03-21 RX ORDER — OXYCODONE HYDROCHLORIDE 5 MG/1
10 TABLET ORAL EVERY 4 HOURS PRN
Qty: 180 TABLET | Refills: 0 | Status: SHIPPED | OUTPATIENT
Start: 2024-03-21 | End: 2024-04-03 | Stop reason: SDUPTHER

## 2024-03-21 NOTE — TELEPHONE ENCOUNTER
Received email from spouse regarding HS flare. Will refill 7 days of doxy - awaiting Dr. Engle response. Sent oxycodone as well.   Tatiana Rodriguez, APRN-CNP

## 2024-03-26 ENCOUNTER — TELEPHONE (OUTPATIENT)
Dept: TRANSPLANT | Facility: HOSPITAL | Age: 39
End: 2024-03-26
Payer: COMMERCIAL

## 2024-03-26 ENCOUNTER — PHARMACY VISIT (OUTPATIENT)
Dept: PHARMACY | Facility: CLINIC | Age: 39
End: 2024-03-26
Payer: MEDICAID

## 2024-03-26 ENCOUNTER — COMMITTEE REVIEW (OUTPATIENT)
Dept: TRANSPLANT | Facility: HOSPITAL | Age: 39
End: 2024-03-26
Payer: COMMERCIAL

## 2024-03-26 NOTE — COMMITTEE REVIEW
Presentation for Listing Evaluation Date: 2/8/2022  Committee Review Date: 3/21/2024    Organ being evaluated for: Kidney    Transplant Phase: Waitlist  Transplant Status: Inactive    Referring Physician:     Primary Diagnosis: Diabetes Mellitus - Type II  Secondary Diagnosis:     Committee Review Decision:       Committee Discussion Details:   The candidate's evaluation was presented and discussed at the Kidney  Multidisciplinary Selection Conference. After review of the candidate's diagnosis and the evaluations of the multidisciplinary team members, it was the consensus of the Selection Committee that the candidate does not meet Kidney Selection Criteria and will be removed from the kidney transplant waitlist at this time.     Resolution:  Delist patient.  He is not a candidate for transplant due to metastatic papillary carcinoma with metastases to bone.

## 2024-03-26 NOTE — TELEPHONE ENCOUNTER
NARESH for pt requesting a call back to discuss his transplant waitlist status.    Discussed with patient that we need to remove him from the waiting list at this time due to his cancer diagnosis.

## 2024-03-26 NOTE — LETTER
March 26, 2024    Siddhartha Orellana  [address] [address] [address]      Dear Mr. Orellana:    Our multi-disciplinary transplant team completed a review of your medical records on 3/21/2024.  ***    Our transplant program consists of surgeons and medical doctors who provide coverage 365 days a year, 24 hours a day.     If you have any questions or concerns regarding your insurance coverage or billing issues, a  is available to speak with you.     It is important to keep us updated of any major changes in your medical condition, contact information and health insurance coverage.     Please don't hesitate to contact us at Dept: [phone] with any questions or concerns. We look forward to working with you through this process.      Sincerely,      Amalia Harper RN          [facility] Toll-free Patient Services Line:  Your Resource for Organ Transplant Information    If you have a question regarding your own medical care, you always should call your transplant hospital first. However, for general organ transplant-related information, you should call the [employer] for Organ Sharing (UNOS) toll-free patient services line at 1-830.405.7279.  Anyone, including potential transplant candidates, candidates, recipients, family members, friends, living donors, and donor family members, can call this number to:    Talk about organ donation, living donation, the transplant process, the donation process, and transplant policies.  Get a free patient information kit with helpful booklets, waiting list and transplant information, and a list of all transplant hospitals.  Ask questions about the Organ Procurement and Transplantation Network (OPTN) web site (http://optn.transplant.hrsa.gov/), the UNOS Web site (http://unos.org/), or the UNOS web site for living donors and transplant recipients. (http://www.transplantliving.org/).  Learn how UNOS and the OPTN can help you.  Talk about any concerns  that you may have with a transplant hospital.    [facility] is a not-for-profit organization that provides the administrative services for the national OPTN under federal contract to the Health Resources and Services Administration (HRSA), an agency under the U.S. Department of Health and Human Services (HHS).    [facility] and the OPTN are responsible for:    Providing educational material for patients, the public, and professionals.  Raising awareness of the need for donated organs and tissue.  Writing organ transplant policy with help from transplant professionals, transplant patients, transplant candidates, donor families, living donors, and the public.  Coordinating organ procurement, matching, and placement.  Collecting information about every organ transplant and donation that occurs in the [place].    Remember, you should contact your transplant hospital directly if you have questions or concerns about your own medical care including medical records, work-up progress, and test results.    [facility] is not your transplant hospital, and staff at [facility] will not be able to transfer you to your transplant hospital, so keep your transplant hospital’s phone number handy.    However, while you research your transplant needs and learn as much as you can about transplantation and donation, we welcome your call to our toll-free patient services line at [phone].      [facility] PIL Final Rev 1-

## 2024-04-01 ENCOUNTER — OFFICE VISIT (OUTPATIENT)
Dept: DERMATOLOGY | Facility: HOSPITAL | Age: 39
End: 2024-04-01
Payer: COMMERCIAL

## 2024-04-01 DIAGNOSIS — L73.2 HIDRADENITIS SUPPURATIVA: Primary | ICD-10-CM

## 2024-04-01 PROCEDURE — 2500000004 HC RX 250 GENERAL PHARMACY W/ HCPCS (ALT 636 FOR OP/ED): Performed by: DERMATOLOGY

## 2024-04-01 PROCEDURE — 3008F BODY MASS INDEX DOCD: CPT | Performed by: DERMATOLOGY

## 2024-04-01 PROCEDURE — 99214 OFFICE O/P EST MOD 30 MIN: CPT | Mod: 25 | Performed by: DERMATOLOGY

## 2024-04-01 PROCEDURE — 11900 INJECT SKIN LESIONS </W 7: CPT | Performed by: DERMATOLOGY

## 2024-04-01 PROCEDURE — 1036F TOBACCO NON-USER: CPT | Performed by: DERMATOLOGY

## 2024-04-01 PROCEDURE — 87186 SC STD MICRODIL/AGAR DIL: CPT | Mod: 59 | Performed by: DERMATOLOGY

## 2024-04-01 PROCEDURE — 99214 OFFICE O/P EST MOD 30 MIN: CPT | Performed by: DERMATOLOGY

## 2024-04-01 RX ORDER — TRIAMCINOLONE ACETONIDE 40 MG/ML
40 INJECTION, SUSPENSION INTRA-ARTICULAR; INTRAMUSCULAR ONCE
Status: COMPLETED | OUTPATIENT
Start: 2024-04-01 | End: 2024-04-01

## 2024-04-01 RX ORDER — NON-ADHERENT BANDAGE 8"X10"
1 BANDAGE TOPICAL DAILY
Qty: 18 EACH | Refills: 11 | Status: SHIPPED | OUTPATIENT
Start: 2024-04-01

## 2024-04-01 RX ADMIN — TRIAMCINOLONE ACETONIDE 40 MG: 40 INJECTION, SUSPENSION INTRA-ARTICULAR; INTRAMUSCULAR at 13:45

## 2024-04-01 ASSESSMENT — DERMATOLOGY QUALITY OF LIFE (QOL) ASSESSMENT
RATE HOW BOTHERED YOU ARE BY SYMPTOMS OF YOUR SKIN PROBLEM (EG, ITCHING, STINGING BURNING, HURTING OR SKIN IRRITATION): 0 - NEVER BOTHERED
RATE HOW EMOTIONALLY BOTHERED YOU ARE BY YOUR SKIN PROBLEM (FOR EXAMPLE, WORRY, EMBARRASSMENT, FRUSTRATION): 0 - NEVER BOTHERED
ARE THERE EXCLUSIONS OR EXCEPTIONS FOR THE QUALITY OF LIFE ASSESSMENT: NO
DATE THE QUALITY-OF-LIFE ASSESSMENT WAS COMPLETED: 66931
RATE HOW BOTHERED YOU ARE BY EFFECTS OF YOUR SKIN PROBLEMS ON YOUR ACTIVITIES (EG, GOING OUT, ACCOMPLISHING WHAT YOU WANT, WORK ACTIVITIES OR YOUR RELATIONSHIPS WITH OTHERS): 0 - NEVER BOTHERED

## 2024-04-01 ASSESSMENT — DERMATOLOGY PATIENT ASSESSMENT
WHERE ARE THESE NEW OR CHANGING LESIONS LOCATED: GROIN
DO YOU HAVE ANY NEW OR CHANGING LESIONS: YES
HAVE YOU HAD OR DO YOU HAVE VASCULAR DISEASE: NO
DO YOU USE A TANNING BED: NO
HAVE YOU HAD OR DO YOU HAVE A STAPH INFECTION: NO

## 2024-04-01 ASSESSMENT — ITCH NUMERIC RATING SCALE: HOW SEVERE IS YOUR ITCHING?: 0

## 2024-04-01 ASSESSMENT — PATIENT GLOBAL ASSESSMENT (PGA): PATIENT GLOBAL ASSESSMENT: PATIENT GLOBAL ASSESSMENT:  1 - CLEAR

## 2024-04-01 NOTE — PROGRESS NOTES
Subjective     Siddhartha Orellana is a 39 y.o. male who presents for the following: Hidradenitis Suppurativa.     Patient has papillary renal cell carcinoma, previously on Nivolumab and Cabozantinib. Nivolumab has been on hold since 1/2023. Cabozanitinib has also been held for graft; last dose 9/15/23. 3/7/24 re-started cabozantinib. He is currently also receiving hemodialysis    Review of Systems:  No other skin or systemic complaints other than what is documented elsewhere in the note.    The following portions of the chart were reviewed this encounter and updated as appropriate:          Skin Cancer History  No skin cancer on file.      Specialty Problems          Dermatology Problems    Hidradenitis suppurativa    Dehiscence of wound    Intertrigo    Rash and other nonspecific skin eruption    Psoriasis     Rash started Sept - Oct 2023. Biopsied 10/30/23 demonstrated parakeratosis and orthokeratosis with neutrophils most consistent with psoriasis based on the clinical presentation. The path could also fit with pityriasis rubra pilaris; this could be reconsidered if he is worsening or not responding    ?Triggered by cabozantinib. Could also consider hypertension medication such as beta-blocker             Objective   Well appearing patient in no apparent distress; mood and affect are within normal limits.    A focused skin examination was performed. All findings within normal limits unless otherwise noted below.    Assessment/Plan   1. Hidradenitis suppurativa  Gluteal Crease, Left Hip (side) - Posterior, Left Medial Thigh, Pubic, Right Hip (side) - Posterior, Right Medial Thigh  Large infiltrated plaques and draining sinuses involving    -Discussed nature of condition  -Discussed treatment options  - no longer responding to doxycycline PO  - he did restart his cabo  - prevoiusly clindamycin from ED did help    - since he is starting to develop resistance I would like to obtain a bacterial culture and start  "antibiotics based on sensitivities. Will check with onc and make sure OK with HD before selecting    - for more immediate relief today, ILK to worst lesions      Intralesional injection - Gluteal Crease, Right Medial Thigh  Intralesional Injection:   Consent:     Consent obtained:  Verbal    Consent given by:  Patient    Risks discussed:  Poor cosmetic result, pain, infection and bleeding    Alternatives discussed:  No treatment  Pre-Procedure Details:     Prep Type:  Isopropyl alcohol  Procedure Details:   Injection:  Triamcinolone  Outcome: patient tolerated procedure well  Post-procedure details: sterile dressing applied and wound care instructions given  Dressing type: bandage   Comments:  A total of 1 ml of 40 mg/mL Kenalog were injected into 2 lesion(s)      Specimen A - Tissue/Wound Culture/Smear      Related Medications  doxycycline (Vibramycin) 100 mg capsule  Take 1 capsule (100 mg) by mouth 2 times a day. Take with a full glass of water and do not lie down for at least 30 minutes after.    triamcinolone acetonide (Kenalog-40) injection 40 mg      non-adherent bandage (Curity Abdominal Pad) 8 X 10 \" bandage  Apply 1 Application topically once daily. To areas of hidradenitis for drainage        Follow up  4-6 weeks     "

## 2024-04-03 DIAGNOSIS — C64.9 RENAL CELL CARCINOMA, UNSPECIFIED LATERALITY (MULTI): ICD-10-CM

## 2024-04-03 DIAGNOSIS — G89.3 CANCER ASSOCIATED PAIN: ICD-10-CM

## 2024-04-03 RX ORDER — OXYCODONE HYDROCHLORIDE 5 MG/1
10 TABLET ORAL EVERY 4 HOURS PRN
Qty: 180 TABLET | Refills: 0 | Status: SHIPPED | OUTPATIENT
Start: 2024-04-04 | End: 2024-04-17 | Stop reason: SDUPTHER

## 2024-04-04 ENCOUNTER — HOSPITAL ENCOUNTER (OUTPATIENT)
Dept: RADIOLOGY | Facility: CLINIC | Age: 39
Discharge: HOME | End: 2024-04-04
Payer: COMMERCIAL

## 2024-04-04 DIAGNOSIS — C64.9 RENAL CELL CARCINOMA, UNSPECIFIED LATERALITY (MULTI): ICD-10-CM

## 2024-04-04 LAB
B-LACTAMASE ORGANISM ISLT: POSITIVE
BACTERIA SPEC CULT: ABNORMAL
BACTERIA SPEC CULT: ABNORMAL
GRAM STN SPEC: ABNORMAL
GRAM STN SPEC: ABNORMAL

## 2024-04-04 PROCEDURE — 74176 CT ABD & PELVIS W/O CONTRAST: CPT | Performed by: RADIOLOGY

## 2024-04-04 PROCEDURE — 71250 CT THORAX DX C-: CPT | Performed by: RADIOLOGY

## 2024-04-04 PROCEDURE — 71250 CT THORAX DX C-: CPT

## 2024-04-05 RX ORDER — SULFAMETHOXAZOLE AND TRIMETHOPRIM 400; 80 MG/1; MG/1
TABLET ORAL
Qty: 30 TABLET | Refills: 2 | Status: SHIPPED | OUTPATIENT
Start: 2024-04-05

## 2024-04-09 ENCOUNTER — TELEPHONE (OUTPATIENT)
Dept: DERMATOLOGY | Facility: CLINIC | Age: 39
End: 2024-04-09
Payer: COMMERCIAL

## 2024-04-11 ENCOUNTER — TELEPHONE (OUTPATIENT)
Dept: PALLIATIVE MEDICINE | Facility: HOSPITAL | Age: 39
End: 2024-04-11

## 2024-04-11 ENCOUNTER — OFFICE VISIT (OUTPATIENT)
Dept: HEMATOLOGY/ONCOLOGY | Facility: CLINIC | Age: 39
End: 2024-04-11
Payer: COMMERCIAL

## 2024-04-11 ENCOUNTER — LAB (OUTPATIENT)
Dept: LAB | Facility: CLINIC | Age: 39
End: 2024-04-11
Payer: COMMERCIAL

## 2024-04-11 VITALS
DIASTOLIC BLOOD PRESSURE: 89 MMHG | HEART RATE: 77 BPM | OXYGEN SATURATION: 95 % | SYSTOLIC BLOOD PRESSURE: 135 MMHG | BODY MASS INDEX: 27.97 KG/M2 | TEMPERATURE: 97.7 F | WEIGHT: 173.28 LBS | RESPIRATION RATE: 18 BRPM

## 2024-04-11 DIAGNOSIS — G89.3 CANCER RELATED PAIN: ICD-10-CM

## 2024-04-11 DIAGNOSIS — G89.4 CHRONIC PAIN SYNDROME: ICD-10-CM

## 2024-04-11 DIAGNOSIS — C64.9 RENAL CELL CARCINOMA, UNSPECIFIED LATERALITY (MULTI): ICD-10-CM

## 2024-04-11 DIAGNOSIS — C64.9 RENAL CELL CARCINOMA, UNSPECIFIED LATERALITY (MULTI): Primary | ICD-10-CM

## 2024-04-11 LAB
ALBUMIN SERPL BCP-MCNC: 3.6 G/DL (ref 3.4–5)
ALP SERPL-CCNC: 118 U/L (ref 33–120)
ALT SERPL W P-5'-P-CCNC: 22 U/L (ref 10–52)
ANION GAP SERPL CALC-SCNC: 23 MMOL/L (ref 10–20)
AST SERPL W P-5'-P-CCNC: 17 U/L (ref 9–39)
BILIRUB SERPL-MCNC: 0.3 MG/DL (ref 0–1.2)
BUN SERPL-MCNC: 60 MG/DL (ref 6–23)
CALCIUM SERPL-MCNC: 8.9 MG/DL (ref 8.6–10.6)
CHLORIDE SERPL-SCNC: 96 MMOL/L (ref 98–107)
CO2 SERPL-SCNC: 30 MMOL/L (ref 21–32)
CREAT SERPL-MCNC: 14.59 MG/DL (ref 0.5–1.3)
EGFRCR SERPLBLD CKD-EPI 2021: 4 ML/MIN/1.73M*2
ERYTHROCYTE [DISTWIDTH] IN BLOOD BY AUTOMATED COUNT: 18.9 % (ref 11.5–14.5)
GLUCOSE SERPL-MCNC: 117 MG/DL (ref 74–99)
HCT VFR BLD AUTO: 39.5 % (ref 41–52)
HGB BLD-MCNC: 12.3 G/DL (ref 13.5–17.5)
MCH RBC QN AUTO: 27.8 PG (ref 26–34)
MCHC RBC AUTO-ENTMCNC: 31.1 G/DL (ref 32–36)
MCV RBC AUTO: 89 FL (ref 80–100)
NRBC BLD-RTO: ABNORMAL /100{WBCS}
PLATELET # BLD AUTO: 298 X10*3/UL (ref 150–450)
POTASSIUM SERPL-SCNC: 5.5 MMOL/L (ref 3.5–5.3)
PROT SERPL-MCNC: 6.8 G/DL (ref 6.4–8.2)
RBC # BLD AUTO: 4.43 X10*6/UL (ref 4.5–5.9)
SODIUM SERPL-SCNC: 143 MMOL/L (ref 136–145)
T4 FREE SERPL-MCNC: 0.81 NG/DL (ref 0.78–1.48)
TSH SERPL-ACNC: 4.92 MIU/L (ref 0.44–3.98)
WBC # BLD AUTO: 9.3 X10*3/UL (ref 4.4–11.3)

## 2024-04-11 PROCEDURE — 3075F SYST BP GE 130 - 139MM HG: CPT | Performed by: STUDENT IN AN ORGANIZED HEALTH CARE EDUCATION/TRAINING PROGRAM

## 2024-04-11 PROCEDURE — 99215 OFFICE O/P EST HI 40 MIN: CPT | Performed by: STUDENT IN AN ORGANIZED HEALTH CARE EDUCATION/TRAINING PROGRAM

## 2024-04-11 PROCEDURE — 3008F BODY MASS INDEX DOCD: CPT | Performed by: STUDENT IN AN ORGANIZED HEALTH CARE EDUCATION/TRAINING PROGRAM

## 2024-04-11 PROCEDURE — 36415 COLL VENOUS BLD VENIPUNCTURE: CPT

## 2024-04-11 PROCEDURE — 3079F DIAST BP 80-89 MM HG: CPT | Performed by: STUDENT IN AN ORGANIZED HEALTH CARE EDUCATION/TRAINING PROGRAM

## 2024-04-11 PROCEDURE — 84443 ASSAY THYROID STIM HORMONE: CPT

## 2024-04-11 PROCEDURE — 85027 COMPLETE CBC AUTOMATED: CPT

## 2024-04-11 PROCEDURE — 84439 ASSAY OF FREE THYROXINE: CPT

## 2024-04-11 PROCEDURE — 80053 COMPREHEN METABOLIC PANEL: CPT

## 2024-04-11 ASSESSMENT — ENCOUNTER SYMPTOMS
NAUSEA: 1
DIARRHEA: 0
ROS GI COMMENTS: GAS PAINS
LEG SWELLING: 0
PSYCHIATRIC NEGATIVE: 1
FEVER: 0
LIGHT-HEADEDNESS: 0
VOMITING: 1
ABDOMINAL PAIN: 0
DIZZINESS: 0
DIAPHORESIS: 0
FATIGUE: 1
SORE THROAT: 0
EYE PROBLEMS: 0
UNEXPECTED WEIGHT CHANGE: 0
CONSTIPATION: 0
COUGH: 0
MYALGIAS: 1
ARTHRALGIAS: 1
WOUND: 1
SHORTNESS OF BREATH: 0
EXTREMITY WEAKNESS: 1
APPETITE CHANGE: 1
CHEST TIGHTNESS: 0
CHILLS: 0
VOICE CHANGE: 0

## 2024-04-11 ASSESSMENT — PAIN SCALES - GENERAL: PAINLEVEL: 6

## 2024-04-11 NOTE — TELEPHONE ENCOUNTER
Supportive Oncology referral received from Dr. Thomson. Mr. Orellana has renal cell/ papillary carcinoma. He is experiencing pain, fatigue, nausea, vomiting and decreased appetite. Call placed to  to review referral and offer scheduling.  Mr. Orellana agrees to see Audrey Jones  NP on 5/1/24 @ 1:30pm [facility]. Teams updated via email. (Of note, for scheduling purposes,  Mr. Orellana has  dialysis in the morning on M-W-F. He states that he leaves dialysis by 10 AM.) Teams updated via email.

## 2024-04-11 NOTE — PROGRESS NOTES
Patient ID: Siddhartha Orellana is a 39 y.o. male.  Diagnosis:  Papillary RCC  MedOnc: Dr. Thomson  Nephrologist: Dr. Grover  Vascular Surgeon: Dr. Daigle    Current Therapy: Cabozantinib     ONCOLOGIC HISTORY  11/7/22 - CT-guided biopsy of retroperitoneal lymph node revealed findings consistent with metastatic papillary carcinoma.  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  2/7/23 - On Prednisone 20mg daily  2/17/23 - Scans show SD (RECIST) with some mild tumor shrinkage RPLN  2/21/23 - Increase Prednisone 40mg daily for continued arthralgia; continue Cabo 40 mg daily  3/15/23 - Continue Pred 40mg PO daily, continue Cabo  End March - hidradenitis suppurativa  4/4/23 - Hold Cabo; prednisone 30 mg daily  4/25/23 - Resume cabo  5/18/23 - Hold Cabo 2/2 vascular fistula repair  5/23/23 - Continue hold due to extensive edema, blistering  6/2023 - resumed cabo. Nivo on hold with PDN 25 mg  8/16/23 - continue cabo, reduce PDN to 20 mg daily  8/23/23 - Left upper extremity wound debridement down to subcutaneous tissue and vessels- Cabo on hold  8/31/23 - continue to hold cabo, PDN back at 30mg   9/15/23 - resumed cabozantinib 40 mg   Mid Sep 23 - admission hidradenitis suppurativa  10/10/23 - cabo on hold. Will start PDN 20 mg  10/17/23 - cabo on hold for graft, PDN 20 mg  10/25/23 - cabo on hold for graft, PDN 15 mg daily  11/16/23 - continue cabozantinib hold, PDN 10 mg daily  1/18/24 - mild inguinal/iliac LN growth + stable bone mets in staging scans.   01/2024 - dialysis started  3/7/24 - he got back on cabozantinib 40 mg 2-3 w ago  4/11/24 - scans show SD. Continue cabo 40 alone    Oncology History   Renal cell carcinoma (CMS/HCC)   12/1/2022 -  Chemotherapy     Cabozantinib, 28 Day Cycles     8/28/2023 Initial Diagnosis    Renal cell carcinoma (CMS/HCC)        Past Medical History:   1. Lymphadenopathy and a pre surgical consult for kidney transplant  2. Stage V CKD  3. Type II DM  3. HTN  5. HLD  6. Parathyroidism   7. Anemia with NARGIS component   8. Gastric ulcer  9. SHIVA  10. Vitamin D deficiency   11. Morbid obesity    Past Medical History: Siddhartha has a past medical history of Dorsalgia, unspecified (01/04/2023), End stage renal disease (CMS/HCC) (01/04/2023), Essential (primary) hypertension (01/04/2023), Iron deficiency (02/23/2020), and Other specified postprocedural states.  Surgical History:  Siddhartha has a past surgical history that includes Other surgical history (04/11/2018).  Social History:  Siddhartha reports that he has quit smoking. His smoking use included cigars. He has never been exposed to tobacco smoke. He has never used smokeless tobacco. He reports current alcohol use. He reports that he does not use drugs.  Family History:    Family History   Problem Relation Name Age of Onset    Hypertension Mother      Cancer Father      Hypertension Father      Other (renal transplant recipient) Mother's Brother       Family Oncology History:  Cancer-related family history includes Cancer in his father.    SUBJECTIVE:  History of Present Illness:  Siddhartha Orellana is a 39 y.o. male who presents today for follow up of papillary RCC.    Review of Systems   Constitutional:  Positive for appetite change and fatigue. Negative for chills, diaphoresis, fever and unexpected weight change.   HENT:   Negative for mouth sores, sore throat and voice change.    Eyes:  Negative for eye problems.   Respiratory:  Negative for chest tightness, cough and shortness of breath.    Cardiovascular:  Negative for chest pain and leg swelling.   Gastrointestinal:  Positive for nausea and vomiting. Negative for abdominal pain, constipation and diarrhea.        Gas pains   Musculoskeletal:   "Positive for arthralgias, gait problem and myalgias.   Skin:  Positive for wound. Negative for itching and rash.   Neurological:  Positive for extremity weakness and gait problem. Negative for dizziness and light-headedness.   Psychiatric/Behavioral: Negative.       Allergies  No Known Allergies     Medications  Current Outpatient Medications   Medication Instructions    atorvastatin (LIPITOR) 20 mg, oral, Nightly    B complex-vitamin C-folic acid (Nephrocaps) 1 mg capsule 1 capsule, oral, Daily    cabozantinib (Cabometyx) 40 mg tablet TAKE ONE (1) TABLET BY MOUTH ONCE A DAY    carvedilol (COREG) 25 mg, oral, 2 times daily    doxycycline (VIBRAMYCIN) 100 mg, oral, 2 times daily, Take with a full glass of water and do not lie down for at least 30 minutes after.    hydrocortisone 2.5 % cream 1 APPLICATION TWICE A DAY TO AFFECTED AREA OF FACE (AVOID EYES) TO DARK SPOTS FOR 2-4 WEEKS    lidocaine-prilocaine (Emla) 2.5-2.5 % cream Apply thick layer 2 hours prior to dialysis site, cover with plastic wrap.    loratadine (CLARITIN) 10 mg, oral, Daily    non-adherent bandage (Curity Abdominal Pad) 8 X 10 \" bandage 1 Application, topical (top), Daily, To areas of hidradenitis for drainage    omeprazole (PRILOSEC) 40 mg, oral, Daily    ondansetron (ZOFRAN) 8 mg, oral, 2 times daily PRN    oxyCODONE (ROXICODONE) 10 mg, oral, Every 4 hours PRN    prochlorperazine (COMPAZINE) 10 mg, oral, Every 6 hours PRN    sevelamer HCl (RENAGEL) 800 mg, oral, 3 times daily with meals, Swallow tablet whole; do not crush, break, or chew.    sulfamethoxazole-trimethoprim (Bactrim) 400-80 mg tablet Take 2 tablet by mouth on the first day for a loading dose. Starting day 2 take 1 tablet by mouth once daily.        OBJECTIVE:    VS / Pain:  /89   Pulse 77   Temp 36.5 °C (97.7 °F)   Resp 18   Wt 78.6 kg (173 lb 4.5 oz)   SpO2 95%   BMI 27.97 kg/m²   BSA: 1.91 meters squared  Wt Readings from Last 5 Encounters:   04/11/24 78.6 kg (173 lb " 4.5 oz)   03/13/24 73.9 kg (163 lb)   03/07/24 77 kg (169 lb 12.1 oz)   01/23/24 85.8 kg (189 lb 2.5 oz)   01/18/24 85.8 kg (189 lb 2.5 oz)      Pain Scale: 3    Physical Exam  Constitutional:       Appearance: Normal appearance. He is normal weight.   HENT:      Head: Normocephalic and atraumatic.   Eyes:      Pupils: Pupils are equal, round, and reactive to light.   Pulmonary:      Effort: Pulmonary effort is normal.   Musculoskeletal:         General: No swelling. Normal range of motion.      Cervical back: Normal range of motion.   Skin:     General: Skin is warm and dry.      Findings: Rash present.      Comments: Left arm wound with dressing, hypopigmentation, HS in groin was not assessed   Neurological:      General: No focal deficit present.      Mental Status: He is alert and oriented to person, place, and time.   Psychiatric:         Mood and Affect: Mood normal.         Behavior: Behavior normal.         Thought Content: Thought content normal.         Judgment: Judgment normal.       Performance Status:  ECOG Score: 1- Restricted in physically strenuous activity.  Carries out light duty.  Karnofsky Score: 80 - Normal activity with effort; some signs or symptoms of disease    Diagnostic Results   No results found for this or any previous visit (from the past 96 hour(s)).    Assessment/Plan   I personally saw patient and counselled all visit  on his diagnosis, maria luisa history and coordination of his care.   39yo AA man with hx of stage IV CKD with metastatic papRCC to LNs now on cabo/nivo. He has CKD and planned for kidney dialysis. Known proteinuria +++ (known). Still with sig joint pains and dealing with hyperglycemias. On cabo on hold due to healing fistula and nivo on hold d/t arthritis (PDN) on 30 mg PDN BUT pt self discontinued his prednisone and arthralgias are actually better. Pain is under control on opioids and arthralgias might not be fully IO-related. Scans showing overall disease stable. Now back  on cabo with stable disease - bone mets. Fistula got infiltrated and dialysis on hold. He will need to hold cabo for vascular intervention. Referral to Supp Onc for pain management  I sincerely appreciate the opportunity to see this patient and will contact the referring provider by phone or communicate directly through the electronic chart.  All patient's questions were answered and contact information provided to contact us with any issues.  Renan Thomson MD MSc FACP   in Medicine [employer] [facility] [employer]  Director Clinical  Medical Oncology Research Program   [facility]  Cell [phone]  Office [phone]

## 2024-04-17 DIAGNOSIS — G89.3 CANCER ASSOCIATED PAIN: ICD-10-CM

## 2024-04-17 DIAGNOSIS — C64.9 RENAL CELL CARCINOMA, UNSPECIFIED LATERALITY (MULTI): ICD-10-CM

## 2024-04-17 RX ORDER — OXYCODONE HYDROCHLORIDE 5 MG/1
10 TABLET ORAL EVERY 4 HOURS PRN
Qty: 180 TABLET | Refills: 0 | Status: SHIPPED | OUTPATIENT
Start: 2024-04-17 | End: 2024-05-01 | Stop reason: SDUPTHER

## 2024-04-17 RX ORDER — HYDRALAZINE HYDROCHLORIDE 100 MG/1
TABLET, FILM COATED ORAL
Qty: 90 TABLET | Refills: 10 | OUTPATIENT
Start: 2024-04-17

## 2024-04-19 PROCEDURE — RXMED WILLOW AMBULATORY MEDICATION CHARGE

## 2024-04-24 ENCOUNTER — SPECIALTY PHARMACY (OUTPATIENT)
Dept: PHARMACY | Facility: CLINIC | Age: 39
End: 2024-04-24

## 2024-04-27 ENCOUNTER — PHARMACY VISIT (OUTPATIENT)
Dept: PHARMACY | Facility: CLINIC | Age: 39
End: 2024-04-27
Payer: MEDICAID

## 2024-04-29 NOTE — PROGRESS NOTES
Patient ID: Siddhartha Orellana is a 39 y.o. male.  Diagnosis:  Papillary RCC  MedOnc: Dr. Thomson  Nephrologist: Dr. Grover  Vascular Surgeon: Dr. Daigle    Current Therapy: Cabozantinib     ONCOLOGIC HISTORY  11/7/22 - CT-guided biopsy of retroperitoneal lymph node revealed findings consistent with metastatic papillary carcinoma.  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  2/7/23 - On Prednisone 20mg daily  2/17/23 - Scans show SD (RECIST) with some mild tumor shrinkage RPLN  2/21/23 - Increase Prednisone 40mg daily for continued arthralgia; continue Cabo 40 mg daily  3/15/23 - Continue Pred 40mg PO daily, continue Cabo  End March - hidradenitis suppurativa  4/4/23 - Hold Cabo; prednisone 30 mg daily  4/25/23 - Resume cabo  5/18/23 - Hold Cabo 2/2 vascular fistula repair  5/23/23 - Continue hold due to extensive edema, blistering  6/2023 - resumed cabo. Nivo on hold with PDN 25 mg  8/16/23 - continue cabo, reduce PDN to 20 mg daily  8/23/23 - Left upper extremity wound debridement down to subcutaneous tissue and vessels- Cabo on hold  8/31/23 - continue to hold cabo, PDN back at 30mg   9/15/23 - resumed cabozantinib 40 mg   Mid Sep 23 - admission hidradenitis suppurativa  10/10/23 - cabo on hold. Will start PDN 20 mg  10/17/23 - cabo on hold for graft, PDN 20 mg  10/25/23 - cabo on hold for graft, PDN 15 mg daily  11/16/23 - continue cabozantinib hold, PDN 10 mg daily  1/18/24 - mild inguinal/iliac LN growth + stable bone mets in staging scans.   01/2024 - dialysis started  3/7/24 - he got back on cabozantinib 40 mg 2-3 w ago  4/11/24 - scans show SD. Continue cabo 40 alone  4/12/24 - held for AVF infiltration  5/1/24 - No AVF revision required - restart cabo at  40 mg    Oncology History   Renal cell carcinoma (Multi)   12/1/2022 -  Chemotherapy    Cabozantinib, 28 Day Cycles     8/28/2023 Initial Diagnosis    Renal cell carcinoma (CMS/HCC)        Past Medical History:   1. Lymphadenopathy and a pre surgical consult for kidney transplant  2. Stage V CKD  3. Type II DM  3. HTN  5. HLD  6. Parathyroidism   7. Anemia with NARGIS component   8. Gastric ulcer  9. SHIVA  10. Vitamin D deficiency   11. Morbid obesity    SUBJECTIVE:    History of Present Illness:  Siddhartha Orellana is a 39 y.o. male who presents today for follow up of papillary RCC. Patient of Dr. Thomson currently on cabozantinib monotherapy. Discontinued nivolumab in January 2023 due to arthralgias. Has been off steroids - added oxycodone in attempts to decrease prednisone dose to taper off, but pain continues, describes it as soreness and tenderness, bones and muscles. Weakness now gone since off steroids. Still struggles to do stairs and step up into the truck. His back also has an aching pain, not as frequent. He is taking narcotics ATC every 4 hours, pain is never completely resolved. Knows when he misses meds. He was inactive for a few days recently and did not require as much pain medication. Continues to have nausea at times and takes zofran and compazine with relief. Recently has an infiltration of his AVF - now resolved - held cabo for a few weeks during this - they are using AVF again 3x a week without incident. Multiple flares of hidradenitis suppurativa - HS flares managed with derm - he received shots for this and antibiotics which helped. He sees Dr. Engle again soon. No issues with appetite, PPE, mouth sores. No recent fevers or infections.     Review of Systems   Constitutional:  Negative for appetite change, chills, fatigue, fever and unexpected weight change.   HENT:  Negative.     Eyes: Negative.    Respiratory:  Negative for cough and shortness of breath.    Cardiovascular:  Negative for chest pain  "and leg swelling.   Gastrointestinal:  Positive for nausea. Negative for constipation, diarrhea and vomiting.   Endocrine: Negative.    Genitourinary: Negative.     Musculoskeletal:  Positive for arthralgias, back pain, gait problem and myalgias.   Skin:  Positive for wound. Negative for rash.        HS   Neurological:  Positive for gait problem. Negative for extremity weakness, light-headedness and numbness.   Hematological: Negative.    Psychiatric/Behavioral: Negative.       Allergies  No Known Allergies     Medications  Current Outpatient Medications   Medication Instructions    atorvastatin (LIPITOR) 20 mg, oral, Nightly    B complex-vitamin C-folic acid (Nephrocaps) 1 mg capsule 1 capsule, oral, Daily    cabozantinib (Cabometyx) 40 mg tablet TAKE ONE (1) TABLET BY MOUTH ONCE A DAY    carvedilol (COREG) 25 mg, oral, 2 times daily    doxycycline (VIBRAMYCIN) 100 mg, oral, 2 times daily, Take with a full glass of water and do not lie down for at least 30 minutes after.    hydrocortisone 2.5 % cream 1 APPLICATION TWICE A DAY TO AFFECTED AREA OF FACE (AVOID EYES) TO DARK SPOTS FOR 2-4 WEEKS    lidocaine-prilocaine (Emla) 2.5-2.5 % cream Apply thick layer 2 hours prior to dialysis site, cover with plastic wrap.    loratadine (CLARITIN) 10 mg, oral, Daily    non-adherent bandage (Curity Abdominal Pad) 8 X 10 \" bandage 1 Application, topical (top), Daily, To areas of hidradenitis for drainage    omeprazole (PRILOSEC) 40 mg, oral, Daily    ondansetron (ZOFRAN) 8 mg, oral, Every 8 hours PRN    oxyCODONE (ROXICODONE) 10 mg, oral, Every 4 hours PRN    prochlorperazine (COMPAZINE) 10 mg, oral, Every 6 hours PRN    sevelamer HCl (RENAGEL) 800 mg, oral, 3 times daily with meals, Swallow tablet whole; do not crush, break, or chew.    sulfamethoxazole-trimethoprim (Bactrim) 400-80 mg tablet Take 2 tablet by mouth on the first day for a loading dose. Starting day 2 take 1 tablet by mouth once daily.        OBJECTIVE:    VS / " Pain:  /77   Pulse 100   Temp 36.8 °C (98.2 °F) (Core)   Resp 20   SpO2 97%   BSA: There is no height or weight on file to calculate BSA.  Wt Readings from Last 5 Encounters:   04/11/24 78.6 kg (173 lb 4.5 oz)   03/13/24 73.9 kg (163 lb)   03/07/24 77 kg (169 lb 12.1 oz)   01/23/24 85.8 kg (189 lb 2.5 oz)   01/18/24 85.8 kg (189 lb 2.5 oz)     Physical Exam  Constitutional:       General: He is not in acute distress.     Appearance: Normal appearance. He is not toxic-appearing.   HENT:      Head: Normocephalic and atraumatic.      Mouth/Throat:      Mouth: Mucous membranes are moist.      Pharynx: Oropharynx is clear.   Eyes:      Pupils: Pupils are equal, round, and reactive to light.   Pulmonary:      Effort: Pulmonary effort is normal.   Musculoskeletal:         General: Normal range of motion.      Cervical back: Normal range of motion.      Right lower leg: No edema.      Left lower leg: No edema.   Skin:     General: Skin is warm and dry.      Findings: No rash.   Neurological:      General: No focal deficit present.      Mental Status: He is alert and oriented to person, place, and time.      Motor: No weakness.   Psychiatric:         Mood and Affect: Mood normal.         Behavior: Behavior normal.         Thought Content: Thought content normal.         Judgment: Judgment normal.         Performance Status:  ECOG Score: 1- Restricted in physically strenuous activity.  Carries out light duty.  Karnofsky Score: 80 - Normal activity with effort; some signs or symptoms of disease    Diagnostic Results     No results found for this or any previous visit (from the past 96 hour(s)).  Lab Results   Component Value Date    WBC 9.3 04/11/2024    HGB 12.3 (L) 04/11/2024    HCT 39.5 (L) 04/11/2024    MCV 89 04/11/2024     04/11/2024       Chemistry    Lab Results   Component Value Date/Time     04/11/2024 1040    K 5.5 (H) 04/11/2024 1040    CL 96 (L) 04/11/2024 1040    CO2 30 04/11/2024 1040     BUN 60 (H) 04/11/2024 1040    CREATININE 14.59 (H) 04/11/2024 1040    Lab Results   Component Value Date/Time    CALCIUM 8.9 04/11/2024 1040    ALKPHOS 118 04/11/2024 1040    AST 17 04/11/2024 1040    ALT 22 04/11/2024 1040    BILITOT 0.3 04/11/2024 1040        Lab Results   Component Value Date    TSH 4.92 (H) 04/11/2024     Assessment/Plan   40yo AA man with hx of stage IV CKD with metastatic papRCC to LNs now on cabo/nivo. He has CKD and planned for kidney dialysis. Known proteinuria +++ (known). Still with sig joint pains and dealing with hyperglycemias. On cabo (nivo on hold d/t arthritis (PDN) 30 mg PDN BUT pt self discontinued his prednisone and arthralgias are actually better. Pain is under control on opioids and arthralgias might not be fully IO-related. Scans showing overall disease stable. Now back on cabo with stable disease - bone mets. Fistula got infiltrated and dialysis on hold. He will need to hold cabo for vascular intervention. Referral to Supp Onc for pain management.     AVF repair not required- restart cabozantinib.   Hidradenitis suppurativa - treatment with Dr. Alexandra. He was recently diagnosed by biopsy with psoriasis. No treatment at this time. He has hypopigmentation, likely from cabozantinib.     Supportive onc seeing patient today to follow chronic arthralgias since IO therapy in Jan 2023. I have been prescribing short acting - I believe he may benefit from long-acting medications to control pain. Also checking into autoimmune/Rheumatological cause of pain with CLOVIS, RF, anti-CCP, ESR, CRP per ASCO irAE guidelines since pain has been persistent since IO. Will refer to rheumatology if results are abnormal.     Siddhartha was seen today for follow-up.  Diagnoses and all orders for this visit:  Arthralgia, unspecified joint (Primary)  -     CLOVIS with Reflex to LANE; Future  -     Rheumatoid Factor; Future  -     Citrulline Antibody, IgG; Future  -     Sedimentation Rate; Future  -      C-Reactive Protein; Future  Renal cell carcinoma, unspecified laterality (Multi)  -     Cancel: Clinic Appointment Request OCTAVIA FRASER; [facility] [id number] MEDONC1; Future  -     Cancel: CBC and Auto Differential; Future  -     Cancel: Comprehensive Metabolic Panel; Future  -     Cancel: TSH with reflex to Free T4 if abnormal; Future  -     Clinic Appointment Request [facility] (telephone call); OCTAVIA FRASER (phone call fu, labs prior)  -     prochlorperazine (Compazine) 10 mg tablet; Take 1 tablet (10 mg) by mouth every 6 hours if needed for nausea.  -     ondansetron (Zofran) 8 mg tablet; Take 1 tablet (8 mg) by mouth every 8 hours if needed for nausea.  -     CLOVIS with Reflex to LANE; Future  -     Rheumatoid Factor; Future  -     Citrulline Antibody, IgG; Future  -     Sedimentation Rate; Future  -     C-Reactive Protein; Future  -     CBC and Auto Differential; Future  -     Comprehensive Metabolic Panel; Future  -     TSH with reflex to Free T4 if abnormal; Future  Chronic pain syndrome  -     Clinic Appointment Request Virtual [facility] (telephone call); OCTAVIA FRASER (phone call fu, labs prior)  Chemotherapy induced nausea and vomiting  -     prochlorperazine (Compazine) 10 mg tablet; Take 1 tablet (10 mg) by mouth every 6 hours if needed for nausea.  -     ondansetron (Zofran) 8 mg tablet; Take 1 tablet (8 mg) by mouth every 8 hours if needed for nausea.      Treatment Plan:  Cabozantinib, 28 Day Cycles    # RCC  - Restart Cabozantinib 40 mg daily  - Scans due End of June    # IO related arthralgia   - Self discontinued prednisone in Nov of 2023  - Continued on oxycodone Q4 hours   - Referral to supportive onc - seen today  - CLOVIS, RF, anti-CCP, ESR, CRP to rule in/out need for rheumatology referral    # Nausea  - Compazine and Zofran as needed - scripts sent today    # Hidradenitis Suppurativa, Psoriasis  - Continue follow up with Dr. Alexandra     Follow up in 1 month with labs prior.     Patient  verbalizes understanding of above plan. Time provided for patient's questions. All questions answered to patient's satisfaction in office. Patient instructed to reach out for any new concerning issues at [phone].    Tatiana Rodriguez MSN, APRN, A-GNP-C, OCN   Oncology   [facility]  [id number] [place] [id number]   [facility]  Phone: [phone]  [email or web address]

## 2024-05-01 ENCOUNTER — TELEPHONE (OUTPATIENT)
Dept: PALLIATIVE MEDICINE | Facility: HOSPITAL | Age: 39
End: 2024-05-01

## 2024-05-01 ENCOUNTER — OFFICE VISIT (OUTPATIENT)
Dept: HEMATOLOGY/ONCOLOGY | Facility: CLINIC | Age: 39
End: 2024-05-01
Payer: COMMERCIAL

## 2024-05-01 ENCOUNTER — OFFICE VISIT (OUTPATIENT)
Dept: PALLIATIVE MEDICINE | Facility: CLINIC | Age: 39
End: 2024-05-01
Payer: COMMERCIAL

## 2024-05-01 VITALS
OXYGEN SATURATION: 97 % | SYSTOLIC BLOOD PRESSURE: 110 MMHG | DIASTOLIC BLOOD PRESSURE: 77 MMHG | TEMPERATURE: 98.2 F | RESPIRATION RATE: 20 BRPM | HEART RATE: 100 BPM

## 2024-05-01 DIAGNOSIS — C64.9 RENAL CELL CARCINOMA, UNSPECIFIED LATERALITY (MULTI): ICD-10-CM

## 2024-05-01 DIAGNOSIS — R11.0 NAUSEA: ICD-10-CM

## 2024-05-01 DIAGNOSIS — Z79.891 ENCOUNTER FOR MONITORING OPIOID MAINTENANCE THERAPY: ICD-10-CM

## 2024-05-01 DIAGNOSIS — R11.2 CHEMOTHERAPY INDUCED NAUSEA AND VOMITING: ICD-10-CM

## 2024-05-01 DIAGNOSIS — T45.1X5A CHEMOTHERAPY INDUCED NAUSEA AND VOMITING: ICD-10-CM

## 2024-05-01 DIAGNOSIS — Z51.5 PALLIATIVE CARE ENCOUNTER: Primary | ICD-10-CM

## 2024-05-01 DIAGNOSIS — G89.3 CANCER RELATED PAIN: ICD-10-CM

## 2024-05-01 DIAGNOSIS — M25.50 ARTHRALGIA, UNSPECIFIED JOINT: Primary | ICD-10-CM

## 2024-05-01 DIAGNOSIS — G89.4 CHRONIC PAIN SYNDROME: ICD-10-CM

## 2024-05-01 DIAGNOSIS — G89.3 CANCER ASSOCIATED PAIN: ICD-10-CM

## 2024-05-01 DIAGNOSIS — L40.9 PSORIASIS: ICD-10-CM

## 2024-05-01 DIAGNOSIS — Z51.81 ENCOUNTER FOR MONITORING OPIOID MAINTENANCE THERAPY: ICD-10-CM

## 2024-05-01 PROCEDURE — 3008F BODY MASS INDEX DOCD: CPT | Performed by: NURSE PRACTITIONER

## 2024-05-01 PROCEDURE — 99214 OFFICE O/P EST MOD 30 MIN: CPT | Performed by: NURSE PRACTITIONER

## 2024-05-01 PROCEDURE — 3078F DIAST BP <80 MM HG: CPT

## 2024-05-01 PROCEDURE — 99215 OFFICE O/P EST HI 40 MIN: CPT

## 2024-05-01 PROCEDURE — 3008F BODY MASS INDEX DOCD: CPT

## 2024-05-01 PROCEDURE — 3074F SYST BP LT 130 MM HG: CPT

## 2024-05-01 RX ORDER — NALOXONE HYDROCHLORIDE 4 MG/.1ML
4 SPRAY NASAL AS NEEDED
Qty: 2 EACH | Refills: 0 | Status: SHIPPED | OUTPATIENT
Start: 2024-05-01

## 2024-05-01 RX ORDER — PROCHLORPERAZINE MALEATE 10 MG
10 TABLET ORAL EVERY 6 HOURS PRN
Qty: 60 TABLET | Refills: 11 | Status: SHIPPED | OUTPATIENT
Start: 2024-05-01

## 2024-05-01 RX ORDER — OXYCODONE HYDROCHLORIDE 5 MG/1
10 TABLET ORAL EVERY 4 HOURS PRN
Qty: 180 TABLET | Refills: 0 | Status: SHIPPED | OUTPATIENT
Start: 2024-05-01 | End: 2024-05-14 | Stop reason: SDUPTHER

## 2024-05-01 RX ORDER — FENTANYL 25 UG/1
1 PATCH TRANSDERMAL
Qty: 10 PATCH | Refills: 0 | Status: SHIPPED | OUTPATIENT
Start: 2024-05-01 | End: 2024-05-21 | Stop reason: SDUPTHER

## 2024-05-01 RX ORDER — ONDANSETRON HYDROCHLORIDE 8 MG/1
8 TABLET, FILM COATED ORAL EVERY 8 HOURS PRN
Qty: 60 TABLET | Refills: 11 | Status: SHIPPED | OUTPATIENT
Start: 2024-05-01

## 2024-05-01 RX ORDER — OLANZAPINE 2.5 MG/1
2.5 TABLET ORAL NIGHTLY
Qty: 30 TABLET | Refills: 0 | Status: SHIPPED | OUTPATIENT
Start: 2024-05-01 | End: 2024-05-29 | Stop reason: WASHOUT

## 2024-05-01 ASSESSMENT — ENCOUNTER SYMPTOMS
EYES NEGATIVE: 1
CHILLS: 0
NUMBNESS: 0
DIARRHEA: 0
OCCASIONAL FEELINGS OF UNSTEADINESS: 0
LEG SWELLING: 0
LIGHT-HEADEDNESS: 0
ROS SKIN COMMENTS: HS
UNEXPECTED WEIGHT CHANGE: 0
APPETITE CHANGE: 0
CONSTIPATION: 0
EXTREMITY WEAKNESS: 0
FATIGUE: 0
MYALGIAS: 1
HEMATOLOGIC/LYMPHATIC NEGATIVE: 1
WOUND: 1
LOSS OF SENSATION IN FEET: 0
BACK PAIN: 1
ARTHRALGIAS: 1
PSYCHIATRIC NEGATIVE: 1
FEVER: 0
COUGH: 0
VOMITING: 0
NAUSEA: 1
SHORTNESS OF BREATH: 0
ENDOCRINE NEGATIVE: 1
DEPRESSION: 0

## 2024-05-01 ASSESSMENT — PAIN SCALES - GENERAL: PAINLEVEL: 7

## 2024-05-01 NOTE — TELEPHONE ENCOUNTER
[facility] pharmacy requesting prior authorization for the Fentanyl Patch. Request sent to  Specialty pharmacy

## 2024-05-01 NOTE — PROGRESS NOTES
SUPPORTIVE AND PALLIATIVE ONCOLOGY CONSULT - OUTPATIENT      SERVICE DATE: 5/1/2024    Referred by:  Renan Thomson MD   Medical Oncologist: Renan Thomson MD   Radiation Oncologist: No care team member to display  Primary Physician: Davina Pratt  [phone]    REASON FOR CONSULT/CHIEF CONSULT COMPLAINT: Intro to Supportive Oncology and Symptom Management.     Subjective   HISTORY OF PRESENT ILLNESS: Siddhartha Orellana is a 39 y.o. 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 Discontinued nivolumab in January 2023 due to arthralgias requiring high dose steroids. Course c/b by hidradenitis suppurativa and delayed fistula healing. He was started on oxycodone 10mg TID for pain management.      Additional PMHx:  Lymphadenopathy and a pre surgical consult for kidney transplant  End stage renal failure on dialysis   Type II DM  HTN  HLD  Parathyroidism   Anemia with NARGIS component   Gastric ulcer  SHIVA  Vitamin D deficiency   Morbid obesity    Pain Assessment:    Location varies by day.    Currently located in L calf, R knee, and lower back - throbbing (left calf), shooting (right knee to lower thigh), tight - fairly constant with flares.     Always has constant pain in at least one of his legs. Worse with steps/incline.     Off steroids.     Current regimen:   Oxycodone - 60mg per day   APAP PRN - rare     Symptom Assessment:    Pain:very much   Lack of energy: somewhat  Difficulty sleeping: a little - due to pain   Lack of appetite: a little   Nausea: somewhat  Vomiting: a little  Constipation: none  Numbness or tingling in hands/feet/other: somewhat  Weight loss: somewhat      Information obtained from: interview of patient and interview of family  ______________________________________________________________________     Oncology History   Renal cell carcinoma (Multi)   12/1/2022 -  Chemotherapy    Cabozantinib, 28 Day Cycles     8/28/2023 Initial Diagnosis    Renal cell carcinoma  "(CMS/formerly [facility])         Past Medical History:   Diagnosis Date    Dorsalgia, unspecified 01/04/2023    Back pain    End stage renal disease (Multi) 01/04/2023    ESRD (end stage renal disease) on dialysis    Essential (primary) hypertension 01/04/2023    Hypertension    Iron deficiency 02/23/2020    Iron deficiency    Other specified postprocedural states     H/O endoscopy     Past Surgical History:   Procedure Laterality Date    OTHER SURGICAL HISTORY  04/11/2018    Creation Of A-V Graft Fistula For Dialysis     Family History   Problem Relation Name Age of Onset    Hypertension Mother      Cancer Father      Hypertension Father      Other (renal transplant recipient) Mother's Brother          SOCIAL HISTORY  . 5 children. Thompson.  Social History:  reports that he has quit smoking. His smoking use included cigars. He has never been exposed to tobacco smoke. He has never used smokeless tobacco. He reports current alcohol use. He reports that he does not use drugs.      REVIEW OF SYSTEMS  Review of systems negative unless noted in HPI.       Objective       Current Outpatient Medications   Medication Instructions    atorvastatin (LIPITOR) 20 mg, oral, Nightly    B complex-vitamin C-folic acid (Nephrocaps) 1 mg capsule 1 capsule, oral, Daily    cabozantinib (Cabometyx) 40 mg tablet TAKE ONE (1) TABLET BY MOUTH ONCE A DAY    carvedilol (COREG) 25 mg, oral, 2 times daily    doxycycline (VIBRAMYCIN) 100 mg, oral, 2 times daily, Take with a full glass of water and do not lie down for at least 30 minutes after.    hydrocortisone 2.5 % cream 1 APPLICATION TWICE A DAY TO AFFECTED AREA OF FACE (AVOID EYES) TO DARK SPOTS FOR 2-4 WEEKS    lidocaine-prilocaine (Emla) 2.5-2.5 % cream Apply thick layer 2 hours prior to dialysis site, cover with plastic wrap.    loratadine (CLARITIN) 10 mg, oral, Daily    non-adherent bandage (Curity Abdominal Pad) 8 X 10 \" bandage 1 Application, topical (top), Daily, To areas of hidradenitis for " "drainage    omeprazole (PRILOSEC) 40 mg, oral, Daily    ondansetron (ZOFRAN) 8 mg, oral, 2 times daily PRN    oxyCODONE (ROXICODONE) 10 mg, oral, Every 4 hours PRN    prochlorperazine (COMPAZINE) 10 mg, oral, Every 6 hours PRN    sevelamer HCl (RENAGEL) 800 mg, oral, 3 times daily with meals, Swallow tablet whole; do not crush, break, or chew.    sulfamethoxazole-trimethoprim (Bactrim) 400-80 mg tablet Take 2 tablet by mouth on the first day for a loading dose. Starting day 2 take 1 tablet by mouth once daily.       Allergies: No Known Allergies        CBC, CMP, TSH/FT4 reviewed 4/11/24     PHYSICAL EXAMINATION  Vital Signs:   Vital signs reviewed        1/29/2024    12:44 PM 1/29/2024     4:27 PM 1/29/2024     5:11 PM 3/7/2024     3:26 PM 3/13/2024    12:29 PM 4/11/2024     9:47 AM 5/1/2024     1:15 PM   Vitals   Systolic   134 103 128 135 110   Diastolic   80 67 88 89 77   Heart Rate 78 79 86 84 86 77 100   Temp 36.4 °C (97.5 °F) 36.3 °C (97.3 °F) 37 °C (98.6 °F) 36.7 °C (98.1 °F)  36.5 °C (97.7 °F) 36.8 °C (98.2 °F)   Resp   18 18  18 20   Height (in)     1.676 m (5' 6\")     Weight (lb)    169.75 163 173.28    BMI    27.38 kg/m2 26.31 kg/m2 27.97 kg/m2    BSA (m2)    1.89 m2 1.86 m2 1.91 m2    Visit Report    Report Report Report Report    Report          Physical Exam  HENT:      Head: Normocephalic and atraumatic.      Mouth/Throat:      Pharynx: Oropharynx is clear.   Eyes:      Extraocular Movements: Extraocular movements intact.      Conjunctiva/sclera: Conjunctivae normal.   Pulmonary:      Effort: Pulmonary effort is normal.   Abdominal:      General: Abdomen is flat.   Musculoskeletal:         General: No swelling.      Cervical back: Normal range of motion.   Neurological:      General: No focal deficit present.      Mental Status: He is alert.      Comments: No myoclonus   Psychiatric:         Mood and Affect: Mood normal.         Thought Content: Thought content normal. "         ASSESSMENT/PLAN    Pain  Pain is:  IO related arthralgia, chronic pain syndrome in the setting of end stage renal disease on dialysis  Type: somatic and neuropathic  -We discussed starting methadone (given ESRD and neuropathic component) vs fentanyl TD with renally dosed gabapentin. He prefers fentanyl TD and gabapentin.   -Start fentanyl TD 25mcg q72  -Continue oxycodone 10mg q4 PRN for now (I recommended rotating to hydromorphone as it is more renally favored but due to patient's concern of making multiple changes will defer until next appointment as he is not having any signs of toxicity from the oxycodone at this time)  -Will add renally dosed gabapentin at next appointment  -Continue APAP as needed   -Autoimmune/Rheum workup per oncology     Opioid Use  Medication Management:   - OARRS report reviewed with no aberrant behavior; consistent with  prescriptions/records and patient history  - MED 90.  Overdose Risk Score 460.   This has been discussed with patient.   - We will continue to closely monitor the patient for signs of prescription misuse including UDS, OARRS review and subjective reports at each visit.  - No concurrent benzodiazepine use   - I am a provider who either is or has consulted and collaborated with a provider certified in Hospice and Palliative Medicine and have conducted a face-face visit and examination for this patient.  - Routine Urine Drug Screen complete at next visit   - Controlled Substance Agreement complete at next visit  - Specifically discussed that controlled substance prescriptions will only be provided by our group as outlined in the completed agreement  - Prescribed naloxone 5/1/24  - Red Flags: None      Nausea   Intermittent nausea with vomiting related to opioids and dialysis   -Start zyprexa 2.5mg at bedtime  -Continue compazine and zofran as needed     Constipation   At risk for constipation related to opioids  -Continue miralax 17 g daily as needed     Sleeping  Difficulty:  -Treat pain     Decreased appetite  -zyprexa per above      Introduction to Supportive and Palliative Oncology:  Spoke with patient and wife   Introduced the role and philosophy of Supportive and Palliative oncology in the evaluation and management of symptoms during cancer treatment      Medical Decision Making/Goals of Care/Advance Care Planning:  Patient's current clinical condition, including diagnosis, prognosis, and management plan, and goals of care were discussed.   Life limiting disease: RCC  Family: Supportive wife/children   Goals: symptom control and cancer directed therapy    Advance Directives  Existence of Advance Directives:Other address at next visit     Next Follow-Up Visit:  Return to clinic in 4 weeks     Signature and billing  Thank you for allowing us to participate in the care of this patient. Recommendations will be communicated back to the consulting service by way of shared electronic medical record or face-to-face.    Medical complexity was moderate level due to due to complexity of problems, extensive data review, and high risk of management/treatment.  Time was spent on the following: Prep Time, Time Directly with Patient/Family/Caregiver, Documentation Time. Total time spent: 45 min       DATA   Diagnostic tests and information reviewed for today's visit:  Most recent labs and imaging results, Medications       Plan of Care discussed with: Provider, Patient, wife and RN      SIGNATURE: Audrey Jones, APRN-CNP    Contact information:  Supportive and Palliative Oncology  Monday-Friday 8 AM-5 PM  Phone:  [phone], press option #5, then option #1.   Or Epic Secure Chat

## 2024-05-03 RX ORDER — LORATADINE 10 MG/1
10 TABLET ORAL DAILY
Qty: 30 TABLET | Refills: 11 | Status: SHIPPED | OUTPATIENT
Start: 2024-05-03

## 2024-05-06 NOTE — OP NOTE
PREOPERATIVE DIAGNOSIS:  1. Malfunctioning left upper extremity radiocephalic AV fistula.  2. Chronic kidney disease stage 5.  3. 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     POSTOPERATIVE DIAGNOSIS:  1. Malfunctioning left upper extremity radiocephalic AV fistula.  2. Chronic kidney disease stage 5.  3. 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     OPERATION/PROCEDURE:  1. Left upper extremity AV fistula revision with resection of  aneurysm and primary reconstruction of the proximal AV fistula with  bovine pericardial patch angioplasty.   2. Balloon angioplasty, ultrasound guidance of outflow stenosis with  an 8 mm PTA followed by an 8 x 40 Medtronic IN.PACT drug-coated  balloon angioplasty.     SURGEON:  Val Daigle MD.    ASSISTANT(S):  Malachi Mallory.    ANESTHESIA:  General with 0.25% Marcaine for local anesthetic.  No contrast was  used.     INDICATION:  The patient is a 38-year-old male who had a left radiocephalic AV  fistula created some years ago by Dr. Parada.  He has had some  persistent stable aneurysmal degeneration of the inflow part of the  fistula, likely related to a couple of outflow stenoses that were  causing pulsatility at this segment of aneurysm.  He has still CKD 5,  not on dialysis, and he did not wish for any contrast certainly on my  mapping from the clinic.  The best way to address this was with an  open reconstruction as well as ultrasound-guided intervention for  what I think was an outflow stenosis on the cephalic vein in the  forearm.  I explained this to the patient.  He wished to proceed.  Risks and benefits were explained.  All questions were answered.  Consent was obtained.     DESCRIPTION OF PROCEDURE:  The patient was brought to the operating room.  He was placed on the  operating table in supine position with his left arm out.  Appropriate time-out and a Huddle performed by name, date of  birth,  medical record number.  He underwent general anesthesia.  He received  Ancef.  We went ahead and ultrasounded his fistula, mapped it prior  to prepping and draping.  We marked out the area of the outflow  stenosis at the forearm and then his anastomosis was robust,  measuring about 8 to 9 mm.  The proximal part of the fistula was of  adequate caliber, and then I, think there were 2 segments of  tortuosity which likely contributed to some either band or stenosis,  which we will address once we get into dissect out the fistula.  All  of this was marked out.  Based on this, I think this was still  amenable to my plan as discussed with the patient.  We made a  semicircular lateral based incision to avoid the cannulation zone and  then raised this as a skin flap away medial and then exposed the  entire fistula both close to the arterial anastomosis, there was  plenty of length and positions for clamping and just about 3 cm  distal to where the stenosis past the aneurysm was located.  We  placed vessel loops at those 2 locations.  We tied off any branches  with 2-0 and 3-0 silk suture with hemostatic clips as appropriate,  and then at this point, I felt it was best to proceed with the  angioplasty so we cannulated the aneurysmal part of the fistula which  was going to be resected with a micropuncture needle, advanced our  micropuncture wire under ultrasound guidance.  We then upsized to the  micropuncture sheath, removed the dilator and wire, then took a stiff  Glidewire.  Again using ultrasound, we maneuvered the wire all the  way to the basilic vein into the axillary vein where we parked the  wire for intervention.  Following this, we upsized to a 6 and  eventually a 7, 6 cm long Czech sheath, removed the dilator, heparin  flushed the sheath, and gave 3000 units of heparin systemically.  We  then took a 6 followed by an 8 x 4 Dixon balloon angioplasty for 2  minutes, and identifying the stenosis.  We balloon  angioplastied each  of the segments direct visualization under ultrasound for 2 minutes  each.  There was improvement in the caliber after the 8, and this was  more consistent with the size of the vein proximal and distal to the  stenosis, and therefore, we chose an 8 x 40 IN.PACT drug-coated  balloon angioplasty.  We put this across the lesion again with  ultrasound guidance and insufflated under direct ultrasound guidance  for a total of 3 minutes, removed the balloon, re-ultrasounded the  lesion with an improvement in the caliber and improvement in thrill  with less pulsatility.  At this point our balloon angioplasty  intervention was completed we removed the balloon and the wire and  then placed profunda clamps proximal and distal after removing the  sheath and then proceeded to remove the aneurysm, first opening up  the anterior wall.  There was definitely a stenosis on the inflow and  outflow side of this aneurysm which needed patch angioplasty for  reconstruction, and there was certainly redundancy in the fistula  which required me to resect and transect the fistula such to recreate  the back wall as well, so we did that, transected and resected as  much of the aneurysmal segments with leaving enough to recreate the  posterior wall for about a centimeter wide.  We did that with a 6-0  Prolene doing the back wall in a running suture fashion, tied both  ends, placed shots, and then took a bovine pericardial patch, cut it  to about 2 cm in length and tapered the ends to again recreate the  anterior portion of the wall and then used 6-0 Prolene in parachuting  fashion from the cephalad and the caudad area, running this both  suture lines and meeting up with the suture line from the posterior  wall and tying those ends together as well, incorporating it into 1  complete suture line.  We back bled and forward bled the fistula  prior to opening things up and restoring flow to the fistula with an  excellent thrill  and no pulsatility compared to preop.  There was 1  area of bleeding that required to repair suture, but otherwise, the  suture line was intact with no bleeding.  No protamine was given.  We  did antibiotic irrigation, 0.5% Marcaine for local anesthetic, and  then used thrombin, Gelfoam, and FloSeal for hemostasis.  Then, we  closed the wound, bringing the flap back tacking it down with 2-0  Vicryl figure-of-eight interrupted fashion in a couple of layers and  then the skin was 4-0 PDS in a subcuticular running stitch, and then  LiquiBand glue and a sterile dressing was applied.  At this point,  the procedure was completed.  I was present for the entire case.  All  counts were correct.  There were no complications.       Val Daigle MD    DD:  05/19/2023 18:13:19 EST  DT:  05/20/2023 04:28:55 EST  DICTATION NUMBER:  [id number]  INTERNAL JOB NUMBER:  [id number]    CC:  Val Daigle MD, Fax: [phone]        Electronic Signatures:  Val Daigle) (Signed on 22-May-2023 18:29)   Authored  Unsigned, Draft (SYS GENERATED) (Entered on 20-May-2023 04:28)   Entered    Last Updated: 22-May-2023 18:29 by Val Daigle)

## 2024-05-14 ENCOUNTER — TELEPHONE (OUTPATIENT)
Dept: PALLIATIVE MEDICINE | Facility: HOSPITAL | Age: 39
End: 2024-05-14
Payer: COMMERCIAL

## 2024-05-14 ENCOUNTER — TELEPHONE (OUTPATIENT)
Dept: HEMATOLOGY/ONCOLOGY | Facility: HOSPITAL | Age: 39
End: 2024-05-14

## 2024-05-14 DIAGNOSIS — Z51.5 PALLIATIVE CARE ENCOUNTER: ICD-10-CM

## 2024-05-14 DIAGNOSIS — C64.9 RENAL CELL CARCINOMA, UNSPECIFIED LATERALITY (MULTI): ICD-10-CM

## 2024-05-14 DIAGNOSIS — N19 HYPERPHOSPHATEMIA ASSOCIATED WITH RENAL FAILURE: ICD-10-CM

## 2024-05-14 DIAGNOSIS — G89.3 CANCER ASSOCIATED PAIN: ICD-10-CM

## 2024-05-14 DIAGNOSIS — E83.39 HYPERPHOSPHATEMIA ASSOCIATED WITH RENAL FAILURE: ICD-10-CM

## 2024-05-14 RX ORDER — SEVELAMER HYDROCHLORIDE 800 MG/1
1600 TABLET, FILM COATED ORAL
Qty: 540 TABLET | Refills: 3 | Status: SHIPPED | OUTPATIENT
Start: 2024-05-14 | End: 2025-05-14

## 2024-05-14 RX ORDER — OXYCODONE HYDROCHLORIDE 10 MG/1
10 TABLET ORAL EVERY 4 HOURS PRN
Qty: 180 TABLET | Refills: 0 | Status: SHIPPED | OUTPATIENT
Start: 2024-05-14 | End: 2024-06-12 | Stop reason: SDUPTHER

## 2024-05-14 NOTE — TELEPHONE ENCOUNTER
[facility] pharmacy called [phone] to check on status of the appeal for the patient's fentanyl patches.  No decision made as of yet.  Will call next week to check again.

## 2024-05-15 RX ORDER — CALCITRIOL 0.25 UG/1
0.25 CAPSULE ORAL NIGHTLY
Qty: 30 CAPSULE | Refills: 10 | OUTPATIENT
Start: 2024-05-15

## 2024-05-17 ASSESSMENT — ENCOUNTER SYMPTOMS
BACK PAIN: 1
DIARRHEA: 0
SHORTNESS OF BREATH: 0
VOMITING: 0
EXTREMITY WEAKNESS: 0
PSYCHIATRIC NEGATIVE: 1
APPETITE CHANGE: 0
FATIGUE: 0
UNEXPECTED WEIGHT CHANGE: 0
ENDOCRINE NEGATIVE: 1
MYALGIAS: 1
EYES NEGATIVE: 1
FEVER: 0
NAUSEA: 1
HEMATOLOGIC/LYMPHATIC NEGATIVE: 1
COUGH: 0
WOUND: 1
ARTHRALGIAS: 1
LEG SWELLING: 0
NUMBNESS: 0
CHILLS: 0
ROS SKIN COMMENTS: HS
CONSTIPATION: 0
LIGHT-HEADEDNESS: 0

## 2024-05-17 NOTE — PROGRESS NOTES
Patient ID: Siddhartha Orellana is a 39 y.o. male.  Diagnosis:  Papillary RCC  MedOnc: Dr. Thomson  Nephrologist: Dr. Grover  Vascular Surgeon: Dr. Daigle    Current Therapy: Cabozantinib     ONCOLOGIC HISTORY  11/7/22 - CT-guided biopsy of retroperitoneal lymph node revealed findings consistent with metastatic papillary carcinoma.  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  2/7/23 - On Prednisone 20mg daily  2/17/23 - Scans show SD (RECIST) with some mild tumor shrinkage RPLN  2/21/23 - Increase Prednisone 40mg daily for continued arthralgia; continue Cabo 40 mg daily  3/15/23 - Continue Pred 40mg PO daily, continue Cabo  End March - hidradenitis suppurativa  4/4/23 - Hold Cabo; prednisone 30 mg daily  4/25/23 - Resume cabo  5/18/23 - Hold Cabo 2/2 vascular fistula repair  5/23/23 - Continue hold due to extensive edema, blistering  6/2023 - resumed cabo. Nivo on hold with PDN 25 mg  8/16/23 - continue cabo, reduce PDN to 20 mg daily  8/23/23 - Left upper extremity wound debridement down to subcutaneous tissue and vessels- Cabo on hold  8/31/23 - continue to hold cabo, PDN back at 30mg   9/15/23 - resumed cabozantinib 40 mg   Mid Sep 23 - admission hidradenitis suppurativa  10/10/23 - cabo on hold. Will start PDN 20 mg  10/17/23 - cabo on hold for graft, PDN 20 mg  10/25/23 - cabo on hold for graft, PDN 15 mg daily  11/16/23 - continue cabozantinib hold, PDN 10 mg daily  1/18/24 - mild inguinal/iliac LN growth + stable bone mets in staging scans.   01/2024 - dialysis started  3/7/24 - he got back on cabozantinib 40 mg 2-3 w ago  4/11/24 - scans show SD. Continue cabo 40 alone  4/12/24 - held for AVF infiltration  5/1/24 - No AVF revision required - restart cabo at  40 mg  5/29/24 - Continue cabozantinib    Oncology History   Renal cell carcinoma (Multi)   12/1/2022 -  Chemotherapy    Cabozantinib, 28 Day Cycles     8/28/2023 Initial Diagnosis    Renal cell carcinoma (CMS/HCC)       PMH: Stage V CKD on HD, Type II DM, HTN, HLD, Parathryroidism, Anemia, gastric ulcer, SHIVA, Vit D def     SUBJECTIVE:    History of Present Illness:  Siddhartha Orellana is a 39 y.o. male who presents today for follow up of papillary RCC. Patient of Dr. Thomson currently on cabozantinib monotherapy. Discontinued nivolumab in January 2023 due to arthralgias. Has been off steroids - added oxycodone in attempts to decrease prednisone dose to taper off, but pain continues, describes it as soreness and tenderness, bones and muscles. Weakness now gone since off steroids. Still struggles to do stairs and step up into the truck. He is now followed by supportive onc. He is tolerating cabozantinib well with no breaks. Currently struggling with hidradenitis suppurativa flare and working on getting a hold of Dr. Engle. Continues to have AM nausea and vomiting, seems like it may be tied to uremia and not cabo side effect. Denies mouth sore. Eating well. No signs of HFS. Denies diarrhea. HD going well 3 times a week. AV fistula looks healed, no more issues. He is receiving epogen with HD. No recent fevers or infections.     Review of Systems   Constitutional:  Negative for appetite change, chills, fatigue, fever and unexpected weight change.   HENT:  Negative.     Eyes: Negative.    Respiratory:  Negative for cough and shortness of breath.    Cardiovascular:  Negative for chest pain and leg swelling.   Gastrointestinal:  Positive for nausea. Negative for constipation, diarrhea and vomiting.   Endocrine: Negative.    Genitourinary: Negative.     Musculoskeletal:  Positive for arthralgias, back pain, gait problem and myalgias.   Skin:  Positive for wound. Negative for rash.        HS   Neurological:  Positive for gait  "problem. Negative for extremity weakness, light-headedness and numbness.   Hematological: Negative.    Psychiatric/Behavioral: Negative.       Allergies  No Known Allergies     Medications  Current Outpatient Medications   Medication Instructions    atorvastatin (LIPITOR) 20 mg, oral, Nightly    B complex-vitamin C-folic acid (Nephrocaps) 1 mg capsule 1 capsule, oral, Daily    cabozantinib (Cabometyx) 40 mg tablet TAKE ONE (1) TABLET BY MOUTH ONCE A DAY    carvedilol (COREG) 25 mg, oral, 2 times daily    doxycycline (VIBRAMYCIN) 100 mg, oral, 2 times daily, Take with a full glass of water and do not lie down for at least 30 minutes after.    fentaNYL (Duragesic) 25 mcg/hr patch 1 patch, transdermal, Every 72 hours    hydrocortisone 2.5 % cream 1 APPLICATION TWICE A DAY TO AFFECTED AREA OF FACE (AVOID EYES) TO DARK SPOTS FOR 2-4 WEEKS    lidocaine-prilocaine (Emla) 2.5-2.5 % cream Apply thick layer 2 hours prior to dialysis site, cover with plastic wrap.    loratadine (CLARITIN) 10 mg, oral, Daily    naloxone (NARCAN) 4 mg, nasal, As needed, May repeat every 2-3 minutes if needed, alternating nostrils, until medical assistance becomes available.    non-adherent bandage (Curity Abdominal Pad) 8 X 10 \" bandage 1 Application, topical (top), Daily, To areas of hidradenitis for drainage    omeprazole (PRILOSEC) 40 mg, oral, Daily    ondansetron (ZOFRAN) 8 mg, oral, Every 8 hours PRN    oxyCODONE (ROXICODONE) 10 mg, oral, Every 4 hours PRN    prochlorperazine (COMPAZINE) 10 mg, oral, Every 6 hours PRN    sevelamer HCl (RENAGEL) 1,600 mg, oral, 3 times daily before meals, Swallow tablet whole; do not crush, break, or chew.    sulfamethoxazole-trimethoprim (Bactrim) 400-80 mg tablet Take 2 tablet by mouth on the first day for a loading dose. Starting day 2 take 1 tablet by mouth once daily.        OBJECTIVE:    VS / Pain:  /81   Pulse 105   Temp 36.2 °C (97.2 °F) (Core)   Resp 20   Wt 75.7 kg (166 lb 14.2 oz)   " SpO2 98%   BMI 26.94 kg/m²   BSA: 1.88 meters squared  Wt Readings from Last 5 Encounters:   05/29/24 75.7 kg (166 lb 14.2 oz)   04/11/24 78.6 kg (173 lb 4.5 oz)   03/13/24 73.9 kg (163 lb)   03/07/24 77 kg (169 lb 12.1 oz)   01/23/24 85.8 kg (189 lb 2.5 oz)     Physical Exam  Constitutional:       General: He is not in acute distress.     Appearance: Normal appearance. He is not toxic-appearing.   HENT:      Head: Normocephalic and atraumatic.      Mouth/Throat:      Mouth: Mucous membranes are moist.      Pharynx: Oropharynx is clear.   Eyes:      Pupils: Pupils are equal, round, and reactive to light.   Pulmonary:      Effort: Pulmonary effort is normal.   Musculoskeletal:         General: Normal range of motion.      Cervical back: Normal range of motion.      Right lower leg: No edema.      Left lower leg: No edema.   Skin:     General: Skin is warm and dry.      Findings: No rash.   Neurological:      General: No focal deficit present.      Mental Status: He is alert and oriented to person, place, and time.      Motor: No weakness.   Psychiatric:         Mood and Affect: Mood normal.         Behavior: Behavior normal.         Thought Content: Thought content normal.         Judgment: Judgment normal.       Performance Status:  ECOG Score: 1- Restricted in physically strenuous activity.  Carries out light duty.  Karnofsky Score: 80 - Normal activity with effort; some signs or symptoms of disease    Diagnostic Results     Results for orders placed or performed in visit on 05/28/24 (from the past 96 hour(s))   Rheumatoid Factor   Result Value Ref Range    Rheumatoid Factor <10 0 - 15 IU/mL   Citrulline Antibody, IgG   Result Value Ref Range    Citrulline Antibody, IgG <1 <3 U/mL   Sedimentation Rate   Result Value Ref Range    Sedimentation Rate 32 (H) 0 - 15 mm/h   C-Reactive Protein   Result Value Ref Range    C-Reactive Protein 2.86 (H) <1.00 mg/dL   CBC and Auto Differential   Result Value Ref Range    WBC  10.3 4.4 - 11.3 x10*3/uL    nRBC      RBC 4.31 (L) 4.50 - 5.90 x10*6/uL    Hemoglobin 12.6 (L) 13.5 - 17.5 g/dL    Hematocrit 39.5 (L) 41.0 - 52.0 %    MCV 92 80 - 100 fL    MCH 29.2 26.0 - 34.0 pg    MCHC 31.9 (L) 32.0 - 36.0 g/dL    RDW 18.1 (H) 11.5 - 14.5 %    Platelets 309 150 - 450 x10*3/uL    Neutrophils % 68.8 40.0 - 80.0 %    Immature Granulocytes %, Automated 0.1 0.0 - 0.9 %    Lymphocytes % 22.7 13.0 - 44.0 %    Monocytes % 5.7 2.0 - 10.0 %    Eosinophils % 2.0 0.0 - 6.0 %    Basophils % 0.7 0.0 - 2.0 %    Neutrophils Absolute 7.06 1.20 - 7.70 x10*3/uL    Immature Granulocytes Absolute, Automated 0.01 0.00 - 0.70 x10*3/uL    Lymphocytes Absolute 2.33 1.20 - 4.80 x10*3/uL    Monocytes Absolute 0.58 0.10 - 1.00 x10*3/uL    Eosinophils Absolute 0.21 0.00 - 0.70 x10*3/uL    Basophils Absolute 0.07 0.00 - 0.10 x10*3/uL   Comprehensive Metabolic Panel   Result Value Ref Range    Glucose 103 (H) 74 - 99 mg/dL    Sodium 139 136 - 145 mmol/L    Potassium 5.5 (H) 3.5 - 5.3 mmol/L    Chloride 92 (L) 98 - 107 mmol/L    Bicarbonate 29 21 - 32 mmol/L    Anion Gap 24 (H) 10 - 20 mmol/L    Urea Nitrogen 48 (H) 6 - 23 mg/dL    Creatinine 11.37 (H) 0.50 - 1.30 mg/dL    eGFR 5 (L) >60 mL/min/1.73m*2    Calcium 9.0 8.6 - 10.6 mg/dL    Albumin 4.5 3.4 - 5.0 g/dL    Alkaline Phosphatase 169 (H) 33 - 120 U/L    Total Protein 8.0 6.4 - 8.2 g/dL    AST 16 9 - 39 U/L    Bilirubin, Total 0.3 0.0 - 1.2 mg/dL    ALT 26 10 - 52 U/L   TSH with reflex to Free T4 if abnormal   Result Value Ref Range    Thyroid Stimulating Hormone 4.02 (H) 0.44 - 3.98 mIU/L   Thyroxine, Free   Result Value Ref Range    Thyroxine, Free 1.02 0.78 - 1.48 ng/dL     Assessment/Plan   38yo AA man with hx of stage IV CKD with metastatic papRCC to LNs now on cabo/nivo. He has CKD and planned for kidney dialysis. Known proteinuria +++ (known). Still with sig joint pains and dealing with hyperglycemias. On cabo (nivo on hold d/t arthritis (PDN) 30 mg PDN BUT pt  self discontinued his prednisone and arthralgias are actually better. Pain is under control on opioids and arthralgias might not be fully IO-related. Scans showing overall disease stable. Now back on cabo with stable disease - bone mets. Fistula got infiltrated and dialysis on hold. He will need to hold cabo for vascular intervention. Referral to Supp Onc for pain management.     CBC and CMP baseline.  He is tolerating cabozantinib well.   AVF repair was not required and he resumed HD and cabozantinib at last visit.   Hidradenitis suppurativa - treatment with Dr. Alexandra. He was recently diagnosed by biopsy with psoriasis. No treatment at this time. He has hypopigmentation, likely from cabozantinib.     Supportive onc seeing patient today to follow chronic arthralgias since IO therapy in Jan 2023. They have taken over pain management for me. I checked into autoimmune/Rheumatological cause of pain with CLOVIS, RF, anti-CCP, ESR, CRP per ASCO irAE guidelines since pain has been persistent since IO. CRP and Sed rate slighty elevated. RF and Citrulline Antibody WNL. CLOVIS pending. Will refer to rheumatology if results are abnormal.     Siddhartha was seen today for follow-up.  Diagnoses and all orders for this visit:  Renal cell carcinoma, unspecified laterality (Multi) (Primary)  -     CT chest abdomen pelvis wo IV contrast; Future  -     CBC and Auto Differential; Future  -     Comprehensive Metabolic Panel; Future  -     Clinic Appointment Request Follow Up; MARYLOU JUAREZ; [facility] [id number] MEDONC1; Future  -     CBC and Auto Differential; Future  -     Comprehensive Metabolic Panel; Future  -     TSH with reflex to Free T4 if abnormal; Future  -     Clinic Appointment Request Follow Up; SUSANA SETHI; [facility] 1F MEDONC1; Future      Treatment Plan:  Cabozantinib, 28 Day Cycles    # RCC  - Continue Cabozantinib 40 mg daily  - Scans due End of June    # IO related arthralgia   - Self discontinued prednisone in Nov of 2023  -  Continued on oxycodone Q4 hours   - Referred to supportive onc - seen today  - CLOVIS, RF, anti-CCP, ESR, CRP to rule in/out need for rheumatology referral     # Nausea  - Compazine and Zofran as needed   - possibly related to uremia     # Hidradenitis Suppurativa, Psoriasis  - Continue follow up with Dr. Alexandra     Follow up in 1 month with labs and scans prior.     Patient verbalizes understanding of above plan. Time provided for patient's questions. All questions answered to patient's satisfaction in office. Patient instructed to reach out for any new concerning issues at [phone].    Tatiana Rodriguez MSN, APRN, A-GNP-C, OCN   Oncology   [facility]  [address] [place] [id number]   [facility]  Phone: [phone]  [email or web address]

## 2024-05-21 RX ORDER — FENTANYL 25 UG/1
1 PATCH TRANSDERMAL
Qty: 10 PATCH | Refills: 0 | Status: SHIPPED | OUTPATIENT
Start: 2024-05-21 | End: 2024-06-20

## 2024-05-21 NOTE — TELEPHONE ENCOUNTER
Called [facility] and fentanyl was approved as of . Pharmacy needs a new script as last script sent  is .     Pharmacy updated, script went through and just needs to be ordered in. Called patient to let him know/ discuss, left  for a call back about his medication.

## 2024-05-22 ENCOUNTER — SPECIALTY PHARMACY (OUTPATIENT)
Dept: PHARMACY | Facility: CLINIC | Age: 39
End: 2024-05-22

## 2024-05-28 ENCOUNTER — LAB (OUTPATIENT)
Dept: LAB | Facility: CLINIC | Age: 39
End: 2024-05-28
Payer: COMMERCIAL

## 2024-05-28 ENCOUNTER — TELEPHONE (OUTPATIENT)
Dept: PALLIATIVE MEDICINE | Facility: HOSPITAL | Age: 39
End: 2024-05-28
Payer: COMMERCIAL

## 2024-05-28 DIAGNOSIS — M25.50 ARTHRALGIA, UNSPECIFIED JOINT: ICD-10-CM

## 2024-05-28 DIAGNOSIS — C64.9 RENAL CELL CARCINOMA, UNSPECIFIED LATERALITY (MULTI): ICD-10-CM

## 2024-05-28 LAB
ALBUMIN SERPL BCP-MCNC: 4.5 G/DL (ref 3.4–5)
ALP SERPL-CCNC: 169 U/L (ref 33–120)
ALT SERPL W P-5'-P-CCNC: 26 U/L (ref 10–52)
ANION GAP SERPL CALC-SCNC: 24 MMOL/L (ref 10–20)
AST SERPL W P-5'-P-CCNC: 16 U/L (ref 9–39)
BASOPHILS # BLD AUTO: 0.07 X10*3/UL (ref 0–0.1)
BASOPHILS NFR BLD AUTO: 0.7 %
BILIRUB SERPL-MCNC: 0.3 MG/DL (ref 0–1.2)
BUN SERPL-MCNC: 48 MG/DL (ref 6–23)
CALCIUM SERPL-MCNC: 9 MG/DL (ref 8.6–10.6)
CCP IGG SERPL-ACNC: <1 U/ML
CHLORIDE SERPL-SCNC: 92 MMOL/L (ref 98–107)
CO2 SERPL-SCNC: 29 MMOL/L (ref 21–32)
CREAT SERPL-MCNC: 11.37 MG/DL (ref 0.5–1.3)
CRP SERPL-MCNC: 2.86 MG/DL
EGFRCR SERPLBLD CKD-EPI 2021: 5 ML/MIN/1.73M*2
EOSINOPHIL # BLD AUTO: 0.21 X10*3/UL (ref 0–0.7)
EOSINOPHIL NFR BLD AUTO: 2 %
ERYTHROCYTE [DISTWIDTH] IN BLOOD BY AUTOMATED COUNT: 18.1 % (ref 11.5–14.5)
ERYTHROCYTE [SEDIMENTATION RATE] IN BLOOD BY WESTERGREN METHOD: 32 MM/H (ref 0–15)
GLUCOSE SERPL-MCNC: 103 MG/DL (ref 74–99)
HCT VFR BLD AUTO: 39.5 % (ref 41–52)
HGB BLD-MCNC: 12.6 G/DL (ref 13.5–17.5)
IMM GRANULOCYTES # BLD AUTO: 0.01 X10*3/UL (ref 0–0.7)
IMM GRANULOCYTES NFR BLD AUTO: 0.1 % (ref 0–0.9)
LYMPHOCYTES # BLD AUTO: 2.33 X10*3/UL (ref 1.2–4.8)
LYMPHOCYTES NFR BLD AUTO: 22.7 %
MCH RBC QN AUTO: 29.2 PG (ref 26–34)
MCHC RBC AUTO-ENTMCNC: 31.9 G/DL (ref 32–36)
MCV RBC AUTO: 92 FL (ref 80–100)
MONOCYTES # BLD AUTO: 0.58 X10*3/UL (ref 0.1–1)
MONOCYTES NFR BLD AUTO: 5.7 %
NEUTROPHILS # BLD AUTO: 7.06 X10*3/UL (ref 1.2–7.7)
NEUTROPHILS NFR BLD AUTO: 68.8 %
NRBC BLD-RTO: ABNORMAL /100{WBCS}
PLATELET # BLD AUTO: 309 X10*3/UL (ref 150–450)
POTASSIUM SERPL-SCNC: 5.5 MMOL/L (ref 3.5–5.3)
PROT SERPL-MCNC: 8 G/DL (ref 6.4–8.2)
RBC # BLD AUTO: 4.31 X10*6/UL (ref 4.5–5.9)
RHEUMATOID FACT SER NEPH-ACNC: <10 IU/ML (ref 0–15)
SODIUM SERPL-SCNC: 139 MMOL/L (ref 136–145)
T4 FREE SERPL-MCNC: 1.02 NG/DL (ref 0.78–1.48)
TSH SERPL-ACNC: 4.02 MIU/L (ref 0.44–3.98)
WBC # BLD AUTO: 10.3 X10*3/UL (ref 4.4–11.3)

## 2024-05-28 PROCEDURE — 86140 C-REACTIVE PROTEIN: CPT

## 2024-05-28 PROCEDURE — 86200 CCP ANTIBODY: CPT

## 2024-05-28 PROCEDURE — 84075 ASSAY ALKALINE PHOSPHATASE: CPT

## 2024-05-28 PROCEDURE — 85025 COMPLETE CBC W/AUTO DIFF WBC: CPT

## 2024-05-28 PROCEDURE — 86431 RHEUMATOID FACTOR QUANT: CPT

## 2024-05-28 PROCEDURE — 85652 RBC SED RATE AUTOMATED: CPT

## 2024-05-28 PROCEDURE — 36415 COLL VENOUS BLD VENIPUNCTURE: CPT

## 2024-05-28 PROCEDURE — 84443 ASSAY THYROID STIM HORMONE: CPT

## 2024-05-28 PROCEDURE — 84439 ASSAY OF FREE THYROXINE: CPT

## 2024-05-28 PROCEDURE — 86038 ANTINUCLEAR ANTIBODIES: CPT

## 2024-05-28 PROCEDURE — 80053 COMPREHEN METABOLIC PANEL: CPT

## 2024-05-28 NOTE — TELEPHONE ENCOUNTER
Received refill request from patients pharmacy. Patient has a follow up appt with Audrey Jones tomorrow and will receive refills at tomorrows appt as she may want to discuss tolerance/ dose changes. left VM to ask patient to call/ message back if he needs this refill before tomorrows appt.

## 2024-05-29 ENCOUNTER — OFFICE VISIT (OUTPATIENT)
Dept: PALLIATIVE MEDICINE | Facility: CLINIC | Age: 39
End: 2024-05-29
Payer: COMMERCIAL

## 2024-05-29 ENCOUNTER — OFFICE VISIT (OUTPATIENT)
Dept: HEMATOLOGY/ONCOLOGY | Facility: CLINIC | Age: 39
End: 2024-05-29
Payer: COMMERCIAL

## 2024-05-29 VITALS
OXYGEN SATURATION: 98 % | WEIGHT: 166.89 LBS | HEART RATE: 105 BPM | BODY MASS INDEX: 26.94 KG/M2 | TEMPERATURE: 97.2 F | DIASTOLIC BLOOD PRESSURE: 81 MMHG | RESPIRATION RATE: 20 BRPM | SYSTOLIC BLOOD PRESSURE: 103 MMHG

## 2024-05-29 DIAGNOSIS — Z79.891 ENCOUNTER FOR MONITORING OPIOID MAINTENANCE THERAPY: ICD-10-CM

## 2024-05-29 DIAGNOSIS — Z51.81 ENCOUNTER FOR MONITORING OPIOID MAINTENANCE THERAPY: ICD-10-CM

## 2024-05-29 DIAGNOSIS — Z51.5 PALLIATIVE CARE ENCOUNTER: ICD-10-CM

## 2024-05-29 DIAGNOSIS — C64.9 RENAL CELL CARCINOMA, UNSPECIFIED LATERALITY (MULTI): Primary | ICD-10-CM

## 2024-05-29 DIAGNOSIS — G89.3 CANCER RELATED PAIN: Primary | ICD-10-CM

## 2024-05-29 DIAGNOSIS — K59.03 CONSTIPATION DUE TO PAIN MEDICATION THERAPY: ICD-10-CM

## 2024-05-29 PROCEDURE — 99214 OFFICE O/P EST MOD 30 MIN: CPT | Performed by: NURSE PRACTITIONER

## 2024-05-29 PROCEDURE — 99214 OFFICE O/P EST MOD 30 MIN: CPT

## 2024-05-29 PROCEDURE — 3074F SYST BP LT 130 MM HG: CPT

## 2024-05-29 PROCEDURE — 3079F DIAST BP 80-89 MM HG: CPT

## 2024-05-29 ASSESSMENT — ENCOUNTER SYMPTOMS
OCCASIONAL FEELINGS OF UNSTEADINESS: 0
LOSS OF SENSATION IN FEET: 0
DEPRESSION: 0

## 2024-05-29 ASSESSMENT — PAIN SCALES - GENERAL: PAINLEVEL: 7

## 2024-05-29 NOTE — PROGRESS NOTES
SUPPORTIVE AND PALLIATIVE ONCOLOGY CONSULT - OUTPATIENT FOLLOW UP      SERVICE DATE: 5/29/2024    Referred by:  Renan Thomson MD   Medical Oncologist: Renan Thomson MD   Radiation Oncologist: No care team member to display  Primary Physician: Davina Pratt  [phone]    REASON FOR CONSULT/CHIEF CONSULT COMPLAINT: Intro to Supportive Oncology and Symptom Management.     Subjective   HISTORY OF PRESENT ILLNESS: Siddhartha Orellana is a 39 y.o. 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 Discontinued nivolumab in January 2023 due to arthralgias requiring high dose steroids. Course c/b by hidradenitis suppurativa and delayed fistula healing. He was started on oxycodone 10mg TID for pain management.      Additional PMHx:  Lymphadenopathy and a pre surgical consult for kidney transplant  End stage renal failure on dialysis   Type II DM  HTN  HLD  Parathyroidism   Anemia with NARGIS component   Gastric ulcer  SHIVA  Vitamin D deficiency   Morbid obesity    5/29/24:   Just received fentanyl patches 2 days ago and hasn't started yet. Pain remains about the same in quality intensity but noticing more in R calf and knee today. Still using about 60mg of oxycodone per day.   Stopped Zyprexa after 2 days after he felt it made the nausea worse.   CRP/ESR mildly elevated. RF nl. Citrulline antibody, IgG nl,  CLOVIS pending.     Pain Assessment:    Location varies by day.    Currently located in L calf, R knee, and lower back - throbbing (left calf), shooting (right knee to lower thigh), tight - fairly constant with flares.     Always has constant pain in at least one of his legs. Worse with steps/incline.     Off steroids.     Current regimen:   Oxycodone - 60mg per day   APAP PRN - rare     Symptom Assessment:    Pain:very much   Lack of energy: somewhat  Difficulty sleeping: a little - due to pain   Lack of appetite: a little   Nausea: somewhat  Vomiting: a little  Constipation: none  Numbness or  tingling in hands/feet/other: somewhat  Weight loss: somewhat      Information obtained from: interview of patient and interview of family  ______________________________________________________________________     Oncology History   Renal cell carcinoma (Multi)   12/1/2022 -  Chemotherapy    Cabozantinib, 28 Day Cycles     8/28/2023 Initial Diagnosis    Renal cell carcinoma (CMS/HCC)         Past Medical History:   Diagnosis Date    Dorsalgia, unspecified 01/04/2023    Back pain    End stage renal disease (Multi) 01/04/2023    ESRD (end stage renal disease) on dialysis    Essential (primary) hypertension 01/04/2023    Hypertension    Iron deficiency 02/23/2020    Iron deficiency    Other specified postprocedural states     H/O endoscopy     Past Surgical History:   Procedure Laterality Date    OTHER SURGICAL HISTORY  04/11/2018    Creation Of A-V Graft Fistula For Dialysis     Family History   Problem Relation Name Age of Onset    Hypertension Mother      Cancer Father      Hypertension Father      Other (renal transplant recipient) Mother's Brother          SOCIAL HISTORY  . 5 children. Thompson.  Social History:  reports that he has quit smoking. His smoking use included cigars. He has never been exposed to tobacco smoke. He has never used smokeless tobacco. He reports current alcohol use. He reports that he does not use drugs.      REVIEW OF SYSTEMS  Review of systems negative unless noted in HPI.       Objective       Current Outpatient Medications   Medication Instructions    atorvastatin (LIPITOR) 20 mg, oral, Nightly    B complex-vitamin C-folic acid (Nephrocaps) 1 mg capsule 1 capsule, oral, Daily    cabozantinib (Cabometyx) 40 mg tablet TAKE ONE (1) TABLET BY MOUTH ONCE A DAY    carvedilol (COREG) 25 mg, oral, 2 times daily    doxycycline (VIBRAMYCIN) 100 mg, oral, 2 times daily, Take with a full glass of water and do not lie down for at least 30 minutes after.    fentaNYL (Duragesic) 25 mcg/hr patch  "1 patch, transdermal, Every 72 hours    hydrocortisone 2.5 % cream 1 APPLICATION TWICE A DAY TO AFFECTED AREA OF FACE (AVOID EYES) TO DARK SPOTS FOR 2-4 WEEKS    lidocaine-prilocaine (Emla) 2.5-2.5 % cream Apply thick layer 2 hours prior to dialysis site, cover with plastic wrap.    loratadine (CLARITIN) 10 mg, oral, Daily    naloxone (NARCAN) 4 mg, nasal, As needed, May repeat every 2-3 minutes if needed, alternating nostrils, until medical assistance becomes available.    non-adherent bandage (Curity Abdominal Pad) 8 X 10 \" bandage 1 Application, topical (top), Daily, To areas of hidradenitis for drainage    OLANZapine (ZYPREXA) 2.5 mg, oral, Nightly    omeprazole (PRILOSEC) 40 mg, oral, Daily    ondansetron (ZOFRAN) 8 mg, oral, Every 8 hours PRN    oxyCODONE (ROXICODONE) 10 mg, oral, Every 4 hours PRN    prochlorperazine (COMPAZINE) 10 mg, oral, Every 6 hours PRN    sevelamer HCl (RENAGEL) 1,600 mg, oral, 3 times daily before meals, Swallow tablet whole; do not crush, break, or chew.    sulfamethoxazole-trimethoprim (Bactrim) 400-80 mg tablet Take 2 tablet by mouth on the first day for a loading dose. Starting day 2 take 1 tablet by mouth once daily.       Allergies: No Known Allergies        CMP/CBC 5/28/24 reviewed      PHYSICAL EXAMINATION  Vital Signs:   Vital signs reviewed        1/29/2024     4:27 PM 1/29/2024     5:11 PM 3/7/2024     3:26 PM 3/13/2024    12:29 PM 4/11/2024     9:47 AM 5/1/2024     1:15 PM 5/29/2024     2:04 PM   Vitals   Systolic  134 103 128 135 110 103   Diastolic  80 67 88 89 77 81   Heart Rate 79 86 84 86 77 100 105   Temp 36.3 °C (97.3 °F) 37 °C (98.6 °F) 36.7 °C (98.1 °F)  36.5 °C (97.7 °F) 36.8 °C (98.2 °F) 36.2 °C (97.2 °F)   Resp  18 18  18 20 20   Height (in)    1.676 m (5' 6\")      Weight (lb)   169.75 163 173.28  166.89   BMI   27.38 kg/m2 26.31 kg/m2 27.97 kg/m2  26.94 kg/m2   BSA (m2)   1.89 m2 1.86 m2 1.91 m2  1.88 m2   Visit Report   Report Report Report Report    Report " Report          Physical Exam  HENT:      Head: Normocephalic and atraumatic.      Mouth/Throat:      Pharynx: Oropharynx is clear.   Eyes:      Extraocular Movements: Extraocular movements intact.      Conjunctiva/sclera: Conjunctivae normal.   Pulmonary:      Effort: Pulmonary effort is normal.   Abdominal:      General: Abdomen is flat.   Musculoskeletal:         General: No swelling.      Cervical back: Normal range of motion.   Neurological:      General: No focal deficit present.      Mental Status: He is alert.      Comments: No myoclonus   Psychiatric:         Mood and Affect: Mood normal.         Thought Content: Thought content normal.         ASSESSMENT/PLAN    Pain  Pain is:  IO related arthralgia, chronic pain syndrome in the setting of end stage renal disease on dialysis  Type: somatic and neuropathic  -We discussed starting methadone (given ESRD and neuropathic component) vs fentanyl TD with renally dosed gabapentin. He prefers fentanyl TD and gabapentin.   -Start fentanyl TD 25mcg q72 - do not apply direct heat or submerge in water   -Continue oxycodone 10mg q4 PRN for now (I recommended rotating to hydromorphone as it is more renally favored but due to patient's concern of making multiple changes will defer until next appointment as he is not having any signs of toxicity from the oxycodone at this time)  -Will add renally dosed gabapentin at next appointment  -Continue APAP as needed  - uses sparingly   -Autoimmune/Rheum workup per oncology - unremarkable     Opioid Use  Medication Management:   - OARRS report reviewed with no aberrant behavior; consistent with  prescriptions/records and patient history  - MED 90.  Overdose Risk Score 550 .   This has been discussed with patient.   - We will continue to closely monitor the patient for signs of prescription misuse including UDS, OARRS review and subjective reports at each visit.  - No concurrent benzodiazepine use   - I am a provider who either is  or has consulted and collaborated with a provider certified in Hospice and Palliative Medicine and have conducted a face-face visit and examination for this patient.  - Routine Urine Drug Screen NA - anuric   - Controlled Substance Agreement completed 5/29/24  - Specifically discussed that controlled substance prescriptions will only be provided by our group as outlined in the completed agreement  - Prescribed naloxone 5/1/24  - Red Flags: None      Nausea   Intermittent nausea with vomiting related to opioids and dialysis   -Intolerant to zyprexa 2.5mg   -Continue compazine and zofran as needed   -Consider adding marinol    Constipation   At risk for constipation related to opioids  -Continue miralax 17 g daily as needed     Sleeping Difficulty:  -Treat pain       Introduction to Supportive and Palliative Oncology:  Spoke with patient and wife   Introduced the role and philosophy of Supportive and Palliative oncology in the evaluation and management of symptoms during cancer treatment      Medical Decision Making/Goals of Care/Advance Care Planning:  Patient's current clinical condition, including diagnosis, prognosis, and management plan, and goals of care were discussed.   Life limiting disease: RCC  Family: Supportive wife/children   Goals: symptom control and cancer directed therapy    Advance Directives  Existence of Advance Directives:Other address at next visit     Next Follow-Up Visit:  Return to clinic in 4 weeks     Signature and billing  Thank you for allowing us to participate in the care of this patient. Recommendations will be communicated back to the consulting service by way of shared electronic medical record or face-to-face.    Medical complexity was moderate level due to due to complexity of problems, extensive data review, and high risk of management/treatment.  Time was spent on the following: Prep Time, Time Directly with Patient/Family/Caregiver, Documentation Time. Total time spent: 30 min        DATA   Diagnostic tests and information reviewed for today's visit:  Most recent labs and imaging results, Medications       Plan of Care discussed with: Patient and RN     Some elements copied from Supportive Oncology note on 5/1/24, the elements have been updated and all reflect current decision making from today, 5/29/24.        SIGNATURE: Audrey Jones, APRN-CNP    Contact information:  Supportive and Palliative Oncology  Monday-Friday 8 AM-5 PM  Phone:  [phone], press option #5, then option #1.   Or Epic Secure Chat

## 2024-05-30 LAB — ANA SER QL HEP2 SUBST: NEGATIVE

## 2024-05-31 ENCOUNTER — SPECIALTY PHARMACY (OUTPATIENT)
Dept: HEMATOLOGY/ONCOLOGY | Facility: HOSPITAL | Age: 39
End: 2024-05-31
Payer: COMMERCIAL

## 2024-05-31 DIAGNOSIS — C64.9 RENAL CELL CARCINOMA, UNSPECIFIED LATERALITY (MULTI): Primary | ICD-10-CM

## 2024-05-31 NOTE — PROGRESS NOTES
[facility] Clinical Note    Siddhartha Orellana is a 39 y.o. male, who is on the specialty pharmacy service of: [facility].  Siddhartha Orellana is taking: cabozantinib.     Reviewed OV on 05/29    Refer to the encounter summary report for documentation details about patient counseling and education.      Impression/Plan  Is patient high risk? (potential patients:  pregnancy, geriatric, pediatric)   no  Is laboratory follow-up needed? no  Is a clinical intervention needed?  no  Next assessment date?  11/27/24  Additional comments:    Medication Adherence  The importance of adherence was discussed with the patient and they were advised to take the medication as prescribed by their provider. Siddhartha was encouraged to call his physician's office if they have a question regarding a missed dose.        Patient advised to contact the pharmacy if there are any changes to her medication list, including prescriptions, OTC medications, herbal products, or supplements. Patient was advised of [facility]’s dispensing process, refill timeline, contact information ([phone]), and patient management follow up. Patient confirmed understanding of education conducted during assessment. All patient questions and concerns were addressed to the best of my ability. Patient was encouraged to contact the specialty pharmacy with any questions or concerns.    Confirmed follow-up outreaches are properly scheduled. Reviewed goals of therapy in the program targets.    Aiden Weiner, VarshaD

## 2024-06-03 ENCOUNTER — TELEPHONE (OUTPATIENT)
Dept: PALLIATIVE MEDICINE | Facility: HOSPITAL | Age: 39
End: 2024-06-03
Payer: COMMERCIAL

## 2024-06-03 NOTE — TELEPHONE ENCOUNTER
Patient started Fentanyl 25 mcg. Called patient to see how he was doing, message left on voice mail encouraging a return call

## 2024-06-12 ENCOUNTER — TELEPHONE (OUTPATIENT)
Dept: HEMATOLOGY/ONCOLOGY | Facility: HOSPITAL | Age: 39
End: 2024-06-12
Payer: COMMERCIAL

## 2024-06-12 DIAGNOSIS — C64.9 RENAL CELL CARCINOMA, UNSPECIFIED LATERALITY (MULTI): ICD-10-CM

## 2024-06-12 DIAGNOSIS — G89.3 CANCER ASSOCIATED PAIN: ICD-10-CM

## 2024-06-12 RX ORDER — OXYCODONE HYDROCHLORIDE 10 MG/1
10 TABLET ORAL EVERY 4 HOURS PRN
Qty: 180 TABLET | Refills: 0 | Status: SHIPPED | OUTPATIENT
Start: 2024-06-12

## 2024-06-12 NOTE — TELEPHONE ENCOUNTER
OARRS report reviewed and reflects  prescription history, no aberrancy noted. Per OARRS, patient last filled Oxycodone 10 mg, 5/14/24, 30 day supply. Per last visit with Audrey Jones patient to continue the medication for now, with a potential change to Dilaudid. Patient needs a follow up visit scheduled with Audrey, will be scheduled when able to align with Chichi Rodriguez. Patient updated that medication will be sent to [facility]. Refill request routed to provider.     Patient recently started the Fentanyl patch state getting great pain relief.

## 2024-06-20 ENCOUNTER — OFFICE VISIT (OUTPATIENT)
Dept: DERMATOLOGY | Facility: CLINIC | Age: 39
End: 2024-06-20
Payer: COMMERCIAL

## 2024-06-20 DIAGNOSIS — L73.2 HIDRADENITIS SUPPURATIVA: Primary | ICD-10-CM

## 2024-06-20 PROCEDURE — 99213 OFFICE O/P EST LOW 20 MIN: CPT | Performed by: DERMATOLOGY

## 2024-06-20 PROCEDURE — 11900 INJECT SKIN LESIONS </W 7: CPT

## 2024-06-20 RX ORDER — TRIAMCINOLONE ACETONIDE 40 MG/ML
60 INJECTION, SUSPENSION INTRA-ARTICULAR; INTRAMUSCULAR ONCE
Status: COMPLETED | OUTPATIENT
Start: 2024-06-20 | End: 2024-06-20

## 2024-06-20 ASSESSMENT — DERMATOLOGY QUALITY OF LIFE (QOL) ASSESSMENT
DATE THE QUALITY-OF-LIFE ASSESSMENT WAS COMPLETED: 67011
WHAT SINGLE SKIN CONDITION LISTED BELOW IS THE PATIENT ANSWERING THE QUALITY-OF-LIFE ASSESSMENT QUESTIONS ABOUT: HIDRADENITIS SUPPURATIVA
ARE THERE EXCLUSIONS OR EXCEPTIONS FOR THE QUALITY OF LIFE ASSESSMENT: NO
RATE HOW EMOTIONALLY BOTHERED YOU ARE BY YOUR SKIN PROBLEM (FOR EXAMPLE, WORRY, EMBARRASSMENT, FRUSTRATION): 5
RATE HOW BOTHERED YOU ARE BY EFFECTS OF YOUR SKIN PROBLEMS ON YOUR ACTIVITIES (EG, GOING OUT, ACCOMPLISHING WHAT YOU WANT, WORK ACTIVITIES OR YOUR RELATIONSHIPS WITH OTHERS): 6 - ALWAYS BOTHERED
RATE HOW BOTHERED YOU ARE BY SYMPTOMS OF YOUR SKIN PROBLEM (EG, ITCHING, STINGING BURNING, HURTING OR SKIN IRRITATION): 5

## 2024-06-20 ASSESSMENT — PATIENT GLOBAL ASSESSMENT (PGA): PATIENT GLOBAL ASSESSMENT: PATIENT GLOBAL ASSESSMENT:  4 - SEVERE

## 2024-06-20 ASSESSMENT — ITCH NUMERIC RATING SCALE: HOW SEVERE IS YOUR ITCHING?: 6

## 2024-06-20 ASSESSMENT — DERMATOLOGY PATIENT ASSESSMENT: HAVE YOU HAD OR DO YOU HAVE A STAPH INFECTION: NO

## 2024-06-20 NOTE — PATIENT INSTRUCTIONS
Dilute bleach baths    Dilute bleach baths are a great way to safely reduce the amount of bacteria on the skin without contributing to bacterial resistance.     Directions: 1/2 cup bleach in a tub filled to the normal level, soak 5-10 minutes at least 2 to 3 time per week. May bleach towels.

## 2024-06-21 NOTE — PROGRESS NOTES
Subjective     Siddhartha Orellana is a 39 y.o. male who presents for the following: Hidradenitis Suppurativa (Flaring in groin area. Taking Bactrim daily.).     Patient has papillary renal cell carcinoma, previously on Nivolumab and Cabozantinib. Nivolumab has been on hold since 1/2023. Patient is actively receiving treatment with Cabozantinib, most recently restarted 5/1/2024.   He is currently also receiving hemodialysis.    At his last dermatology visit in April 2024 patient had significant flare of HS ongoing. A wound culture was obtained showing Klebsiella sensitive to Bactrim. He has been taking Bactrim (renally-dosed) daily since  April 6th 2024. He denies notable improvement while taking Bactrim although states lesions may have decreased in size. He also states he responded well to the prior intralesional kenalog injections he received at last visit in April.    Today he reports significant ongoing flare, most painful lesions on his thighs and in his intergluteal cleft.    Review of Systems:  No other skin or systemic complaints other than what is documented elsewhere in the note.    The following portions of the chart were reviewed this encounter and updated as appropriate:   Tobacco  Allergies  Meds  Problems  Med Hx  Surg Hx         Skin Cancer History  No skin cancer on file.      Specialty Problems          Dermatology Problems    Hidradenitis suppurativa    Dehiscence of wound    Intertrigo    Rash and other nonspecific skin eruption    Psoriasis     Rash started Sept - Oct 2023. Biopsied 10/30/23 demonstrated parakeratosis and orthokeratosis with neutrophils most consistent with psoriasis based on the clinical presentation. The path could also fit with pityriasis rubra pilaris; this could be reconsidered if he is worsening or not responding    ?Triggered by cabozantinib. Could also consider hypertension medication such as beta-blocker             Objective   Well appearing patient in no apparent  distress; mood and affect are within normal limits.    A focused skin examination was performed. All findings within normal limits unless otherwise noted below.    Assessment/Plan   1. Hidradenitis suppurativa  Gluteal Crease, Left Medial Thigh, Left Thigh - Posterior, Right Medial Thigh, Right Thigh - Posterior  Involving the medial thighs, intergluteal cleft are several indurated nodules with open sinus tracts, some areas with cribriform scarring    Hidradenitis Suppurativia - Allen Stage III  - Significantly flaring based on clinical examination today, although moderately improved since last visit. We have some degree of concern that his level of disease activity is greater due to his currently oncologic treatment (Cabozitinib)  - Patient experienced relief of pain for 1 to 2 weeks following prior intralesional kenalog injections  - Discussed with patient that he would benefit from further intralesional kenalog injections today. Discussed the risks, benefits, side effects, alternatives to ILK in detail with patient and he elects to proceed.  - Discussed bleach baths with patient today for antimicrobial effect. His greatest disease activity is on the thighs, perineum, intergluteal cleft and therefore would perhaps benefit from bleach baths. Discussed in detail with patient and handout was provided.  - If there is insufficient disease control at follow up visits, will have to consider escalation of therapy which may include trial of isotretinoin or a monoclonal antibody. We would highly consider an IL-17 inhibitor (Ixekizumab) based on limited reports of its utility in treatment of neutrophilic dermatoses (pyoderma gangrenosum) induced by Cabozitinib  - Evidence for use of isotretinoin for treatment of hidradenitis suppurative suggests limited efficacy however may be a later treatment option to consider if disease remains recalcitrant    Intralesional injection - Gluteal Crease, Left Medial Thigh, Right Medial  "Thigh  Intralesional Injection:   Consent:     Consent obtained:  Verbal    Consent given by:  Patient    Risks discussed:  Poor cosmetic result, pain, infection and bleeding    Alternatives discussed:  No treatment  Pre-Procedure Details:     Prep Type:  Isopropyl alcohol  Procedure Details:   Injection:  Triamcinolone  Outcome: patient tolerated procedure well  Post-procedure details: sterile dressing applied and wound care instructions given  Dressing type: bandage   Comments:  A total of 1.5 ml of 40 mg/mL Kenalog were injected into 5 lesion(s)  Lot #: [id number]  Expiration: 04/2026    Related Procedures  Follow Up In Dermatology - Established Patient    Related Medications  doxycycline (Vibramycin) 100 mg capsule  Take 1 capsule (100 mg) by mouth 2 times a day. Take with a full glass of water and do not lie down for at least 30 minutes after.    non-adherent bandage (Curity Abdominal Pad) 8 X 10 \" bandage  Apply 1 Application topically once daily. To areas of hidradenitis for drainage    sulfamethoxazole-trimethoprim (Bactrim) 400-80 mg tablet  Take 2 tablet by mouth on the first day for a loading dose. Starting day 2 take 1 tablet by mouth once daily.    triamcinolone acetonide (Kenalog-40) injection 60 mg      Kevon Turk MD    I was present for the entirety of the procedure(s).  I saw and evaluated the patient. I personally obtained the key and critical portions of the history and physical exam or was physically present for key and critical portions performed by the resident/fellow. I reviewed the resident/fellow's documentation and discussed the patient with the resident/fellow. I agree with the resident/fellow's medical decision making as documented in the note and made changes where appropriate.    Laura Engle MD     "

## 2024-06-27 ENCOUNTER — HOSPITAL ENCOUNTER (OUTPATIENT)
Dept: RADIOLOGY | Facility: CLINIC | Age: 39
Discharge: HOME | End: 2024-06-27
Payer: COMMERCIAL

## 2024-06-27 DIAGNOSIS — C64.9 RENAL CELL CARCINOMA, UNSPECIFIED LATERALITY (MULTI): ICD-10-CM

## 2024-06-27 PROCEDURE — 74176 CT ABD & PELVIS W/O CONTRAST: CPT

## 2024-07-01 ENCOUNTER — APPOINTMENT (OUTPATIENT)
Dept: HEMATOLOGY/ONCOLOGY | Facility: CLINIC | Age: 39
End: 2024-07-01
Payer: COMMERCIAL

## 2024-07-01 ENCOUNTER — DOCUMENTATION (OUTPATIENT)
Dept: HEMATOLOGY/ONCOLOGY | Facility: CLINIC | Age: 39
End: 2024-07-01
Payer: COMMERCIAL

## 2024-07-01 ASSESSMENT — ENCOUNTER SYMPTOMS
EXTREMITY WEAKNESS: 0
COUGH: 0
NAUSEA: 1
BACK PAIN: 1
ENDOCRINE NEGATIVE: 1
ROS SKIN COMMENTS: HS
LIGHT-HEADEDNESS: 0
DIARRHEA: 0
EYES NEGATIVE: 1
NUMBNESS: 0
VOMITING: 0
FEVER: 0
MYALGIAS: 1
PSYCHIATRIC NEGATIVE: 1
CONSTIPATION: 0
SHORTNESS OF BREATH: 0
APPETITE CHANGE: 0
ARTHRALGIAS: 1
FATIGUE: 0
CHILLS: 0
HEMATOLOGIC/LYMPHATIC NEGATIVE: 1
UNEXPECTED WEIGHT CHANGE: 0
WOUND: 1
LEG SWELLING: 0

## 2024-07-01 NOTE — PROGRESS NOTES
Left vm for patient that he missed today's follow up apt. With Dr. Pryor to review his CT scan. Rescheduled for phone visit tomorrow. Left instructions for patient.

## 2024-07-01 NOTE — PROGRESS NOTES
Patient ID: Siddhartha Orellana is a 39 y.o. male.  Diagnosis:  Papillary RCC  MedOnc: Dr. Thomson  Nephrologist: Dr. Grover  Vascular Surgeon: Dr. Daigle    Current Therapy: Cabozantinib     ONCOLOGIC HISTORY  11/7/22 - CT-guided biopsy of retroperitoneal lymph node revealed findings consistent with metastatic papillary carcinoma.  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  2/7/23 - On Prednisone 20mg daily  2/17/23 - Scans show SD (RECIST) with some mild tumor shrinkage RPLN  2/21/23 - Increase Prednisone 40mg daily for continued arthralgia; continue Cabo 40 mg daily  3/15/23 - Continue Pred 40mg PO daily, continue Cabo  End March - hidradenitis suppurativa  4/4/23 - Hold Cabo; prednisone 30 mg daily  4/25/23 - Resume cabo  5/18/23 - Hold Cabo 2/2 vascular fistula repair  5/23/23 - Continue hold due to extensive edema, blistering  6/2023 - resumed cabo. Nivo on hold with PDN 25 mg  8/16/23 - continue cabo, reduce PDN to 20 mg daily  8/23/23 - Left upper extremity wound debridement down to subcutaneous tissue and vessels- Cabo on hold  8/31/23 - continue to hold cabo, PDN back at 30mg   9/15/23 - resumed cabozantinib 40 mg   Mid Sep 23 - admission hidradenitis suppurativa  10/10/23 - cabo on hold. Will start PDN 20 mg  10/17/23 - cabo on hold for graft, PDN 20 mg  10/25/23 - cabo on hold for graft, PDN 15 mg daily  11/16/23 - continue cabozantinib hold, PDN 10 mg daily  1/18/24 - mild inguinal/iliac LN growth + stable bone mets in staging scans.   01/2024 - dialysis started  3/7/24 - he got back on cabozantinib 40 mg 2-3 w ago  4/11/24 - scans show SD. Continue cabo 40 alone  4/12/24 - held for AVF infiltration  5/1/24 - No AVF revision required - restart cabo at  40 mg  5/29/24 - Continue cabozantinib      Oncology History   Renal cell carcinoma (Multi)   12/1/2022 -  Chemotherapy    Cabozantinib, 28 Day Cycles     8/28/2023 Initial Diagnosis    Renal cell carcinoma (CMS/HCC)       PMH: Stage V CKD on HD, Type II DM, HTN, HLD, Parathryroidism, Anemia, gastric ulcer, SHIVA, Vit D def     Review of Systems   Constitutional:  Negative for appetite change, chills, fatigue, fever and unexpected weight change.   HENT:  Negative.     Eyes: Negative.    Respiratory:  Negative for cough and shortness of breath.    Cardiovascular:  Negative for chest pain and leg swelling.   Gastrointestinal:  Positive for nausea. Negative for constipation, diarrhea and vomiting.   Endocrine: Negative.    Genitourinary: Negative.     Musculoskeletal:  Positive for arthralgias, back pain, gait problem and myalgias.   Skin:  Positive for wound. Negative for rash.        HS   Neurological:  Positive for gait problem. Negative for extremity weakness, light-headedness and numbness.   Hematological: Negative.    Psychiatric/Behavioral: Negative.       Allergies  No Known Allergies     Medications  Current Outpatient Medications   Medication Instructions    atorvastatin (LIPITOR) 20 mg, oral, Nightly    B complex-vitamin C-folic acid (Nephrocaps) 1 mg capsule 1 capsule, oral, Daily    cabozantinib (Cabometyx) 40 mg tablet TAKE ONE (1) TABLET BY MOUTH ONCE A DAY    carvedilol (COREG) 25 mg, oral, 2 times daily    doxycycline (VIBRAMYCIN) 100 mg, oral, 2 times daily, Take with a full glass of water and do not lie down for at least 30 minutes after.    hydrocortisone 2.5 % cream 1 APPLICATION TWICE A DAY TO AFFECTED AREA OF FACE (AVOID EYES) TO DARK SPOTS FOR 2-4 WEEKS    lidocaine-prilocaine (Emla) 2.5-2.5 % cream Apply thick layer 2 hours prior to dialysis site, cover with plastic wrap.    loratadine (CLARITIN) 10 mg, oral, Daily    naloxone (NARCAN) 4 mg, nasal, As needed, May repeat every 2-3 minutes if  "needed, alternating nostrils, until medical assistance becomes available.    non-adherent bandage (Curity Abdominal Pad) 8 X 10 \" bandage 1 Application, topical (top), Daily, To areas of hidradenitis for drainage    omeprazole (PRILOSEC) 40 mg, oral, Daily    ondansetron (ZOFRAN) 8 mg, oral, Every 8 hours PRN    oxyCODONE (ROXICODONE) 10 mg, oral, Every 4 hours PRN    prochlorperazine (COMPAZINE) 10 mg, oral, Every 6 hours PRN    sevelamer HCl (RENAGEL) 1,600 mg, oral, 3 times daily before meals, Swallow tablet whole; do not crush, break, or chew.    sulfamethoxazole-trimethoprim (Bactrim) 400-80 mg tablet Take 2 tablet by mouth on the first day for a loading dose. Starting day 2 take 1 tablet by mouth once daily.    torsemide (DEMADEX) 60 mg, oral, Daily        OBJECTIVE:    VS / Pain:  There were no vitals taken for this visit.  BSA: There is no height or weight on file to calculate BSA.  Wt Readings from Last 5 Encounters:   05/29/24 75.7 kg (166 lb 14.2 oz)   04/11/24 78.6 kg (173 lb 4.5 oz)   03/13/24 73.9 kg (163 lb)   03/07/24 77 kg (169 lb 12.1 oz)   01/23/24 85.8 kg (189 lb 2.5 oz)     Physical Exam  Constitutional:       General: He is not in acute distress.     Appearance: Normal appearance. He is not toxic-appearing.   HENT:      Head: Normocephalic and atraumatic.      Mouth/Throat:      Mouth: Mucous membranes are moist.      Pharynx: Oropharynx is clear.   Eyes:      Pupils: Pupils are equal, round, and reactive to light.   Pulmonary:      Effort: Pulmonary effort is normal.   Musculoskeletal:         General: Normal range of motion.      Cervical back: Normal range of motion.      Right lower leg: No edema.      Left lower leg: No edema.   Skin:     General: Skin is warm and dry.      Findings: No rash.   Neurological:      General: No focal deficit present.      Mental Status: He is alert and oriented to person, place, and time.      Motor: No weakness.   Psychiatric:         Mood and Affect: Mood " normal.         Behavior: Behavior normal.         Thought Content: Thought content normal.         Judgment: Judgment normal.       Performance Status:  ECOG Score: 1- Restricted in physically strenuous activity.  Carries out light duty.  Karnofsky Score: 80 - Normal activity with effort; some signs or symptoms of disease    Diagnostic Results   No results found for this or any previous visit (from the past 96 hour(s)).    Assessment/Plan   38yo AA man with hx of stage IV CKD with metastatic papRCC to LNs now on cabo/nivo. He has CKD and planned for kidney dialysis. Known proteinuria +++ (known). Still with sig joint pains and dealing with hyperglycemias. On cabo (nivo on hold d/t arthritis (PDN) 30 mg PDN BUT pt self discontinued his prednisone and arthralgias are actually better. Pain is under control on opioids and arthralgias might not be fully IO-related. Scans showing overall disease stable. Now back on cabo with stable disease - bone mets. Fistula got infiltrated and dialysis on hold. He will need to hold cabo for vascular intervention. Referral to Supp Onc for pain management.     CBC and CMP baseline.  He is tolerating cabozantinib well.   AVF repair was not required and he resumed HD and cabozantinib at last visit.   Hidradenitis suppurativa - treatment with Dr. Alexandra. He was recently diagnosed by biopsy with psoriasis. No treatment at this time. He has hypopigmentation, likely from cabozantinib.     Supportive onc seeing patient today to follow chronic arthralgias since IO therapy in Jan 2023. They have taken over pain management for me. I checked into autoimmune/Rheumatological cause of pain with CLOVIS, RF, anti-CCP, ESR, CRP per ASCO irAE guidelines since pain has been persistent since IO. CRP and Sed rate slighty elevated. RF and Citrulline Antibody WNL. CLOVIS pending. Will refer to rheumatology if results are abnormal.       I saw and evaluated the patient. I personally obtained the key and critical  portions of the history and physical exam or was physically present for key and critical portions performed by the resident/fellow. I reviewed the resident/fellow's documentation and discussed the patient with the resident/fellow. I agree with the resident/fellow's medical decision making as documented in the note.   Renan Thomson MD MSc FACP  [facility] in Cancer Research   in Medicine [facility] [employer]  Director Clinical  Medical Oncology Research Program   [facility] [phone]  Office [phone]

## 2024-07-02 ENCOUNTER — TELEMEDICINE (OUTPATIENT)
Dept: HEMATOLOGY/ONCOLOGY | Facility: HOSPITAL | Age: 39
End: 2024-07-02
Payer: COMMERCIAL

## 2024-07-02 DIAGNOSIS — C64.9 RENAL CELL CARCINOMA, UNSPECIFIED LATERALITY (MULTI): ICD-10-CM

## 2024-07-02 PROCEDURE — 99215 OFFICE O/P EST HI 40 MIN: CPT | Performed by: STUDENT IN AN ORGANIZED HEALTH CARE EDUCATION/TRAINING PROGRAM

## 2024-07-02 ASSESSMENT — ENCOUNTER SYMPTOMS
HEMATOLOGIC/LYMPHATIC NEGATIVE: 1
NAUSEA: 1
APPETITE CHANGE: 0
ENDOCRINE NEGATIVE: 1
DIARRHEA: 0
WOUND: 1
SHORTNESS OF BREATH: 0
ROS SKIN COMMENTS: HS
COUGH: 0
LEG SWELLING: 0
VOMITING: 0
CONSTIPATION: 0
PSYCHIATRIC NEGATIVE: 1
BACK PAIN: 1
UNEXPECTED WEIGHT CHANGE: 0
ARTHRALGIAS: 1
NUMBNESS: 0
MYALGIAS: 1
CHILLS: 0
FEVER: 0
EXTREMITY WEAKNESS: 0
LIGHT-HEADEDNESS: 0
FATIGUE: 0
EYES NEGATIVE: 1

## 2024-07-02 NOTE — PROGRESS NOTES
Patient ID: Sidhdartha Orellana is a 39 y.o. male.  Diagnosis:  Papillary RCC  MedOnc: Dr. Thomson  Nephrologist: Dr. Grover  Vascular Surgeon: Dr. Daigle    Current Therapy: Cabozantinib     ONCOLOGIC HISTORY  11/7/22 - CT-guided biopsy of retroperitoneal lymph node revealed findings consistent with metastatic papillary carcinoma.  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  2/7/23 - On Prednisone 20mg daily  2/17/23 - Scans show SD (RECIST) with some mild tumor shrinkage RPLN  2/21/23 - Increase Prednisone 40mg daily for continued arthralgia; continue Cabo 40 mg daily  3/15/23 - Continue Pred 40mg PO daily, continue Cabo  End March - hidradenitis suppurativa  4/4/23 - Hold Cabo; prednisone 30 mg daily  4/25/23 - Resume cabo  5/18/23 - Hold Cabo 2/2 vascular fistula repair  5/23/23 - Continue hold due to extensive edema, blistering  6/2023 - resumed cabo. Nivo on hold with PDN 25 mg  8/16/23 - continue cabo, reduce PDN to 20 mg daily  8/23/23 - Left upper extremity wound debridement down to subcutaneous tissue and vessels- Cabo on hold  8/31/23 - continue to hold cabo, PDN back at 30mg   9/15/23 - resumed cabozantinib 40 mg   Mid Sep 23 - admission hidradenitis suppurativa  10/10/23 - cabo on hold. Will start PDN 20 mg  10/17/23 - cabo on hold for graft, PDN 20 mg  10/25/23 - cabo on hold for graft, PDN 15 mg daily  11/16/23 - continue cabozantinib hold, PDN 10 mg daily  1/18/24 - mild inguinal/iliac LN growth + stable bone mets in staging scans.   01/2024 - dialysis started  3/7/24 - he got back on cabozantinib 40 mg 2-3 w ago  4/11/24 - scans show SD. Continue cabo 40 alone  4/12/24 - held for AVF infiltration  5/1/24 - No AVF revision required - restart cabo at  40 mg  5/29/24 - Continue cabozantinib  7/2/24 - Continue cabozantinib 40 mg with breaks PRN    Oncology History   Renal cell carcinoma (Multi)   12/1/2022 -  Chemotherapy    Cabozantinib, 28 Day Cycles     8/28/2023 Initial Diagnosis    Renal cell carcinoma (CMS/HCC)       PMH: Stage V CKD on HD, Type II DM, HTN, HLD, Parathryroidism, Anemia, gastric ulcer, SHIVA, Vit D def     Review of Systems   Constitutional:  Negative for appetite change, chills, fatigue, fever and unexpected weight change.   HENT:  Negative.     Eyes: Negative.    Respiratory:  Negative for cough and shortness of breath.    Cardiovascular:  Negative for chest pain and leg swelling.   Gastrointestinal:  Positive for nausea. Negative for constipation, diarrhea and vomiting.   Endocrine: Negative.    Genitourinary: Negative.     Musculoskeletal:  Positive for arthralgias, back pain, gait problem and myalgias.   Skin:  Positive for wound. Negative for rash.        HS   Neurological:  Positive for gait problem. Negative for extremity weakness, light-headedness and numbness.   Hematological: Negative.    Psychiatric/Behavioral: Negative.       Allergies  No Known Allergies     Medications  Current Outpatient Medications   Medication Instructions    atorvastatin (LIPITOR) 20 mg, oral, Nightly    B complex-vitamin C-folic acid (Nephrocaps) 1 mg capsule 1 capsule, oral, Daily    cabozantinib (Cabometyx) 40 mg tablet TAKE ONE (1) TABLET BY MOUTH ONCE A DAY    carvedilol (COREG) 25 mg, oral, 2 times daily    doxycycline (VIBRAMYCIN) 100 mg, oral, 2 times daily, Take with a full glass of water and do not lie down for at least 30 minutes after.    hydrocortisone 2.5 % cream 1 APPLICATION TWICE A DAY TO AFFECTED AREA OF FACE (AVOID EYES) TO DARK SPOTS FOR 2-4 WEEKS    lidocaine-prilocaine (Emla) 2.5-2.5 % cream Apply thick layer 2 hours prior to dialysis site, cover with plastic wrap.    loratadine (CLARITIN) 10 mg, oral, Daily    naloxone (NARCAN) 4 mg,  "nasal, As needed, May repeat every 2-3 minutes if needed, alternating nostrils, until medical assistance becomes available.    non-adherent bandage (Curity Abdominal Pad) 8 X 10 \" bandage 1 Application, topical (top), Daily, To areas of hidradenitis for drainage    omeprazole (PRILOSEC) 40 mg, oral, Daily    ondansetron (ZOFRAN) 8 mg, oral, Every 8 hours PRN    oxyCODONE (ROXICODONE) 10 mg, oral, Every 4 hours PRN    prochlorperazine (COMPAZINE) 10 mg, oral, Every 6 hours PRN    sevelamer HCl (RENAGEL) 1,600 mg, oral, 3 times daily before meals, Swallow tablet whole; do not crush, break, or chew.    sulfamethoxazole-trimethoprim (Bactrim) 400-80 mg tablet Take 2 tablet by mouth on the first day for a loading dose. Starting day 2 take 1 tablet by mouth once daily.    torsemide (DEMADEX) 60 mg, oral, Daily        OBJECTIVE:    VS / Pain:  There were no vitals taken for this visit.  BSA: There is no height or weight on file to calculate BSA.  Wt Readings from Last 5 Encounters:   05/29/24 75.7 kg (166 lb 14.2 oz)   04/11/24 78.6 kg (173 lb 4.5 oz)   03/13/24 73.9 kg (163 lb)   03/07/24 77 kg (169 lb 12.1 oz)   01/23/24 85.8 kg (189 lb 2.5 oz)     Physical Exam  Constitutional:       General: He is not in acute distress.     Appearance: Normal appearance. He is not toxic-appearing.   HENT:      Head: Normocephalic and atraumatic.      Mouth/Throat:      Mouth: Mucous membranes are moist.      Pharynx: Oropharynx is clear.   Eyes:      Pupils: Pupils are equal, round, and reactive to light.   Pulmonary:      Effort: Pulmonary effort is normal.   Musculoskeletal:         General: Normal range of motion.      Cervical back: Normal range of motion.      Right lower leg: No edema.      Left lower leg: No edema.   Skin:     General: Skin is warm and dry.      Findings: No rash.   Neurological:      General: No focal deficit present.      Mental Status: He is alert and oriented to person, place, and time.      Motor: No " weakness.   Psychiatric:         Mood and Affect: Mood normal.         Behavior: Behavior normal.         Thought Content: Thought content normal.         Judgment: Judgment normal.       Performance Status:  ECOG Score: 1- Restricted in physically strenuous activity.  Carries out light duty.  Karnofsky Score: 80 - Normal activity with effort; some signs or symptoms of disease    Diagnostic Results   No results found for this or any previous visit (from the past 96 hour(s)).    Scans 06/2024 - stable disease    Assessment/Plan   40yo AA man with hx of stage IV CKD with metastatic papRCC to LNs now on cabo/nivo. He has CKD and planned for kidney dialysis. Known proteinuria +++ (known). Still with sig joint pains and dealing with hyperglycemias. On cabo (nivo on hold d/t arthritis (PDN) 30 mg PDN BUT pt self discontinued his prednisone and arthralgias are actually better. Pain is under control on opioids and arthralgias might not be fully IO-related. Now back on cabo with stable disease - bone mets. Fistula got infiltrated and dialysis on hold. He will need to hold cabo for vascular intervention. Referral to Supp Onc for pain management.   CPM.   I saw and evaluated the patient. I personally obtained the key and critical portions of the history and physical exam or was physically present for key and critical portions performed by the resident/fellow. I reviewed the resident/fellow's documentation and discussed the patient with the resident/fellow. I agree with the resident/fellow's medical decision making as documented in the note.   Renan Thomson MD MSc FACP  [facility] Chair in Cancer Research   in Medicine [employer]  Director Clinical  Medical Oncology Research Program   [facility]  Cell [phone]  Office [phone]

## 2024-07-08 ENCOUNTER — SPECIALTY PHARMACY (OUTPATIENT)
Dept: PHARMACY | Facility: CLINIC | Age: 39
End: 2024-07-08

## 2024-07-09 DIAGNOSIS — L73.2 HIDRADENITIS SUPPURATIVA: ICD-10-CM

## 2024-07-10 ENCOUNTER — TELEPHONE (OUTPATIENT)
Dept: PALLIATIVE MEDICINE | Facility: HOSPITAL | Age: 39
End: 2024-07-10

## 2024-07-10 ENCOUNTER — OFFICE VISIT (OUTPATIENT)
Dept: PALLIATIVE MEDICINE | Facility: CLINIC | Age: 39
End: 2024-07-10
Payer: COMMERCIAL

## 2024-07-10 VITALS
HEART RATE: 91 BPM | RESPIRATION RATE: 18 BRPM | TEMPERATURE: 97 F | DIASTOLIC BLOOD PRESSURE: 78 MMHG | OXYGEN SATURATION: 99 % | SYSTOLIC BLOOD PRESSURE: 109 MMHG | WEIGHT: 171.08 LBS | BODY MASS INDEX: 27.61 KG/M2

## 2024-07-10 DIAGNOSIS — R63.0 DECREASED APPETITE: ICD-10-CM

## 2024-07-10 DIAGNOSIS — G89.3 CANCER ASSOCIATED PAIN: ICD-10-CM

## 2024-07-10 DIAGNOSIS — C64.9 RENAL CELL CARCINOMA, UNSPECIFIED LATERALITY (MULTI): ICD-10-CM

## 2024-07-10 DIAGNOSIS — Z79.891 ENCOUNTER FOR MONITORING OPIOID MAINTENANCE THERAPY: Primary | ICD-10-CM

## 2024-07-10 DIAGNOSIS — K59.03 CONSTIPATION DUE TO PAIN MEDICATION THERAPY: ICD-10-CM

## 2024-07-10 DIAGNOSIS — Z51.81 ENCOUNTER FOR MONITORING OPIOID MAINTENANCE THERAPY: Primary | ICD-10-CM

## 2024-07-10 PROCEDURE — 99214 OFFICE O/P EST MOD 30 MIN: CPT | Performed by: NURSE PRACTITIONER

## 2024-07-10 PROCEDURE — 3074F SYST BP LT 130 MM HG: CPT | Performed by: NURSE PRACTITIONER

## 2024-07-10 PROCEDURE — 3078F DIAST BP <80 MM HG: CPT | Performed by: NURSE PRACTITIONER

## 2024-07-10 RX ORDER — FENTANYL 50 UG/1
1 PATCH TRANSDERMAL
Qty: 10 PATCH | Refills: 0 | Status: SHIPPED | OUTPATIENT
Start: 2024-07-10 | End: 2024-08-09

## 2024-07-10 RX ORDER — PREGABALIN 25 MG/1
25 CAPSULE ORAL NIGHTLY
Qty: 30 CAPSULE | Refills: 3 | Status: SHIPPED | OUTPATIENT
Start: 2024-07-10 | End: 2024-11-07

## 2024-07-10 RX ORDER — PROMETHAZINE HYDROCHLORIDE 12.5 MG/1
12.5 TABLET ORAL EVERY 6 HOURS PRN
Qty: 30 TABLET | Refills: 3 | Status: SHIPPED | OUTPATIENT
Start: 2024-07-10 | End: 2024-08-09

## 2024-07-10 RX ORDER — OXYCODONE HYDROCHLORIDE 10 MG/1
10 TABLET ORAL EVERY 4 HOURS PRN
Qty: 180 TABLET | Refills: 0 | Status: SHIPPED | OUTPATIENT
Start: 2024-07-10

## 2024-07-10 ASSESSMENT — ENCOUNTER SYMPTOMS
LOSS OF SENSATION IN FEET: 0
OCCASIONAL FEELINGS OF UNSTEADINESS: 0
DEPRESSION: 0

## 2024-07-10 ASSESSMENT — PAIN SCALES - GENERAL: PAINLEVEL: 7

## 2024-07-10 NOTE — PROGRESS NOTES
SUPPORTIVE AND PALLIATIVE ONCOLOGY CONSULT - OUTPATIENT FOLLOW UP      SERVICE DATE: 7/10/2024    Referred by:  Renan Thomson MD   Medical Oncologist: Renan Thomson MD   Radiation Oncologist: No care team member to display  Primary Physician: Davina Pratt  [phone]    REASON FOR CONSULT/CHIEF CONSULT COMPLAINT: Intro to Supportive Oncology and Symptom Management.     Subjective   HISTORY OF PRESENT ILLNESS: Siddhartha Orellana is a 39 y.o. 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 Discontinued nivolumab in January 2023 due to arthralgias requiring high dose steroids. Course c/b by hidradenitis suppurativa and delayed fistula healing. He was started on oxycodone 10mg TID for pain management.      Additional PMHx:  Lymphadenopathy and a pre surgical consult for kidney transplant  End stage renal failure on dialysis   Type II DM  HTN  HLD  Parathyroidism   Anemia with NARGIS component   Gastric ulcer  SHIVA  Vitamin D deficiency   Morbid obesity    5/29/24:   Just received fentanyl patches 2 days ago and hasn't started yet. Pain remains about the same in quality intensity but noticing more in R calf and knee today. Still using about 60mg of oxycodone per day.   Stopped Zyprexa after 2 days after he felt it made the nausea worse.   CRP/ESR mildly elevated. RF nl. Citrulline antibody, IgG nl,  CLOVIS pending.     7/10/24: Pain overall better controlled with additional of fentanyl patch. Still using oxy 10 about 4-5 pills per day. Ongoing issues with nausea and vomiting.     Pain Assessment:    Location varies by day.    Currently located in L calf, R knee, and lower back - throbbing (left calf), shooting (right knee to lower thigh), tight - fairly constant with flares.     Always has constant pain in at least one of his legs. Worse with steps/incline.     Off steroids.     Symptom Assessment:    Pain:somewhat  Lack of energy: somewhat  Difficulty sleeping: none   Lack of appetite:  somewhat   Nausea: somewhat  Vomiting: somewhat  Constipation: none  Numbness or tingling in hands/feet/other: somewhat  Weight loss: somewhat      Information obtained from: interview of patient and interview of family  ______________________________________________________________________     Oncology History   Renal cell carcinoma (Multi)   12/1/2022 -  Chemotherapy    Cabozantinib, 28 Day Cycles     8/28/2023 Initial Diagnosis    Renal cell carcinoma (CMS/HCC)         Past Medical History:   Diagnosis Date    Dorsalgia, unspecified 01/04/2023    Back pain    End stage renal disease (Multi) 01/04/2023    ESRD (end stage renal disease) on dialysis    Essential (primary) hypertension 01/04/2023    Hypertension    Iron deficiency 02/23/2020    Iron deficiency    Other specified postprocedural states     H/O endoscopy     Past Surgical History:   Procedure Laterality Date    OTHER SURGICAL HISTORY  04/11/2018    Creation Of A-V Graft Fistula For Dialysis     Family History   Problem Relation Name Age of Onset    Hypertension Mother      Cancer Father      Hypertension Father      Other (renal transplant recipient) Mother's Brother          SOCIAL HISTORY  . 5 children. Thompson.  Social History:  reports that he has quit smoking. His smoking use included cigars. He has never been exposed to tobacco smoke. He has never used smokeless tobacco. He reports current alcohol use. He reports that he does not use drugs.      REVIEW OF SYSTEMS  Review of systems negative unless noted in HPI.       Objective       Current Outpatient Medications   Medication Instructions    atorvastatin (LIPITOR) 20 mg, oral, Nightly    B complex-vitamin C-folic acid (Nephrocaps) 1 mg capsule 1 capsule, oral, Daily    cabozantinib (Cabometyx) 40 mg tablet TAKE ONE (1) TABLET BY MOUTH ONCE A DAY    carvedilol (COREG) 25 mg, oral, 2 times daily    doxycycline (VIBRAMYCIN) 100 mg, oral, 2 times daily, Take with a full glass of water and do not  "lie down for at least 30 minutes after.    hydrocortisone 2.5 % cream 1 APPLICATION TWICE A DAY TO AFFECTED AREA OF FACE (AVOID EYES) TO DARK SPOTS FOR 2-4 WEEKS    lidocaine-prilocaine (Emla) 2.5-2.5 % cream Apply thick layer 2 hours prior to dialysis site, cover with plastic wrap.    loratadine (CLARITIN) 10 mg, oral, Daily    naloxone (NARCAN) 4 mg, nasal, As needed, May repeat every 2-3 minutes if needed, alternating nostrils, until medical assistance becomes available.    non-adherent bandage (Curity Abdominal Pad) 8 X 10 \" bandage 1 Application, topical (top), Daily, To areas of hidradenitis for drainage    omeprazole (PRILOSEC) 40 mg, oral, Daily    ondansetron (ZOFRAN) 8 mg, oral, Every 8 hours PRN    oxyCODONE (ROXICODONE) 10 mg, oral, Every 4 hours PRN    prochlorperazine (COMPAZINE) 10 mg, oral, Every 6 hours PRN    sevelamer HCl (RENAGEL) 1,600 mg, oral, 3 times daily before meals, Swallow tablet whole; do not crush, break, or chew.    sulfamethoxazole-trimethoprim (Bactrim) 400-80 mg tablet Take 2 tablet by mouth on the first day for a loading dose. Starting day 2 take 1 tablet by mouth once daily.    torsemide (DEMADEX) 60 mg, oral, Daily       Allergies: No Known Allergies          Creatinine   Date Value Ref Range Status   05/28/2024 11.37 (H) 0.50 - 1.30 mg/dL Final     AST   Date Value Ref Range Status   05/28/2024 16 9 - 39 U/L Final     ALT   Date Value Ref Range Status   05/28/2024 26 10 - 52 U/L Final     Comment:     Patients treated with Sulfasalazine may generate falsely decreased results for ALT.     Hemoglobin   Date Value Ref Range Status   05/28/2024 12.6 (L) 13.5 - 17.5 g/dL Final     Platelets   Date Value Ref Range Status   05/28/2024 309 150 - 450 x10*3/uL Final          PHYSICAL EXAMINATION  Vital Signs:   Vital signs reviewed        1/29/2024     4:27 PM 1/29/2024     5:11 PM 3/7/2024     3:26 PM 3/13/2024    12:29 PM 4/11/2024     9:47 AM 5/1/2024     1:15 PM 5/29/2024     2:04 PM " "  Vitals   Systolic  134 103 128 135 110 103   Diastolic  80 67 88 89 77 81   Heart Rate 79 86 84 86 77 100 105   Temp 36.3 °C (97.3 °F) 37 °C (98.6 °F) 36.7 °C (98.1 °F)  36.5 °C (97.7 °F) 36.8 °C (98.2 °F) 36.2 °C (97.2 °F)   Resp  18 18  18 20 20   Height (in)    1.676 m (5' 6\")      Weight (lb)   169.75 163 173.28  166.89   BMI   27.38 kg/m2 26.31 kg/m2 27.97 kg/m2  26.94 kg/m2   BSA (m2)   1.89 m2 1.86 m2 1.91 m2  1.88 m2   Visit Report   Report Report Report Report    Report Report    Report          Physical Exam  HENT:      Head: Normocephalic and atraumatic.      Mouth/Throat:      Pharynx: Oropharynx is clear.   Eyes:      Extraocular Movements: Extraocular movements intact.      Conjunctiva/sclera: Conjunctivae normal.   Pulmonary:      Effort: Pulmonary effort is normal.   Abdominal:      General: Abdomen is flat.   Musculoskeletal:         General: No swelling.      Cervical back: Normal range of motion.   Neurological:      General: No focal deficit present.      Mental Status: He is alert.      Comments: No myoclonus   Psychiatric:         Mood and Affect: Mood normal.         Thought Content: Thought content normal.         ASSESSMENT/PLAN    Pain  Pain is:  IO related arthralgia, chronic pain syndrome in the setting of end stage renal disease on dialysis  Type: somatic and neuropathic  -He declined methadone that was previously recommended   -Increase fentanyl TD to  50mcg q72 - do not apply direct heat or submerge in water   -Continue oxycodone 10mg q4 PRN for now (I recommended rotating to hydromorphone as it is more renally favored but due to patient's concern of making multiple changes will defer until next appointment as he is not having any signs of toxicity from the oxycodone at this time)  -Start lyrica 25mg at bedtime   -Continue APAP as needed  - uses sparingly   -Autoimmune/Rheum workup per oncology - unremarkable     Opioid Use  Medication Management:   - OARRS report reviewed with no " aberrant behavior; consistent with  prescriptions/records and patient history  - MED 90.  Overdose Risk Score 490 .   This has been discussed with patient.   - We will continue to closely monitor the patient for signs of prescription misuse including UDS, OARRS review and subjective reports at each visit.  - No concurrent benzodiazepine use   - I am a provider who either is or has consulted and collaborated with a provider certified in Hospice and Palliative Medicine and have conducted a face-face visit and examination for this patient.  - Routine Urine Drug Screen: patient is anuric and drug screen 9 panel pending   - Controlled Substance Agreement completed 5/29/24  - Specifically discussed that controlled substance prescriptions will only be provided by our group as outlined in the completed agreement  - Prescribed naloxone 5/1/24  - Red Flags: None      Nausea   Intermittent nausea with vomiting related to opioids and dialysis   -Intolerant to zyprexa 2.5mg   -Continue compazine and zofran as needed   -Start phenergan 12.5mg q6 PRN   -If ineffective, consider haloperidol or reglan     Poor appetite   -Treat nausea and reassess  -Consider remeron     Constipation   At risk for constipation related to opioids  -Continue miralax 17 g daily as needed     Introduction to Supportive and Palliative Oncology:  Spoke with patient and wife   Introduced the role and philosophy of Supportive and Palliative oncology in the evaluation and management of symptoms during cancer treatment      Medical Decision Making/Goals of Care/Advance Care Planning:  Patient's current clinical condition, including diagnosis, prognosis, and management plan, and goals of care were discussed.   Life limiting disease: RCC  Family: Supportive wife/children   Goals: symptom control and cancer directed therapy    Advance Directives  Existence of Advance Directives:Other address at next visit     Next Follow-Up Visit:  Return to clinic in 4 weeks      Signature and billing  Thank you for allowing us to participate in the care of this patient. Recommendations will be communicated back to the consulting service by way of shared electronic medical record or face-to-face.    Medical complexity was moderate level due to due to complexity of problems, extensive data review, and high risk of management/treatment.  Time was spent on the following: Prep Time, Time Directly with Patient/Family/Caregiver, Documentation Time. Total time spent: 30 min       DATA   Diagnostic tests and information reviewed for today's visit:  Most recent labs and imaging results, Medications       Plan of Care discussed with: Patient and RN     Some elements copied from Supportive Oncology note on 5/29/24, the elements have been updated and all reflect current decision making from today, 7/10/24.        SIGNATURE: JAIME Rausch-CNP    Contact information:  Supportive and Palliative Oncology  Monday-Friday 8 AM-5 PM  Phone:  [phone], press option #5, then option #1.   Or Epic Secure Chat

## 2024-07-11 RX ORDER — SULFAMETHOXAZOLE AND TRIMETHOPRIM 400; 80 MG/1; MG/1
TABLET ORAL
Qty: 30 TABLET | Refills: 2 | Status: SHIPPED | OUTPATIENT
Start: 2024-07-11

## 2024-07-12 ENCOUNTER — SPECIALTY PHARMACY (OUTPATIENT)
Dept: PHARMACY | Facility: CLINIC | Age: 39
End: 2024-07-12

## 2024-07-15 ENCOUNTER — TELEPHONE (OUTPATIENT)
Dept: PALLIATIVE MEDICINE | Facility: HOSPITAL | Age: 39
End: 2024-07-15
Payer: COMMERCIAL

## 2024-07-15 ENCOUNTER — SPECIALTY PHARMACY (OUTPATIENT)
Dept: PHARMACY | Facility: CLINIC | Age: 39
End: 2024-07-15

## 2024-07-15 PROCEDURE — RXMED WILLOW AMBULATORY MEDICATION CHARGE

## 2024-07-15 NOTE — TELEPHONE ENCOUNTER
Called [facility] and informed a prior authorization was received for the Fentanyl patches. 180 days, [id number].

## 2024-07-16 RX ORDER — AMLODIPINE BESYLATE 10 MG/1
10 TABLET ORAL DAILY
Qty: 30 TABLET | Refills: 10 | OUTPATIENT
Start: 2024-07-16

## 2024-07-19 ENCOUNTER — PHARMACY VISIT (OUTPATIENT)
Dept: PHARMACY | Facility: CLINIC | Age: 39
End: 2024-07-19
Payer: MEDICAID

## 2024-07-22 ENCOUNTER — APPOINTMENT (OUTPATIENT)
Dept: HEMATOLOGY/ONCOLOGY | Facility: HOSPITAL | Age: 39
End: 2024-07-22
Payer: COMMERCIAL

## 2024-08-01 ENCOUNTER — APPOINTMENT (OUTPATIENT)
Dept: NEPHROLOGY | Facility: CLINIC | Age: 39
End: 2024-08-01
Payer: COMMERCIAL

## 2024-08-01 VITALS
SYSTOLIC BLOOD PRESSURE: 115 MMHG | HEART RATE: 96 BPM | DIASTOLIC BLOOD PRESSURE: 81 MMHG | WEIGHT: 172 LBS | BODY MASS INDEX: 27.76 KG/M2

## 2024-08-01 DIAGNOSIS — R11.2 NAUSEA AND VOMITING, UNSPECIFIED VOMITING TYPE: ICD-10-CM

## 2024-08-01 DIAGNOSIS — N25.81 SECONDARY HYPERPARATHYROIDISM (MULTI): Primary | ICD-10-CM

## 2024-08-01 DIAGNOSIS — Z99.2 ESRD ON DIALYSIS (MULTI): ICD-10-CM

## 2024-08-01 DIAGNOSIS — N18.6 ESRD ON DIALYSIS (MULTI): ICD-10-CM

## 2024-08-01 PROCEDURE — 3074F SYST BP LT 130 MM HG: CPT | Performed by: INTERNAL MEDICINE

## 2024-08-01 PROCEDURE — 1036F TOBACCO NON-USER: CPT | Performed by: INTERNAL MEDICINE

## 2024-08-01 PROCEDURE — 3079F DIAST BP 80-89 MM HG: CPT | Performed by: INTERNAL MEDICINE

## 2024-08-01 RX ORDER — METOCLOPRAMIDE 5 MG/1
5 TABLET ORAL 4 TIMES DAILY
Qty: 240 TABLET | Refills: 0 | Status: SHIPPED | OUTPATIENT
Start: 2024-08-01 | End: 2024-09-30

## 2024-08-01 NOTE — PROGRESS NOTES
ESRD OUTPATIENT PROGRESS NOTE      Here in follow up for dialysis care. Doing overall pretty good.  Follows regularly with all her medical care team and attends dialysis 3 x a week. No issues with access cannulation. He has however, a lot of problems with intradialytic hypotension.  Thinks he may have gained some wt. Appetite a bit better, but lots of nausea and vomiting, even in the morning, vomiting undigested food from previous night. Taking Zofran and promethazine with little relief.  Also on PPI. No EGD in a long long time. Report bowel movement are regular.  Denies chest pain, dyspnea, leg swelling. Continues with oral chemo and does labs monthly for Dr. Thomson. On fentanyl patch for pain. On Bactrim for hydradenitis. Unclear if he needs to stay on it indefinitely or not.        ROS  All the rest reviewed and negative.         Objective   /81   Pulse 96   Wt 78 kg (172 lb)   BMI 27.76 kg/m²         Physical Exam  Constitutional:  well appearing, in NAD  Psychiatric:  appropriate mood and behavior    HEENT: NC/AT, clear sclerae, moist mucous membranes  Cardiovascular: distant S1, S2, RRR,  no murmurs, gallop or rubs  Pulmonary:  Normal breath sounds  Extremities: no edema  Skin:  warm and dry  ACCESS: LFA AVF     No Known Allergies  Current Outpatient Medications   Medication Instructions    atorvastatin (LIPITOR) 20 mg, oral, Nightly    B complex-vitamin C-folic acid (Nephrocaps) 1 mg capsule 1 capsule, oral, Daily    cabozantinib (Cabometyx) 40 mg tablet TAKE ONE (1) TABLET BY MOUTH ONCE A DAY    fentaNYL (Duragesic) 50 mcg/hr patch 1 patch, transdermal, Every 72 hours    hydrocortisone 2.5 % cream 1 APPLICATION TWICE A DAY TO AFFECTED AREA OF FACE (AVOID EYES) TO DARK SPOTS FOR 2-4 WEEKS    lidocaine-prilocaine (Emla) 2.5-2.5 % cream Apply thick layer 2 hours prior to dialysis site, cover with plastic wrap.    loratadine (CLARITIN) 10 mg, oral, Daily    naloxone (NARCAN) 4 mg, nasal, As needed, May  "repeat every 2-3 minutes if needed, alternating nostrils, until medical assistance becomes available.    non-adherent bandage (Curity Abdominal Pad) 8 X 10 \" bandage 1 Application, topical (top), Daily, To areas of hidradenitis for drainage    omeprazole (PRILOSEC) 40 mg, oral, Daily    ondansetron (ZOFRAN) 8 mg, oral, Every 8 hours PRN    oxyCODONE (ROXICODONE) 10 mg, oral, Every 4 hours PRN    pregabalin (LYRICA) 25 mg, oral, Nightly    prochlorperazine (COMPAZINE) 10 mg, oral, Every 6 hours PRN    promethazine (PHENERGAN) 12.5 mg, oral, Every 6 hours PRN    sevelamer HCl (RENAGEL) 1,600 mg, oral, 3 times daily before meals, Swallow tablet whole; do not crush, break, or chew.    sulfamethoxazole-trimethoprim (Bactrim) 400-80 mg tablet TAKE 2 TABLET BY MOUTH ON THE FIRST DAY FOR A LOADING DOSE. STARTING DAY 2 TAKE 1 TABLET BY MOUTH ONCE DAILY.       Results       Assessment and Plan  1. ESRD secondary to diabetic nephropathy on dialysis at [facility] on a MWF schedule, via LFA AVF. Last 6 treatments pre and post BP reviewed and average: 141/102 and 128/92 respectively. Average post dialysis wt 75.92 kg , EDW of 75.5 kg; will increase to 76.5 kg due to intradialytic hypotension. Chemistries for July  good. No access cannulation problems. Continue current dialysis prescription.      2. Anemia of CKD. H/H on target; adjust Mircera and iron per unit protocol.     3. Mineral metabolism. Ca, Phos acceptable. PTH 2323 pg/ml. On Cinacalcet 90 mg daily; only taking 30; will increase to 90 mg daily as prescribed.  No 25D level. Will order.    4. Nausea/vomiting c/f gastroparesis. Trial of Reglan. Continue Zofran, but STOP phenergan while taking this.  ECG for QT eval, if not done within the last year.      RTC in 4 months.     "

## 2024-08-02 ENCOUNTER — PATIENT MESSAGE (OUTPATIENT)
Dept: NEPHROLOGY | Facility: CLINIC | Age: 39
End: 2024-08-02
Payer: COMMERCIAL

## 2024-08-02 NOTE — PATIENT INSTRUCTIONS
EDW increased to 76.5 kg today  Take cinacalcet 90 mg daily  Trial of reglan before meals and at bedtime  Stop phenergan while taking reglan  GI evaluation for consideration of EGD  ECG to evaluate QT while on Zofran.  RTC in 4-6 months

## 2024-08-05 ENCOUNTER — HOSPITAL ENCOUNTER (EMERGENCY)
Facility: HOSPITAL | Age: 39
Discharge: HOME | End: 2024-08-05
Attending: STUDENT IN AN ORGANIZED HEALTH CARE EDUCATION/TRAINING PROGRAM
Payer: COMMERCIAL

## 2024-08-05 ENCOUNTER — APPOINTMENT (OUTPATIENT)
Dept: RADIOLOGY | Facility: HOSPITAL | Age: 39
End: 2024-08-05
Payer: COMMERCIAL

## 2024-08-05 ENCOUNTER — APPOINTMENT (OUTPATIENT)
Dept: CARDIOLOGY | Facility: HOSPITAL | Age: 39
End: 2024-08-05
Payer: COMMERCIAL

## 2024-08-05 VITALS
DIASTOLIC BLOOD PRESSURE: 81 MMHG | RESPIRATION RATE: 16 BRPM | BODY MASS INDEX: 27.1 KG/M2 | WEIGHT: 168.65 LBS | OXYGEN SATURATION: 98 % | HEIGHT: 66 IN | TEMPERATURE: 98.2 F | HEART RATE: 94 BPM | SYSTOLIC BLOOD PRESSURE: 128 MMHG

## 2024-08-05 DIAGNOSIS — R42 EPISODIC LIGHTHEADEDNESS: Primary | ICD-10-CM

## 2024-08-05 LAB
ALBUMIN SERPL BCP-MCNC: 4.5 G/DL (ref 3.4–5)
ALP SERPL-CCNC: 229 U/L (ref 33–120)
ALT SERPL W P-5'-P-CCNC: 16 U/L (ref 10–52)
ANION GAP SERPL CALC-SCNC: 16 MMOL/L (ref 10–20)
AST SERPL W P-5'-P-CCNC: 14 U/L (ref 9–39)
ATRIAL RATE: 90 BPM
BASOPHILS # BLD AUTO: 0.02 X10*3/UL (ref 0–0.1)
BASOPHILS NFR BLD AUTO: 0.2 %
BILIRUB SERPL-MCNC: 0.4 MG/DL (ref 0–1.2)
BUN SERPL-MCNC: 28 MG/DL (ref 6–23)
CALCIUM SERPL-MCNC: 8.7 MG/DL (ref 8.6–10.3)
CARDIAC TROPONIN I PNL SERPL HS: 24 NG/L (ref 0–20)
CARDIAC TROPONIN I PNL SERPL HS: 27 NG/L (ref 0–20)
CHLORIDE SERPL-SCNC: 92 MMOL/L (ref 98–107)
CO2 SERPL-SCNC: 32 MMOL/L (ref 21–32)
CREAT SERPL-MCNC: 8.05 MG/DL (ref 0.5–1.3)
EGFRCR SERPLBLD CKD-EPI 2021: 8 ML/MIN/1.73M*2
EOSINOPHIL # BLD AUTO: 0.06 X10*3/UL (ref 0–0.7)
EOSINOPHIL NFR BLD AUTO: 0.6 %
ERYTHROCYTE [DISTWIDTH] IN BLOOD BY AUTOMATED COUNT: 18.1 % (ref 11.5–14.5)
GLUCOSE SERPL-MCNC: 109 MG/DL (ref 74–99)
HCT VFR BLD AUTO: 31.1 % (ref 41–52)
HGB BLD-MCNC: 10.7 G/DL (ref 13.5–17.5)
IMM GRANULOCYTES # BLD AUTO: 0.05 X10*3/UL (ref 0–0.7)
IMM GRANULOCYTES NFR BLD AUTO: 0.5 % (ref 0–0.9)
LYMPHOCYTES # BLD AUTO: 1.62 X10*3/UL (ref 1.2–4.8)
LYMPHOCYTES NFR BLD AUTO: 16.1 %
MCH RBC QN AUTO: 34.4 PG (ref 26–34)
MCHC RBC AUTO-ENTMCNC: 34.4 G/DL (ref 32–36)
MCV RBC AUTO: 100 FL (ref 80–100)
MONOCYTES # BLD AUTO: 0.51 X10*3/UL (ref 0.1–1)
MONOCYTES NFR BLD AUTO: 5.1 %
NEUTROPHILS # BLD AUTO: 7.79 X10*3/UL (ref 1.2–7.7)
NEUTROPHILS NFR BLD AUTO: 77.5 %
NRBC BLD-RTO: 0 /100 WBCS (ref 0–0)
P AXIS: 32 DEGREES
P OFFSET: 183 MS
P ONSET: 132 MS
PLATELET # BLD AUTO: 303 X10*3/UL (ref 150–450)
POTASSIUM SERPL-SCNC: 4.3 MMOL/L (ref 3.5–5.3)
PR INTERVAL: 158 MS
PROT SERPL-MCNC: 7.5 G/DL (ref 6.4–8.2)
Q ONSET: 211 MS
QRS COUNT: 15 BEATS
QRS DURATION: 90 MS
QT INTERVAL: 364 MS
QTC CALCULATION(BAZETT): 445 MS
QTC FREDERICIA: 416 MS
R AXIS: -28 DEGREES
RBC # BLD AUTO: 3.11 X10*6/UL (ref 4.5–5.9)
SODIUM SERPL-SCNC: 136 MMOL/L (ref 136–145)
T AXIS: 56 DEGREES
T OFFSET: 393 MS
VENTRICULAR RATE: 90 BPM
WBC # BLD AUTO: 10.1 X10*3/UL (ref 4.4–11.3)

## 2024-08-05 PROCEDURE — 36415 COLL VENOUS BLD VENIPUNCTURE: CPT | Performed by: STUDENT IN AN ORGANIZED HEALTH CARE EDUCATION/TRAINING PROGRAM

## 2024-08-05 PROCEDURE — 71045 X-RAY EXAM CHEST 1 VIEW: CPT | Mod: FOREIGN READ | Performed by: RADIOLOGY

## 2024-08-05 PROCEDURE — 84075 ASSAY ALKALINE PHOSPHATASE: CPT | Performed by: STUDENT IN AN ORGANIZED HEALTH CARE EDUCATION/TRAINING PROGRAM

## 2024-08-05 PROCEDURE — 71045 X-RAY EXAM CHEST 1 VIEW: CPT

## 2024-08-05 PROCEDURE — 84484 ASSAY OF TROPONIN QUANT: CPT | Performed by: STUDENT IN AN ORGANIZED HEALTH CARE EDUCATION/TRAINING PROGRAM

## 2024-08-05 PROCEDURE — 93005 ELECTROCARDIOGRAM TRACING: CPT

## 2024-08-05 PROCEDURE — 99283 EMERGENCY DEPT VISIT LOW MDM: CPT

## 2024-08-05 PROCEDURE — 85025 COMPLETE CBC W/AUTO DIFF WBC: CPT | Performed by: STUDENT IN AN ORGANIZED HEALTH CARE EDUCATION/TRAINING PROGRAM

## 2024-08-05 ASSESSMENT — COLUMBIA-SUICIDE SEVERITY RATING SCALE - C-SSRS
2. HAVE YOU ACTUALLY HAD ANY THOUGHTS OF KILLING YOURSELF?: NO
6. HAVE YOU EVER DONE ANYTHING, STARTED TO DO ANYTHING, OR PREPARED TO DO ANYTHING TO END YOUR LIFE?: NO
1. IN THE PAST MONTH, HAVE YOU WISHED YOU WERE DEAD OR WISHED YOU COULD GO TO SLEEP AND NOT WAKE UP?: NO

## 2024-08-05 ASSESSMENT — PAIN SCALES - GENERAL
PAINLEVEL_OUTOF10: 4
PAINLEVEL_OUTOF10: 0 - NO PAIN

## 2024-08-05 ASSESSMENT — PAIN DESCRIPTION - LOCATION: LOCATION: BACK

## 2024-08-05 ASSESSMENT — PAIN - FUNCTIONAL ASSESSMENT: PAIN_FUNCTIONAL_ASSESSMENT: 0-10

## 2024-08-05 NOTE — DISCHARGE INSTRUCTIONS
You have been seen at a [facility].  Please follow-up with your primary care provider in the next 1 to 2 days for further evaluation and routine follow-up.  Please return to the emergency room if having any worsening symptoms.  Please follow-up with any specialists if discussed during your emergency room stay.

## 2024-08-05 NOTE — ED PROVIDER NOTES
EMERGENCY MEDICINE EVALUATION NOTE    History of Present Illness     Chief Complaint:   Chief Complaint   Patient presents with    Dizziness       HPI: Siddhartha Orellana is a 39 y.o. male with past medical history of ESRD on dialysis and hypertension as well as iron deficiency anemia who presents with complaint of lightheadedness.  Patient states he has had multiple similar episodes in the past.  He states he was receiving dialysis prior to arrival and near the end of his session started develop some lightheadedness as well as reported hypotension.  He states he was placed on oxygen as he was also complaining of some shortness of breath.  He states most of his symptoms resolved shortly after.  He states he now does not have any current lightheadedness and denies any definitive chest pain.  Denies any cough, fever, chills, Eduardo pain, nausea, vomiting.    Previous History     Past Medical History:   Diagnosis Date    Dorsalgia, unspecified 01/04/2023    Back pain    End stage renal disease (Multi) 01/04/2023    ESRD (end stage renal disease) on dialysis    Essential (primary) hypertension 01/04/2023    Hypertension    Iron deficiency 02/23/2020    Iron deficiency    Other specified postprocedural states     H/O endoscopy     Past Surgical History:   Procedure Laterality Date    OTHER SURGICAL HISTORY  04/11/2018    Creation Of A-V Graft Fistula For Dialysis     Social History     Tobacco Use    Smoking status: Former     Types: Cigars     Passive exposure: Never    Smokeless tobacco: Never   Substance Use Topics    Alcohol use: Not Currently     Comment: rarely    Drug use: Never     Types: Marijuana     Family History   Problem Relation Name Age of Onset    Hypertension Mother      Cancer Father      Hypertension Father      Other (renal transplant recipient) Mother's Brother       No Known Allergies  Current Outpatient Medications   Medication Instructions    atorvastatin (LIPITOR) 20 mg, oral, Nightly    B  "complex-vitamin C-folic acid (Nephrocaps) 1 mg capsule 1 capsule, oral, Daily    cabozantinib (Cabometyx) 40 mg tablet TAKE ONE (1) TABLET BY MOUTH ONCE A DAY    fentaNYL (Duragesic) 50 mcg/hr patch 1 patch, transdermal, Every 72 hours    hydrocortisone 2.5 % cream 1 APPLICATION TWICE A DAY TO AFFECTED AREA OF FACE (AVOID EYES) TO DARK SPOTS FOR 2-4 WEEKS    lidocaine-prilocaine (Emla) 2.5-2.5 % cream Apply thick layer 2 hours prior to dialysis site, cover with plastic wrap.    loratadine (CLARITIN) 10 mg, oral, Daily    metoclopramide (REGLAN) 5 mg, oral, 4 times daily, Take half an hour before meals and at bedtime    naloxone (NARCAN) 4 mg, nasal, As needed, May repeat every 2-3 minutes if needed, alternating nostrils, until medical assistance becomes available.    non-adherent bandage (Curity Abdominal Pad) 8 X 10 \" bandage 1 Application, topical (top), Daily, To areas of hidradenitis for drainage    omeprazole (PRILOSEC) 40 mg, oral, Daily    ondansetron (ZOFRAN) 8 mg, oral, Every 8 hours PRN    oxyCODONE (ROXICODONE) 10 mg, oral, Every 4 hours PRN    pregabalin (LYRICA) 25 mg, oral, Nightly    sevelamer HCl (RENAGEL) 1,600 mg, oral, 3 times daily before meals, Swallow tablet whole; do not crush, break, or chew.    sulfamethoxazole-trimethoprim (Bactrim) 400-80 mg tablet TAKE 2 TABLET BY MOUTH ON THE FIRST DAY FOR A LOADING DOSE. STARTING DAY 2 TAKE 1 TABLET BY MOUTH ONCE DAILY.       Physical Exam     Appearance: Alert, oriented , cooperative,  in acute distress. Well nourished & well hydrated.     Skin: Intact,  dry skin, no lesions, rash, petechiae or purpura.      Eyes: PERRLA, EOMs intact,  Conjunctiva pink with no redness or exudates. Cornea & anterior chamber are clear, Eyelids without lesions. No scleral icterus.      ENT: Hearing grossly intact. External auditory canals patent, tympanic membranes intact with visible landmarks. Nares patent, mucus membranes moist. Dentition without lesions. Pharynx " clear, uvula midline.      Neck: Supple, without meningismus. Thyroid not palpable. Trachea at midline. No lymphadenopathy.     Pulmonary: Clear bilaterally with good chest wall excursion. No rales, rhonchi or wheezing. No accessory muscle use or stridor.     Cardiac: Normal S1, S2 without murmur, rub, gallop or extrasystole. No JVD, Carotids without bruits.     Abdomen: Soft, nontender, active bowel sounds.  No palpable organomegaly.  No rebound or guarding.  No CVA tenderness.     Genitourinary: Exam deferred.     Musculoskeletal: Full range of motion. no pain, edema, or deformity. Pulses full and equal. No cyanosis or clubbing.      Neurological:  Cranial nerves II through XII are grossly intact, finger-nose touch is normal, normal sensation, no weakness, no focal findings identified.     Psychiatric: Appropriate mood and affect.      Results     Labs Reviewed   COMPREHENSIVE METABOLIC PANEL - Abnormal       Result Value    Glucose 109 (*)     Sodium 136      Potassium 4.3      Chloride 92 (*)     Bicarbonate 32      Anion Gap 16      Urea Nitrogen 28 (*)     Creatinine 8.05 (*)     eGFR 8 (*)     Calcium 8.7      Albumin 4.5      Alkaline Phosphatase 229 (*)     Total Protein 7.5      AST 14      Bilirubin, Total 0.4      ALT 16     CBC WITH AUTO DIFFERENTIAL - Abnormal    WBC 10.1      nRBC 0.0      RBC 3.11 (*)     Hemoglobin 10.7 (*)     Hematocrit 31.1 (*)           MCH 34.4 (*)     MCHC 34.4      RDW 18.1 (*)     Platelets 303      Neutrophils % 77.5      Immature Granulocytes %, Automated 0.5      Lymphocytes % 16.1      Monocytes % 5.1      Eosinophils % 0.6      Basophils % 0.2      Neutrophils Absolute 7.79 (*)     Immature Granulocytes Absolute, Automated 0.05      Lymphocytes Absolute 1.62      Monocytes Absolute 0.51      Eosinophils Absolute 0.06      Basophils Absolute 0.02     SERIAL TROPONIN-INITIAL - Abnormal    Troponin I, High Sensitivity 27 (*)     Narrative:     Less than 99th  "percentile of normal range cutoff-  Female and children under 18 years old <14 ng/L; Male <21 ng/L: Negative  Repeat testing should be performed if clinically indicated.     Female and children under 18 years old 14-50 ng/L; Male 21-50 ng/L:  Consistent with possible cardiac damage and possible increased clinical   risk. Serial measurements may help to assess extent of myocardial damage.     >50 ng/L: Consistent with cardiac damage, increased clinical risk and  myocardial infarction. Serial measurements may help assess extent of   myocardial damage.      NOTE: Children less than 1 year old may have higher baseline troponin   levels and results should be interpreted in conjunction with the overall   clinical context.     NOTE: Troponin I testing is performed using a different   testing methodology at [facility] than at other   [facility]. Direct result comparisons should only   be made within the same method.   TROPONIN SERIES- (INITIAL, 1 HR)    Narrative:     The following orders were created for panel order Troponin I Series, High Sensitivity (0, 1 HR).  Procedure                               Abnormality         Status                     ---------                               -----------         ------                     Troponin I, High Sensiti...[[id number]]  Abnormal            Final result               Troponin, High Sensitivi...[[id number]]                      In process                   Please view results for these tests on the individual orders.   SERIAL TROPONIN, 1 HOUR     XR chest 1 view   Final Result   No consolidation or effusion.   Signed by Chan Guy MD            ED Course & Medical Decision Making   Medications - No data to display  Diagnoses as of 08/05/24 1245   Episodic lightheadedness     Heart Rate:  [82-96]   Temperature:  [36.8 °C (98.2 °F)]   Respirations:  [16]   BP: (113-138)/(75-95)   Height:  [167.6 cm (5' 6\")]   Weight:  [76.5 kg (168 lb 10.4 oz)] "   Pulse Ox:  [93 %-99 %]      Patient was initially hemodynamically stable and afebrile.  Patient was likely experiencing symptoms secondary to his dialysis with likely excess fluid shift changes and hypotension secondary to this.  Patient on arrival here had resolution of his symptoms.  Also consider dehydration, cardiac arrhythmia, ACS.  Initial EKG was reassuring with normal sinus rhythm rate of 90 bpm no signs of acute ischemic change or arrhythmia.  Labs reassuring with baseline renal function.  No significant electrolyte normality.  Baseline anemia with no leukocytosis.  Troponin mildly elevated at 27.  Patient did wish to be discharged home.  Low suspicion for ACS.  Chest x-ray is nonacute.  He was informed to let his nephrologist know as they might need to adjust his dialysis session.  Otherwise stable for outpatient management.    Procedures   Procedures    Diagnosis     1. Episodic lightheadedness        Disposition     DISCHARGE.  The patient is discharged back to their place of residence.  Discharge diagnosis, instructions and plan were discussed and understood. At the time of discharge the patient was comfortable and was in no apparent distress. Patient is aware of diagnostic uncertainty and was notified though testing is negative here, there is a very small chance that pathology may be missed.  The patient understands these risks and the patient /family understood to return immediately to the emergency department if the symptoms worsen or if they have any additional concerns.    FOLLOW UP  Primary care provider in 1-2 days.        ED Prescriptions    None            Isma Witt DO  08/05/24 3871

## 2024-08-05 NOTE — ED TRIAGE NOTES
Pt to the ED from dialysis, pt completed about 2.5 hours of his treatment and then became lightheaded and dizzy, per pt his BP dropped but he is unsure how low it went. Pt has hx of stage 4 renal carcinoma, currently on oral chemotherapy. Pt complains of back pain and and pain at fistula site. Pt a&o x3, wife at bedside

## 2024-08-06 ENCOUNTER — PATIENT MESSAGE (OUTPATIENT)
Dept: PALLIATIVE MEDICINE | Facility: HOSPITAL | Age: 39
End: 2024-08-06
Payer: COMMERCIAL

## 2024-08-06 DIAGNOSIS — C64.9 RENAL CELL CARCINOMA, UNSPECIFIED LATERALITY (MULTI): ICD-10-CM

## 2024-08-06 DIAGNOSIS — G89.3 CANCER ASSOCIATED PAIN: ICD-10-CM

## 2024-08-06 RX ORDER — OXYCODONE HYDROCHLORIDE 10 MG/1
10 TABLET ORAL EVERY 4 HOURS PRN
Qty: 180 TABLET | Refills: 0 | Status: SHIPPED | OUTPATIENT
Start: 2024-08-06

## 2024-08-06 NOTE — PATIENT COMMUNICATION
OARRS report reviewed and reflects  prescription history, no aberrancy noted. Per OARRS, patient last filled 30 day supply of oxycodone 10mg on 7/10, and 50mcg fentanyl patches  7/16. Per last visit with Audrey patient to continue oxy 10mg q4h prn, and increase to fentanyl 50 mcg q72hr. Patient with follow up visit scheduled with Audrey on 8/13. Patient updated that oxycodone will be sent to [facility] and fentanyl can be refilled at visit on 8/13. Refill request routed to provider.

## 2024-08-13 ENCOUNTER — OFFICE VISIT (OUTPATIENT)
Dept: HEMATOLOGY/ONCOLOGY | Facility: HOSPITAL | Age: 39
End: 2024-08-13
Payer: COMMERCIAL

## 2024-08-13 ENCOUNTER — OFFICE VISIT (OUTPATIENT)
Dept: PALLIATIVE MEDICINE | Facility: HOSPITAL | Age: 39
End: 2024-08-13
Payer: COMMERCIAL

## 2024-08-13 VITALS
TEMPERATURE: 97.3 F | SYSTOLIC BLOOD PRESSURE: 119 MMHG | HEART RATE: 104 BPM | WEIGHT: 181 LBS | RESPIRATION RATE: 20 BRPM | DIASTOLIC BLOOD PRESSURE: 75 MMHG | BODY MASS INDEX: 29.21 KG/M2 | OXYGEN SATURATION: 99 %

## 2024-08-13 DIAGNOSIS — Z51.81 ENCOUNTER FOR MONITORING OPIOID MAINTENANCE THERAPY: Primary | ICD-10-CM

## 2024-08-13 DIAGNOSIS — K59.03 CONSTIPATION DUE TO PAIN MEDICATION THERAPY: ICD-10-CM

## 2024-08-13 DIAGNOSIS — G89.3 CANCER ASSOCIATED PAIN: ICD-10-CM

## 2024-08-13 DIAGNOSIS — Z79.891 ENCOUNTER FOR MONITORING OPIOID MAINTENANCE THERAPY: Primary | ICD-10-CM

## 2024-08-13 DIAGNOSIS — C64.9 RENAL CELL CARCINOMA, UNSPECIFIED LATERALITY (MULTI): ICD-10-CM

## 2024-08-13 DIAGNOSIS — R11.2 NAUSEA AND VOMITING, UNSPECIFIED VOMITING TYPE: ICD-10-CM

## 2024-08-13 PROCEDURE — 3078F DIAST BP <80 MM HG: CPT | Performed by: NURSE PRACTITIONER

## 2024-08-13 PROCEDURE — 99214 OFFICE O/P EST MOD 30 MIN: CPT | Performed by: NURSE PRACTITIONER

## 2024-08-13 PROCEDURE — 1036F TOBACCO NON-USER: CPT | Performed by: NURSE PRACTITIONER

## 2024-08-13 PROCEDURE — 3074F SYST BP LT 130 MM HG: CPT | Performed by: NURSE PRACTITIONER

## 2024-08-13 PROCEDURE — 99215 OFFICE O/P EST HI 40 MIN: CPT | Performed by: NURSE PRACTITIONER

## 2024-08-13 RX ORDER — FENTANYL 50 UG/1
1 PATCH TRANSDERMAL
Qty: 10 PATCH | Refills: 0 | Status: SHIPPED | OUTPATIENT
Start: 2024-08-13 | End: 2024-09-12

## 2024-08-13 ASSESSMENT — PAIN SCALES - GENERAL: PAINLEVEL: 2

## 2024-08-13 NOTE — PROGRESS NOTES
Patient ID: Siddhartha Orellana is a 39 y.o. male.  Attending Physician: Dr. Renan Thomson  Cancer Diagnosis: Cancer Staging   No matching staging information was found for the patient.     Current Therapy: Cabozantinib 40 mg PO daily  Nivolumab discontinued  Genetics: Tali  MET  TMB 3  MSI-S  SONAL 0%    Subjective      Cancer History:  Oncology History   Renal cell carcinoma (Multi)   12/1/2022 -  Chemotherapy    Cabozantinib, 28 Day Cycles     8/28/2023 Initial Diagnosis    Renal cell carcinoma (CMS/HCC)       11/7/22 - CT-guided biopsy of retroperitoneal lymph node revealed findings consistent with metastatic papillary carcinoma.  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  2/7/23 - On Prednisone 20mg daily  2/17/23 - Scans show SD (RECIST) with some mild tumor shrinkage RPLN  2/21/23 - Increase Prednisone 40mg daily for continued arthralgia; continue Cabo 40 mg daily  3/15/23 - Continue Pred 40mg PO daily, continue Cabo  End March - hidradenitis suppurativa  4/4/23 - Hold Cabo; prednisone 30 mg daily  4/25/23 - Resume cabo  5/18/23 - Hold Cabo 2/2 vascular fistula repair  5/23/23 - Continue hold due to extensive edema, blistering  6/2023 - resumed cabo. Nivo on hold with PDN 25 mg  8/16/23 - continue cabo, reduce PDN to 20 mg daily  8/23/23 - Left upper extremity wound debridement down to subcutaneous tissue and vessels- Cabo on hold  8/31/23 - continue to hold cabo, PDN back at 30mg   9/15/23 - resumed cabozantinib 40 mg   Mid Sep 23 - admission hidradenitis suppurativa  10/10/23 - cabo on hold. Will start PDN 20 mg  10/17/23 - cabo on hold for graft, PDN 20 mg  10/25/23 - cabo on hold for graft, PDN 15 mg daily  11/16/23 - continue cabozantinib hold, PDN 10  mg daily  1/18/24 - mild inguinal/iliac LN growth + stable bone mets in staging scans.   01/2024 - dialysis started  3/7/24 - he got back on cabozantinib 40 mg 2-3 w ago  4/11/24 - scans show SD. Continue cabo 40 alone  4/12/24 - held for AVF infiltration  5/1/24 - No AVF revision required - restart cabo at 40 mg  5/29/24 - Continue cabozantinib  7/2/24 - Continue cabozantinib 40 mg with breaks PRN    Interval History:  Mr. Orellana is doing well. He reports feeling the best he has on the cabozantinib yet. Not on any steroids. He recently started a fentanyl patch with pall med for pain in his legs which has helped significantly. He does not feel he has new or worsening pain at this time. Did not have pain at sites of bone mets previously. Some stomach upset, eating and drinking well. Skin doing OK. Hands and feet OK as well. Otherwise, no new or concerning symptoms. The remainder of his ROS is otherwise negative.    HPI    Objective    BSA: 1.96 meters squared  /75   Pulse 104   Temp 36.3 °C (97.3 °F) (Core)   Resp 20   Wt 82.1 kg (181 lb)   SpO2 99%   BMI 29.21 kg/m²     Physical Exam  General: alert, well-dressed in NAD. Speech is fluent and coherent, words clear. Good insight. Oriented x4  Skin: warm, dry, and pink without cyanosis or nail clubbing. No rash, petechiae, or ecchymoses  HEENT: Normocephalic atraumatic. Sclera white, conjunctiva pink. EOMs intact. Hearing intact to spoken voice. No visible lesions  Respiratory: Chest expansion symmetric. No audible wheeze. Unlabored breathing.  CV: Good color.  Psych: engaged, polite, appropriate conversation and eye contact.      Current Medications:    Current Outpatient Medications:     atorvastatin (Lipitor) 20 mg tablet, Take 1 tablet (20 mg) by mouth once daily at bedtime., Disp: , Rfl:     B complex-vitamin C-folic acid (Nephrocaps) 1 mg capsule, Take 1 capsule by mouth once daily., Disp: 30 capsule, Rfl: 11    cabozantinib (Cabometyx) 40 mg tablet,  "TAKE ONE (1) TABLET BY MOUTH ONCE A DAY, Disp: 90 each, Rfl: 6    hydrocortisone 2.5 % cream, 1 APPLICATION TWICE A DAY TO AFFECTED AREA OF FACE (AVOID EYES) TO DARK SPOTS FOR 2-4 WEEKS, Disp: 56.7 g, Rfl: 3    lidocaine-prilocaine (Emla) 2.5-2.5 % cream, Apply thick layer 2 hours prior to dialysis site, cover with plastic wrap., Disp: 30 g, Rfl: 11    loratadine (Claritin) 10 mg tablet, Take 1 tablet (10 mg) by mouth once daily., Disp: 30 tablet, Rfl: 11    metoclopramide (Reglan) 5 mg tablet, Take 1 tablet (5 mg) by mouth 4 times a day. Take half an hour before meals and at bedtime, Disp: 240 tablet, Rfl: 0    naloxone (Narcan) 4 mg/0.1 mL nasal spray, Administer 1 spray (4 mg) into affected nostril(s) if needed for opioid reversal. May repeat every 2-3 minutes if needed, alternating nostrils, until medical assistance becomes available., Disp: 2 each, Rfl: 0    non-adherent bandage (Curity Abdominal Pad) 8 X 10 \" bandage, Apply 1 Application topically once daily. To areas of hidradenitis for drainage (Patient not taking: Reported on 8/1/2024), Disp: 18 each, Rfl: 11    omeprazole (PriLOSEC) 40 mg DR capsule, TAKE 1 CAPSULE BY MOUTH DAILY, Disp: 30 capsule, Rfl: 10    ondansetron (Zofran) 8 mg tablet, Take 1 tablet (8 mg) by mouth every 8 hours if needed for nausea., Disp: 60 tablet, Rfl: 11    oxyCODONE (Roxicodone) 10 mg immediate release tablet, Take 1 tablet (10 mg) by mouth every 4 hours if needed for moderate pain (4 - 6)., Disp: 180 tablet, Rfl: 0    pregabalin (Lyrica) 25 mg capsule, Take 1 capsule (25 mg) by mouth once daily at bedtime., Disp: 30 capsule, Rfl: 3    sevelamer HCl (Renagel) 800 mg tablet, Take 2 tablets (1,600 mg) by mouth 3 times a day before meals. Swallow tablet whole; do not crush, break, or chew., Disp: 540 tablet, Rfl: 3    sulfamethoxazole-trimethoprim (Bactrim) 400-80 mg tablet, TAKE 2 TABLET BY MOUTH ON THE FIRST DAY FOR A LOADING DOSE. STARTING DAY 2 TAKE 1 TABLET BY MOUTH ONCE " DAILY., Disp: 30 tablet, Rfl: 2     Most Recent Labs:  Results for orders placed or performed during the hospital encounter of 08/05/24   Comprehensive Metabolic Panel   Result Value Ref Range    Glucose 109 (H) 74 - 99 mg/dL    Sodium 136 136 - 145 mmol/L    Potassium 4.3 3.5 - 5.3 mmol/L    Chloride 92 (L) 98 - 107 mmol/L    Bicarbonate 32 21 - 32 mmol/L    Anion Gap 16 10 - 20 mmol/L    Urea Nitrogen 28 (H) 6 - 23 mg/dL    Creatinine 8.05 (H) 0.50 - 1.30 mg/dL    eGFR 8 (L) >60 mL/min/1.73m*2    Calcium 8.7 8.6 - 10.3 mg/dL    Albumin 4.5 3.4 - 5.0 g/dL    Alkaline Phosphatase 229 (H) 33 - 120 U/L    Total Protein 7.5 6.4 - 8.2 g/dL    AST 14 9 - 39 U/L    Bilirubin, Total 0.4 0.0 - 1.2 mg/dL    ALT 16 10 - 52 U/L   CBC and Auto Differential   Result Value Ref Range    WBC 10.1 4.4 - 11.3 x10*3/uL    nRBC 0.0 0.0 - 0.0 /100 WBCs    RBC 3.11 (L) 4.50 - 5.90 x10*6/uL    Hemoglobin 10.7 (L) 13.5 - 17.5 g/dL    Hematocrit 31.1 (L) 41.0 - 52.0 %     80 - 100 fL    MCH 34.4 (H) 26.0 - 34.0 pg    MCHC 34.4 32.0 - 36.0 g/dL    RDW 18.1 (H) 11.5 - 14.5 %    Platelets 303 150 - 450 x10*3/uL    Neutrophils % 77.5 40.0 - 80.0 %    Immature Granulocytes %, Automated 0.5 0.0 - 0.9 %    Lymphocytes % 16.1 13.0 - 44.0 %    Monocytes % 5.1 2.0 - 10.0 %    Eosinophils % 0.6 0.0 - 6.0 %    Basophils % 0.2 0.0 - 2.0 %    Neutrophils Absolute 7.79 (H) 1.20 - 7.70 x10*3/uL    Immature Granulocytes Absolute, Automated 0.05 0.00 - 0.70 x10*3/uL    Lymphocytes Absolute 1.62 1.20 - 4.80 x10*3/uL    Monocytes Absolute 0.51 0.10 - 1.00 x10*3/uL    Eosinophils Absolute 0.06 0.00 - 0.70 x10*3/uL    Basophils Absolute 0.02 0.00 - 0.10 x10*3/uL   Troponin I, High Sensitivity, Initial   Result Value Ref Range    Troponin I, High Sensitivity 27 (H) 0 - 20 ng/L   Troponin, High Sensitivity, 1 Hour   Result Value Ref Range    Troponin I, High Sensitivity 24 (H) 0 - 20 ng/L   ECG 12 Lead   Result Value Ref Range    Ventricular Rate 90 BPM     Atrial Rate 90 BPM    AL Interval 158 ms    QRS Duration 90 ms    QT Interval 364 ms    QTC Calculation(Bazett) 445 ms    P Axis 32 degrees    R Axis -28 degrees    T Axis 56 degrees    QRS Count 15 beats    Q Onset 211 ms    P Onset 132 ms    P Offset 183 ms    T Offset 393 ms    QTC Fredericia 416 ms      Lab Results   Component Value Date    PSA 1.31 02/08/2022        Performance Status:  ECOG Score: 1- Restricted in physically strenuous activity.  Carries out light duty.  Karnofsky Score: 80 - Normal activity with effort; some signs or symptoms of disease      Assessment/Plan   Siddhartha Orellana is a 39 y.o. 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 Nivolumab was discontinued due to arthralgia/HS, no longer on prednisone.    He looks and feels well. Labs are relatively unremarkable aside from rising alk phos. No pain, or liver symptoms/abnormalities. Advised bone scan given known disease.    # mRCC  -  Continue cabo 40 mg PO daily  - CT CAP and NM Bone prior to next visist    # Elevated alk phos  - NM Bone ordered prior to next visit    # Nausea  # Arthraliga  # Pain  - Continue to follow with supportive oncology    # HS  # Psoriasis  - continue to follow with Dr. Engle    RTC September with labs and scans prior    Total time spent on this encounter was 40 minutes, which included preparation, direct time with patient, documentation, and care coordination on the day of visit.    Shawna Silva, MSN, APRN, AGNP-C, AOCNP  Associate Nurse Practitioner  [facility]

## 2024-08-13 NOTE — PROGRESS NOTES
SUPPORTIVE AND PALLIATIVE ONCOLOGY CONSULT - OUTPATIENT FOLLOW UP      SERVICE DATE: 8/13/2024    Referred by:  Renan Thomson MD   Medical Oncologist: Renan Thomson MD   Radiation Oncologist: No care team member to display  Primary Physician: Davina Pratt  [phone]    REASON FOR CONSULT/CHIEF CONSULT COMPLAINT: Intro to Supportive Oncology and Symptom Management.     Subjective   HISTORY OF PRESENT ILLNESS: Siddhartha Orellana is a 39 y.o. 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 Discontinued nivolumab in January 2023 due to arthralgias requiring high dose steroids. Course c/b by hidradenitis suppurativa and delayed fistula healing. He was started on oxycodone 10mg TID for pain management.      Additional PMHx:  Lymphadenopathy and a pre surgical consult for kidney transplant  End stage renal failure on dialysis   Type II DM  HTN  HLD  Parathyroidism   Anemia with NARGIS component   Gastric ulcer  SHIVA  Vitamin D deficiency   Morbid obesity    5/29/24:   Just received fentanyl patches 2 days ago and hasn't started yet. Pain remains about the same in quality intensity but noticing more in R calf and knee today. Still using about 60mg of oxycodone per day.   Stopped Zyprexa after 2 days after he felt it made the nausea worse.   CRP/ESR mildly elevated. RF nl. Citrulline antibody, IgG nl,  CLOVIS pending.     7/10/24: Pain overall better controlled with additional of fentanyl patch. Still using oxy 10 about 4-5 pills per day. Ongoing issues with nausea and vomiting.     8/13/24: Pain better controlled. Much more active. Still using about 4-6 oxy per day but feeling much better overall.  Hesitant about lyrica so he didn't start it. Nausea and appetite much better with reglan.     Pain Assessment:    Location varies by day.    Currently located in L calf, R knee, and lower back - throbbing (left calf), shooting (right knee to lower thigh), tight - fairly constant with flares.     Always  has constant pain in at least one of his legs. Worse with steps/incline.     Off steroids.     Symptom Assessment:    Pain:a little  Lack of energy: a little  Difficulty sleeping: none   Lack of appetite: none   Nausea: none  Vomiting: none  Constipation: none  Numbness or tingling in hands/feet/other: none  Weight loss: none      Information obtained from: interview of patient and interview of family  ______________________________________________________________________     Oncology History   Renal cell carcinoma (Multi)   12/1/2022 -  Chemotherapy    Cabozantinib, 28 Day Cycles     8/28/2023 Initial Diagnosis    Renal cell carcinoma (CMS/HCC)         Past Medical History:   Diagnosis Date    Dorsalgia, unspecified 01/04/2023    Back pain    End stage renal disease (Multi) 01/04/2023    ESRD (end stage renal disease) on dialysis    Essential (primary) hypertension 01/04/2023    Hypertension    Iron deficiency 02/23/2020    Iron deficiency    Other specified postprocedural states     H/O endoscopy     Past Surgical History:   Procedure Laterality Date    OTHER SURGICAL HISTORY  04/11/2018    Creation Of A-V Graft Fistula For Dialysis     Family History   Problem Relation Name Age of Onset    Hypertension Mother      Cancer Father      Hypertension Father      Other (renal transplant recipient) Mother's Brother          SOCIAL HISTORY  . 5 children. Thompson.  Social History:  reports that he has quit smoking. His smoking use included cigars. He has never been exposed to tobacco smoke. He has never used smokeless tobacco. He reports that he does not currently use alcohol. He reports that he does not use drugs.      REVIEW OF SYSTEMS  Review of systems negative unless noted in HPI.       Objective       Current Outpatient Medications   Medication Instructions    atorvastatin (LIPITOR) 20 mg, oral, Nightly    B complex-vitamin C-folic acid (Nephrocaps) 1 mg capsule 1 capsule, oral, Daily    cabozantinib  "(Cabometyx) 40 mg tablet TAKE ONE (1) TABLET BY MOUTH ONCE A DAY    hydrocortisone 2.5 % cream 1 APPLICATION TWICE A DAY TO AFFECTED AREA OF FACE (AVOID EYES) TO DARK SPOTS FOR 2-4 WEEKS    lidocaine-prilocaine (Emla) 2.5-2.5 % cream Apply thick layer 2 hours prior to dialysis site, cover with plastic wrap.    loratadine (CLARITIN) 10 mg, oral, Daily    metoclopramide (REGLAN) 5 mg, oral, 4 times daily, Take half an hour before meals and at bedtime    naloxone (NARCAN) 4 mg, nasal, As needed, May repeat every 2-3 minutes if needed, alternating nostrils, until medical assistance becomes available.    non-adherent bandage (Curity Abdominal Pad) 8 X 10 \" bandage 1 Application, topical (top), Daily, To areas of hidradenitis for drainage    omeprazole (PRILOSEC) 40 mg, oral, Daily    ondansetron (ZOFRAN) 8 mg, oral, Every 8 hours PRN    oxyCODONE (ROXICODONE) 10 mg, oral, Every 4 hours PRN    pregabalin (LYRICA) 25 mg, oral, Nightly    sevelamer HCl (RENAGEL) 1,600 mg, oral, 3 times daily before meals, Swallow tablet whole; do not crush, break, or chew.    sulfamethoxazole-trimethoprim (Bactrim) 400-80 mg tablet TAKE 2 TABLET BY MOUTH ON THE FIRST DAY FOR A LOADING DOSE. STARTING DAY 2 TAKE 1 TABLET BY MOUTH ONCE DAILY.       Allergies: No Known Allergies          Creatinine   Date Value Ref Range Status   08/05/2024 8.05 (H) 0.50 - 1.30 mg/dL Final     AST   Date Value Ref Range Status   08/05/2024 14 9 - 39 U/L Final     ALT   Date Value Ref Range Status   08/05/2024 16 10 - 52 U/L Final     Comment:     Patients treated with Sulfasalazine may generate falsely decreased results for ALT.     Hemoglobin   Date Value Ref Range Status   08/05/2024 10.7 (L) 13.5 - 17.5 g/dL Final     Platelets   Date Value Ref Range Status   08/05/2024 303 150 - 450 x10*3/uL Final          PHYSICAL EXAMINATION  Vital Signs:   Vital signs reviewed        8/5/2024    11:30 AM 8/5/2024    11:45 AM 8/5/2024    12:00 PM 8/5/2024    12:15 PM " 8/5/2024    12:30 PM 8/5/2024    12:45 PM 8/13/2024     1:18 PM   Vitals   Systolic 115 119 113 114 130 128 119   Diastolic 84 82 75 81 89 81 75   Heart Rate 90 85 82 92 96 94 104   Temp      36.8 °C (98.2 °F) 36.3 °C (97.3 °F)   Resp       20   Weight (lb)       181   BMI       29.21 kg/m2   BSA (m2)       1.96 m2   Visit Report       Report            Physical Exam  HENT:      Head: Normocephalic and atraumatic.      Mouth/Throat:      Pharynx: Oropharynx is clear.   Eyes:      Extraocular Movements: Extraocular movements intact.      Conjunctiva/sclera: Conjunctivae normal.   Pulmonary:      Effort: Pulmonary effort is normal.   Abdominal:      General: Abdomen is flat.   Musculoskeletal:         General: No swelling.      Cervical back: Normal range of motion.   Neurological:      General: No focal deficit present.      Mental Status: He is alert.      Comments: No myoclonus   Psychiatric:         Mood and Affect: Mood normal.         Thought Content: Thought content normal.         ASSESSMENT/PLAN    Pain  Pain is:  IO related arthralgia, chronic pain syndrome in the setting of end stage renal disease on dialysis  Type: somatic and neuropathic  -He declined methadone that was previously recommended   -Continue fentanyl TD 50mcg q72 - do not apply direct heat or submerge in water   -Continue oxycodone 10mg q4 PRN for now (I recommended rotating to hydromorphone as it is more renally favored but due to patient's concern of making multiple changes will defer until next refill due as he is not having any signs of toxicity from the oxycodone at this time)  -He never started lyrica due to concerns of ADRs  -Continue APAP as needed  - uses sparingly   -Autoimmune/Rheum workup per oncology - unremarkable     Opioid Use  Medication Management:   - OARRS report reviewed with no aberrant behavior; consistent with  prescriptions/records and patient history  - .  Overdose Risk Score 540 .   This has been discussed  with patient.   - We will continue to closely monitor the patient for signs of prescription misuse including UDS, OARRS review and subjective reports at each visit.  - No concurrent benzodiazepine use   - I am a provider who either is or has consulted and collaborated with a provider certified in Hospice and Palliative Medicine and have conducted a face-face visit and examination for this patient.  - Routine Urine Drug Screen: patient is anuric and drug screen 9 panel pending   - Controlled Substance Agreement completed 5/29/24  - Specifically discussed that controlled substance prescriptions will only be provided by our group as outlined in the completed agreement  - Prescribed naloxone 5/1/24  - Red Flags: None      Nausea   Intermittent nausea with vomiting related to opioids and dialysis   -Intolerant to zyprexa 2.5mg   -Continue zofran 8mg q8 as needed   -Continue reglan 5mg QID       Constipation   At risk for constipation related to opioids  -Continue miralax 17 g daily as needed     Introduction to Supportive and Palliative Oncology:  Spoke with patient and wife   Introduced the role and philosophy of Supportive and Palliative oncology in the evaluation and management of symptoms during cancer treatment      Medical Decision Making/Goals of Care/Advance Care Planning:  Patient's current clinical condition, including diagnosis, prognosis, and management plan, and goals of care were discussed.   Life limiting disease: RCC  Family: Supportive wife/children   Goals: symptom control and cancer directed therapy    Advance Directives  Existence of Advance Directives:Other address at next visit     Next Follow-Up Visit:  Return to clinic in 4 weeks aligned with oncology appt    Signature and billing  Thank you for allowing us to participate in the care of this patient. Recommendations will be communicated back to the consulting service by way of shared electronic medical record or face-to-face.    Medical complexity  was moderate level due to due to complexity of problems, extensive data review, and high risk of management/treatment.  Time was spent on the following: Prep Time, Time Directly with Patient/Family/Caregiver, Documentation Time. Total time spent: 30 min       DATA   Diagnostic tests and information reviewed for today's visit:  Most recent labs and imaging results, Medications       Plan of Care discussed with: Patient and RN     Some elements copied from Supportive Oncology note on 7/10/24, the elements have been updated and all reflect current decision making from today, 8/13/24.        SIGNATURE: JAIME Rausch-CNP    Contact information:  Supportive and Palliative Oncology  Monday-Friday 8 AM-5 PM  Phone:  [phone], press option #5, then option #1.   Or Epic Secure Chat

## 2024-08-15 ENCOUNTER — PATIENT MESSAGE (OUTPATIENT)
Dept: DERMATOLOGY | Facility: CLINIC | Age: 39
End: 2024-08-15
Payer: COMMERCIAL

## 2024-08-15 PROCEDURE — RXMED WILLOW AMBULATORY MEDICATION CHARGE

## 2024-08-20 ENCOUNTER — APPOINTMENT (OUTPATIENT)
Dept: DERMATOLOGY | Facility: CLINIC | Age: 39
End: 2024-08-20
Payer: COMMERCIAL

## 2024-08-20 DIAGNOSIS — L73.2 HIDRADENITIS SUPPURATIVA: ICD-10-CM

## 2024-08-20 PROCEDURE — 99214 OFFICE O/P EST MOD 30 MIN: CPT | Performed by: DERMATOLOGY

## 2024-08-20 PROCEDURE — 11900 INJECT SKIN LESIONS </W 7: CPT | Performed by: DERMATOLOGY

## 2024-08-20 RX ORDER — MENTHOL AND ZINC OXIDE .44; 20.625 G/100G; G/100G
1 OINTMENT TOPICAL AS NEEDED
Qty: 113 G | Refills: 11 | Status: SHIPPED | OUTPATIENT
Start: 2024-08-20

## 2024-08-20 RX ORDER — TRIAMCINOLONE ACETONIDE 40 MG/ML
40 INJECTION, SUSPENSION INTRA-ARTICULAR; INTRAMUSCULAR ONCE
Status: COMPLETED | OUTPATIENT
Start: 2024-08-20 | End: 2024-08-20

## 2024-08-20 ASSESSMENT — PATIENT GLOBAL ASSESSMENT (PGA): PATIENT GLOBAL ASSESSMENT: PATIENT GLOBAL ASSESSMENT:  1 - CLEAR

## 2024-08-20 ASSESSMENT — DERMATOLOGY QUALITY OF LIFE (QOL) ASSESSMENT
DATE THE QUALITY-OF-LIFE ASSESSMENT WAS COMPLETED: 67072
RATE HOW BOTHERED YOU ARE BY SYMPTOMS OF YOUR SKIN PROBLEM (EG, ITCHING, STINGING BURNING, HURTING OR SKIN IRRITATION): 0 - NEVER BOTHERED
ARE THERE EXCLUSIONS OR EXCEPTIONS FOR THE QUALITY OF LIFE ASSESSMENT: NO
WHAT SINGLE SKIN CONDITION LISTED BELOW IS THE PATIENT ANSWERING THE QUALITY-OF-LIFE ASSESSMENT QUESTIONS ABOUT: HIDRADENITIS SUPPURATIVA
RATE HOW EMOTIONALLY BOTHERED YOU ARE BY YOUR SKIN PROBLEM (FOR EXAMPLE, WORRY, EMBARRASSMENT, FRUSTRATION): 0 - NEVER BOTHERED
RATE HOW BOTHERED YOU ARE BY EFFECTS OF YOUR SKIN PROBLEMS ON YOUR ACTIVITIES (EG, GOING OUT, ACCOMPLISHING WHAT YOU WANT, WORK ACTIVITIES OR YOUR RELATIONSHIPS WITH OTHERS): 0 - NEVER BOTHERED

## 2024-08-20 ASSESSMENT — ITCH NUMERIC RATING SCALE: HOW SEVERE IS YOUR ITCHING?: 0

## 2024-08-20 ASSESSMENT — DERMATOLOGY PATIENT ASSESSMENT
ARE YOU AN ORGAN TRANSPLANT RECIPIENT: NO
HAVE YOU HAD OR DO YOU HAVE VASCULAR DISEASE: NO
DO YOU HAVE ANY NEW OR CHANGING LESIONS: NO
HAVE YOU HAD OR DO YOU HAVE A STAPH INFECTION: NO
DO YOU USE A TANNING BED: NO

## 2024-08-20 NOTE — Clinical Note
Dear Dr. Thomson - I am managing Mr. Orellana's hidradenitis suppurativa with intermittent ILK, but it is only partially controlling his disease and the injections are very painful for him. I wanted to run next treatment options by you.   Typically I would try rifampin/clindamycin for a 3 month course but the rifampin alerts with his cabozantinib - it looks like it will induce metabolism. Can this be compensated for or is it best to avoid?   Next in line would be an IL-17 inhibitor like secukinumab (Cosentyx) or ixekizumab (Taltz). Traditionally we have been cautious to use biologics in cancer patients but these newer more targeted biologics due not immunosuppress nearly as much as adalimumab (Humira) did and seem to be well-tolerated. Only one case report I could find for ixe and cabo. Would this be OK or would it interfere with any trial eligibility?  And finally I would consider isotretinoin - this would not be immunosuppressive but just may not be effective for his hidradenitis suppurativa   Laura

## 2024-08-20 NOTE — PROGRESS NOTES
Subjective     Siddhartha Orellana is a 39 y.o. male who presents for the following: Hidradenitis Suppurativa.     Last derm visit 6/20/24 for hidradenitis suppurativa, he had been responding to bactrim PO (started 4/6/24, renally dosed) but then had to stop due to blood pressure at dialysis and nausea. He has been off 2 weeks and has noticed 2 small spots appearing    Patient has papillary renal cell carcinoma, previously on Nivolumab and Cabozantinib. Nivolumab has been on hold since 1/2023. Patient is actively receiving treatment with Cabozantinib, most recently restarted 5/1/2024.   He is currently also receiving hemodialysis.    Review of Systems:  No other skin or systemic complaints other than what is documented elsewhere in the note.    The following portions of the chart were reviewed this encounter and updated as appropriate:   Tobacco  Allergies  Meds  Problems  Med Hx  Surg Hx         Skin Cancer History  No skin cancer on file.      Specialty Problems          Dermatology Problems    Hidradenitis suppurativa    Dehiscence of wound    Intertrigo    Rash and other nonspecific skin eruption    Psoriasis     Rash started Sept - Oct 2023. Biopsied 10/30/23 demonstrated parakeratosis and orthokeratosis with neutrophils most consistent with psoriasis based on the clinical presentation. The path could also fit with pityriasis rubra pilaris; this could be reconsidered if he is worsening or not responding    ?Triggered by cabozantinib. Could also consider hypertension medication such as beta-blocker             Objective   Well appearing patient in no apparent distress; mood and affect are within normal limits.    A focused skin examination was performed. All findings within normal limits unless otherwise noted below.    Assessment/Plan   1. Hidradenitis suppurativa  Gluteal Crease, Left Medial Thigh, Left Thigh - Posterior, Right Medial Thigh, Right Thigh - Posterior  Involving the medial thighs, intergluteal  cleft are several indurated nodules with open sinus tracts, some areas with cribriform scarring    Hidradenitis Suppurativia - Allen Stage III  - partial improvement with therapy   - prior treatments with doxycycline PO, clindamycin topically, benzoyl peroxide wash topically, bactrim PO, ILK  - relief with bactrim but unable to tolerate (blood pressure at dialysis, nausea)  - relief with ILK but short-lived    - Next options for systemic therapy would typically be rifampin/clindamycin, but the rifampin will induce metabolism of his cabozantinib.   - Further options include isotretinoin or ixekizumab (Taltz) or secukinumab (Cosentyx) which are IL-17 inhibitors.     - We would first consider an IL-17 inhibitor (Ixekizumab, Taltz) based on limited reports of its utility in treatment of neutrophilic dermatoses (pyoderma gangrenosum) induced by Cabozitinib (Successful treatment of cabozantinib?induced pyoderma gangrenosum with ixekizumab therapy: A case report  Kevin Neumann, MARCO Hanson  Published in Dermatologic Therapy 15 July 2022)  - Secukinumab (Cosentyx) could also be considered since it is in the same drug class and now is approved for hidradenitis suppurativa   - Evidence for use of isotretinoin for treatment of hidradenitis suppurative suggests limited efficacy however may be a later treatment option to consider if disease remains recalcitrant    - will check with oncologist Dr. Thomson    - today treat with ILK again for short-term relief    triamcinolone acetonide (Kenalog-40) injection 40 mg - Gluteal Crease, Left Medial Thigh, Left Thigh - Posterior, Right Medial Thigh, Right Thigh - Posterior      Intralesional injection - Gluteal Crease, Left Medial Thigh, Left Thigh - Posterior, Right Medial Thigh, Right Thigh - Posterior  Intralesional Injection:   Consent:     Consent obtained:  Verbal    Consent given by:  Patient    Risks discussed:  Poor cosmetic result, pain, infection and bleeding     "Alternatives discussed:  No treatment  Pre-Procedure Details:     Prep Type:  Isopropyl alcohol  Procedure Details:   Injection:  Triamcinolone  Outcome: patient tolerated procedure well  Post-procedure details: sterile dressing applied and wound care instructions given  Dressing type: bandage   Comments:  A total of 1 ml of 40 mg/mL Kenalog were injected into 5 lesion(s)  Lot #:   Expiration:     Related Procedures  Follow Up In Dermatology - Established Patient  Follow Up In Dermatology - Established Patient    Related Medications  non-adherent bandage (Curity Abdominal Pad) 8 X 10 \" bandage  Apply 1 Application topically once daily. To areas of hidradenitis for drainage    menthol-zinc oxide (Calmoseptine) 0.44-20.6 % ointment  Apply 1 Application topically if needed for irritation.    secukinumab (Cosentyx Pen) 150 mg/mL self-injector pen  Inject 300 mg subcutaneously every 4 weeks    secukinumab (Cosentyx Pen) 150 mg/mL self-injector pen  Inject 300 mg subcutaneously once a week for weeks 0, 1, 2, 3 and 4        Follow up for ILK as scheduled  Start systemic therapy pending communication with oncology        Addendum 8/30/24  After discussion with oncology will proceed with secukinumab therapy. Orders placed.   "

## 2024-08-22 ENCOUNTER — SPECIALTY PHARMACY (OUTPATIENT)
Dept: PHARMACY | Facility: CLINIC | Age: 39
End: 2024-08-22

## 2024-08-23 ENCOUNTER — PHARMACY VISIT (OUTPATIENT)
Dept: PHARMACY | Facility: CLINIC | Age: 39
End: 2024-08-23
Payer: MEDICAID

## 2024-09-04 ENCOUNTER — PATIENT MESSAGE (OUTPATIENT)
Dept: PALLIATIVE MEDICINE | Facility: HOSPITAL | Age: 39
End: 2024-09-04
Payer: COMMERCIAL

## 2024-09-04 DIAGNOSIS — G89.3 CANCER RELATED PAIN: ICD-10-CM

## 2024-09-04 RX ORDER — HYDROMORPHONE HYDROCHLORIDE 2 MG/1
2-4 TABLET ORAL EVERY 4 HOURS PRN
Qty: 120 TABLET | Refills: 0 | Status: SHIPPED | OUTPATIENT
Start: 2024-09-04 | End: 2024-09-14

## 2024-09-04 NOTE — PATIENT COMMUNICATION
Patient last seen by JAIME Jones on 8/13 with plan to rotate from oxycodone to hydromorphone. Follow up visit is scheduled for 9/24. OARRS reviewed and no aberrancy noted. Patient last filled oxycodone 10mg #180/30 days on 8/6. Per JAIME Jones, a prescription for hydromorphone 2-4mg q4h PRN #120/10 days should be sent in. Prescription pended to provider to approve. Patient verbalized understanding of medication change. He will call with questions or concerns.

## 2024-09-05 NOTE — PATIENT COMMUNICATION
I called pharmacy who reports PA needed. Cost with discount card is $27.36. STAT PA sent to [facility] as patient will likely run out of Oxycodone tomorrow or Saturday. Patient is agreeable to pay OOP if needed for this fill while we are obtaining PA. I will call patient tomorrow to update him prior to weekend if we received approval as he may just went a short 5 say supply sent to pay OOP for instead of 10 day supply. We will discuss then if needed.

## 2024-09-06 ENCOUNTER — SPECIALTY PHARMACY (OUTPATIENT)
Dept: PHARMACY | Facility: CLINIC | Age: 39
End: 2024-09-06

## 2024-09-06 ENCOUNTER — TELEPHONE (OUTPATIENT)
Dept: DERMATOLOGY | Facility: CLINIC | Age: 39
End: 2024-09-06
Payer: COMMERCIAL

## 2024-09-06 NOTE — PATIENT COMMUNICATION
[facility] called patient and left VM earlier today asking for him to call back to confirm insurance information as  and [facility] only have Medicaid - [place] on file but when [facility] tried to run a test claim, it said patient has Medicare coverage as primary. I called patient and left another message encouraging him to return [facility] call. I also told him he could fill on discount card as discussed yesterday for full month's supply (but if he wanted us to send a new shorter supply instead to call back).

## 2024-09-10 NOTE — PATIENT COMMUNICATION
"[facility] was able to speak to patient: \"I just spoke with Siddhartha. He sates he filled out the paperwork for Medicare but he has not heard back as of yet. I explained that since he is eligible for Medicare, his Medicaid wont pay. He said he will follow up with Medicare and get back to me.\"    No needs at this time.    "

## 2024-09-12 ENCOUNTER — PATIENT MESSAGE (OUTPATIENT)
Dept: PALLIATIVE MEDICINE | Facility: HOSPITAL | Age: 39
End: 2024-09-12
Payer: COMMERCIAL

## 2024-09-12 DIAGNOSIS — G89.3 CANCER RELATED PAIN: ICD-10-CM

## 2024-09-12 NOTE — PATIENT COMMUNICATION
Per Audrey Jones, CNP increase Dilaudid to 6mg (3, 2mg tabs) Q4H PRN. If this remains ineffective, can increase to 8mg (may need to send short supply of 4mg tabs, take 1-2 tabs) Q4H PRN. I called patient with these instructions and will call him tomorrow to check in and determine if short supply of 4mg needs sent and how pain is being managed. If still not effective by Monday, Audrey will consider increasing Fentanyl patch and/or rotating back to Oxycodone if needed. Patient is on dialysis so want to avoid Oxycodone if at all possible. Patient verbalized understanding and aware I will call him around 12-1 tomorrow to check in.

## 2024-09-12 NOTE — PATIENT COMMUNICATION
"I called patient who reports he is taking Dilaudid 2, 2mg tabs (total dose 4mg) Q4H around the clock and \"it isn't working at all\". I contacted Audrey Jones CNP with an update.  "

## 2024-09-13 RX ORDER — HYDROMORPHONE HYDROCHLORIDE 4 MG/1
4-8 TABLET ORAL EVERY 4 HOURS PRN
Qty: 60 TABLET | Refills: 0 | Status: SHIPPED | OUTPATIENT
Start: 2024-09-13 | End: 2024-09-18

## 2024-09-13 NOTE — PATIENT COMMUNICATION
I called pharmacy who reports PA is required for Dilaudid but can use discount cards. [employer] $8.40 coupon texted to patient for #60 tabs/5 day supply. Patient is aware and agreeable. I have him on our reminder list for a nurse to call on Monday but told him to call in if any needs.    Recall when PA was attempted for 2mg tabs, [facility] received notification when running the claim that medicare was primary insurance but we only have [facility] Medicaid on file. [facility] spoke to patient and discovered he filled out Medicare paperwork but was waiting to hear back. When I spoke to patient today he said that medicare told him he was approved for hospital coverage but not prescription coverage. I will try resubmitting PA for Dilaudid 4mg and also asking [facility] for any advice.

## 2024-09-13 NOTE — PATIENT COMMUNICATION
Per [facility], new insurance was added and when script ran, denial stating PA needed. New PA submitted.

## 2024-09-13 NOTE — PATIENT COMMUNICATION
I called patient to check on pain. Patient reports that he notices a slight difference in his pain since increasing Dilaudid to 6mg (3, 2mg tabs) last night. Patient is agreeable to try increasing to 8mg as discussed yesterday. 5 day supply of Dilaudid 4mg, take 1-2 tabs Q4H PRN pended to covering provider. Recall patient had to use discount card to fill last script as issues with Medicaid vs. Medicare. Per Romy, #60 tabs for 5 day supply is $8.40. I will call pharmacy after script is sent though to check if it is covered and then can update patient with this information. We will reach out to patient on Monday to check in as Audrey Joens CNP may want to increase Fentanyl patch and/or switch back to Oxycodone if needed.

## 2024-09-16 ENCOUNTER — TELEPHONE (OUTPATIENT)
Dept: PALLIATIVE MEDICINE | Facility: CLINIC | Age: 39
End: 2024-09-16
Payer: COMMERCIAL

## 2024-09-16 NOTE — TELEPHONE ENCOUNTER
PA approved for dilaudid 4 mg tab by [employer] from 9/1/24-12/31/24.  Patient called.  No answer.  Voicemail message left for patient updating about PA approval.  Patient instructed to call office and update how his pain is doing and with any further questions or concerns.  This RN unable to reach out to [facility] pharmacy regarding PA approval.  Will call again this afternoon.

## 2024-09-17 ENCOUNTER — PATIENT MESSAGE (OUTPATIENT)
Dept: DERMATOLOGY | Facility: CLINIC | Age: 39
End: 2024-09-17
Payer: COMMERCIAL

## 2024-09-17 DIAGNOSIS — Z51.81 ENCOUNTER FOR THERAPEUTIC DRUG LEVEL MONITORING: ICD-10-CM

## 2024-09-17 DIAGNOSIS — L73.2 HIDRADENITIS SUPPURATIVA: Primary | ICD-10-CM

## 2024-09-20 ENCOUNTER — HOSPITAL ENCOUNTER (OUTPATIENT)
Dept: RADIOLOGY | Facility: CLINIC | Age: 39
Discharge: HOME | End: 2024-09-20
Payer: COMMERCIAL

## 2024-09-20 DIAGNOSIS — C64.9 RENAL CELL CARCINOMA, UNSPECIFIED LATERALITY (MULTI): ICD-10-CM

## 2024-09-20 PROCEDURE — 78306 BONE IMAGING WHOLE BODY: CPT

## 2024-09-20 PROCEDURE — 3430000001 HC RX 343 DIAGNOSTIC RADIOPHARMACEUTICALS: Performed by: NURSE PRACTITIONER

## 2024-09-20 PROCEDURE — A9503 TC99M MEDRONATE: HCPCS | Performed by: NURSE PRACTITIONER

## 2024-09-20 PROCEDURE — 74176 CT ABD & PELVIS W/O CONTRAST: CPT

## 2024-09-20 PROCEDURE — RXMED WILLOW AMBULATORY MEDICATION CHARGE

## 2024-09-21 ENCOUNTER — PHARMACY VISIT (OUTPATIENT)
Dept: PHARMACY | Facility: CLINIC | Age: 39
End: 2024-09-21
Payer: COMMERCIAL

## 2024-09-24 ENCOUNTER — LAB (OUTPATIENT)
Dept: LAB | Facility: HOSPITAL | Age: 39
End: 2024-09-24
Payer: COMMERCIAL

## 2024-09-24 ENCOUNTER — OFFICE VISIT (OUTPATIENT)
Dept: PALLIATIVE MEDICINE | Facility: HOSPITAL | Age: 39
End: 2024-09-24
Payer: COMMERCIAL

## 2024-09-24 ENCOUNTER — OFFICE VISIT (OUTPATIENT)
Dept: HEMATOLOGY/ONCOLOGY | Facility: HOSPITAL | Age: 39
End: 2024-09-24
Payer: COMMERCIAL

## 2024-09-24 VITALS
DIASTOLIC BLOOD PRESSURE: 102 MMHG | OXYGEN SATURATION: 100 % | SYSTOLIC BLOOD PRESSURE: 174 MMHG | RESPIRATION RATE: 16 BRPM | BODY MASS INDEX: 31.05 KG/M2 | TEMPERATURE: 97.5 F | WEIGHT: 192.4 LBS | HEART RATE: 72 BPM

## 2024-09-24 DIAGNOSIS — G89.3 CANCER ASSOCIATED PAIN: ICD-10-CM

## 2024-09-24 DIAGNOSIS — L73.2 HIDRADENITIS SUPPURATIVA: ICD-10-CM

## 2024-09-24 DIAGNOSIS — C64.9 RENAL CELL CARCINOMA, UNSPECIFIED LATERALITY (MULTI): ICD-10-CM

## 2024-09-24 DIAGNOSIS — G89.3 CANCER ASSOCIATED PAIN: Primary | ICD-10-CM

## 2024-09-24 DIAGNOSIS — Z51.81 ENCOUNTER FOR THERAPEUTIC DRUG LEVEL MONITORING: ICD-10-CM

## 2024-09-24 LAB
ALBUMIN SERPL BCP-MCNC: 4.1 G/DL (ref 3.4–5)
ALP SERPL-CCNC: 186 U/L (ref 33–120)
ALT SERPL W P-5'-P-CCNC: 10 U/L (ref 10–52)
ANION GAP SERPL CALC-SCNC: 22 MMOL/L (ref 10–20)
AST SERPL W P-5'-P-CCNC: 11 U/L (ref 9–39)
BASOPHILS # BLD AUTO: 0.08 X10*3/UL (ref 0–0.1)
BASOPHILS NFR BLD AUTO: 0.8 %
BILIRUB SERPL-MCNC: 0.4 MG/DL (ref 0–1.2)
BUN SERPL-MCNC: 57 MG/DL (ref 6–23)
CALCIUM SERPL-MCNC: 8.3 MG/DL (ref 8.6–10.3)
CHLORIDE SERPL-SCNC: 98 MMOL/L (ref 98–107)
CO2 SERPL-SCNC: 29 MMOL/L (ref 21–32)
CREAT SERPL-MCNC: 16.47 MG/DL (ref 0.5–1.3)
EGFRCR SERPLBLD CKD-EPI 2021: 3 ML/MIN/1.73M*2
EOSINOPHIL # BLD AUTO: 0.39 X10*3/UL (ref 0–0.7)
EOSINOPHIL NFR BLD AUTO: 3.9 %
ERYTHROCYTE [DISTWIDTH] IN BLOOD BY AUTOMATED COUNT: 15.3 % (ref 11.5–14.5)
GLUCOSE SERPL-MCNC: 84 MG/DL (ref 74–99)
HCT VFR BLD AUTO: 32.1 % (ref 41–52)
HGB BLD-MCNC: 10.3 G/DL (ref 13.5–17.5)
IMM GRANULOCYTES # BLD AUTO: 0.15 X10*3/UL (ref 0–0.7)
IMM GRANULOCYTES NFR BLD AUTO: 1.5 % (ref 0–0.9)
LYMPHOCYTES # BLD AUTO: 1.64 X10*3/UL (ref 1.2–4.8)
LYMPHOCYTES NFR BLD AUTO: 16.2 %
MCH RBC QN AUTO: 32.4 PG (ref 26–34)
MCHC RBC AUTO-ENTMCNC: 32.1 G/DL (ref 32–36)
MCV RBC AUTO: 101 FL (ref 80–100)
MONOCYTES # BLD AUTO: 0.68 X10*3/UL (ref 0.1–1)
MONOCYTES NFR BLD AUTO: 6.7 %
NEUTROPHILS # BLD AUTO: 7.17 X10*3/UL (ref 1.2–7.7)
NEUTROPHILS NFR BLD AUTO: 70.9 %
NRBC BLD-RTO: 0 /100 WBCS (ref 0–0)
PLATELET # BLD AUTO: 273 X10*3/UL (ref 150–450)
POTASSIUM SERPL-SCNC: 5.2 MMOL/L (ref 3.5–5.3)
PROT SERPL-MCNC: 7 G/DL (ref 6.4–8.2)
RBC # BLD AUTO: 3.18 X10*6/UL (ref 4.5–5.9)
SODIUM SERPL-SCNC: 144 MMOL/L (ref 136–145)
TSH SERPL-ACNC: 2.27 MIU/L (ref 0.44–3.98)
WBC # BLD AUTO: 10.1 X10*3/UL (ref 4.4–11.3)

## 2024-09-24 PROCEDURE — 99214 OFFICE O/P EST MOD 30 MIN: CPT | Performed by: NURSE PRACTITIONER

## 2024-09-24 PROCEDURE — 84443 ASSAY THYROID STIM HORMONE: CPT

## 2024-09-24 PROCEDURE — 3077F SYST BP >= 140 MM HG: CPT | Performed by: NURSE PRACTITIONER

## 2024-09-24 PROCEDURE — 85025 COMPLETE CBC W/AUTO DIFF WBC: CPT

## 2024-09-24 PROCEDURE — 86481 TB AG RESPONSE T-CELL SUSP: CPT

## 2024-09-24 PROCEDURE — 80053 COMPREHEN METABOLIC PANEL: CPT

## 2024-09-24 PROCEDURE — 80307 DRUG TEST PRSMV CHEM ANLYZR: CPT

## 2024-09-24 PROCEDURE — 99215 OFFICE O/P EST HI 40 MIN: CPT | Performed by: STUDENT IN AN ORGANIZED HEALTH CARE EDUCATION/TRAINING PROGRAM

## 2024-09-24 PROCEDURE — 36415 COLL VENOUS BLD VENIPUNCTURE: CPT

## 2024-09-24 PROCEDURE — 80364 OPIOID &OPIATE ANALOG 5/MORE: CPT

## 2024-09-24 PROCEDURE — 3080F DIAST BP >= 90 MM HG: CPT | Performed by: NURSE PRACTITIONER

## 2024-09-24 PROCEDURE — 80349 CANNABINOIDS NATURAL: CPT

## 2024-09-24 RX ORDER — FENTANYL 50 UG/1
1 PATCH TRANSDERMAL
Qty: 10 PATCH | Refills: 0 | Status: SHIPPED | OUTPATIENT
Start: 2024-09-24 | End: 2024-10-24

## 2024-09-24 RX ORDER — OXYCODONE HYDROCHLORIDE 10 MG/1
10 TABLET ORAL EVERY 4 HOURS PRN
Qty: 180 TABLET | Refills: 0 | Status: SHIPPED | OUTPATIENT
Start: 2024-09-24 | End: 2024-10-24

## 2024-09-24 ASSESSMENT — ENCOUNTER SYMPTOMS
FATIGUE: 0
UNEXPECTED WEIGHT CHANGE: 0
ENDOCRINE NEGATIVE: 1
LIGHT-HEADEDNESS: 0
FEVER: 0
COUGH: 0
MYALGIAS: 1
EXTREMITY WEAKNESS: 0
DIARRHEA: 0
LEG SWELLING: 0
APPETITE CHANGE: 0
HEMATOLOGIC/LYMPHATIC NEGATIVE: 1
CONSTIPATION: 0
ARTHRALGIAS: 1
PSYCHIATRIC NEGATIVE: 1
NUMBNESS: 0
SHORTNESS OF BREATH: 0
NAUSEA: 1
VOMITING: 0
ROS SKIN COMMENTS: HS
CHILLS: 0
EYES NEGATIVE: 1
WOUND: 1
BACK PAIN: 1

## 2024-09-24 ASSESSMENT — PAIN SCALES - GENERAL: PAINLEVEL: 7

## 2024-09-24 NOTE — PROGRESS NOTES
SUPPORTIVE AND PALLIATIVE ONCOLOGY CONSULT - OUTPATIENT FOLLOW UP      SERVICE DATE: 9/24/2024    Referred by:  Renan Thomson MD   Medical Oncologist: Renan Thomson MD   Radiation Oncologist: No care team member to display  Primary Physician: Davina Pratt  [phone]    REASON FOR CONSULT/CHIEF CONSULT COMPLAINT: Intro to Supportive Oncology and Symptom Management.     Subjective   HISTORY OF PRESENT ILLNESS: Siddhartha Orellana is a 39 y.o. 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 Discontinued nivolumab in January 2023 due to arthralgias requiring high dose steroids. Course c/b by hidradenitis suppurativa and delayed fistula healing. He was started on oxycodone 10mg TID for pain management.      Additional PMHx:  Lymphadenopathy and a pre surgical consult for kidney transplant  End stage renal failure on dialysis   Type II DM  HTN  HLD  Parathyroidism   Anemia with NARGIS component   Gastric ulcer  SHIVA  Vitamin D deficiency   Morbid obesity    5/29/24:   Just received fentanyl patches 2 days ago and hasn't started yet. Pain remains about the same in quality intensity but noticing more in R calf and knee today. Still using about 60mg of oxycodone per day.   Stopped Zyprexa after 2 days after he felt it made the nausea worse.   CRP/ESR mildly elevated. RF nl. Citrulline antibody, IgG nl,  CLOVIS pending.     7/10/24: Pain overall better controlled with additional of fentanyl patch. Still using oxy 10 about 4-5 pills per day. Ongoing issues with nausea and vomiting.     8/13/24: Pain better controlled. Much more active. Still using about 4-6 oxy per day but feeling much better overall.  Hesitant about lyrica so he didn't start it. Nausea and appetite much better with reglan.     9/24/24: Oxycodone rotated hydromorphone but wasn't as effective. No other complaints today.     Pain Assessment:    Location varies by day.    Currently located in L calf, R knee, and lower back - throbbing  (left calf), shooting (right knee to lower thigh), tight - fairly constant with flares.     Always has constant pain in at least one of his legs. Worse with steps/incline.     Off steroids.     Symptom Assessment:    Pain:a little  Lack of energy: a little  Difficulty sleeping: none   Lack of appetite: none   Nausea: none  Vomiting: none  Constipation: none  Numbness or tingling in hands/feet/other: none  Weight loss: none      Information obtained from: interview of patient and interview of family  ______________________________________________________________________     Oncology History   Renal cell carcinoma (Multi)   12/1/2022 -  Chemotherapy    Cabozantinib, 28 Day Cycles     8/28/2023 Initial Diagnosis    Renal cell carcinoma (CMS/HCC)         Past Medical History:   Diagnosis Date    Dorsalgia, unspecified 01/04/2023    Back pain    End stage renal disease (Multi) 01/04/2023    ESRD (end stage renal disease) on dialysis    Essential (primary) hypertension 01/04/2023    Hypertension    Iron deficiency 02/23/2020    Iron deficiency    Other specified postprocedural states     H/O endoscopy     Past Surgical History:   Procedure Laterality Date    OTHER SURGICAL HISTORY  04/11/2018    Creation Of A-V Graft Fistula For Dialysis     Family History   Problem Relation Name Age of Onset    Hypertension Mother      Cancer Father      Hypertension Father      Other (renal transplant recipient) Mother's Brother          SOCIAL HISTORY  . 5 children. Thompson.  Social History:  reports that he has quit smoking. His smoking use included cigars. He has never been exposed to tobacco smoke. He has never used smokeless tobacco. He reports that he does not currently use alcohol. He reports that he does not use drugs.      REVIEW OF SYSTEMS  Review of systems negative unless noted in HPI.       Objective       Current Outpatient Medications   Medication Instructions    atorvastatin (LIPITOR) 20 mg, oral, Nightly    B  "complex-vitamin C-folic acid (Nephrocaps) 1 mg capsule 1 capsule, oral, Daily    cabozantinib (Cabometyx) 40 mg tablet TAKE ONE (1) TABLET BY MOUTH ONCE A DAY    hydrocortisone 2.5 % cream 1 APPLICATION TWICE A DAY TO AFFECTED AREA OF FACE (AVOID EYES) TO DARK SPOTS FOR 2-4 WEEKS    HYDROmorphone (DILAUDID) 4-8 mg, oral, Every 4 hours PRN    lidocaine-prilocaine (Emla) 2.5-2.5 % cream Apply thick layer 2 hours prior to dialysis site, cover with plastic wrap.    loratadine (CLARITIN) 10 mg, oral, Daily    menthol-zinc oxide (Calmoseptine) 0.44-20.6 % ointment 1 Application, Topical, As needed    metoclopramide (REGLAN) 5 mg, oral, 4 times daily, Take half an hour before meals and at bedtime    naloxone (NARCAN) 4 mg, nasal, As needed, May repeat every 2-3 minutes if needed, alternating nostrils, until medical assistance becomes available.    non-adherent bandage (Curity Abdominal Pad) 8 X 10 \" bandage 1 Application, topical (top), Daily, To areas of hidradenitis for drainage    omeprazole (PRILOSEC) 40 mg, oral, Daily    ondansetron (ZOFRAN) 8 mg, oral, Every 8 hours PRN    secukinumab (Cosentyx Pen) 150 mg/mL self-injector pen Inject 300 mg subcutaneously every 4 weeks    secukinumab (Cosentyx Pen) 150 mg/mL self-injector pen Inject 300 mg under the skin once a week for weeks 0, 1, 2, 3 and 4 for loading dose    sevelamer HCl (RENAGEL) 1,600 mg, oral, 3 times daily before meals, Swallow tablet whole; do not crush, break, or chew.       Allergies: No Known Allergies          Creatinine   Date Value Ref Range Status   08/05/2024 8.05 (H) 0.50 - 1.30 mg/dL Final     AST   Date Value Ref Range Status   08/05/2024 14 9 - 39 U/L Final     ALT   Date Value Ref Range Status   08/05/2024 16 10 - 52 U/L Final     Comment:     Patients treated with Sulfasalazine may generate falsely decreased results for ALT.     Hemoglobin   Date Value Ref Range Status   08/05/2024 10.7 (L) 13.5 - 17.5 g/dL Final     Platelets   Date Value " Ref Range Status   08/05/2024 303 150 - 450 x10*3/uL Final          PHYSICAL EXAMINATION  Vital Signs:   Vital signs reviewed        8/5/2024    11:45 AM 8/5/2024    12:00 PM 8/5/2024    12:15 PM 8/5/2024    12:30 PM 8/5/2024    12:45 PM 8/13/2024     1:18 PM 9/24/2024    12:03 PM   Vitals   Systolic 119 113 114 130 128 119 174   Diastolic 82 75 81 89 81 75 102   Heart Rate 85 82 92 96 94 104 72   Temp     36.8 °C (98.2 °F) 36.3 °C (97.3 °F) 36.4 °C (97.5 °F)   Resp      20 16   Weight (lb)      181 192.4   BMI      29.21 kg/m2 31.05 kg/m2   BSA (m2)      1.96 m2 2.02 m2   Visit Report      Report    Report Report    Report        Physical Exam  HENT:      Head: Normocephalic and atraumatic.      Mouth/Throat:      Pharynx: Oropharynx is clear.   Eyes:      Extraocular Movements: Extraocular movements intact.      Conjunctiva/sclera: Conjunctivae normal.   Pulmonary:      Effort: Pulmonary effort is normal.   Abdominal:      General: Abdomen is flat.   Musculoskeletal:         General: No swelling.      Cervical back: Normal range of motion.   Neurological:      General: No focal deficit present.      Mental Status: He is alert.      Comments: No myoclonus   Psychiatric:         Mood and Affect: Mood normal.         Thought Content: Thought content normal.         ASSESSMENT/PLAN    HTN  -patient states he forgot to take his medication today   -he will resume and recheck BP at home  -advised to contact nephrologist (recently adjusted meds) if still elevated    Pain  Pain is:  IO related arthralgia, chronic pain syndrome in the setting of end stage renal disease on dialysis  Type: somatic and neuropathic  -He declined methadone that was previously recommended   -Continue fentanyl TD 50mcg q72 - do not apply direct heat or submerge in water   -Continue oxycodone 10mg q4 PRN for now (I rotated to hydromorphone as it is more renally favored but it was not effective and he is not having any signs of toxicity from the  oxycodone at this time)  -He never started lyrica due to concerns of ADRs  -Continue APAP as needed  - uses sparingly   -Autoimmune/Rheum workup per oncology - unremarkable     Opioid Use  Medication Management:   - OARRS report reviewed with no aberrant behavior; consistent with  prescriptions/records and patient history  - .  Overdose Risk Score 540 .   This has been discussed with patient.   - We will continue to closely monitor the patient for signs of prescription misuse including UDS, OARRS review and subjective reports at each visit.  - No concurrent benzodiazepine use   - I am a provider who either is or has consulted and collaborated with a provider certified in Hospice and Palliative Medicine and have conducted a face-face visit and examination for this patient.  - Routine Urine Drug Screen: patient is anuric and drug screen 9 panel pending. He will obtain today.   - Controlled Substance Agreement completed 5/29/24  - Specifically discussed that controlled substance prescriptions will only be provided by our group as outlined in the completed agreement  - Prescribed naloxone 5/1/24  - Red Flags: None      Nausea   Intermittent nausea with vomiting related to opioids and dialysis   -Intolerant to zyprexa 2.5mg   -Continue zofran 8mg q8 as needed   -Continue reglan 5mg QID       Constipation   At risk for constipation related to opioids  -Continue miralax 17 g daily as needed     Medical Decision Making/Goals of Care/Advance Care Planning:  Patient's current clinical condition, including diagnosis, prognosis, and management plan, and goals of care were discussed.   Life limiting disease: RCC  Family: Supportive wife/children   Goals: symptom control and cancer directed therapy    Advance Directives  Existence of Advance Directives:Other address at next visit     Next Follow-Up Visit:  Return to clinic in 8 weeks     Signature and billing  Thank you for allowing us to participate in the care of this  patient. Recommendations will be communicated back to the consulting service by way of shared electronic medical record or face-to-face.    Medical complexity was moderate level due to due to complexity of problems, extensive data review, and high risk of management/treatment.  Time was spent on the following: Prep Time, Time Directly with Patient/Family/Caregiver, Documentation Time. Total time spent: 30 min       DATA   Diagnostic tests and information reviewed for today's visit:  Most recent labs and imaging results, Medications       Plan of Care discussed with: Patient and RN     Some elements copied from Supportive Oncology note on 8/13/24, the elements have been updated and all reflect current decision making from today, 9/24/24.        SIGNATURE: Audrey Jones, APRN-CNP    Contact information:  Supportive and Palliative Oncology  Monday-Friday 8 AM-5 PM  Phone:  [phone], press option #5, then option #1.   Or Epic Secure Chat

## 2024-09-24 NOTE — PROGRESS NOTES
Patient ID: Siddhartha Orellana is a 39 y.o. male.  Diagnosis:  Papillary RCC  MedOnc: Dr. Thomson  Nephrologist: Dr. Grover  Vascular Surgeon: Dr. Daigle    Current Therapy: Cabozantinib     ONCOLOGIC HISTORY  11/7/22 - CT-guided biopsy of retroperitoneal lymph node revealed findings consistent with metastatic papillary carcinoma.  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  2/7/23 - On Prednisone 20mg daily  2/17/23 - Scans show SD (RECIST) with some mild tumor shrinkage RPLN  2/21/23 - Increase Prednisone 40mg daily for continued arthralgia; continue Cabo 40 mg daily  3/15/23 - Continue Pred 40mg PO daily, continue Cabo  End March - hidradenitis suppurativa  4/4/23 - Hold Cabo; prednisone 30 mg daily  4/25/23 - Resume cabo  5/18/23 - Hold Cabo 2/2 vascular fistula repair  5/23/23 - Continue hold due to extensive edema, blistering  6/2023 - resumed cabo. Nivo on hold with PDN 25 mg  8/16/23 - continue cabo, reduce PDN to 20 mg daily  8/23/23 - Left upper extremity wound debridement down to subcutaneous tissue and vessels- Cabo on hold  8/31/23 - continue to hold cabo, PDN back at 30mg   9/15/23 - resumed cabozantinib 40 mg   Mid Sep 23 - admission hidradenitis suppurativa  10/10/23 - cabo on hold. Will start PDN 20 mg  10/17/23 - cabo on hold for graft, PDN 20 mg  10/25/23 - cabo on hold for graft, PDN 15 mg daily  11/16/23 - continue cabozantinib hold, PDN 10 mg daily  1/18/24 - mild inguinal/iliac LN growth + stable bone mets in staging scans.   01/2024 - dialysis started  3/7/24 - he got back on cabozantinib 40 mg 2-3 w ago  4/11/24 - scans show SD. Continue cabo 40 alone  4/12/24 - held for AVF infiltration  5/1/24 - No AVF revision required - restart cabo at  40 mg  5/29/24 - Continue cabozantinib  7/2/24 - Continue cabozantinib 40 mg with breaks PRN  9/24/24 - got back on cabo 1 week ago, off for about 1 month    Oncology History   Renal cell carcinoma (Multi)   12/1/2022 -  Chemotherapy    Cabozantinib, 28 Day Cycles     8/28/2023 Initial Diagnosis    Renal cell carcinoma (CMS/HCC)       PMH: Stage V CKD on HD, Type II DM, HTN, HLD, Parathryroidism, Anemia, gastric ulcer, SHIVA, Vit D def     Review of Systems   Constitutional:  Negative for appetite change, chills, fatigue, fever and unexpected weight change.   HENT:  Negative.     Eyes: Negative.    Respiratory:  Negative for cough and shortness of breath.    Cardiovascular:  Negative for chest pain and leg swelling.   Gastrointestinal:  Positive for nausea. Negative for constipation, diarrhea and vomiting.   Endocrine: Negative.    Genitourinary: Negative.     Musculoskeletal:  Positive for arthralgias, back pain, gait problem and myalgias.   Skin:  Positive for wound. Negative for rash.        HS   Neurological:  Positive for gait problem. Negative for extremity weakness, light-headedness and numbness.   Hematological: Negative.    Psychiatric/Behavioral: Negative.       Allergies  No Known Allergies     Medications  Current Outpatient Medications   Medication Instructions    atorvastatin (LIPITOR) 20 mg, oral, Nightly    B complex-vitamin C-folic acid (Nephrocaps) 1 mg capsule 1 capsule, oral, Daily    cabozantinib (Cabometyx) 40 mg tablet TAKE ONE (1) TABLET BY MOUTH ONCE A DAY    hydrocortisone 2.5 % cream 1 APPLICATION TWICE A DAY TO AFFECTED AREA OF FACE (AVOID EYES) TO DARK SPOTS FOR 2-4 WEEKS    HYDROmorphone (DILAUDID) 4-8 mg, oral, Every 4 hours PRN    lidocaine-prilocaine (Emla) 2.5-2.5 % cream Apply thick layer 2 hours prior to dialysis site, cover with plastic wrap.    loratadine (CLARITIN) 10 mg, oral, Daily    menthol-zinc oxide (Calmoseptine) 0.44-20.6 % ointment 1 Application, Topical, As needed     "metoclopramide (REGLAN) 5 mg, oral, 4 times daily, Take half an hour before meals and at bedtime    naloxone (NARCAN) 4 mg, nasal, As needed, May repeat every 2-3 minutes if needed, alternating nostrils, until medical assistance becomes available.    non-adherent bandage (Curity Abdominal Pad) 8 X 10 \" bandage 1 Application, topical (top), Daily, To areas of hidradenitis for drainage    omeprazole (PRILOSEC) 40 mg, oral, Daily    ondansetron (ZOFRAN) 8 mg, oral, Every 8 hours PRN    secukinumab (Cosentyx Pen) 150 mg/mL self-injector pen Inject 300 mg subcutaneously every 4 weeks    secukinumab (Cosentyx Pen) 150 mg/mL self-injector pen Inject 300 mg under the skin once a week for weeks 0, 1, 2, 3 and 4 for loading dose    sevelamer HCl (RENAGEL) 1,600 mg, oral, 3 times daily before meals, Swallow tablet whole; do not crush, break, or chew.        OBJECTIVE:    VS / Pain:  There were no vitals taken for this visit.  BSA: There is no height or weight on file to calculate BSA.  Wt Readings from Last 5 Encounters:   08/13/24 82.1 kg (181 lb)   08/05/24 76.5 kg (168 lb 10.4 oz)   08/01/24 78 kg (172 lb)   07/10/24 77.6 kg (171 lb 1.2 oz)   05/29/24 75.7 kg (166 lb 14.2 oz)     Physical Exam  Constitutional:       General: He is not in acute distress.     Appearance: Normal appearance. He is not toxic-appearing.   HENT:      Head: Normocephalic and atraumatic.      Mouth/Throat:      Mouth: Mucous membranes are moist.      Pharynx: Oropharynx is clear.   Eyes:      Pupils: Pupils are equal, round, and reactive to light.   Pulmonary:      Effort: Pulmonary effort is normal.   Musculoskeletal:         General: Normal range of motion.      Cervical back: Normal range of motion.      Right lower leg: No edema.      Left lower leg: No edema.   Skin:     General: Skin is warm and dry.      Findings: No rash.   Neurological:      General: No focal deficit present.      Mental Status: He is alert and oriented to person, place, " and time.      Motor: No weakness.   Psychiatric:         Mood and Affect: Mood normal.         Behavior: Behavior normal.         Thought Content: Thought content normal.         Judgment: Judgment normal.     Performance Status:  ECOG Score: 1- Restricted in physically strenuous activity.  Carries out light duty.  Karnofsky Score: 80 - Normal activity with effort; some signs or symptoms of disease    Diagnostic Results   No results found for this or any previous visit (from the past 96 hour(s)).    Scans 06/2024 - stable disease    Scans 9/24/24 -   Renal cancer restaging scan. When compared to the prior examination  dated 06/27/2024:  1. Interval increase in size of the right retroperitoneal lymph node  2. Osseous tumor burden as described above, please refer to nuclear  medicine study for osseous disease evaluation  3. Interval resolution of the ground-glass opacities of the right  lower lobe.  4. Additional stable chronic incidental findings described above.    Assessment/Plan   38yo AA man with hx of stage IV CKD with metastatic papRCC to LNs now on cabo/nivo. He has CKD and planned for kidney dialysis. Known proteinuria +++ (known). Still with sig joint pains and dealing with hyperglycemias. On cabo (nivo on hold d/t arthritis (PDN) 30 mg PDN BUT pt self discontinued his prednisone and arthralgias are actually better. Pain is under control on opioids and arthralgias might not be fully IO-related. Now back on cabo with stable disease - bone mets. Fistula got infiltrated and dialysis on hold.   His scans show some growth in Lns but he was off cabo for approximately one month s/t wound issues related to fistula area in left forearm. and just resumed 1 week ago. Plan to remain on cabo and repeat labs in 6 weeks and scans in 12 weeks.     I saw and evaluated the patient. I personally obtained the key and critical portions of the history and physical exam or was physically present for key and critical portions  performed by the resident/fellow. I reviewed the resident/fellow's documentation and discussed the patient with the resident/fellow. I agree with the resident/fellow's medical decision making as documented in the note.   Renan Thomson MD MSc FACP  [facility] in Cancer Research   in Medicine [employer]  Director Clinical  Medical Oncology Research Program   [facility]  Cell [phone]  Office [phone]

## 2024-09-26 LAB
NIL(NEG) CONTROL SPOT COUNT: NORMAL
PANEL A SPOT COUNT: 0
PANEL B SPOT COUNT: 0
POS CONTROL SPOT COUNT: NORMAL
T-SPOT. TB INTERPRETATION: NEGATIVE

## 2024-09-27 PROCEDURE — RXMED WILLOW AMBULATORY MEDICATION CHARGE

## 2024-09-28 LAB
AMPHETAMINES SERPL QL SCN: NEGATIVE NG/ML
ANNOTATION COMMENT IMP: NORMAL
BARBITURATES SERPL QL SCN: NEGATIVE NG/ML
BENZODIAZ SERPL QL SCN: NEGATIVE NG/ML
BUPRENORPHINE SERPL-MCNC: NEGATIVE NG/ML
CANNABINOIDS SERPL QL SCN: POSITIVE NG/ML
COCAINE SERPL QL SCN: NEGATIVE NG/ML
METHADONE SERPL QL SCN: NEGATIVE NG/ML
METHAMPHET SERPL QL: NEGATIVE NG/ML
OPIATES SERPL QL SCN: POSITIVE NG/ML
OXYCODONE SERPL QL: NEGATIVE NG/ML
PCP SERPL QL SCN: NEGATIVE NG/ML

## 2024-09-30 ENCOUNTER — SPECIALTY PHARMACY (OUTPATIENT)
Dept: PHARMACY | Facility: CLINIC | Age: 39
End: 2024-09-30

## 2024-10-01 ENCOUNTER — PHARMACY VISIT (OUTPATIENT)
Dept: PHARMACY | Facility: CLINIC | Age: 39
End: 2024-10-01
Payer: COMMERCIAL

## 2024-10-01 LAB
6MAM SERPL-MCNC: <2 NG/ML
CODEINE SERPL-MCNC: <2 NG/ML
HYDROCODONE SERPL-MCNC: <2 NG/ML
HYDROMORPHONE SERPL-MCNC: 13 NG/ML
MORPHINE SERPL-MCNC: <2 NG/ML
OXYCODONE SERPL-MCNC: <2 NG/ML
OXYMORPHONE SERPL-MCNC: <2 NG/ML

## 2024-10-02 LAB — CANNABINOIDS SERPL-MCNC: 187 NG/ML

## 2024-10-07 ENCOUNTER — SPECIALTY PHARMACY (OUTPATIENT)
Dept: PHARMACY | Facility: CLINIC | Age: 39
End: 2024-10-07

## 2024-10-18 ENCOUNTER — APPOINTMENT (OUTPATIENT)
Dept: DERMATOLOGY | Facility: CLINIC | Age: 39
End: 2024-10-18
Payer: COMMERCIAL

## 2024-10-18 ENCOUNTER — SPECIALTY PHARMACY (OUTPATIENT)
Dept: PHARMACY | Facility: CLINIC | Age: 39
End: 2024-10-18

## 2024-10-21 DIAGNOSIS — G89.3 CANCER ASSOCIATED PAIN: ICD-10-CM

## 2024-10-21 DIAGNOSIS — I12.9 HYPERTENSIVE CHRONIC KIDNEY DISEASE WITH STAGE 1 THROUGH STAGE 4 CHRONIC KIDNEY DISEASE, OR UNSPECIFIED CHRONIC KIDNEY DISEASE: Primary | ICD-10-CM

## 2024-10-21 RX ORDER — OLMESARTAN MEDOXOMIL 40 MG/1
40 TABLET ORAL DAILY
Qty: 30 TABLET | Refills: 11 | Status: SHIPPED | OUTPATIENT
Start: 2024-10-21 | End: 2025-10-21

## 2024-10-21 RX ORDER — OXYCODONE HYDROCHLORIDE 10 MG/1
10 TABLET ORAL EVERY 4 HOURS PRN
Qty: 180 TABLET | Refills: 0 | Status: SHIPPED | OUTPATIENT
Start: 2024-10-21 | End: 2024-11-20

## 2024-10-21 RX ORDER — FENTANYL 50 UG/1
1 PATCH TRANSDERMAL
Qty: 10 PATCH | Refills: 0 | Status: SHIPPED | OUTPATIENT
Start: 2024-10-21 | End: 2024-11-20

## 2024-10-21 NOTE — TELEPHONE ENCOUNTER
Patient called and left message regarding refills.  Refills pended to provider for oxycodone 10 mg every 4 hours 180/30 and fentanyl patches 50 mcg every 72 hours 10/30.  OARRS reviewed.  Patient due for refill 10/23/24 for both prescriptions.  Patient will follow up with Audrey Jones NP on 11/13.

## 2024-10-24 PROCEDURE — RXMED WILLOW AMBULATORY MEDICATION CHARGE

## 2024-10-26 ENCOUNTER — PHARMACY VISIT (OUTPATIENT)
Dept: PHARMACY | Facility: CLINIC | Age: 39
End: 2024-10-26
Payer: COMMERCIAL

## 2024-10-28 ENCOUNTER — PHARMACY VISIT (OUTPATIENT)
Dept: PHARMACY | Facility: CLINIC | Age: 39
End: 2024-10-28

## 2024-11-13 ENCOUNTER — OFFICE VISIT (OUTPATIENT)
Dept: PALLIATIVE MEDICINE | Facility: CLINIC | Age: 39
End: 2024-11-13
Payer: MEDICARE

## 2024-11-13 VITALS
RESPIRATION RATE: 18 BRPM | WEIGHT: 186 LBS | OXYGEN SATURATION: 97 % | TEMPERATURE: 97.9 F | SYSTOLIC BLOOD PRESSURE: 120 MMHG | BODY MASS INDEX: 30.02 KG/M2 | HEART RATE: 79 BPM | DIASTOLIC BLOOD PRESSURE: 85 MMHG

## 2024-11-13 DIAGNOSIS — R11.2 NAUSEA AND VOMITING, UNSPECIFIED VOMITING TYPE: ICD-10-CM

## 2024-11-13 DIAGNOSIS — G89.3 CANCER ASSOCIATED PAIN: ICD-10-CM

## 2024-11-13 PROCEDURE — 4010F ACE/ARB THERAPY RXD/TAKEN: CPT | Performed by: NURSE PRACTITIONER

## 2024-11-13 PROCEDURE — 3074F SYST BP LT 130 MM HG: CPT | Performed by: NURSE PRACTITIONER

## 2024-11-13 PROCEDURE — 1036F TOBACCO NON-USER: CPT | Performed by: NURSE PRACTITIONER

## 2024-11-13 PROCEDURE — 99214 OFFICE O/P EST MOD 30 MIN: CPT | Performed by: NURSE PRACTITIONER

## 2024-11-13 PROCEDURE — 3079F DIAST BP 80-89 MM HG: CPT | Performed by: NURSE PRACTITIONER

## 2024-11-13 RX ORDER — METOCLOPRAMIDE 5 MG/1
5 TABLET ORAL 4 TIMES DAILY
Qty: 240 TABLET | Refills: 0 | Status: SHIPPED | OUTPATIENT
Start: 2024-11-13 | End: 2025-01-12

## 2024-11-13 RX ORDER — OXYCODONE HYDROCHLORIDE 10 MG/1
10 TABLET ORAL EVERY 4 HOURS PRN
Qty: 180 TABLET | Refills: 0 | Status: SHIPPED | OUTPATIENT
Start: 2024-11-13 | End: 2024-12-13

## 2024-11-13 RX ORDER — FENTANYL 50 UG/1
1 PATCH TRANSDERMAL
Qty: 10 PATCH | Refills: 0 | Status: SHIPPED | OUTPATIENT
Start: 2024-11-13 | End: 2024-12-13

## 2024-11-13 ASSESSMENT — PAIN SCALES - GENERAL: PAINLEVEL_OUTOF10: 7

## 2024-11-13 NOTE — PROGRESS NOTES
SUPPORTIVE AND PALLIATIVE ONCOLOGY CONSULT - OUTPATIENT FOLLOW UP      SERVICE DATE: 11/13/2024    Referred by:  Renan Thomson MD   Medical Oncologist: Renan Thomson MD   Radiation Oncologist: No care team member to display  Primary Physician: Davina Pratt  [phone]    REASON FOR CONSULT/CHIEF CONSULT COMPLAINT: Intro to Supportive Oncology and Symptom Management.     Subjective   HISTORY OF PRESENT ILLNESS: Siddhartha Orellana is a 39 y.o. 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 Discontinued nivolumab in January 2023 due to arthralgias requiring high dose steroids. Course c/b by hidradenitis suppurativa and delayed fistula healing. He was started on oxycodone 10mg TID for pain management.      Additional PMHx:  Lymphadenopathy and a pre surgical consult for kidney transplant  End stage renal failure on dialysis   Type II DM  HTN  HLD  Parathyroidism   Anemia with NARGIS component   Gastric ulcer  SHIVA  Vitamin D deficiency   Morbid obesity    5/29/24:   Just received fentanyl patches 2 days ago and hasn't started yet. Pain remains about the same in quality intensity but noticing more in R calf and knee today. Still using about 60mg of oxycodone per day.   Stopped Zyprexa after 2 days after he felt it made the nausea worse.   CRP/ESR mildly elevated. RF nl. Citrulline antibody, IgG nl,  CLOVIS pending.     7/10/24: Pain overall better controlled with additional of fentanyl patch. Still using oxy 10 about 4-5 pills per day. Ongoing issues with nausea and vomiting.     8/13/24: Pain better controlled. Much more active. Still using about 4-6 oxy per day but feeling much better overall.  Hesitant about lyrica so he didn't start it. Nausea and appetite much better with reglan.     9/24/24: Oxycodone rotated hydromorphone but wasn't as effective. No other complaints today.     11/13/24: Arrives from dialysis. Pain stable. Intermittent nausea controlled with antiemetics. Tolerating  cabo.    Pain Assessment:    Location varies by day.    Currently located in L calf, R knee, and lower back - throbbing (left calf), shooting (right knee to lower thigh), tight - fairly constant with flares.     Always has constant pain in at least one of his legs. Worse with steps/incline.     Off steroids.     Symptom Assessment:    Pain:a little  Lack of energy: a little  Difficulty sleeping: none   Lack of appetite: none   Nausea: none  Vomiting: none  Constipation: none  Numbness or tingling in hands/feet/other: none  Weight loss: none      Information obtained from: interview of patient and interview of family  ______________________________________________________________________     Oncology History   Renal cell carcinoma (Multi)   12/1/2022 -  Chemotherapy    Cabozantinib, 28 Day Cycles     8/28/2023 Initial Diagnosis    Renal cell carcinoma (CMS/HCC)         Past Medical History:   Diagnosis Date    Dorsalgia, unspecified 01/04/2023    Back pain    End stage renal disease (Multi) 01/04/2023    ESRD (end stage renal disease) on dialysis    Essential (primary) hypertension 01/04/2023    Hypertension    Iron deficiency 02/23/2020    Iron deficiency    Other specified postprocedural states     H/O endoscopy     Past Surgical History:   Procedure Laterality Date    OTHER SURGICAL HISTORY  04/11/2018    Creation Of A-V Graft Fistula For Dialysis     Family History   Problem Relation Name Age of Onset    Hypertension Mother      Cancer Father      Hypertension Father      Other (renal transplant recipient) Mother's Brother          SOCIAL HISTORY  . 5 children. Thompson.  Social History:  reports that he has quit smoking. His smoking use included cigars. He has never been exposed to tobacco smoke. He has never used smokeless tobacco. He reports that he does not currently use alcohol. He reports that he does not use drugs.      REVIEW OF SYSTEMS  Review of systems negative unless noted in HPI.       Objective  "      Current Outpatient Medications   Medication Instructions    atorvastatin (LIPITOR) 20 mg, oral, Nightly    B complex-vitamin C-folic acid (Nephrocaps) 1 mg capsule 1 capsule, oral, Daily    cabozantinib (Cabometyx) 40 mg tablet TAKE ONE (1) TABLET BY MOUTH ONCE A DAY    fentaNYL (Duragesic) 50 mcg/hr patch 1 patch, transdermal, Every 72 hours    hydrocortisone 2.5 % cream 1 APPLICATION TWICE A DAY TO AFFECTED AREA OF FACE (AVOID EYES) TO DARK SPOTS FOR 2-4 WEEKS    lidocaine-prilocaine (Emla) 2.5-2.5 % cream Apply thick layer 2 hours prior to dialysis site, cover with plastic wrap.    loratadine (CLARITIN) 10 mg, oral, Daily    menthol-zinc oxide (Calmoseptine) 0.44-20.6 % ointment 1 Application, Topical, As needed    metoclopramide (REGLAN) 5 mg, oral, 4 times daily, Take half an hour before meals and at bedtime    naloxone (NARCAN) 4 mg, nasal, As needed, May repeat every 2-3 minutes if needed, alternating nostrils, until medical assistance becomes available.    non-adherent bandage (Curity Abdominal Pad) 8 X 10 \" bandage 1 Application, topical (top), Daily, To areas of hidradenitis for drainage    olmesartan (BENICAR) 40 mg, oral, Daily    omeprazole (PRILOSEC) 40 mg, oral, Daily    ondansetron (ZOFRAN) 8 mg, oral, Every 8 hours PRN    oxyCODONE (ROXICODONE) 10 mg, oral, Every 4 hours PRN    secukinumab (Cosentyx Pen) 150 mg/mL self-injector pen Inject 300 mg subcutaneously every 4 weeks    secukinumab (Cosentyx Pen) 150 mg/mL self-injector pen Inject 300 mg (2 pens) under the skin once a week for weeks 0, 1, 2, 3 and 4 for loading dose    sevelamer HCl (RENAGEL) 1,600 mg, oral, 3 times daily before meals, Swallow tablet whole; do not crush, break, or chew.       Allergies: No Known Allergies          Creatinine   Date Value Ref Range Status   09/24/2024 16.47 (H) 0.50 - 1.30 mg/dL Final     AST   Date Value Ref Range Status   09/24/2024 11 9 - 39 U/L Final     ALT   Date Value Ref Range Status   09/24/2024 " 10 10 - 52 U/L Final     Comment:     Patients treated with Sulfasalazine may generate falsely decreased results for ALT.     Hemoglobin   Date Value Ref Range Status   09/24/2024 10.3 (L) 13.5 - 17.5 g/dL Final     Platelets   Date Value Ref Range Status   09/24/2024 273 150 - 450 x10*3/uL Final          PHYSICAL EXAMINATION  Vital Signs:   Vital signs reviewed        8/5/2024    12:00 PM 8/5/2024    12:15 PM 8/5/2024    12:30 PM 8/5/2024    12:45 PM 8/13/2024     1:18 PM 9/24/2024    12:03 PM 11/13/2024    10:55 AM   Vitals   Systolic 113 114 130 128 119 174 120   Diastolic 75 81 89 81 75 102 85   Heart Rate 82 92 96 94 104 72 79   Temp    36.8 °C (98.2 °F) 36.3 °C (97.3 °F) 36.4 °C (97.5 °F) 36.6 °C (97.9 °F)   Resp     20 16 18   Weight (lb)     181 192.4 186   BMI     29.21 kg/m2 31.05 kg/m2 30.02 kg/m2   BSA (m2)     1.96 m2 2.02 m2 1.98 m2   Visit Report     Report    Report Report    Report Report        Physical Exam  HENT:      Head: Normocephalic and atraumatic.      Mouth/Throat:      Pharynx: Oropharynx is clear.   Eyes:      Extraocular Movements: Extraocular movements intact.      Conjunctiva/sclera: Conjunctivae normal.   Pulmonary:      Effort: Pulmonary effort is normal.   Abdominal:      General: Abdomen is flat.   Musculoskeletal:         General: No swelling.      Cervical back: Normal range of motion.   Neurological:      General: No focal deficit present.      Mental Status: He is alert.      Comments: No myoclonus   Psychiatric:         Mood and Affect: Mood normal.         Thought Content: Thought content normal.         ASSESSMENT/PLAN    Pain  Pain is:  IO related arthralgia, chronic pain syndrome in the setting of end stage renal disease on dialysis  Type: somatic and neuropathic  -He declined methadone that was previously recommended   -Continue fentanyl TD 50mcg q72 - do not apply direct heat or submerge in water   -Continue oxycodone 10mg q4 PRN for now (I rotated to hydromorphone as  it is more renally favored but it was not effective and he is not having any signs of toxicity from the oxycodone at this time)  -He never started lyrica due to concerns of ADRs  -Continue APAP as needed  - uses sparingly   -Autoimmune/Rheum workup per oncology - unremarkable     Opioid Use  Medication Management:   - OARRS report reviewed with no aberrant behavior; consistent with  prescriptions/records and patient history  - .  Overdose Risk Score 530 .   This has been discussed with patient.   - We will continue to closely monitor the patient for signs of prescription misuse including UDS, OARRS review and subjective reports at each visit.  - No concurrent benzodiazepine use   - I am a provider who either is or has consulted and collaborated with a provider certified in Hospice and Palliative Medicine and have conducted a face-face visit and examination for this patient.  - Routine Urine Drug Screen: patient is anuric and drug screen 9 panel appropriate 9/24/24  - Controlled Substance Agreement completed 5/29/24  - Specifically discussed that controlled substance prescriptions will only be provided by our group as outlined in the completed agreement  - Prescribed naloxone 5/1/24  - Red Flags: None      Nausea   Intermittent nausea with vomiting related to opioids and dialysis   -Intolerant to zyprexa 2.5mg   -Continue zofran 8mg q8 as needed   -Continue reglan 5mg QID       Constipation   At risk for constipation related to opioids  -Continue miralax 17 g daily as needed     Medical Decision Making/Goals of Care/Advance Care Planning:  Patient's current clinical condition, including diagnosis, prognosis, and management plan, and goals of care were discussed.   Life limiting disease: RCC  Family: Supportive wife/children   Goals: symptom control and cancer directed therapy    Advance Directives  Existence of Advance Directives:Other address at next visit     Next Follow-Up Visit:  Return to clinic in 8  weeks     Signature and billing  Thank you for allowing us to participate in the care of this patient. Recommendations will be communicated back to the consulting service by way of shared electronic medical record or face-to-face.    Medical complexity was moderate level due to due to complexity of problems, extensive data review, and high risk of management/treatment.  Time was spent on the following: Prep Time, Time Directly with Patient/Family/Caregiver, Documentation Time. Total time spent: 30 min       DATA   Diagnostic tests and information reviewed for today's visit:  Most recent labs and imaging results, Medications       Plan of Care discussed with: Patient and RN     Some elements copied from Supportive Oncology note on 9/24/2424, the elements have been updated and all reflect current decision making from today, 11/13/24.        SIGNATURE: JAIME Rausch-CNP    Contact information:  Supportive and Palliative Oncology  Monday-Friday 8 AM-5 PM  Phone:  [phone], press option #5, then option #1.   Or Epic Secure Chat

## 2024-11-21 ENCOUNTER — TELEPHONE (OUTPATIENT)
Dept: PALLIATIVE MEDICINE | Facility: HOSPITAL | Age: 39
End: 2024-11-21
Payer: MEDICARE

## 2024-11-21 NOTE — TELEPHONE ENCOUNTER
Prior Authorization received for Fentanyl Patches. Per pharmacy, patient has picked up this prescription as well as the Oxycodone.

## 2024-11-22 ENCOUNTER — SPECIALTY PHARMACY (OUTPATIENT)
Dept: PHARMACY | Facility: CLINIC | Age: 39
End: 2024-11-22

## 2024-11-22 PROCEDURE — RXMED WILLOW AMBULATORY MEDICATION CHARGE

## 2024-11-26 ENCOUNTER — PHARMACY VISIT (OUTPATIENT)
Dept: PHARMACY | Facility: CLINIC | Age: 39
End: 2024-11-26
Payer: COMMERCIAL

## 2024-11-27 DIAGNOSIS — N18.6 ESRD (END STAGE RENAL DISEASE) ON DIALYSIS (MULTI): ICD-10-CM

## 2024-11-27 DIAGNOSIS — I12.9 HYPERTENSIVE CHRONIC KIDNEY DISEASE WITH STAGE 1 THROUGH STAGE 4 CHRONIC KIDNEY DISEASE, OR UNSPECIFIED CHRONIC KIDNEY DISEASE: Primary | ICD-10-CM

## 2024-11-27 DIAGNOSIS — Z99.2 ESRD (END STAGE RENAL DISEASE) ON DIALYSIS (MULTI): ICD-10-CM

## 2024-11-27 RX ORDER — AMLODIPINE BESYLATE 10 MG/1
10 TABLET ORAL
Qty: 90 TABLET | Refills: 3 | Status: SHIPPED | OUTPATIENT
Start: 2024-11-27 | End: 2025-11-27

## 2024-11-27 RX ORDER — AMLODIPINE BESYLATE 10 MG/1
1 TABLET ORAL
COMMUNITY
Start: 2024-07-16 | End: 2024-11-27 | Stop reason: SDUPTHER

## 2024-12-09 DIAGNOSIS — I10 HYPERTENSION, UNCONTROLLED: Primary | ICD-10-CM

## 2024-12-09 RX ORDER — HYDRALAZINE HYDROCHLORIDE 50 MG/1
50 TABLET, FILM COATED ORAL 2 TIMES DAILY
Qty: 180 TABLET | Refills: 3 | Status: SHIPPED | OUTPATIENT
Start: 2024-12-09 | End: 2025-12-09

## 2024-12-11 ENCOUNTER — APPOINTMENT (OUTPATIENT)
Dept: DERMATOLOGY | Facility: CLINIC | Age: 39
End: 2024-12-11
Payer: COMMERCIAL

## 2024-12-12 DIAGNOSIS — Z99.2 ESRD ON DIALYSIS (MULTI): Primary | ICD-10-CM

## 2024-12-12 DIAGNOSIS — N18.6 ESRD ON DIALYSIS (MULTI): Primary | ICD-10-CM

## 2024-12-12 DIAGNOSIS — R11.2 NAUSEA AND VOMITING, UNSPECIFIED VOMITING TYPE: ICD-10-CM

## 2024-12-12 RX ORDER — CARVEDILOL 25 MG/1
25 TABLET ORAL 2 TIMES DAILY
Qty: 60 TABLET | Refills: 11 | Status: SHIPPED | OUTPATIENT
Start: 2024-12-12 | End: 2025-12-12

## 2024-12-12 RX ORDER — METOCLOPRAMIDE 5 MG/1
5 TABLET ORAL 4 TIMES DAILY
Qty: 240 TABLET | Refills: 0 | Status: SHIPPED | OUTPATIENT
Start: 2024-12-12 | End: 2025-02-10

## 2024-12-16 DIAGNOSIS — E83.39 HYPERPHOSPHATEMIA: Primary | ICD-10-CM

## 2024-12-16 DIAGNOSIS — R11.2 NAUSEA AND VOMITING, UNSPECIFIED VOMITING TYPE: Primary | ICD-10-CM

## 2024-12-16 DIAGNOSIS — Z99.2: ICD-10-CM

## 2024-12-16 DIAGNOSIS — Z99.2 ESRD (END STAGE RENAL DISEASE) ON DIALYSIS (MULTI): ICD-10-CM

## 2024-12-16 DIAGNOSIS — N18.6 ESRD (END STAGE RENAL DISEASE) ON DIALYSIS (MULTI): ICD-10-CM

## 2024-12-16 RX ORDER — CALCIUM ACETATE 667 MG/1
667 CAPSULE ORAL
Qty: 270 CAPSULE | Refills: 3 | Status: SHIPPED | OUTPATIENT
Start: 2024-12-16 | End: 2025-12-16

## 2024-12-16 RX ORDER — PROCHLORPERAZINE MALEATE 10 MG
10 TABLET ORAL EVERY 8 HOURS PRN
Qty: 30 TABLET | Refills: 0 | Status: SHIPPED | OUTPATIENT
Start: 2024-12-16 | End: 2024-12-26

## 2024-12-16 RX ORDER — LIDOCAINE AND PRILOCAINE 25; 25 MG/G; MG/G
CREAM TOPICAL
Qty: 30 G | Refills: 11 | Status: SHIPPED | OUTPATIENT
Start: 2024-12-16 | End: 2025-12-16

## 2024-12-18 DIAGNOSIS — G89.3 CANCER ASSOCIATED PAIN: ICD-10-CM

## 2024-12-18 RX ORDER — FENTANYL 50 UG/1
1 PATCH TRANSDERMAL
Qty: 10 PATCH | Refills: 0 | Status: SHIPPED | OUTPATIENT
Start: 2024-12-18 | End: 2025-01-17

## 2024-12-18 RX ORDER — OXYCODONE HYDROCHLORIDE 10 MG/1
10 TABLET ORAL EVERY 4 HOURS PRN
Qty: 180 TABLET | Refills: 0 | Status: SHIPPED | OUTPATIENT
Start: 2024-12-18 | End: 2025-01-17

## 2024-12-18 NOTE — TELEPHONE ENCOUNTER
OARRS report reviewed and reflects  prescription history, no aberrancy noted. Per OARRS, patient last filled Fentanyl 50 mcg and Oxycodone 10 mg 11/19/24, 30 day supply. Per last visit with Audrey Jones on 11/13/24 patient to continue medications. Patient with follow up visit scheduled with Audrey 1/22/25. Patient updated that medication will be sent to [facility]. Refill request routed to provider.

## 2024-12-24 ENCOUNTER — SPECIALTY PHARMACY (OUTPATIENT)
Dept: PHARMACY | Facility: CLINIC | Age: 39
End: 2024-12-24

## 2024-12-24 PROCEDURE — RXMED WILLOW AMBULATORY MEDICATION CHARGE

## 2024-12-28 ENCOUNTER — PHARMACY VISIT (OUTPATIENT)
Dept: PHARMACY | Facility: CLINIC | Age: 39
End: 2024-12-28
Payer: COMMERCIAL

## 2024-12-30 ENCOUNTER — PHARMACY VISIT (OUTPATIENT)
Dept: PHARMACY | Facility: CLINIC | Age: 39
End: 2024-12-30

## 2025-01-06 DIAGNOSIS — I10 HYPERTENSION, UNCONTROLLED: ICD-10-CM

## 2025-01-06 RX ORDER — HYDRALAZINE HYDROCHLORIDE 100 MG/1
100 TABLET, FILM COATED ORAL 2 TIMES DAILY
Qty: 180 TABLET | Refills: 3 | Status: SHIPPED | OUTPATIENT
Start: 2025-01-06 | End: 2026-01-06

## 2025-01-07 DIAGNOSIS — Z99.2 ESRD (END STAGE RENAL DISEASE) ON DIALYSIS (MULTI): ICD-10-CM

## 2025-01-07 DIAGNOSIS — I12.9 HYPERTENSIVE CHRONIC KIDNEY DISEASE WITH STAGE 1 THROUGH STAGE 4 CHRONIC KIDNEY DISEASE, OR UNSPECIFIED CHRONIC KIDNEY DISEASE: ICD-10-CM

## 2025-01-07 DIAGNOSIS — N18.6 ESRD (END STAGE RENAL DISEASE) ON DIALYSIS (MULTI): ICD-10-CM

## 2025-01-07 RX ORDER — AMLODIPINE BESYLATE 10 MG/1
10 TABLET ORAL
Qty: 90 TABLET | Refills: 3 | Status: SHIPPED | OUTPATIENT
Start: 2025-01-07 | End: 2026-01-07

## 2025-01-09 ENCOUNTER — APPOINTMENT (OUTPATIENT)
Dept: NEPHROLOGY | Facility: CLINIC | Age: 40
End: 2025-01-09
Payer: COMMERCIAL

## 2025-01-16 DIAGNOSIS — R11.2 NAUSEA AND VOMITING, UNSPECIFIED VOMITING TYPE: ICD-10-CM

## 2025-01-17 RX ORDER — PROCHLORPERAZINE MALEATE 10 MG
10 TABLET ORAL EVERY 8 HOURS PRN
Qty: 30 TABLET | Refills: 2 | Status: SHIPPED | OUTPATIENT
Start: 2025-01-17 | End: 2025-02-16

## 2025-01-20 ENCOUNTER — SPECIALTY PHARMACY (OUTPATIENT)
Dept: PHARMACY | Facility: CLINIC | Age: 40
End: 2025-01-20

## 2025-01-20 PROCEDURE — RXMED WILLOW AMBULATORY MEDICATION CHARGE

## 2025-01-22 ENCOUNTER — PHARMACY VISIT (OUTPATIENT)
Dept: PHARMACY | Facility: CLINIC | Age: 40
End: 2025-01-22
Payer: COMMERCIAL

## 2025-01-22 ENCOUNTER — TELEMEDICINE (OUTPATIENT)
Dept: PALLIATIVE MEDICINE | Facility: CLINIC | Age: 40
End: 2025-01-22
Payer: MEDICARE

## 2025-01-22 DIAGNOSIS — R11.2 CHEMOTHERAPY INDUCED NAUSEA AND VOMITING: Primary | ICD-10-CM

## 2025-01-22 DIAGNOSIS — K59.03 CONSTIPATION DUE TO PAIN MEDICATION THERAPY: ICD-10-CM

## 2025-01-22 DIAGNOSIS — T45.1X5A CHEMOTHERAPY INDUCED NAUSEA AND VOMITING: Primary | ICD-10-CM

## 2025-01-22 DIAGNOSIS — G89.3 CANCER ASSOCIATED PAIN: ICD-10-CM

## 2025-01-22 PROCEDURE — 4010F ACE/ARB THERAPY RXD/TAKEN: CPT | Performed by: NURSE PRACTITIONER

## 2025-01-22 PROCEDURE — 99214 OFFICE O/P EST MOD 30 MIN: CPT | Performed by: NURSE PRACTITIONER

## 2025-01-22 RX ORDER — OXYCODONE HYDROCHLORIDE 10 MG/1
10 TABLET ORAL EVERY 4 HOURS PRN
Qty: 180 TABLET | Refills: 0 | Status: SHIPPED | OUTPATIENT
Start: 2025-01-22 | End: 2025-02-21

## 2025-01-22 RX ORDER — FENTANYL 50 UG/1
1 PATCH TRANSDERMAL
Qty: 10 PATCH | Refills: 0 | Status: SHIPPED | OUTPATIENT
Start: 2025-01-22 | End: 2025-02-21

## 2025-01-22 NOTE — PROGRESS NOTES
SUPPORTIVE AND PALLIATIVE ONCOLOGY CONSULT - OUTPATIENT FOLLOW UP    Virtual or Telephone Consent     An interactive audio and video telecommunication system which permits real time communications between the patient (at the originating site) and provider (at the distant site) was utilized to provide this telehealth service.   Verbal consent was requested and obtained from Siddhartha Orellana on this date, 1/22/25 for a telehealth visit.       SERVICE DATE: 1/22/2025    Referred by:  Renan Thomson MD   Medical Oncologist: Renan Thomson MD   Radiation Oncologist: No care team member to display  Primary Physician: Davina Pratt  [phone]    REASON FOR CONSULT/CHIEF CONSULT COMPLAINT: Intro to Supportive Oncology and Symptom Management.     Subjective   HISTORY OF PRESENT ILLNESS: Siddhartha Orellana is a 39 y.o. 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 Discontinued nivolumab in January 2023 due to arthralgias requiring high dose steroids. Course c/b by hidradenitis suppurativa and delayed fistula healing. He was started on oxycodone 10mg TID for pain management.      Additional PMHx:  Lymphadenopathy and a pre surgical consult for kidney transplant  End stage renal failure on dialysis   Type II DM  HTN  HLD  Parathyroidism   Anemia with NARGIS component   Gastric ulcer  SHIVA  Vitamin D deficiency   Morbid obesity    5/29/24:   Just received fentanyl patches 2 days ago and hasn't started yet. Pain remains about the same in quality intensity but noticing more in R calf and knee today. Still using about 60mg of oxycodone per day.   Stopped Zyprexa after 2 days after he felt it made the nausea worse.   CRP/ESR mildly elevated. RF nl. Citrulline antibody, IgG nl,  CLOVIS pending.     7/10/24: Pain overall better controlled with additional of fentanyl patch. Still using oxy 10 about 4-5 pills per day. Ongoing issues with nausea and vomiting.     8/13/24: Pain better controlled. Much more active.  "Still using about 4-6 oxy per day but feeling much better overall.  Hesitant about lyrica so he didn't start it. Nausea and appetite much better with reglan.     9/24/24: Oxycodone rotated hydromorphone but wasn't as effective. No other complaints today.     11/13/24: Arrives from dialysis. Pain stable. Intermittent nausea controlled with antiemetics. Tolerating cabo.    11/22/25: Pain stable.       Pain Assessment:    Lower back and legs (bilateral calves and thighs at times - chronic)  Denies edema, erythema, warmth to touch     Symptom Assessment:    Pain:a little  Lack of energy: a little - stable; \"good\" - more active   Difficulty sleeping: none   Lack of appetite: a little - weight stable    Nausea: none   Vomiting: none  Constipation: none  Numbness or tingling in hands/feet/other: none      Information obtained from: interview of patient  ______________________________________________________________________     Oncology History   Renal cell carcinoma (Multi)   12/1/2022 -  Chemotherapy    Cabozantinib, 28 Day Cycles     8/28/2023 Initial Diagnosis    Renal cell carcinoma (CMS/HCC)         Past Medical History:   Diagnosis Date    Dorsalgia, unspecified 01/04/2023    Back pain    End stage renal disease (Multi) 01/04/2023    ESRD (end stage renal disease) on dialysis    Essential (primary) hypertension 01/04/2023    Hypertension    Iron deficiency 02/23/2020    Iron deficiency    Other specified postprocedural states     H/O endoscopy     Past Surgical History:   Procedure Laterality Date    OTHER SURGICAL HISTORY  04/11/2018    Creation Of A-V Graft Fistula For Dialysis     Family History   Problem Relation Name Age of Onset    Hypertension Mother      Cancer Father      Hypertension Father      Other (renal transplant recipient) Mother's Brother          SOCIAL HISTORY  . 5 children. Thompson.  Social History:  reports that he has quit smoking. His smoking use included cigars. He has never been " "exposed to tobacco smoke. He has never used smokeless tobacco. He reports that he does not currently use alcohol. He reports that he does not use drugs.      REVIEW OF SYSTEMS  Review of systems negative unless noted in HPI.       Objective       Current Outpatient Medications   Medication Instructions    amLODIPine (NORVASC) 10 mg, oral, Daily (0630), Take half  a tablet  daily for 5-7 days. Then if SBP still above 140 afterwards, take one pill daily.    atorvastatin (LIPITOR) 20 mg, oral, Nightly    B complex-vitamin C-folic acid (Nephrocaps) 1 mg capsule 1 capsule, oral, Daily    cabozantinib (Cabometyx) 40 mg tablet TAKE ONE (1) TABLET BY MOUTH ONCE A DAY    calcium acetate (PHOSLO) 667 mg, oral, 3 times daily (morning, midday, late afternoon)    carvedilol (COREG) 25 mg, oral, 2 times daily    hydrALAZINE (APRESOLINE) 100 mg, oral, 2 times daily    hydrocortisone 2.5 % cream 1 APPLICATION TWICE A DAY TO AFFECTED AREA OF FACE (AVOID EYES) TO DARK SPOTS FOR 2-4 WEEKS    lidocaine-prilocaine (Emla) 2.5-2.5 % cream Apply thick layer 2 hours prior to dialysis site, cover with plastic wrap.    loratadine (CLARITIN) 10 mg, oral, Daily    menthol-zinc oxide (Calmoseptine) 0.44-20.6 % ointment 1 Application, Topical, As needed    naloxone (NARCAN) 4 mg, nasal, As needed, May repeat every 2-3 minutes if needed, alternating nostrils, until medical assistance becomes available.    non-adherent bandage (Curity Abdominal Pad) 8 X 10 \" bandage 1 Application, topical (top), Daily, To areas of hidradenitis for drainage    olmesartan (BENICAR) 40 mg, oral, Daily    omeprazole (PRILOSEC) 40 mg, oral, Daily    ondansetron (ZOFRAN) 8 mg, oral, Every 8 hours PRN    prochlorperazine (COMPAZINE) 10 mg, oral, Every 8 hours PRN    secukinumab (Cosentyx Pen) 150 mg/mL self-injector pen Inject 2 pens (300 mg) under the skin every 4 weeks as directed.    secukinumab (Cosentyx Pen) 150 mg/mL self-injector pen Inject 300 mg (2 pens) under " the skin once a week for weeks 0, 1, 2, 3 and 4 for loading dose       Allergies: No Known Allergies          Creatinine   Date Value Ref Range Status   09/24/2024 16.47 (H) 0.50 - 1.30 mg/dL Final     AST   Date Value Ref Range Status   09/24/2024 11 9 - 39 U/L Final     ALT   Date Value Ref Range Status   09/24/2024 10 10 - 52 U/L Final     Comment:     Patients treated with Sulfasalazine may generate falsely decreased results for ALT.     Hemoglobin   Date Value Ref Range Status   09/24/2024 10.3 (L) 13.5 - 17.5 g/dL Final     Platelets   Date Value Ref Range Status   09/24/2024 273 150 - 450 x10*3/uL Final          PHYSICAL EXAMINATION  Vital Signs:   Vital signs reviewed    No VS with VV     Physical Exam  HENT:      Head: Normocephalic and atraumatic.   Eyes:      Extraocular Movements: Extraocular movements intact.      Conjunctiva/sclera: Conjunctivae normal.   Abdominal:      General: Abdomen is flat.   Musculoskeletal:      Cervical back: Normal range of motion.   Neurological:      General: No focal deficit present.      Mental Status: He is alert.      Comments: No myoclonus   Psychiatric:         Mood and Affect: Mood normal.         Thought Content: Thought content normal.         ASSESSMENT/PLAN    Pain  Pain is:  IO related arthralgia, chronic pain syndrome in the setting of end stage renal disease on dialysis  Type: somatic and neuropathic  -He declined methadone that was previously recommended   -Continue fentanyl TD 50mcg q72 - do not apply direct heat or submerge in water   -Continue oxycodone 10mg q4 PRN (I rotated to hydromorphone but it was not effective and he is not having any signs of toxicity from the oxycodone at this time)  -He never started lyrica due to concerns of ADRs  -Continue APAP as needed  - uses sparingly   -Autoimmune/Rheum workup per oncology - unremarkable     Opioid Use  Medication Management:   - OARRS report reviewed with no aberrant behavior; consistent with [facility]  prescriptions/records and patient history  - .  Overdose Risk Score 390 .   This has been discussed with patient.   - We will continue to closely monitor the patient for signs of prescription misuse including UDS, OARRS review and subjective reports at each visit.  - No concurrent benzodiazepine use   - I am a provider who either is or has consulted and collaborated with a provider certified in Hospice and Palliative Medicine and have conducted a face-face visit and examination for this patient.  - Routine Urine Drug Screen: patient is anuric and drug screen 9 panel appropriate 9/24/24  - Controlled Substance Agreement completed 5/29/24  - Specifically discussed that controlled substance prescriptions will only be provided by our group as outlined in the completed agreement  - Prescribed naloxone 5/1/24  - Red Flags: None      Nausea   Intermittent nausea with vomiting related to opioids and dialysis   -Intolerant to zyprexa 2.5mg   -Continue zofran 8mg q8 as needed   -Continue reglan 5mg QID     Constipation   At risk for constipation related to opioids  -Continue miralax 17 g daily as needed     Medical Decision Making/Goals of Care/Advance Care Planning:  Patient's current clinical condition, including diagnosis, prognosis, and management plan, and goals of care were discussed.   Life limiting disease: RCC  Family: Supportive wife/children   Goals: symptom control and cancer directed therapy    Advance Directives  Existence of Advance Directives:Other address at next visit     Next Follow-Up Visit:  Return to clinic in 4 weeks     Signature and billing  Thank you for allowing us to participate in the care of this patient. Recommendations will be communicated back to the consulting service by way of shared electronic medical record or face-to-face.    Medical complexity was moderate level due to due to complexity of problems, extensive data review, and high risk of management/treatment.  Time was spent on  the following: Prep Time, Time Directly with Patient/Family/Caregiver, Documentation Time. Total time spent: 30 min       DATA   Diagnostic tests and information reviewed for today's visit:  Most recent labs and imaging results, Medications       Plan of Care discussed with: Patient and RN     Some elements copied from Supportive Oncology note on 11/13/24, the elements have been updated and all reflect current decision making from today, 1/22/25.        SIGNATURE: JAIME Rausch-CNP    Contact information:  Supportive and Palliative Oncology  Monday-Friday 8 AM-5 PM  Phone:  [phone], press option #5, then option #1.   Or Epic Secure Chat

## 2025-02-06 ENCOUNTER — APPOINTMENT (OUTPATIENT)
Dept: DERMATOLOGY | Facility: CLINIC | Age: 40
End: 2025-02-06
Payer: COMMERCIAL

## 2025-02-11 ENCOUNTER — SPECIALTY PHARMACY (OUTPATIENT)
Dept: HEMATOLOGY/ONCOLOGY | Facility: HOSPITAL | Age: 40
End: 2025-02-11

## 2025-02-11 ENCOUNTER — OFFICE VISIT (OUTPATIENT)
Dept: HEMATOLOGY/ONCOLOGY | Facility: HOSPITAL | Age: 40
End: 2025-02-11
Payer: COMMERCIAL

## 2025-02-11 ENCOUNTER — LAB (OUTPATIENT)
Dept: LAB | Facility: HOSPITAL | Age: 40
End: 2025-02-11
Payer: MEDICAID

## 2025-02-11 VITALS
OXYGEN SATURATION: 98 % | WEIGHT: 188.2 LBS | BODY MASS INDEX: 30.38 KG/M2 | SYSTOLIC BLOOD PRESSURE: 143 MMHG | DIASTOLIC BLOOD PRESSURE: 91 MMHG | RESPIRATION RATE: 18 BRPM | HEART RATE: 78 BPM | TEMPERATURE: 97.3 F

## 2025-02-11 DIAGNOSIS — C64.9 RENAL CELL CARCINOMA, UNSPECIFIED LATERALITY (MULTI): Primary | ICD-10-CM

## 2025-02-11 DIAGNOSIS — Z99.2 ESRD ON DIALYSIS (MULTI): ICD-10-CM

## 2025-02-11 DIAGNOSIS — R94.6 THYROID FUNCTION STUDY ABNORMALITY: ICD-10-CM

## 2025-02-11 DIAGNOSIS — R11.2 NAUSEA AND VOMITING, UNSPECIFIED VOMITING TYPE: ICD-10-CM

## 2025-02-11 DIAGNOSIS — Z51.81 ENCOUNTER FOR MONITORING OPIOID MAINTENANCE THERAPY: ICD-10-CM

## 2025-02-11 DIAGNOSIS — Z79.891 ENCOUNTER FOR MONITORING OPIOID MAINTENANCE THERAPY: ICD-10-CM

## 2025-02-11 DIAGNOSIS — N18.6 ESRD ON DIALYSIS (MULTI): ICD-10-CM

## 2025-02-11 DIAGNOSIS — N25.81 SECONDARY HYPERPARATHYROIDISM (MULTI): ICD-10-CM

## 2025-02-11 DIAGNOSIS — G89.3 CANCER ASSOCIATED PAIN: ICD-10-CM

## 2025-02-11 DIAGNOSIS — C64.9 RENAL CELL CARCINOMA, UNSPECIFIED LATERALITY (MULTI): ICD-10-CM

## 2025-02-11 LAB
25(OH)D3 SERPL-MCNC: 38 NG/ML (ref 30–100)
ALBUMIN SERPL BCP-MCNC: 3.9 G/DL (ref 3.4–5)
ALP SERPL-CCNC: 178 U/L (ref 33–120)
ALT SERPL W P-5'-P-CCNC: 7 U/L (ref 10–52)
ANION GAP SERPL CALC-SCNC: 20 MMOL/L (ref 10–20)
AST SERPL W P-5'-P-CCNC: 10 U/L (ref 9–39)
BILIRUB SERPL-MCNC: 0.4 MG/DL (ref 0–1.2)
BUN SERPL-MCNC: 50 MG/DL (ref 6–23)
CALCIUM SERPL-MCNC: 7.5 MG/DL (ref 8.6–10.3)
CHLORIDE SERPL-SCNC: 100 MMOL/L (ref 98–107)
CO2 SERPL-SCNC: 27 MMOL/L (ref 21–32)
CREAT SERPL-MCNC: 12.97 MG/DL (ref 0.5–1.3)
EGFRCR SERPLBLD CKD-EPI 2021: 4 ML/MIN/1.73M*2
ERYTHROCYTE [DISTWIDTH] IN BLOOD BY AUTOMATED COUNT: 17.7 % (ref 11.5–14.5)
EST. AVERAGE GLUCOSE BLD GHB EST-MCNC: 100 MG/DL
GLUCOSE SERPL-MCNC: 135 MG/DL (ref 74–99)
HBA1C MFR BLD: 5.1 %
HCT VFR BLD AUTO: 32 % (ref 41–52)
HGB BLD-MCNC: 10.1 G/DL (ref 13.5–17.5)
MCH RBC QN AUTO: 29.8 PG (ref 26–34)
MCHC RBC AUTO-ENTMCNC: 31.6 G/DL (ref 32–36)
MCV RBC AUTO: 94 FL (ref 80–100)
NRBC BLD-RTO: 0 /100 WBCS (ref 0–0)
PLATELET # BLD AUTO: 240 X10*3/UL (ref 150–450)
POTASSIUM SERPL-SCNC: 4.3 MMOL/L (ref 3.5–5.3)
PROT SERPL-MCNC: 7.2 G/DL (ref 6.4–8.2)
RBC # BLD AUTO: 3.39 X10*6/UL (ref 4.5–5.9)
SODIUM SERPL-SCNC: 143 MMOL/L (ref 136–145)
TSH SERPL-ACNC: 3.04 MIU/L (ref 0.44–3.98)
WBC # BLD AUTO: 9.4 X10*3/UL (ref 4.4–11.3)

## 2025-02-11 PROCEDURE — 82306 VITAMIN D 25 HYDROXY: CPT

## 2025-02-11 PROCEDURE — 99214 OFFICE O/P EST MOD 30 MIN: CPT | Performed by: STUDENT IN AN ORGANIZED HEALTH CARE EDUCATION/TRAINING PROGRAM

## 2025-02-11 PROCEDURE — 4010F ACE/ARB THERAPY RXD/TAKEN: CPT | Performed by: STUDENT IN AN ORGANIZED HEALTH CARE EDUCATION/TRAINING PROGRAM

## 2025-02-11 PROCEDURE — 80364 OPIOID &OPIATE ANALOG 5/MORE: CPT

## 2025-02-11 PROCEDURE — 80349 CANNABINOIDS NATURAL: CPT

## 2025-02-11 PROCEDURE — 80053 COMPREHEN METABOLIC PANEL: CPT

## 2025-02-11 PROCEDURE — 83036 HEMOGLOBIN GLYCOSYLATED A1C: CPT

## 2025-02-11 PROCEDURE — 3077F SYST BP >= 140 MM HG: CPT | Performed by: STUDENT IN AN ORGANIZED HEALTH CARE EDUCATION/TRAINING PROGRAM

## 2025-02-11 PROCEDURE — 3080F DIAST BP >= 90 MM HG: CPT | Performed by: STUDENT IN AN ORGANIZED HEALTH CARE EDUCATION/TRAINING PROGRAM

## 2025-02-11 PROCEDURE — 85027 COMPLETE CBC AUTOMATED: CPT

## 2025-02-11 PROCEDURE — 36415 COLL VENOUS BLD VENIPUNCTURE: CPT

## 2025-02-11 PROCEDURE — 84443 ASSAY THYROID STIM HORMONE: CPT

## 2025-02-11 PROCEDURE — 80307 DRUG TEST PRSMV CHEM ANLYZR: CPT

## 2025-02-11 ASSESSMENT — ENCOUNTER SYMPTOMS
CHILLS: 0
MYALGIAS: 1
FEVER: 0
NUMBNESS: 0
ROS SKIN COMMENTS: HS
HEMATOLOGIC/LYMPHATIC NEGATIVE: 1
WOUND: 1
DIARRHEA: 0
VOMITING: 0
ARTHRALGIAS: 1
LIGHT-HEADEDNESS: 0
PSYCHIATRIC NEGATIVE: 1
FATIGUE: 0
BACK PAIN: 1
EXTREMITY WEAKNESS: 0
UNEXPECTED WEIGHT CHANGE: 0
CONSTIPATION: 0
EYES NEGATIVE: 1
SHORTNESS OF BREATH: 0
ENDOCRINE NEGATIVE: 1
NAUSEA: 1
LEG SWELLING: 0
APPETITE CHANGE: 0
COUGH: 0

## 2025-02-11 ASSESSMENT — PAIN SCALES - GENERAL: PAINLEVEL_OUTOF10: 5

## 2025-02-11 NOTE — PATIENT INSTRUCTIONS
Need to schedule a CT scan and follow up with Dr. Thomson in 1-2 weeks.  Please get labs drawn today.

## 2025-02-11 NOTE — PROGRESS NOTES
Saw patient during clinic visit to perform medication reassessment/outreach on cabozantinib. Patient verbalized dizziness and lightheadedness (primarily during dialysis sessions but also when standing up too fast). Counseled patient to change positions slowly to minimize incidence of orthostatic hypotension. Patient had been monitoring blood pressure at home (average bp ~140/80). For patient's minimized appetite, spouse has been making sure patient has 3 meals throughout the day. Counseled patient to have multiple smaller meals throughout the day when he does not have an appetite. Patient also expresses generalized anxiety. Patient has been informed to let primary physician know about recent mood changes.     No other issues. All other questions addressed.       02/11/25 at 11:51 AM - Cha Beebe PharmD  PGY-2 Oncology Pharmacy Resident    Attestation Statements     I was present with the pharmacy resident who participated in the documentation of this note. I have reviewed the pharmacy resident's documentation and verified the findings in the note as written with additions or exceptions as stated in the body of the note.    Aiden Weiner, PharmD, CSP  Clinical Pharm Specialist -  Oncology  [facility]  Phone #: [phone]  Fax #: [phone]  Email: [email or web address]  
Maggy Dimas  (RN)  2020 09:21:55

## 2025-02-11 NOTE — PROGRESS NOTES
Patient ID: Siddhartha Orellana is a 39 y.o. male.  Diagnosis:  Papillary RCC  MedOnc: Dr. Thomson  Nephrologist: Dr. Grover  Vascular Surgeon: Dr. Daigle    Current Therapy: Cabozantinib     ONCOLOGIC HISTORY  11/7/22 - CT-guided biopsy of retroperitoneal lymph node revealed findings consistent with metastatic papillary carcinoma.  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  2/7/23 - On Prednisone 20mg daily  2/17/23 - Scans show SD (RECIST) with some mild tumor shrinkage RPLN  2/21/23 - Increase Prednisone 40mg daily for continued arthralgia; continue Cabo 40 mg daily  3/15/23 - Continue Pred 40mg PO daily, continue Cabo  End March - hidradenitis suppurativa  4/4/23 - Hold Cabo; prednisone 30 mg daily  4/25/23 - Resume cabo  5/18/23 - Hold Cabo 2/2 vascular fistula repair  5/23/23 - Continue hold due to extensive edema, blistering  6/2023 - resumed cabo. Nivo on hold with PDN 25 mg  8/16/23 - continue cabo, reduce PDN to 20 mg daily  8/23/23 - Left upper extremity wound debridement down to subcutaneous tissue and vessels- Cabo on hold  8/31/23 - continue to hold cabo, PDN back at 30mg   9/15/23 - resumed cabozantinib 40 mg   Mid Sep 23 - admission hidradenitis suppurativa  10/10/23 - cabo on hold. Will start PDN 20 mg  10/17/23 - cabo on hold for graft, PDN 20 mg  10/25/23 - cabo on hold for graft, PDN 15 mg daily  11/16/23 - continue cabozantinib hold, PDN 10 mg daily  1/18/24 - mild inguinal/iliac LN growth + stable bone mets in staging scans.   01/2024 - dialysis started  3/7/24 - he got back on cabozantinib 40 mg 2-3 w ago  4/11/24 - scans show SD. Continue cabo 40 alone  4/12/24 - held for AVF infiltration  5/1/24 - No AVF revision required - restart cabo at  40 mg  5/29/24 - Continue cabozantinib  7/2/24 - Continue cabozantinib 40 mg with breaks PRN  9/24/24 - got back on cabo 1 week ago, off for about 1 month  2/11/25 - he was lost to follow up    PMH: Stage V CKD on HD, Type II DM, HTN, HLD, Parathryroidism, Anemia, gastric ulcer, SHIVA, Vit D def     Review of Systems   Constitutional:  Negative for appetite change, chills, fatigue, fever and unexpected weight change.   HENT:  Negative.     Eyes: Negative.    Respiratory:  Negative for cough and shortness of breath.    Cardiovascular:  Negative for chest pain and leg swelling.   Gastrointestinal:  Positive for nausea. Negative for constipation, diarrhea and vomiting.   Endocrine: Negative.    Genitourinary: Negative.     Musculoskeletal:  Positive for arthralgias, back pain, gait problem and myalgias.   Skin:  Positive for wound. Negative for rash.        HS   Neurological:  Positive for gait problem. Negative for extremity weakness, light-headedness and numbness.   Hematological: Negative.    Psychiatric/Behavioral: Negative.       Allergies  No Known Allergies     Medications  Current Outpatient Medications   Medication Instructions    amLODIPine (NORVASC) 10 mg, oral, Daily (0630), Take half  a tablet  daily for 5-7 days. Then if SBP still above 140 afterwards, take one pill daily.    atorvastatin (LIPITOR) 20 mg, oral, Nightly    B complex-vitamin C-folic acid (Nephrocaps) 1 mg capsule 1 capsule, oral, Daily    cabozantinib (Cabometyx) 40 mg tablet TAKE ONE (1) TABLET BY MOUTH ONCE A DAY    calcium acetate (PHOSLO) 667 mg, oral, 3 times daily (morning, midday, late afternoon)    carvedilol (COREG) 25 mg, oral, 2 times daily    fentaNYL (Duragesic) 50 mcg/hr patch 1 patch, transdermal, Every 72 hours    hydrALAZINE (APRESOLINE) 100 mg, oral, 2 times daily    hydrocortisone 2.5 % cream 1 APPLICATION TWICE A DAY TO AFFECTED AREA OF FACE (AVOID EYES) TO DARK SPOTS FOR 2-4 WEEKS    lidocaine-prilocaine (Emla) 2.5-2.5 %  "cream Apply thick layer 2 hours prior to dialysis site, cover with plastic wrap.    loratadine (CLARITIN) 10 mg, oral, Daily    menthol-zinc oxide (Calmoseptine) 0.44-20.6 % ointment 1 Application, Topical, As needed    naloxone (NARCAN) 4 mg, nasal, As needed, May repeat every 2-3 minutes if needed, alternating nostrils, until medical assistance becomes available.    non-adherent bandage (Curity Abdominal Pad) 8 X 10 \" bandage 1 Application, topical (top), Daily, To areas of hidradenitis for drainage    olmesartan (BENICAR) 40 mg, oral, Daily    omeprazole (PRILOSEC) 40 mg, oral, Daily    ondansetron (ZOFRAN) 8 mg, oral, Every 8 hours PRN    oxyCODONE (ROXICODONE) 10 mg, oral, Every 4 hours PRN    secukinumab (Cosentyx Pen) 150 mg/mL self-injector pen Inject 2 pens (300 mg) under the skin every 4 weeks as directed.    secukinumab (Cosentyx Pen) 150 mg/mL self-injector pen Inject 300 mg (2 pens) under the skin once a week for weeks 0, 1, 2, 3 and 4 for loading dose        OBJECTIVE:    VS / Pain:  There were no vitals taken for this visit.  BSA: There is no height or weight on file to calculate BSA.  Wt Readings from Last 5 Encounters:   11/13/24 84.4 kg (186 lb)   09/24/24 87.3 kg (192 lb 6.4 oz)   08/13/24 82.1 kg (181 lb)   08/05/24 76.5 kg (168 lb 10.4 oz)   08/01/24 78 kg (172 lb)     Physical Exam  Constitutional:       General: He is not in acute distress.     Appearance: Normal appearance. He is not toxic-appearing.   HENT:      Head: Normocephalic and atraumatic.      Mouth/Throat:      Mouth: Mucous membranes are moist.      Pharynx: Oropharynx is clear.   Eyes:      Pupils: Pupils are equal, round, and reactive to light.   Pulmonary:      Effort: Pulmonary effort is normal.   Musculoskeletal:         General: Normal range of motion.      Cervical back: Normal range of motion.      Right lower leg: No edema.      Left lower leg: No edema.   Skin:     General: Skin is warm and dry.      Findings: No rash. "   Neurological:      General: No focal deficit present.      Mental Status: He is alert and oriented to person, place, and time.      Motor: No weakness.   Psychiatric:         Mood and Affect: Mood normal.         Behavior: Behavior normal.         Thought Content: Thought content normal.         Judgment: Judgment normal.       Performance Status:  ECOG Score: 1- Restricted in physically strenuous activity.  Carries out light duty.  Karnofsky Score: 80 - Normal activity with effort; some signs or symptoms of disease    Diagnostic Results   No results found. However, due to the size of the patient record, not all encounters were searched. Please check Results Review for a complete set of results.    Scans 06/2024 - stable disease    Scans 9/24/24 -   Renal cancer restaging scan. When compared to the prior examination  dated 06/27/2024:  1. Interval increase in size of the right retroperitoneal lymph node  2. Osseous tumor burden as described above, please refer to nuclear  medicine study for osseous disease evaluation  3. Interval resolution of the ground-glass opacities of the right  lower lobe.  4. Additional stable chronic incidental findings described above.    Assessment/Plan   40yo AA man with hx of stage IV CKD with metastatic papRCC to LNs now on cabo/nivo. He has CKD and planned for kidney dialysis. Known proteinuria +++ (known). Still with sig joint pains and dealing with hyperglycemias. On cabo (nivo on hold d/t arthritis (PDN) 30 mg PDN BUT pt self discontinued his prednisone and arthralgias are actually better. Pain is under control on opioids and arthralgias might not be fully IO-related. Now back on cabo with stable disease - bone mets. Fistula got infiltrated and dialysis on hold.     He was lost to follow up for last 6 months. Still on cabozantinib. Apparently doing well, Will get restaging scans and labs.     I saw and evaluated the patient. I personally obtained the key and critical portions of  the history and physical exam or was physically present for key and critical portions performed by the resident/fellow. I reviewed the resident/fellow's documentation and discussed the patient with the resident/fellow. I agree with the resident/fellow's medical decision making as documented in the note.   Renan Thomson MD MSc FACP  [facility] [facility]   in Medicine [facility] [employer]  Director Clinical  Medical Oncology Research Program   [facility]  Cell [phone]  Office [phone]

## 2025-02-12 ENCOUNTER — APPOINTMENT (OUTPATIENT)
Dept: NEPHROLOGY | Facility: CLINIC | Age: 40
End: 2025-02-12
Payer: MEDICARE

## 2025-02-12 VITALS
SYSTOLIC BLOOD PRESSURE: 150 MMHG | WEIGHT: 187 LBS | HEART RATE: 76 BPM | BODY MASS INDEX: 30.18 KG/M2 | DIASTOLIC BLOOD PRESSURE: 93 MMHG

## 2025-02-12 DIAGNOSIS — Z99.2 ESRD (END STAGE RENAL DISEASE) ON DIALYSIS (MULTI): ICD-10-CM

## 2025-02-12 DIAGNOSIS — N52.1 ERECTILE DYSFUNCTION DUE TO DISEASES CLASSIFIED ELSEWHERE: Primary | ICD-10-CM

## 2025-02-12 DIAGNOSIS — I12.9 HYPERTENSIVE CHRONIC KIDNEY DISEASE WITH STAGE 1 THROUGH STAGE 4 CHRONIC KIDNEY DISEASE, OR UNSPECIFIED CHRONIC KIDNEY DISEASE: ICD-10-CM

## 2025-02-12 DIAGNOSIS — N18.6 ESRD (END STAGE RENAL DISEASE) ON DIALYSIS (MULTI): ICD-10-CM

## 2025-02-12 PROCEDURE — 1036F TOBACCO NON-USER: CPT | Performed by: INTERNAL MEDICINE

## 2025-02-12 PROCEDURE — 3077F SYST BP >= 140 MM HG: CPT | Performed by: INTERNAL MEDICINE

## 2025-02-12 PROCEDURE — 4010F ACE/ARB THERAPY RXD/TAKEN: CPT | Performed by: INTERNAL MEDICINE

## 2025-02-12 PROCEDURE — 3044F HG A1C LEVEL LT 7.0%: CPT | Performed by: INTERNAL MEDICINE

## 2025-02-12 PROCEDURE — 3080F DIAST BP >= 90 MM HG: CPT | Performed by: INTERNAL MEDICINE

## 2025-02-12 RX ORDER — TADALAFIL 5 MG/1
5 TABLET ORAL DAILY
Qty: 30 TABLET | Refills: 0 | Status: SHIPPED | OUTPATIENT
Start: 2025-02-12 | End: 2025-03-14

## 2025-02-12 RX ORDER — AMLODIPINE BESYLATE 10 MG/1
10 TABLET ORAL
Qty: 90 TABLET | Refills: 3 | Status: SHIPPED | OUTPATIENT
Start: 2025-02-12 | End: 2026-02-12

## 2025-02-13 ENCOUNTER — HOSPITAL ENCOUNTER (OUTPATIENT)
Dept: RADIOLOGY | Facility: CLINIC | Age: 40
Discharge: HOME | End: 2025-02-13
Payer: MEDICARE

## 2025-02-13 DIAGNOSIS — C64.9 RENAL CELL CARCINOMA, UNSPECIFIED LATERALITY (MULTI): ICD-10-CM

## 2025-02-13 LAB
AMPHETAMINES SERPL QL SCN: NEGATIVE NG/ML
ANNOTATION COMMENT IMP: NORMAL
BARBITURATES SERPL QL SCN: NEGATIVE NG/ML
BENZODIAZ SERPL QL SCN: NEGATIVE NG/ML
BUPRENORPHINE SERPL-MCNC: NEGATIVE NG/ML
CANNABINOIDS SERPL QL SCN: POSITIVE NG/ML
COCAINE SERPL QL SCN: NEGATIVE NG/ML
METHADONE SERPL QL SCN: NEGATIVE NG/ML
METHAMPHET SERPL QL: NEGATIVE NG/ML
OPIATES SERPL QL SCN: NEGATIVE NG/ML
OXYCODONE SERPL QL: POSITIVE NG/ML
PCP SERPL QL SCN: NEGATIVE NG/ML

## 2025-02-13 PROCEDURE — 71250 CT THORAX DX C-: CPT

## 2025-02-13 NOTE — PROGRESS NOTES
ESRD OUTPATIENT PROGRESS NOTE    Here in follow up for dialysis care. Doing overall pretty good.  Follows regularly with all her medical care team and attends dialysis 3 x a week. No  issues with access cannulation. He does experience occasional cramps and also lots of anxiety in dialysis.BP is much better after reintroducing almost all the BP meds  he used to take before initiating dialysis. Appetite remains stable. Denies chest pain, dyspnea, leg swelling. Tolerates Cabozantinib well, and follows with Dr. Thomson regularly.         ROS  All the rest reviewed and negative.         Objective   BP (!) 150/93   Pulse 76   Wt 84.8 kg (187 lb)   BMI 30.18 kg/m²         Physical Exam  Constitutional:  well appearing, in NAD  Psychiatric:  appropriate mood and behavior    HEENT: NC/AT, clear sclerae, moist mucous membranes  Cardiovascular: distant S1, S2, RRR,  no murmurs, gallop or rubs  Pulmonary:  Normal breath sounds  Extremities: no edema  Skin:  warm and dry  ACCESS: LFA AVF     No Known Allergies  Current Outpatient Medications   Medication Instructions    amLODIPine (NORVASC) 10 mg, oral, Daily (0630), Take half  a tablet  daily for 5-7 days. Then if SBP still above 140 afterwards, take one pill daily.    atorvastatin (LIPITOR) 20 mg, oral, Nightly    cabozantinib (Cabometyx) 40 mg tablet TAKE ONE (1) TABLET BY MOUTH ONCE A DAY    calcium acetate (PHOSLO) 667 mg, oral, 3 times daily (morning, midday, late afternoon)    carvedilol (COREG) 25 mg, oral, 2 times daily    fentaNYL (Duragesic) 50 mcg/hr patch 1 patch, transdermal, Every 72 hours    hydrALAZINE (APRESOLINE) 100 mg, oral, 2 times daily    hydrocortisone 2.5 % cream 1 APPLICATION TWICE A DAY TO AFFECTED AREA OF FACE (AVOID EYES) TO DARK SPOTS FOR 2-4 WEEKS    lidocaine-prilocaine (Emla) 2.5-2.5 % cream Apply thick layer 2 hours prior to dialysis site, cover with plastic wrap.    loratadine (CLARITIN) 10 mg, oral, Daily    menthol-zinc oxide  "(Calmoseptine) 0.44-20.6 % ointment 1 Application, Topical, As needed    naloxone (NARCAN) 4 mg, nasal, As needed, May repeat every 2-3 minutes if needed, alternating nostrils, until medical assistance becomes available.    non-adherent bandage (Curity Abdominal Pad) 8 X 10 \" bandage 1 Application, topical (top), Daily, To areas of hidradenitis for drainage    olmesartan (BENICAR) 40 mg, oral, Daily    omeprazole (PRILOSEC) 40 mg, oral, Daily    ondansetron (ZOFRAN) 8 mg, oral, Every 8 hours PRN    oxyCODONE (ROXICODONE) 10 mg, oral, Every 4 hours PRN    secukinumab (Cosentyx Pen) 150 mg/mL self-injector pen Inject 2 pens (300 mg) under the skin every 4 weeks as directed.    secukinumab (Cosentyx Pen) 150 mg/mL self-injector pen Inject 300 mg (2 pens) under the skin once a week for weeks 0, 1, 2, 3 and 4 for loading dose    vitamin B complex-vitamin C-folic acid (Nephrocaps) 1 mg capsule 1 capsule, oral, Daily       Results:  Recent Results (from the past week)   Hemoglobin A1C    Collection Time: 02/11/25 12:05 PM   Result Value Ref Range    Hemoglobin A1C 5.1 See comment %    Estimated Average Glucose 100 Not Established mg/dL   Vitamin D 25-Hydroxy,Total (for eval of Vitamin D levels)    Collection Time: 02/11/25 12:05 PM   Result Value Ref Range    Vitamin D, 25-Hydroxy, Total 38 30 - 100 ng/mL   CBC    Collection Time: 02/11/25 12:05 PM   Result Value Ref Range    WBC 9.4 4.4 - 11.3 x10*3/uL    nRBC 0.0 0.0 - 0.0 /100 WBCs    RBC 3.39 (L) 4.50 - 5.90 x10*6/uL    Hemoglobin 10.1 (L) 13.5 - 17.5 g/dL    Hematocrit 32.0 (L) 41.0 - 52.0 %    MCV 94 80 - 100 fL    MCH 29.8 26.0 - 34.0 pg    MCHC 31.6 (L) 32.0 - 36.0 g/dL    RDW 17.7 (H) 11.5 - 14.5 %    Platelets 240 150 - 450 x10*3/uL   Comprehensive Metabolic Panel    Collection Time: 02/11/25 12:05 PM   Result Value Ref Range    Glucose 135 (H) 74 - 99 mg/dL    Sodium 143 136 - 145 mmol/L    Potassium 4.3 3.5 - 5.3 mmol/L    Chloride 100 98 - 107 mmol/L    " Bicarbonate 27 21 - 32 mmol/L    Anion Gap 20 10 - 20 mmol/L    Urea Nitrogen 50 (H) 6 - 23 mg/dL    Creatinine 12.97 (H) 0.50 - 1.30 mg/dL    eGFR 4 (L) >60 mL/min/1.73m*2    Calcium 7.5 (L) 8.6 - 10.3 mg/dL    Albumin 3.9 3.4 - 5.0 g/dL    Alkaline Phosphatase 178 (H) 33 - 120 U/L    Total Protein 7.2 6.4 - 8.2 g/dL    AST 10 9 - 39 U/L    Bilirubin, Total 0.4 0.0 - 1.2 mg/dL    ALT 7 (L) 10 - 52 U/L   TSH with reflex to Free T4 if abnormal    Collection Time: 02/11/25 12:05 PM   Result Value Ref Range    Thyroid Stimulating Hormone 3.04 0.44 - 3.98 mIU/L          Assessment and Plan  1. ESRD secondary to diabetic nephropathy on dialysis at [facility] on a MWF schedule, via LFA AVF. Last 6 treatments pre and post BP reviewed and average: 158/106 and 155/106 respectively. Average post dialysis wt 85.5 kg , EDW of 85.1 kg acceptable. Chemistries for February good, except for KT/V low. (Dialyzed on 142 min).  No access cannulation problems. Continue current dialysis prescription.    Encourage rx of anxiety (? Low dose SSRI - sertraline) - will discuss with PCP next week.    2. Anemia of CKD. H/H on target; adjust Mircera and iron per unit protocol.     3. Mineral metabolism. Ca on target. Phos and PTH elevated. Encourage phos binder and calcimimetic, low phos diet.. Last 25D level 38 in 2/11. Continue nutritional vitamin D supplements.     RTC in 4 months.

## 2025-02-14 DIAGNOSIS — C64.9 RENAL CELL CARCINOMA, UNSPECIFIED LATERALITY (MULTI): ICD-10-CM

## 2025-02-16 LAB — CANNABINOIDS SERPL-MCNC: 156 NG/ML

## 2025-02-17 ENCOUNTER — OFFICE VISIT (OUTPATIENT)
Dept: HEMATOLOGY/ONCOLOGY | Facility: CLINIC | Age: 40
End: 2025-02-17
Payer: MEDICARE

## 2025-02-17 ENCOUNTER — SPECIALTY PHARMACY (OUTPATIENT)
Dept: PHARMACY | Facility: CLINIC | Age: 40
End: 2025-02-17

## 2025-02-17 ENCOUNTER — PHARMACY VISIT (OUTPATIENT)
Dept: PHARMACY | Facility: CLINIC | Age: 40
End: 2025-02-17
Payer: COMMERCIAL

## 2025-02-17 VITALS
RESPIRATION RATE: 18 BRPM | BODY MASS INDEX: 30.67 KG/M2 | WEIGHT: 190 LBS | HEART RATE: 84 BPM | OXYGEN SATURATION: 97 % | SYSTOLIC BLOOD PRESSURE: 141 MMHG | DIASTOLIC BLOOD PRESSURE: 82 MMHG | TEMPERATURE: 97.7 F

## 2025-02-17 DIAGNOSIS — C64.9 RENAL CELL CARCINOMA, UNSPECIFIED LATERALITY (MULTI): ICD-10-CM

## 2025-02-17 DIAGNOSIS — R94.6 THYROID FUNCTION STUDY ABNORMALITY: ICD-10-CM

## 2025-02-17 PROCEDURE — RXMED WILLOW AMBULATORY MEDICATION CHARGE

## 2025-02-17 PROCEDURE — 99215 OFFICE O/P EST HI 40 MIN: CPT | Performed by: STUDENT IN AN ORGANIZED HEALTH CARE EDUCATION/TRAINING PROGRAM

## 2025-02-17 PROCEDURE — 3044F HG A1C LEVEL LT 7.0%: CPT | Performed by: STUDENT IN AN ORGANIZED HEALTH CARE EDUCATION/TRAINING PROGRAM

## 2025-02-17 PROCEDURE — 3079F DIAST BP 80-89 MM HG: CPT | Performed by: STUDENT IN AN ORGANIZED HEALTH CARE EDUCATION/TRAINING PROGRAM

## 2025-02-17 PROCEDURE — 4010F ACE/ARB THERAPY RXD/TAKEN: CPT | Performed by: STUDENT IN AN ORGANIZED HEALTH CARE EDUCATION/TRAINING PROGRAM

## 2025-02-17 PROCEDURE — 1036F TOBACCO NON-USER: CPT | Performed by: STUDENT IN AN ORGANIZED HEALTH CARE EDUCATION/TRAINING PROGRAM

## 2025-02-17 PROCEDURE — 3077F SYST BP >= 140 MM HG: CPT | Performed by: STUDENT IN AN ORGANIZED HEALTH CARE EDUCATION/TRAINING PROGRAM

## 2025-02-17 ASSESSMENT — ENCOUNTER SYMPTOMS
MYALGIAS: 1
NAUSEA: 1
FATIGUE: 0
APPETITE CHANGE: 0
LIGHT-HEADEDNESS: 0
UNEXPECTED WEIGHT CHANGE: 0
BACK PAIN: 1
ROS SKIN COMMENTS: HS
DIARRHEA: 0
FEVER: 0
EXTREMITY WEAKNESS: 0
VOMITING: 0
COUGH: 0
EYES NEGATIVE: 1
CONSTIPATION: 0
PSYCHIATRIC NEGATIVE: 1
LEG SWELLING: 0
SHORTNESS OF BREATH: 0
CHILLS: 0
HEMATOLOGIC/LYMPHATIC NEGATIVE: 1
WOUND: 1
ENDOCRINE NEGATIVE: 1
NUMBNESS: 0
ARTHRALGIAS: 1

## 2025-02-17 ASSESSMENT — PAIN SCALES - GENERAL: PAINLEVEL_OUTOF10: 5

## 2025-02-17 NOTE — PROGRESS NOTES
Patient ID: Siddhartha Orellana is a 39 y.o. male.  Diagnosis:  Papillary RCC  MedOnc: Dr. Thomson  Nephrologist: Dr. Grover  Vascular Surgeon: Dr. Daigle    Current Therapy: Cabozantinib     ONCOLOGIC HISTORY  11/7/22 - CT-guided biopsy of retroperitoneal lymph node revealed findings consistent with metastatic papillary carcinoma.  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  2/7/23 - On Prednisone 20mg daily  2/17/23 - Scans show SD (RECIST) with some mild tumor shrinkage RPLN  2/21/23 - Increase Prednisone 40mg daily for continued arthralgia; continue Cabo 40 mg daily  3/15/23 - Continue Pred 40mg PO daily, continue Cabo  End March - hidradenitis suppurativa  4/4/23 - Hold Cabo; prednisone 30 mg daily  4/25/23 - Resume cabo  5/18/23 - Hold Cabo 2/2 vascular fistula repair  5/23/23 - Continue hold due to extensive edema, blistering  6/2023 - resumed cabo. Nivo on hold with PDN 25 mg  8/16/23 - continue cabo, reduce PDN to 20 mg daily  8/23/23 - Left upper extremity wound debridement down to subcutaneous tissue and vessels- Cabo on hold  8/31/23 - continue to hold cabo, PDN back at 30mg   9/15/23 - resumed cabozantinib 40 mg   Mid Sep 23 - admission hidradenitis suppurativa  10/10/23 - cabo on hold. Will start PDN 20 mg  10/17/23 - cabo on hold for graft, PDN 20 mg  10/25/23 - cabo on hold for graft, PDN 15 mg daily  11/16/23 - continue cabozantinib hold, PDN 10 mg daily  1/18/24 - mild inguinal/iliac LN growth + stable bone mets in staging scans.   01/2024 - dialysis started  3/7/24 - he got back on cabozantinib 40 mg 2-3 w ago  4/11/24 - scans show SD. Continue cabo 40 alone  4/12/24 - held for AVF infiltration  5/1/24 - No AVF revision required - restart cabo at  40 mg  5/29/24 - Continue cabozantinib  7/2/24 - Continue cabozantinib 40 mg with breaks PRN  9/24/24 - got back on cabo 1 week ago, off for about 1 month  2/11/25 - he was lost to follow up.   2/17/25 - Continue cabozantinib 40 mg with breaks PRN    PMH: Stage V CKD on HD, Type II DM, HTN, HLD, Parathryroidism, Anemia, gastric ulcer, SHIVA, Vit D def     Review of Systems   Constitutional:  Negative for appetite change, chills, fatigue, fever and unexpected weight change.   HENT:  Negative.     Eyes: Negative.    Respiratory:  Negative for cough and shortness of breath.    Cardiovascular:  Negative for chest pain and leg swelling.   Gastrointestinal:  Positive for nausea. Negative for constipation, diarrhea and vomiting.   Endocrine: Negative.    Genitourinary: Negative.     Musculoskeletal:  Positive for arthralgias, back pain, gait problem and myalgias.   Skin:  Positive for wound. Negative for rash.        HS   Neurological:  Positive for gait problem. Negative for extremity weakness, light-headedness and numbness.   Hematological: Negative.    Psychiatric/Behavioral: Negative.       Allergies  No Known Allergies     Medications  Current Outpatient Medications   Medication Instructions    amLODIPine (NORVASC) 10 mg, oral, Daily (0630), Take half  a tablet  daily for 5-7 days. Then if SBP still above 140 afterwards, take one pill daily.    atorvastatin (LIPITOR) 20 mg, oral, Nightly    cabozantinib (Cabometyx) 40 mg tablet TAKE ONE (1) TABLET BY MOUTH ONCE A DAY    calcium acetate (PHOSLO) 667 mg, oral, 3 times daily (morning, midday, late afternoon)    carvedilol (COREG) 25 mg, oral, 2 times daily    fentaNYL (Duragesic) 50 mcg/hr patch 1 patch, transdermal, Every 72 hours    hydrALAZINE (APRESOLINE) 100 mg, oral, 2 times daily    hydrocortisone 2.5 % cream 1 APPLICATION TWICE A DAY TO AFFECTED AREA OF FACE (AVOID EYES) TO DARK SPOTS FOR 2-4 WEEKS    lidocaine-prilocaine (Emla) 2.5-2.5 % cream Apply thick layer 2  "hours prior to dialysis site, cover with plastic wrap.    loratadine (CLARITIN) 10 mg, oral, Daily    menthol-zinc oxide (Calmoseptine) 0.44-20.6 % ointment 1 Application, Topical, As needed    naloxone (NARCAN) 4 mg, nasal, As needed, May repeat every 2-3 minutes if needed, alternating nostrils, until medical assistance becomes available.    non-adherent bandage (Curity Abdominal Pad) 8 X 10 \" bandage 1 Application, topical (top), Daily, To areas of hidradenitis for drainage    olmesartan (BENICAR) 40 mg, oral, Daily    omeprazole (PRILOSEC) 40 mg, oral, Daily    ondansetron (ZOFRAN) 8 mg, oral, Every 8 hours PRN    oxyCODONE (ROXICODONE) 10 mg, oral, Every 4 hours PRN    secukinumab (Cosentyx Pen) 150 mg/mL self-injector pen Inject 2 pens (300 mg) under the skin every 4 weeks as directed.    secukinumab (Cosentyx Pen) 150 mg/mL self-injector pen Inject 300 mg (2 pens) under the skin once a week for weeks 0, 1, 2, 3 and 4 for loading dose    tadalafil (CIALIS) 5 mg, oral, Daily    vitamin B complex-vitamin C-folic acid (Nephrocaps) 1 mg capsule 1 capsule, oral, Daily        OBJECTIVE:    VS / Pain:  There were no vitals taken for this visit.  BSA: There is no height or weight on file to calculate BSA.  Wt Readings from Last 5 Encounters:   02/12/25 84.8 kg (187 lb)   02/11/25 85.4 kg (188 lb 3.2 oz)   11/13/24 84.4 kg (186 lb)   09/24/24 87.3 kg (192 lb 6.4 oz)   08/13/24 82.1 kg (181 lb)     Physical Exam  Constitutional:       General: He is not in acute distress.     Appearance: Normal appearance. He is not toxic-appearing.   HENT:      Head: Normocephalic and atraumatic.      Mouth/Throat:      Mouth: Mucous membranes are moist.      Pharynx: Oropharynx is clear.   Eyes:      Pupils: Pupils are equal, round, and reactive to light.   Pulmonary:      Effort: Pulmonary effort is normal.   Musculoskeletal:         General: Normal range of motion.      Cervical back: Normal range of motion.      Right lower leg: No " edema.      Left lower leg: No edema.   Skin:     General: Skin is warm and dry.      Findings: No rash.   Neurological:      General: No focal deficit present.      Mental Status: He is alert and oriented to person, place, and time.      Motor: No weakness.   Psychiatric:         Mood and Affect: Mood normal.         Behavior: Behavior normal.         Thought Content: Thought content normal.         Judgment: Judgment normal.       Performance Status:  ECOG Score: 1- Restricted in physically strenuous activity.  Carries out light duty.  Karnofsky Score: 80 - Normal activity with effort; some signs or symptoms of disease    Diagnostic Results   No results found. However, due to the size of the patient record, not all encounters were searched. Please check Results Review for a complete set of results.    Scans 06/2024 - stable disease    Scans 9/24/24 -   Renal cancer restaging scan. When compared to the prior examination  dated 06/27/2024:  1. Interval increase in size of the right retroperitoneal lymph node  2. Osseous tumor burden as described above, please refer to nuclear  medicine study for osseous disease evaluation  3. Interval resolution of the ground-glass opacities of the right  lower lobe.  4. Additional stable chronic incidental findings described above.    2/11/25 -   IMPRESSION:  CHEST:  1.  Restaging renal cell carcinoma.  2. No metastatic disease to the lungs.  3. Stable lytic foci with discrete sclerotic borders involving ribs  bilaterally consistent with metastatic disease. There is no  pathologic fracture.      ABDOMEN AND PELVIS:  1.  Restaging renal cell carcinoma.  2. Decrease in size of a retroperitoneal lymph node to the right of  the inferior vena cava.  3. Unchanged 1.2 cm hypodense mass lateral cortex right kidney.  4. Unchanged bony metastatic disease to the pelvis.  5. Unchanged 0.9 cm right adrenal nodule compared to 02/23/2022.  Unchanged adreniform enlargement of the left adrenal  gland compared  to 02/23/2022.      MACRO:  None    Assessment/Plan   40yo AA man with hx of stage IV CKD with metastatic papRCC to LNs now on cabo/nivo. He has CKD and planned for kidney dialysis. Known proteinuria +++ (known). Still with sig joint pains and dealing with hyperglycemias. On cabo (nivo on hold d/t arthritis (PDN) 30 mg PDN BUT pt self discontinued his prednisone and arthralgias are actually better. Pain is under control on opioids and arthralgias might not be fully IO-related.   He was lost to follow up for last 6 months. Still on cabozantinib. Doing well and scans continue to show stable disease / mild shrinkage of Lns (bx proven papRCC). CPM     I saw and evaluated the patient. I personally obtained the key and critical portions of the history and physical exam or was physically present for key and critical portions performed by the resident/fellow. I reviewed the resident/fellow's documentation and discussed the patient with the resident/fellow. I agree with the resident/fellow's medical decision making as documented in the note.   Renan Thomson MD MSc FACP  [facility]   in Medicine [facility] [employer]  Director Clinical  Medical Oncology Research Program   [facility]  Cell [phone]  Office [phone]

## 2025-02-18 ENCOUNTER — PHARMACY VISIT (OUTPATIENT)
Dept: PHARMACY | Facility: CLINIC | Age: 40
End: 2025-02-18
Payer: COMMERCIAL

## 2025-02-18 ENCOUNTER — OFFICE VISIT (OUTPATIENT)
Dept: PRIMARY CARE | Facility: HOSPITAL | Age: 40
End: 2025-02-18
Payer: MEDICAID

## 2025-02-18 VITALS
SYSTOLIC BLOOD PRESSURE: 145 MMHG | HEIGHT: 66 IN | TEMPERATURE: 96 F | DIASTOLIC BLOOD PRESSURE: 90 MMHG | BODY MASS INDEX: 30.53 KG/M2 | WEIGHT: 190 LBS | HEART RATE: 84 BPM | OXYGEN SATURATION: 99 %

## 2025-02-18 DIAGNOSIS — L40.9 PSORIASIS: ICD-10-CM

## 2025-02-18 DIAGNOSIS — Z99.2 ESRD (END STAGE RENAL DISEASE) ON DIALYSIS (MULTI): ICD-10-CM

## 2025-02-18 DIAGNOSIS — F41.9 ANXIETY: Primary | ICD-10-CM

## 2025-02-18 DIAGNOSIS — Z99.2: ICD-10-CM

## 2025-02-18 DIAGNOSIS — N18.6 ESRD (END STAGE RENAL DISEASE) ON DIALYSIS (MULTI): ICD-10-CM

## 2025-02-18 DIAGNOSIS — R25.2 MUSCLE CRAMPS: ICD-10-CM

## 2025-02-18 PROCEDURE — 4010F ACE/ARB THERAPY RXD/TAKEN: CPT

## 2025-02-18 PROCEDURE — 99214 OFFICE O/P EST MOD 30 MIN: CPT

## 2025-02-18 PROCEDURE — 3044F HG A1C LEVEL LT 7.0%: CPT

## 2025-02-18 PROCEDURE — 3077F SYST BP >= 140 MM HG: CPT

## 2025-02-18 PROCEDURE — 3008F BODY MASS INDEX DOCD: CPT

## 2025-02-18 PROCEDURE — 90677 PCV20 VACCINE IM: CPT | Mod: GC

## 2025-02-18 PROCEDURE — 90656 IIV3 VACC NO PRSV 0.5 ML IM: CPT | Mod: GC

## 2025-02-18 PROCEDURE — G2211 COMPLEX E/M VISIT ADD ON: HCPCS

## 2025-02-18 PROCEDURE — 1036F TOBACCO NON-USER: CPT

## 2025-02-18 PROCEDURE — 99214 OFFICE O/P EST MOD 30 MIN: CPT | Mod: GC,25

## 2025-02-18 PROCEDURE — 3080F DIAST BP >= 90 MM HG: CPT

## 2025-02-18 RX ORDER — SERTRALINE HYDROCHLORIDE 50 MG/1
50 TABLET, FILM COATED ORAL DAILY
Qty: 30 TABLET | Refills: 1 | Status: SHIPPED | OUTPATIENT
Start: 2025-02-18 | End: 2025-04-19

## 2025-02-18 RX ORDER — HYDROXYZINE HYDROCHLORIDE 10 MG/1
10 TABLET, FILM COATED ORAL DAILY PRN
Qty: 30 TABLET | Refills: 0 | Status: SHIPPED | OUTPATIENT
Start: 2025-02-18 | End: 2025-03-20

## 2025-02-18 RX ORDER — HYDROCORTISONE 25 MG/G
CREAM TOPICAL AS NEEDED
Qty: 56.7 G | Refills: 3 | Status: SHIPPED | OUTPATIENT
Start: 2025-02-18

## 2025-02-18 RX ORDER — CYCLOBENZAPRINE HCL 5 MG
5 TABLET ORAL NIGHTLY PRN
Qty: 30 TABLET | Refills: 0 | Status: SHIPPED | OUTPATIENT
Start: 2025-02-18 | End: 2025-04-19

## 2025-02-18 RX ORDER — LIDOCAINE AND PRILOCAINE 25; 25 MG/G; MG/G
CREAM TOPICAL
Qty: 30 G | Refills: 11 | Status: SHIPPED | OUTPATIENT
Start: 2025-02-18 | End: 2026-02-18

## 2025-02-18 ASSESSMENT — PATIENT HEALTH QUESTIONNAIRE - PHQ9
2. FEELING DOWN, DEPRESSED OR HOPELESS: NOT AT ALL
1. LITTLE INTEREST OR PLEASURE IN DOING THINGS: NOT AT ALL
SUM OF ALL RESPONSES TO PHQ9 QUESTIONS 1 AND 2: 0

## 2025-02-18 ASSESSMENT — PAIN SCALES - GENERAL: PAINLEVEL_OUTOF10: 6

## 2025-02-18 ASSESSMENT — ENCOUNTER SYMPTOMS
OCCASIONAL FEELINGS OF UNSTEADINESS: 0
LOSS OF SENSATION IN FEET: 0
DEPRESSION: 0

## 2025-02-18 NOTE — PATIENT INSTRUCTIONS
Mr. Orellana,    It was our pleasure taking care of you in the [facility] today! Here are the things we discussed:     For your anxiety, we started a medication called Sertraline, a once a day medication. We also started a medication called Hydroxyzine which you can take as needed on your dialysis days.  2.   Please continue to follow up with your kidney and cancer doctor.  3.   For your muscle cramps, please try stopping a medication called atorvastatin. We also will try a medication called flexeril as needed for your muscle cramps.     Please reach out to the clinic with any questions or concerns at [phone]. We will see you back in clinic in 2 months for a virtual visit.     Davina Pratt MD  Internal Medicine  [facility]

## 2025-02-18 NOTE — PROGRESS NOTES
[facility]    Chief complaint: Follow up, leg pain    HPI:  Siddhartha Orellana is a 39 y.o. 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     Patient was last seen in clinic on 11/23. Patient has been on dialysis for the past year. He reports overall doing well with it, but states that he has significant anxiety related to it. He states the day of his dialysis sessions he dreads going and is very anxious throughout his session. He denies any complications from dialysis that causes this. He endorses 3-4 episodes of hypotension post dialysis. He takes benadryl prior to his sessions to help him calm down. He follows with Dr. Thomson from nephrology.     Patient endorsees chronic leg pain since starting chemo. He follows with pain management, and is currently on a regiment including a fentanyl patch and oxy 10 every 4 hours. He endorses mild leg weakness and muscle cramping.     Medications:    Current Outpatient Medications:     amLODIPine (Norvasc) 10 mg tablet, Take 1 tablet (10 mg) by mouth early in the morning.. Take half  a tablet  daily for 5-7 days. Then if SBP still above 140 afterwards, take one pill daily., Disp: 90 tablet, Rfl: 3    atorvastatin (Lipitor) 20 mg tablet, TAKE 1 TABLET BY MOUTH EVERY NIGHT AT BEDTIME, Disp: 30 tablet, Rfl: 10    cabozantinib (Cabometyx) 40 mg tablet, TAKE ONE (1) TABLET BY MOUTH ONCE A DAY, Disp: 90 tablet, Rfl: 6    calcium acetate (Phoslo) 667 mg capsule, Take 1 capsule (667 mg) by mouth 3 times daily (morning, midday, late afternoon)., Disp: 270 capsule, Rfl: 3    carvedilol (Coreg) 25 mg tablet, Take 1 tablet (25 mg) by mouth 2 times a day., Disp: 60 tablet, Rfl: 11    fentaNYL (Duragesic) 50 mcg/hr patch, Place 1 patch over 72 hours on the skin every 3rd day., Disp: 10 patch, Rfl: 0    hydrALAZINE (Apresoline) 100 mg tablet, Take 1 tablet (100 mg) by mouth 2 times a  "day., Disp: 180 tablet, Rfl: 3    hydrocortisone 2.5 % cream, 1 APPLICATION TWICE A DAY TO AFFECTED AREA OF FACE (AVOID EYES) TO DARK SPOTS FOR 2-4 WEEKS (Patient not taking: Reported on 2/17/2025), Disp: 56.7 g, Rfl: 3    lidocaine-prilocaine (Emla) 2.5-2.5 % cream, Apply thick layer 2 hours prior to dialysis site, cover with plastic wrap. (Patient not taking: Reported on 2/17/2025), Disp: 30 g, Rfl: 11    loratadine (Claritin) 10 mg tablet, Take 1 tablet (10 mg) by mouth once daily., Disp: 30 tablet, Rfl: 11    menthol-zinc oxide (Calmoseptine) 0.44-20.6 % ointment, Apply 1 Application topically if needed for irritation., Disp: 113 g, Rfl: 11    naloxone (Narcan) 4 mg/0.1 mL nasal spray, Administer 1 spray (4 mg) into affected nostril(s) if needed for opioid reversal. May repeat every 2-3 minutes if needed, alternating nostrils, until medical assistance becomes available., Disp: 2 each, Rfl: 0    non-adherent bandage (Curity Abdominal Pad) 8 X 10 \" bandage, Apply 1 Application topically once daily. To areas of hidradenitis for drainage, Disp: 18 each, Rfl: 11    olmesartan (BENIcar) 40 mg tablet, Take 1 tablet (40 mg) by mouth once daily., Disp: 30 tablet, Rfl: 11    omeprazole (PriLOSEC) 40 mg DR capsule, TAKE 1 CAPSULE BY MOUTH DAILY, Disp: 30 capsule, Rfl: 10    ondansetron (Zofran) 8 mg tablet, Take 1 tablet (8 mg) by mouth every 8 hours if needed for nausea., Disp: 60 tablet, Rfl: 11    oxyCODONE (Roxicodone) 10 mg immediate release tablet, Take 1 tablet (10 mg) by mouth every 4 hours if needed for severe pain (7 - 10)., Disp: 180 tablet, Rfl: 0    secukinumab (Cosentyx Pen) 150 mg/mL self-injector pen, Inject 2 pens (300 mg) under the skin every 4 weeks as directed., Disp: 2 mL, Rfl: 5    secukinumab (Cosentyx Pen) 150 mg/mL self-injector pen, Inject 300 mg (2 pens) under the skin once a week for weeks 0, 1, 2, 3 and 4 for loading dose, Disp: 10 mL, Rfl: 0    tadalafil (Cialis) 5 mg tablet, Take 1 tablet (5 " mg) by mouth once daily., Disp: 30 tablet, Rfl: 0    vitamin B complex-vitamin C-folic acid (Nephrocaps) 1 mg capsule, Take 1 capsule by mouth once daily., Disp: 90 capsule, Rfl: 3    Allergies:  Not on File    Review of systems:  Constitutional: negative for fevers, chills, weight loss, weight gain, change in appetite, fatigue, weakness.  HEENT: negative for headache, changes in vision or hearing, congestion, sore throat.  Respiratory: negative for SOB, cough, hemoptysis, wheezing  Cardiovascular: negative for chest pain, palpitations, orthopnea, PND  GI: negative for dysphagia, abdominal pain, nausea, vomiting, diarrhea, constipation, melena, hematochezia, BRBPR  : negative for frequency, urgency, dysuria, hematuria, incontinence  MSK: negative for myalgia, arthralgia, decreased joint ROM, LE swelling  Skin: negative for rash, wounds  Heme/lymph: negative for easy bruising, bleeding, epistaxis  Neuro: negative for LOC, numbness, tingling, tremor, vertigo, dizziness    Vitals:  There were no vitals filed for this visit.    Physical exam:  Constitutional: Well-developed male in no acute distress.  HEENT: Normocephalic, atraumatic. PERRL. EOMI. No cervical lymphadenopathy.  Respiratory: CTA bilaterally. No wheezes, rales, or rhonchi. Normal respiratory effort.  Cardiovascular: RRR. No murmurs, gallops, or rubs. No JVD. Radial pulses 2+.  Abdominal: Soft, nondistended, nontender to palpation. Bowel sounds present. No hepatosplenomegaly or masses. No CVA tenderness.  Neuro: CN II-XII intact. UE and LE strength 5/5 bilaterally and sensation intact. Normal FTN testing.  MSK: No LE edema bilaterally.  Skin: Warm, dry. No rashes or wounds.  Psych: Appropriate mood and affect.    Labs:  No results found. However, due to the size of the patient record, not all encounters were searched. Please check Results Review for a complete set of results.    Imaging:  CT chest abdomen pelvis wo IV contrast    Result Date:  2/17/2025  Interpreted By:  Sherice Root, STUDY: CT CHEST ABDOMEN PELVIS WO CONTRAST;  2/13/2025 3:15 pm   INDICATION: Signs/Symptoms:staging RCC.   COMPARISON: 09/2024   ACCESSION NUMBER(S): [id number]   ORDERING CLINICIAN: MARYLOU JUAREZ   TECHNIQUE: CT of the chest, abdomen, and pelvis was performed. Contiguous axial images were obtained at 3 mm slice thickness through the chest, abdomen and pelvis. Coronal and sagittal reconstructions at 3 mm slice thickness were performed. No oral, no intravenous contrast.   FINDINGS: Please note that the study is limited without intravenous contrast.   CHEST:   LUNG/PLEURA/LARGE AIRWAYS: There is no infiltrate or pleural fluid. There is mild left basilar scarring. There is no pulmonary nodule.   VESSELS: There is mild atherosclerotic calcification of the aortic arch.   HEART: The heart is unremarkable.   MEDIASTINUM AND JERARDO: There is no hilar or mediastinal adenopathy.   CHEST WALL AND LOWER NECK: There are multiple lytic foci with discrete sclerotic borders involving the ribs, unchanged compared to 09/2024. There is no pathologic fracture. Findings are consistent with stable bony metastatic disease. There is no abnormality of the lower neck.   ABDOMEN:   LIVER: There is no hepatic mass.   BILE DUCTS: There is no intrahepatic, common hepatic or common bile ductal dilatation.   GALLBLADDER: The gallbladder is unremarkable.   PANCREAS: There is no abnormality of the pancreas.   SPLEEN: The spleen is unremarkable. There is no splenic mass. There is no splenomegaly   ADRENAL GLANDS: There is unchanged thickening of the left adrenal gland which maintains its adreniform shape which could represent hyperplasia. This is unchanged compared to oldest chest CT dated 02/23/2022. There is an unchanged 0.9 cm hypodense mass involving the right adrenal gland, unchanged compared to 02/23/2022.   KIDNEYS AND URETERS:. There is an unchanged 1.2 cm hypodense mass involving the lateral cortex  of the right kidney. There is no intrarenal calculus or hydronephrosis.   PELVIS:   BLADDER: The bladder is unremarkable.   REPRODUCTIVE ORGANS: There is no abnormality of the reproductive organs.   BOWEL: There is no bowel wall thickening, dilatation or obstruction. There is a moderate amount of stool throughout the colon. The appendix is normal.   VESSELS: The abdominal and pelvic vessels are unremarkable.   PERITONEUM/RETROPERITONEUM/LYMPH NODES: There is a 1.8 x 1.0 cm retroperitoneal lymph node to the right of the inferior vena cava. This was biopsy proven lymphoid tissue involved by papillary adenocarcinoma. This was biopsied on 11/03/2022. On 09/2024, this measured 2.2 x 1.3 cm. On oldest CT abdomen dated 02/23/2022, this measured 3.0 x 1.7 cm. There is no ascites.   ABDOMINAL WALL: The abdominal wall is unremarkable.   BONES AND SOFT TISSUES: There are mixed sclerotic lytic foci involving the iliac bones consistent with metastatic disease, stable. The largest focus involves the the right iliac bone measuring 2.7 x 0.9 cm. There is no cortical breakthrough or soft tissue mass.   There is no soft tissue abnormality.       CHEST: 1.  Restaging renal cell carcinoma. 2. No metastatic disease to the lungs. 3. Stable lytic foci with discrete sclerotic borders involving ribs bilaterally consistent with metastatic disease. There is no pathologic fracture.   ABDOMEN AND PELVIS: 1.  Restaging renal cell carcinoma. 2. Decrease in size of a retroperitoneal lymph node to the right of the inferior vena cava. 3. Unchanged 1.2 cm hypodense mass lateral cortex right kidney. 4. Unchanged bony metastatic disease to the pelvis. 5. Unchanged 0.9 cm right adrenal nodule compared to 02/23/2022. Unchanged adreniform enlargement of the left adrenal gland compared to 02/23/2022.   MACRO: None   Signed by: Sherice Root 2/17/2025 8:48 AM Dictation workstation:   [id number]      Assessment and plan:  Siddhartha Orellana is a 39 y.o. male  with PMH of renal cell carcinoma, Stage V CKD not on dialysis, DM2, HTN, HLD, parathyroidism, anemia, SHIVA who presents to the clinic for follow up visit.     Updates:   -Will trial off atorvastatin for muscle cramps. Trial of flexeril 5 mg sent.    -Started sertraline 50 mg for anxiety, PRN hydroxyzine for dialysis days     #Anxiety  -Started sertraline 50 mg daily    -PRN hydroxyzine 10 for dialysis days  - Pts counseled on side effects     # Metastatic renal papillary carcinoma   #CKD IV on dialysis   -Follows with Dr. Thomson from oncology, diagnosed in 2021   - Follows with Dr. Grover from nephrology, on MWF dialysis   -Pt currently on cabozantinib (nivo on hold d/t arthritis)  - Self discontinued prednisone due to arthralgias   - Most recent staging scan CT AP on 2/25 stable      # HTN  - BP in office 145/90   - Home BP readings at 120-130s systolics   - Continue Amlodipine 10 mg, Coreg 25 mg, hydralazine 100 mg BID, olmesartan 40 mg      #Anemia  - Likely related to gastric ulcer vs CKD  - Pt not started on EPO before  - Will obtain iron studies, TSH, B12, folate,   - Continue to follow with nephrology      # Chemo related arthralgia   - Continue oxycodone,fentanyl as needed  - Continue lidocaine patches  - Continue PPI  - Continue to follow with pain management   - Trial off atorvastatin  - Flexeril 5 mg PRN      # Health maintenance  Will obtain HgA1c, lipid, CBC, CMP  Infectious: Hep C, HIV negative   Vaccinations: Tdap done within last 10 years, Covid 2021, Prevnar-20 and Flu injection given today      Follow-up in 2 months virtual visit.     Patient and plan discussed with attending physician, Dr. Lanier.    Davina Pratt MD  PGY-2 Internal Medicine  [facility]

## 2025-02-20 ENCOUNTER — PATIENT MESSAGE (OUTPATIENT)
Dept: PALLIATIVE MEDICINE | Facility: CLINIC | Age: 40
End: 2025-02-20

## 2025-02-20 ENCOUNTER — APPOINTMENT (OUTPATIENT)
Dept: DERMATOLOGY | Facility: CLINIC | Age: 40
End: 2025-02-20
Payer: MEDICARE

## 2025-02-20 DIAGNOSIS — G89.3 CANCER ASSOCIATED PAIN: ICD-10-CM

## 2025-02-20 DIAGNOSIS — L73.2 HIDRADENITIS SUPPURATIVA: Primary | ICD-10-CM

## 2025-02-20 DIAGNOSIS — T45.1X5D ADVERSE EFFECT OF ANTINEOPLASTIC AND IMMUNOSUPPRESSIVE DRUGS, SUBSEQUENT ENCOUNTER: ICD-10-CM

## 2025-02-20 LAB
6MAM SERPL-MCNC: <2 NG/ML
CODEINE SERPL-MCNC: <2 NG/ML
HYDROCODONE SERPL-MCNC: <2 NG/ML
HYDROMORPHONE SERPL-MCNC: <2 NG/ML
MORPHINE SERPL-MCNC: <2 NG/ML
OXYCODONE SERPL-MCNC: 27 NG/ML
OXYMORPHONE SERPL-MCNC: <2 NG/ML

## 2025-02-20 PROCEDURE — 99214 OFFICE O/P EST MOD 30 MIN: CPT | Performed by: DERMATOLOGY

## 2025-02-20 PROCEDURE — 3044F HG A1C LEVEL LT 7.0%: CPT | Performed by: DERMATOLOGY

## 2025-02-20 PROCEDURE — G2211 COMPLEX E/M VISIT ADD ON: HCPCS | Performed by: DERMATOLOGY

## 2025-02-20 PROCEDURE — 4010F ACE/ARB THERAPY RXD/TAKEN: CPT | Performed by: DERMATOLOGY

## 2025-02-20 RX ORDER — OXYCODONE HYDROCHLORIDE 10 MG/1
10 TABLET ORAL EVERY 4 HOURS PRN
Qty: 180 TABLET | Refills: 0 | Status: SHIPPED | OUTPATIENT
Start: 2025-02-20 | End: 2025-03-22

## 2025-02-20 RX ORDER — FENTANYL 50 UG/1
1 PATCH TRANSDERMAL
Qty: 10 PATCH | Refills: 0 | Status: SHIPPED | OUTPATIENT
Start: 2025-02-20 | End: 2025-03-22

## 2025-02-20 RX ORDER — MENTHOL AND ZINC OXIDE .44; 20.625 G/100G; G/100G
1 OINTMENT TOPICAL AS NEEDED
Qty: 113 G | Refills: 11 | Status: SHIPPED | OUTPATIENT
Start: 2025-02-20

## 2025-02-20 RX ORDER — BENZOYL PEROXIDE 50 MG/ML
LIQUID TOPICAL EVERY MORNING
Qty: 142 G | Refills: 3 | Status: SHIPPED | OUTPATIENT
Start: 2025-02-20 | End: 2026-02-20

## 2025-02-20 ASSESSMENT — DERMATOLOGY PATIENT ASSESSMENT
HAVE YOU HAD OR DO YOU HAVE A STAPH INFECTION: NO
DO YOU HAVE ANY NEW OR CHANGING LESIONS: NO
DO YOU USE A TANNING BED: NO
HAVE YOU HAD OR DO YOU HAVE VASCULAR DISEASE: NO
ARE YOU AN ORGAN TRANSPLANT RECIPIENT: NO

## 2025-02-20 ASSESSMENT — DERMATOLOGY QUALITY OF LIFE (QOL) ASSESSMENT
RATE HOW EMOTIONALLY BOTHERED YOU ARE BY YOUR SKIN PROBLEM (FOR EXAMPLE, WORRY, EMBARRASSMENT, FRUSTRATION): 0 - NEVER BOTHERED
WHAT SINGLE SKIN CONDITION LISTED BELOW IS THE PATIENT ANSWERING THE QUALITY-OF-LIFE ASSESSMENT QUESTIONS ABOUT: HIDRADENITIS SUPPURATIVA
RATE HOW BOTHERED YOU ARE BY EFFECTS OF YOUR SKIN PROBLEMS ON YOUR ACTIVITIES (EG, GOING OUT, ACCOMPLISHING WHAT YOU WANT, WORK ACTIVITIES OR YOUR RELATIONSHIPS WITH OTHERS): 0 - NEVER BOTHERED
ARE THERE EXCLUSIONS OR EXCEPTIONS FOR THE QUALITY OF LIFE ASSESSMENT: NO
RATE HOW BOTHERED YOU ARE BY SYMPTOMS OF YOUR SKIN PROBLEM (EG, ITCHING, STINGING BURNING, HURTING OR SKIN IRRITATION): 0 - NEVER BOTHERED
DATE THE QUALITY-OF-LIFE ASSESSMENT WAS COMPLETED: 67256

## 2025-02-20 ASSESSMENT — PATIENT GLOBAL ASSESSMENT (PGA): PATIENT GLOBAL ASSESSMENT: PATIENT GLOBAL ASSESSMENT:  1 - CLEAR

## 2025-02-20 ASSESSMENT — ITCH NUMERIC RATING SCALE: HOW SEVERE IS YOUR ITCHING?: 7

## 2025-02-20 NOTE — PROGRESS NOTES
Subjective     Siddhartha Orellana is a 39 y.o. male who presents for the following: Hidradenitis Suppurativa (Gluteal crease, bilateral thighs Tx: Cosentyx (patient states good response) and hydrocortisone; hx of renal cell carcinoma). Last derm visit 8/20/24 for hidradenitis suppurativa    Patient has papillary renal cell carcinoma, previously on Nivolumab and Cabozantinib. Nivolumab has been on hold since 1/2023. Patient is actively receiving treatment with Cabozantinib, most recently restarted 5/1/2024.   He is currently also receiving hemodialysis.    Cosentyx was started 10/7/24. He finds it works better than anything else we have tried but he starts to feel symptoms recur after 2.5 weeks    Review of Systems:  No other skin or systemic complaints other than what is documented elsewhere in the note.    The following portions of the chart were reviewed this encounter and updated as appropriate:   Tobacco  Allergies  Meds  Problems  Med Hx  Surg Hx         Skin Cancer History  No skin cancer on file.      Specialty Problems          Dermatology Problems    Hidradenitis suppurativa    Dehiscence of wound    Intertrigo    Rash and other nonspecific skin eruption    Psoriasis     Rash started Sept - Oct 2023. Biopsied 10/30/23 demonstrated parakeratosis and orthokeratosis with neutrophils most consistent with psoriasis based on the clinical presentation. The path could also fit with pityriasis rubra pilaris; this could be reconsidered if he is worsening or not responding    ?Triggered by cabozantinib. Could also consider hypertension medication such as beta-blocker             Objective   Well appearing patient in no apparent distress; mood and affect are within normal limits.    A focused skin examination was performed. All findings within normal limits unless otherwise noted below.    Assessment/Plan   1. Hidradenitis suppurativa  Gluteal Crease, Left Medial Thigh, Left Thigh - Posterior, Right Medial Thigh,  "Right Thigh - Posterior  Involving the medial thighs, intergluteal cleft are old open comedones. There are still two areas of pink erythematous plaques with sinus tracts, but they are no longer confluent, each only 3 cm.     Hidradenitis Suppurativia - previously Allen Stage III  - worsened on cabozantinib for renal cell carcinoma  - partial improvement with therapy - still with draining sinuses and he feels it worsening about 2.5 weeks after injection    - prior treatments with doxycycline PO, clindamycin topically, benzoyl peroxide wash topically, bactrim PO, ILK  - relief with bactrim but unable to tolerate (blood pressure at dialysis, nausea)  - relief with ILK but short-lived    - Secukinumab was started 10/7/24. This was chosen based on reports of IL-17 inhibitors and their utility in treatment of neutrophilic dermatoses (pyoderma gangrenosum) induced by Cabozitinib. Secukinumab was chosen instead of ixekizumab due to FDA approval for hidradenitis suppurativa.    - Successful treatment of cabozantinib?induced pyoderma gangrenosum with ixekizumab therapy: A case report Kevin Neumann, MARCO Hanson, Published in Dermatologic Therapy 15 July 2022      Today  - due to partial response after >3 months on medication, we will re-submit to insurance and see if we can get coverage for q2 week dosing.  - drug formulary states: consider an increase to 300 mg every 2 weeks in patients who have an inadequate response    Related Procedures  Follow Up In Dermatology - Established Patient    Related Medications  non-adherent bandage (Curity Abdominal Pad) 8 X 10 \" bandage  Apply 1 Application topically once daily. To areas of hidradenitis for drainage    secukinumab (Cosentyx Pen) 150 mg/mL self-injector pen  Inject 2 pens (300 mg) under the skin every 4 weeks as directed.    menthol-zinc oxide (Calmoseptine) 0.44-20.6 % ointment  Apply 1 Application topically if needed for irritation.    benzoyl peroxide 5 % " external wash  Apply topically once daily in the morning. May bleach fabrics, use an old or white towel    secukinumab (Cosentyx Pen) 150 mg/mL self-injector pen  Inject 300 mg subcutaneously every 2 weeks    2. Adverse effect of antineoplastic and immunosuppressive drugs, subsequent encounter  His trunk as cleared from prior psoriasis which had also flared with cabozantinib treatment                  Follow up 3 months hidradenitis suppurativa

## 2025-02-21 RX ORDER — SECUKINUMAB 150 MG/ML
INJECTION SUBCUTANEOUS
Qty: 4 ML | Refills: 5 | Status: SHIPPED | OUTPATIENT
Start: 2025-02-21

## 2025-02-21 NOTE — PROGRESS NOTES
I reviewed the resident/fellow's documentation and discussed the patient with the resident/fellow. I agree with the resident/fellow's medical decision making as documented in the note.        NAME: Wallace  HPI:   39 year old male   PMHx: HTN, renal cell carcinoma metastatic, ESRD, steroid induced diabetes,   Meds: amlodipine, atorvastatin, phoslo, coreg, hydralazine, Olmesartan, omeprazole, Zofran, cosentyx, fentanyl, oxycodone, benadryl  Allergies:   Fam Hx:  SOCIAL HX:   OBJECTIVE: 145/90, creatinine = 7.9, alk phos elevated  RECOMMEND:  To start sertraline and hydroxyzine  ESRD and renal CA managed by nephrology and oncology  Trial off atorvastatin for muscle cramps, trial flexaril and reviewed some other non pharmacologic approaches.    Appreciate workup of anemia to see if ways to improve his functional status  Rest of screening and prevention addressed in Dr. Pratt excellent note.    Ramya Lanier MD

## 2025-02-27 ENCOUNTER — SPECIALTY PHARMACY (OUTPATIENT)
Dept: PHARMACY | Facility: CLINIC | Age: 40
End: 2025-02-27

## 2025-02-27 PROCEDURE — RXMED WILLOW AMBULATORY MEDICATION CHARGE

## 2025-03-03 ENCOUNTER — PHARMACY VISIT (OUTPATIENT)
Dept: PHARMACY | Facility: CLINIC | Age: 40
End: 2025-03-03
Payer: COMMERCIAL

## 2025-03-07 DIAGNOSIS — R11.0 NAUSEA: Primary | ICD-10-CM

## 2025-03-07 RX ORDER — PROCHLORPERAZINE MALEATE 10 MG
10 TABLET ORAL EVERY 8 HOURS PRN
Qty: 30 TABLET | Refills: 2 | Status: SHIPPED | OUTPATIENT
Start: 2025-03-07 | End: 2025-04-06

## 2025-03-07 NOTE — TELEPHONE ENCOUNTER
Compazine last written 1/17/25 10 day supply with 2 refills. Per last visit with Audrey Jones 1/22/25, patient to continue medication. Next visit is being scheduled. Pended to provider, sending to [facility]. Patient aware

## 2025-03-21 ENCOUNTER — PATIENT MESSAGE (OUTPATIENT)
Dept: PALLIATIVE MEDICINE | Facility: CLINIC | Age: 40
End: 2025-03-21
Payer: COMMERCIAL

## 2025-03-21 DIAGNOSIS — G89.3 CANCER ASSOCIATED PAIN: ICD-10-CM

## 2025-03-24 RX ORDER — OXYCODONE HYDROCHLORIDE 10 MG/1
10 TABLET ORAL EVERY 4 HOURS PRN
Qty: 180 TABLET | Refills: 0 | Status: SHIPPED | OUTPATIENT
Start: 2025-03-24 | End: 2025-04-23

## 2025-03-24 RX ORDER — FENTANYL 50 UG/1
1 PATCH TRANSDERMAL
Qty: 10 PATCH | Refills: 0 | Status: SHIPPED | OUTPATIENT
Start: 2025-03-24 | End: 2025-04-23

## 2025-03-28 ENCOUNTER — LAB (OUTPATIENT)
Dept: LAB | Facility: CLINIC | Age: 40
End: 2025-03-28
Payer: COMMERCIAL

## 2025-03-28 ENCOUNTER — TELEPHONE (OUTPATIENT)
Dept: HEMATOLOGY/ONCOLOGY | Facility: HOSPITAL | Age: 40
End: 2025-03-28
Payer: COMMERCIAL

## 2025-03-28 DIAGNOSIS — C64.9 RENAL CELL CARCINOMA, UNSPECIFIED LATERALITY: ICD-10-CM

## 2025-03-28 DIAGNOSIS — R94.6 THYROID FUNCTION STUDY ABNORMALITY: ICD-10-CM

## 2025-03-28 LAB
ALBUMIN SERPL BCP-MCNC: 4.5 G/DL (ref 3.4–5)
ALP SERPL-CCNC: 147 U/L (ref 33–120)
ALT SERPL W P-5'-P-CCNC: 8 U/L (ref 10–52)
ANION GAP SERPL CALC-SCNC: 28 MMOL/L (ref 10–20)
AST SERPL W P-5'-P-CCNC: 9 U/L (ref 9–39)
BILIRUB SERPL-MCNC: 0.3 MG/DL (ref 0–1.2)
BUN SERPL-MCNC: 139 MG/DL (ref 6–23)
CALCIUM SERPL-MCNC: 7.4 MG/DL (ref 8.6–10.6)
CHLORIDE SERPL-SCNC: 98 MMOL/L (ref 98–107)
CO2 SERPL-SCNC: 18 MMOL/L (ref 21–32)
CREAT SERPL-MCNC: >25 MG/DL (ref 0.5–1.3)
EGFRCR SERPLBLD CKD-EPI 2021: ABNORMAL ML/MIN/{1.73_M2}
ERYTHROCYTE [DISTWIDTH] IN BLOOD BY AUTOMATED COUNT: 15.6 % (ref 11.5–14.5)
GLUCOSE SERPL-MCNC: 104 MG/DL (ref 74–99)
HCT VFR BLD AUTO: 29.9 % (ref 41–52)
HGB BLD-MCNC: 9.6 G/DL (ref 13.5–17.5)
MCH RBC QN AUTO: 28.6 PG (ref 26–34)
MCHC RBC AUTO-ENTMCNC: 32.1 G/DL (ref 32–36)
MCV RBC AUTO: 89 FL (ref 80–100)
NRBC BLD-RTO: ABNORMAL /100{WBCS}
PLATELET # BLD AUTO: 276 X10*3/UL (ref 150–450)
POTASSIUM SERPL-SCNC: 4.1 MMOL/L (ref 3.5–5.3)
PROT SERPL-MCNC: 7 G/DL (ref 6.4–8.2)
RBC # BLD AUTO: 3.36 X10*6/UL (ref 4.5–5.9)
SODIUM SERPL-SCNC: 140 MMOL/L (ref 136–145)
TSH SERPL-ACNC: 2.36 MIU/L (ref 0.44–3.98)
WBC # BLD AUTO: 11.2 X10*3/UL (ref 4.4–11.3)

## 2025-03-28 PROCEDURE — 84443 ASSAY THYROID STIM HORMONE: CPT

## 2025-03-28 PROCEDURE — 36415 COLL VENOUS BLD VENIPUNCTURE: CPT

## 2025-03-28 PROCEDURE — 80053 COMPREHEN METABOLIC PANEL: CPT

## 2025-03-28 PROCEDURE — 85027 COMPLETE CBC AUTOMATED: CPT

## 2025-03-29 NOTE — TELEPHONE ENCOUNTER
Received critical value from the lab for  and Cr >25. K 4.1. Bicarb 18. I called the patient. He reports last dialysis was ~2 weeks ago. He states he was having symptoms when being hooked up to dialysis machine and thus had not had dialysis since then. He reports he went for labs today as instructed by his kidney team. He states he is feeling well. Mild fatigue but no other symptoms. No n/v/d, confusion, tremors, shortness of breath, or chest pain. He reports good appetite and PO intake. He reports good urine output. Will alert his renal team. Patient instructed to monitor for any new symptoms and present to the ED if any new concerns.    Addendum 10:42 PM Discussed with his nephrologist, Dr. Grover. She is recommending he present to the ED for admission for supervised dialysis. I called the patient to relay this and he was understanding and agreeable.

## 2025-03-31 ENCOUNTER — OFFICE VISIT (OUTPATIENT)
Dept: HEMATOLOGY/ONCOLOGY | Facility: CLINIC | Age: 40
End: 2025-03-31
Payer: COMMERCIAL

## 2025-03-31 ENCOUNTER — APPOINTMENT (OUTPATIENT)
Dept: HEMATOLOGY/ONCOLOGY | Facility: CLINIC | Age: 40
End: 2025-03-31
Payer: COMMERCIAL

## 2025-03-31 VITALS
TEMPERATURE: 95.4 F | HEART RATE: 79 BPM | BODY MASS INDEX: 29.18 KG/M2 | SYSTOLIC BLOOD PRESSURE: 129 MMHG | DIASTOLIC BLOOD PRESSURE: 81 MMHG | RESPIRATION RATE: 18 BRPM | WEIGHT: 180.78 LBS | OXYGEN SATURATION: 100 %

## 2025-03-31 DIAGNOSIS — C64.9 RENAL CELL CARCINOMA, UNSPECIFIED LATERALITY: ICD-10-CM

## 2025-03-31 PROCEDURE — 99214 OFFICE O/P EST MOD 30 MIN: CPT

## 2025-03-31 PROCEDURE — 3044F HG A1C LEVEL LT 7.0%: CPT

## 2025-03-31 PROCEDURE — 1036F TOBACCO NON-USER: CPT

## 2025-03-31 PROCEDURE — 4010F ACE/ARB THERAPY RXD/TAKEN: CPT

## 2025-03-31 PROCEDURE — 3074F SYST BP LT 130 MM HG: CPT

## 2025-03-31 PROCEDURE — 3079F DIAST BP 80-89 MM HG: CPT

## 2025-03-31 ASSESSMENT — ENCOUNTER SYMPTOMS
PSYCHIATRIC NEGATIVE: 1
EYES NEGATIVE: 1
HEMATOLOGIC/LYMPHATIC NEGATIVE: 1
APPETITE CHANGE: 0
WOUND: 0
LIGHT-HEADEDNESS: 0
CONSTIPATION: 0
ROS SKIN COMMENTS: HS
SHORTNESS OF BREATH: 0
CHILLS: 0
LEG SWELLING: 0
UNEXPECTED WEIGHT CHANGE: 0
OCCASIONAL FEELINGS OF UNSTEADINESS: 0
FATIGUE: 0
DIARRHEA: 0
FEVER: 0
MYALGIAS: 1
NUMBNESS: 0
EXTREMITY WEAKNESS: 0
VOMITING: 0
LOSS OF SENSATION IN FEET: 0
ARTHRALGIAS: 1
COUGH: 0
DEPRESSION: 1
NAUSEA: 1
BACK PAIN: 1
ENDOCRINE NEGATIVE: 1

## 2025-03-31 ASSESSMENT — PAIN SCALES - GENERAL: PAINLEVEL_OUTOF10: 5

## 2025-03-31 NOTE — PROGRESS NOTES
Patient ID: Siddhartha Orellana is a 40 y.o. male.  Diagnosis:  Papillary RCC  MedOnc: Dr. Thomson  Nephrologist: Dr. Grover  Vascular Surgeon: Dr. Daigle    Current Therapy: Cabozantinib     ONCOLOGIC HISTORY  11/7/22 - CT-guided biopsy of retroperitoneal lymph node revealed findings consistent with metastatic papillary carcinoma.  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  2/7/23 - On Prednisone 20mg daily  2/17/23 - Scans show SD (RECIST) with some mild tumor shrinkage RPLN  2/21/23 - Increase Prednisone 40mg daily for continued arthralgia; continue Cabo 40 mg daily  3/15/23 - Continue Pred 40mg PO daily, continue Cabo  End March - hidradenitis suppurativa  4/4/23 - Hold Cabo; prednisone 30 mg daily  4/25/23 - Resume cabo  5/18/23 - Hold Cabo 2/2 vascular fistula repair  5/23/23 - Continue hold due to extensive edema, blistering  6/2023 - resumed cabo. Nivo on hold with PDN 25 mg  8/16/23 - continue cabo, reduce PDN to 20 mg daily  8/23/23 - Left upper extremity wound debridement down to subcutaneous tissue and vessels- Cabo on hold  8/31/23 - continue to hold cabo, PDN back at 30mg   9/15/23 - resumed cabozantinib 40 mg   Mid Sep 23 - admission hidradenitis suppurativa  10/10/23 - cabo on hold. Will start PDN 20 mg  10/17/23 - cabo on hold for graft, PDN 20 mg  10/25/23 - cabo on hold for graft, PDN 15 mg daily  11/16/23 - continue cabozantinib hold, PDN 10 mg daily  1/18/24 - mild inguinal/iliac LN growth + stable bone mets in staging scans.   01/2024 - dialysis started  3/7/24 - he got back on cabozantinib 40 mg 2-3 w ago  4/11/24 - scans show SD. Continue cabo 40 alone  4/12/24 - held for AVF infiltration  5/1/24 - No AVF revision required - restart cabo at  40 mg  5/29/24 - Continue cabozantinib  7/2/24 - Continue cabozantinib 40 mg with breaks PRN  9/24/24 - got back on cabo 1 week ago, off for about 1 month  2/11/25 - he was lost to follow up.   2/17/25 - Continue cabozantinib 40 mg with breaks PRN  3/31/25 - holding cabo for one week until after HD       PMH: Stage V CKD on HD, Type II DM, HTN, HLD, Parathryroidism, Anemia, gastric ulcer, SHIVA, Vit D def     Review of Systems   Constitutional:  Negative for appetite change, chills, fatigue, fever and unexpected weight change.   HENT:  Negative.     Eyes: Negative.    Respiratory:  Negative for cough and shortness of breath.    Cardiovascular:  Negative for chest pain and leg swelling.   Gastrointestinal:  Positive for nausea. Negative for constipation, diarrhea and vomiting.   Endocrine: Negative.    Genitourinary: Negative.     Musculoskeletal:  Positive for arthralgias, back pain, gait problem and myalgias.   Skin:  Negative for rash and wound.        HS   Neurological:  Positive for gait problem. Negative for extremity weakness, light-headedness and numbness.   Hematological: Negative.    Psychiatric/Behavioral: Negative.       Allergies  No Known Allergies     Medications  Current Outpatient Medications   Medication Instructions    amLODIPine (NORVASC) 10 mg, oral, Daily (0630), Take half  a tablet  daily for 5-7 days. Then if SBP still above 140 afterwards, take one pill daily.    atorvastatin (LIPITOR) 20 mg, oral, Nightly    benzoyl peroxide 5 % external wash Topical, Every morning, May bleach fabrics, use an old or white towel    cabozantinib (Cabometyx) 40 mg tablet TAKE ONE (1) TABLET BY MOUTH ONCE A DAY    calcium acetate (PHOSLO) 667 mg, oral, 3 times daily (morning, midday, late afternoon)    carvedilol (COREG) 25 mg, oral, 2 times daily    cyclobenzaprine (FLEXERIL) 5 mg, oral, Nightly PRN    fentaNYL (Duragesic) 50 mcg/hr patch 1 patch, transdermal, Every 72 hours    hydrALAZINE (APRESOLINE) 100 mg,  "oral, 2 times daily    hydrocortisone 2.5 % cream Topical, As needed    hydrOXYzine HCL (ATARAX) 10 mg, oral, Daily PRN    lidocaine-prilocaine (Emla) 2.5-2.5 % cream Apply thick layer 2 hours prior to dialysis site, cover with plastic wrap.    loratadine (CLARITIN) 10 mg, oral, Daily    menthol-zinc oxide (Calmoseptine) 0.44-20.6 % ointment 1 Application, Topical, As needed    naloxone (NARCAN) 4 mg, nasal, As needed, May repeat every 2-3 minutes if needed, alternating nostrils, until medical assistance becomes available.    non-adherent bandage (Curity Abdominal Pad) 8 X 10 \" bandage 1 Application, topical (top), Daily, To areas of hidradenitis for drainage    olmesartan (BENICAR) 40 mg, oral, Daily    omeprazole (PRILOSEC) 40 mg, oral, Daily    ondansetron (ZOFRAN) 8 mg, oral, Every 8 hours PRN    oxyCODONE (ROXICODONE) 10 mg, oral, Every 4 hours PRN    prochlorperazine (COMPAZINE) 10 mg, oral, Every 8 hours PRN    secukinumab (Cosentyx Pen) 150 mg/mL self-injector pen Inject 300 mg (2 pens) under the skin every 2 weeks    sertraline (ZOLOFT) 50 mg, oral, Daily    tadalafil (CIALIS) 5 mg, oral, Daily    vitamin B complex-vitamin C-folic acid (Nephrocaps) 1 mg capsule 1 capsule, oral, Daily        OBJECTIVE:    VS / Pain:  There were no vitals taken for this visit.  BSA: There is no height or weight on file to calculate BSA.  Wt Readings from Last 5 Encounters:   02/18/25 86.2 kg (190 lb)   02/17/25 86.2 kg (190 lb)   02/12/25 84.8 kg (187 lb)   02/11/25 85.4 kg (188 lb 3.2 oz)   11/13/24 84.4 kg (186 lb)     Physical Exam  Constitutional:       General: He is not in acute distress.     Appearance: Normal appearance. He is not toxic-appearing.   HENT:      Head: Normocephalic and atraumatic.      Mouth/Throat:      Mouth: Mucous membranes are moist.      Pharynx: Oropharynx is clear.   Eyes:      Pupils: Pupils are equal, round, and reactive to light.   Pulmonary:      Effort: Pulmonary effort is normal. "   Musculoskeletal:         General: Normal range of motion.      Cervical back: Normal range of motion.      Right lower leg: No edema.      Left lower leg: No edema.   Skin:     General: Skin is warm and dry.      Findings: No rash.   Neurological:      General: No focal deficit present.      Mental Status: He is alert and oriented to person, place, and time.      Motor: No weakness.   Psychiatric:         Mood and Affect: Mood normal.         Behavior: Behavior normal.         Thought Content: Thought content normal.         Judgment: Judgment normal.     Performance Status:  ECOG Score: 1- Restricted in physically strenuous activity.  Carries out light duty.  Karnofsky Score: 80 - Normal activity with effort; some signs or symptoms of disease    Diagnostic Results   Results for orders placed or performed in visit on 03/28/25 (from the past 96 hours)   CBC   Result Value Ref Range    WBC 11.2 4.4 - 11.3 x10*3/uL    nRBC      RBC 3.36 (L) 4.50 - 5.90 x10*6/uL    Hemoglobin 9.6 (L) 13.5 - 17.5 g/dL    Hematocrit 29.9 (L) 41.0 - 52.0 %    MCV 89 80 - 100 fL    MCH 28.6 26.0 - 34.0 pg    MCHC 32.1 32.0 - 36.0 g/dL    RDW 15.6 (H) 11.5 - 14.5 %    Platelets 276 150 - 450 x10*3/uL   Comprehensive Metabolic Panel   Result Value Ref Range    Glucose 104 (H) 74 - 99 mg/dL    Sodium 140 136 - 145 mmol/L    Potassium 4.1 3.5 - 5.3 mmol/L    Chloride 98 98 - 107 mmol/L    Bicarbonate 18 (L) 21 - 32 mmol/L    Anion Gap 28 (H) 10 - 20 mmol/L    Urea Nitrogen 139 (HH) 6 - 23 mg/dL    Creatinine >25.00 (H) 0.50 - 1.30 mg/dL    eGFR      Calcium 7.4 (L) 8.6 - 10.6 mg/dL    Albumin 4.5 3.4 - 5.0 g/dL    Alkaline Phosphatase 147 (H) 33 - 120 U/L    Total Protein 7.0 6.4 - 8.2 g/dL    AST 9 9 - 39 U/L    Bilirubin, Total 0.3 0.0 - 1.2 mg/dL    ALT 8 (L) 10 - 52 U/L   TSH with reflex to Free T4 if abnormal   Result Value Ref Range    Thyroid Stimulating Hormone 2.36 0.44 - 3.98 mIU/L     *Note: Due to a large number of results and/or  encounters for the requested time period, some results have not been displayed. A complete set of results can be found in Results Review.       Scans 06/2024 - stable disease    Scans 9/24/24 -   Renal cancer restaging scan. When compared to the prior examination  dated 06/27/2024:  1. Interval increase in size of the right retroperitoneal lymph node  2. Osseous tumor burden as described above, please refer to nuclear  medicine study for osseous disease evaluation  3. Interval resolution of the ground-glass opacities of the right  lower lobe.  4. Additional stable chronic incidental findings described above.    2/11/25 -   IMPRESSION:  CHEST:  1.  Restaging renal cell carcinoma.  2. No metastatic disease to the lungs.  3. Stable lytic foci with discrete sclerotic borders involving ribs  bilaterally consistent with metastatic disease. There is no  pathologic fracture.      ABDOMEN AND PELVIS:  1.  Restaging renal cell carcinoma.  2. Decrease in size of a retroperitoneal lymph node to the right of  the inferior vena cava.  3. Unchanged 1.2 cm hypodense mass lateral cortex right kidney.  4. Unchanged bony metastatic disease to the pelvis.  5. Unchanged 0.9 cm right adrenal nodule compared to 02/23/2022.  Unchanged adreniform enlargement of the left adrenal gland compared  to 02/23/2022.      MACRO:  None    Assessment/Plan   38yo AA man with hx of stage IV CKD with metastatic papRCC to LNs now on cabo/nivo. He has CKD and planned for kidney dialysis. Known proteinuria +++. Still with sig joint pains and dealing with hyperglycemias. On cabo (nivo on hold d/t arthritis - 30 mg PDN BUT pt self discontinued his prednisone and arthralgias are actually better). Pain is under control on opioids and arthralgias might not be fully IO-related.   He was lost to follow up for last 6 months. Still on cabozantinib. Doing well and scans continue to show stable disease / mild shrinkage of Lns (bx proven papRCC). CPM.  Has not received  HD in a couple weeks d/t significant anxiety around how he feels when hooked up to machine. BUN and Cr critically high, K+ WNL at this time. He was advised by HemeOnc Fellow and his nephrologist to go to ED yesterday. Again discussed need for HD and risk of further complications and possible death without treatment. States he will go to the hospital for HD.       Patient verbalizes understanding of above plan. Time provided for patient's questions. Patient instructed to reach out for any new concerning issues.     Yu Mariee, MSN, APRN-CNP  Acute Care Nurse Practitioner   Genitourinary () Oncology  [facility] [place]  Phone: [phone]

## 2025-04-02 ENCOUNTER — TELEPHONE (OUTPATIENT)
Dept: PALLIATIVE MEDICINE | Facility: HOSPITAL | Age: 40
End: 2025-04-02
Payer: COMMERCIAL

## 2025-04-02 ENCOUNTER — APPOINTMENT (OUTPATIENT)
Dept: PALLIATIVE MEDICINE | Facility: CLINIC | Age: 40
End: 2025-04-02
Payer: COMMERCIAL

## 2025-04-02 NOTE — TELEPHONE ENCOUNTER
Patient no show for his 1:00 appointment with Audrey Jones. Called patient and a message was left encouraging a return call    Audrey Jones called Spouse and she stated they forgot about his appointment, rescheduled for 4/9/25. Audrey reinforced that Dialysis should be received as soon as possible. Audrey asked when patient was going to ER and address, he stated 4/3/25 in the morning

## 2025-04-02 NOTE — PROGRESS NOTES
SUPPORTIVE AND PALLIATIVE ONCOLOGY CONSULT - OUTPATIENT FOLLOW UP    Virtual or Telephone Consent     An interactive audio and video telecommunication system which permits real time communications between the patient (at the originating site) and provider (at the distant site) was utilized to provide this telehealth service.   Verbal consent was requested and obtained from Siddhartha Orellana on this date, 1/22/25 for a telehealth visit.       SERVICE DATE: 4/2/2025    Referred by:  Renan Thomson MD   Medical Oncologist: Renan Thomson MD   Radiation Oncologist: No care team member to display  Primary Physician: Davina Pratt  [phone]    REASON FOR CONSULT/CHIEF CONSULT COMPLAINT: Intro to Supportive Oncology and Symptom Management.     Subjective   HISTORY OF PRESENT ILLNESS: Siddhartha Oerllana is a 40 y.o. 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 Discontinued nivolumab in January 2023 due to arthralgias requiring high dose steroids. Course c/b by hidradenitis suppurativa and delayed fistula healing. He was started on oxycodone 10mg TID for pain management.      Additional PMHx:  Lymphadenopathy and a pre surgical consult for kidney transplant  End stage renal failure on dialysis   Type II DM  HTN  HLD  Parathyroidism   Anemia with NARGIS component   Gastric ulcer  SHIVA  Vitamin D deficiency   Morbid obesity    5/29/24:   Just received fentanyl patches 2 days ago and hasn't started yet. Pain remains about the same in quality intensity but noticing more in R calf and knee today. Still using about 60mg of oxycodone per day.   Stopped Zyprexa after 2 days after he felt it made the nausea worse.   CRP/ESR mildly elevated. RF nl. Citrulline antibody, IgG nl,  CLOVIS pending.     7/10/24: Pain overall better controlled with additional of fentanyl patch. Still using oxy 10 about 4-5 pills per day. Ongoing issues with nausea and vomiting.     8/13/24: Pain better controlled. Much more active.  "Still using about 4-6 oxy per day but feeling much better overall.  Hesitant about lyrica so he didn't start it. Nausea and appetite much better with reglan.     9/24/24: Oxycodone rotated hydromorphone but wasn't as effective. No other complaints today.     11/13/24: Arrives from dialysis. Pain stable. Intermittent nausea controlled with antiemetics. Tolerating cabo.    11/22/25: Pain stable.       Pain Assessment:    Lower back and legs (bilateral calves and thighs at times - chronic)  Denies edema, erythema, warmth to touch     Symptom Assessment:    Pain:a little  Lack of energy: a little - stable; \"good\" - more active   Difficulty sleeping: none   Lack of appetite: a little - weight stable    Nausea: none   Vomiting: none  Constipation: none  Numbness or tingling in hands/feet/other: none      Information obtained from: interview of patient  ______________________________________________________________________     Oncology History   Renal cell carcinoma   12/1/2022 -  Chemotherapy    Cabozantinib, 28 Day Cycles     8/28/2023 Initial Diagnosis    Renal cell carcinoma (CMS/HCC)         Past Medical History:   Diagnosis Date    Dorsalgia, unspecified 01/04/2023    Back pain    End stage renal disease (Multi) 01/04/2023    ESRD (end stage renal disease) on dialysis    Essential (primary) hypertension 01/04/2023    Hypertension    Iron deficiency 02/23/2020    Iron deficiency    Other specified postprocedural states     H/O endoscopy     Past Surgical History:   Procedure Laterality Date    OTHER SURGICAL HISTORY  04/11/2018    Creation Of A-V Graft Fistula For Dialysis     Family History   Problem Relation Name Age of Onset    Hypertension Mother      Cancer Father      Hypertension Father      Other (renal transplant recipient) Mother's Brother          SOCIAL HISTORY  . 5 children. Thompson.  Social History:  reports that he has quit smoking. His smoking use included cigars. He has never been exposed to " "tobacco smoke. He has never used smokeless tobacco. He reports that he does not currently use alcohol. He reports current drug use. Drug: Marijuana.      REVIEW OF SYSTEMS  Review of systems negative unless noted in HPI.       Objective       Current Outpatient Medications   Medication Instructions    amLODIPine (NORVASC) 10 mg, oral, Daily (0630), Take half  a tablet  daily for 5-7 days. Then if SBP still above 140 afterwards, take one pill daily.    atorvastatin (LIPITOR) 20 mg, oral, Nightly    benzoyl peroxide 5 % external wash Topical, Every morning, May bleach fabrics, use an old or white towel    cabozantinib (Cabometyx) 40 mg tablet TAKE ONE (1) TABLET BY MOUTH ONCE A DAY    calcium acetate (PHOSLO) 667 mg, oral, 3 times daily (morning, midday, late afternoon)    carvedilol (COREG) 25 mg, oral, 2 times daily    cyclobenzaprine (FLEXERIL) 5 mg, oral, Nightly PRN    fentaNYL (Duragesic) 50 mcg/hr patch 1 patch, transdermal, Every 72 hours    hydrALAZINE (APRESOLINE) 100 mg, oral, 2 times daily    hydrocortisone 2.5 % cream Topical, As needed    hydrOXYzine HCL (ATARAX) 10 mg, oral, Daily PRN    lidocaine-prilocaine (Emla) 2.5-2.5 % cream Apply thick layer 2 hours prior to dialysis site, cover with plastic wrap.    loratadine (CLARITIN) 10 mg, oral, Daily    menthol-zinc oxide (Calmoseptine) 0.44-20.6 % ointment 1 Application, Topical, As needed    naloxone (NARCAN) 4 mg, nasal, As needed, May repeat every 2-3 minutes if needed, alternating nostrils, until medical assistance becomes available.    non-adherent bandage (Curity Abdominal Pad) 8 X 10 \" bandage 1 Application, topical (top), Daily, To areas of hidradenitis for drainage    olmesartan (BENICAR) 40 mg, oral, Daily    omeprazole (PRILOSEC) 40 mg, oral, Daily    ondansetron (ZOFRAN) 8 mg, oral, Every 8 hours PRN    oxyCODONE (ROXICODONE) 10 mg, oral, Every 4 hours PRN    prochlorperazine (COMPAZINE) 10 mg, oral, Every 8 hours PRN    secukinumab (Cosentyx " Pen) 150 mg/mL self-injector pen Inject 300 mg (2 pens) under the skin every 2 weeks    sertraline (ZOLOFT) 50 mg, oral, Daily    tadalafil (CIALIS) 5 mg, oral, Daily    vitamin B complex-vitamin C-folic acid (Nephrocaps) 1 mg capsule 1 capsule, oral, Daily       Allergies: No Known Allergies    {If you would like to pull in Lab results for the last 24 hours, type .[id number] :99}  {If you would like to pull in Imaging results, type .imgrslt :99}    Creatinine   Date Value Ref Range Status   03/28/2025 >25.00 (H) 0.50 - 1.30 mg/dL Final     AST   Date Value Ref Range Status   03/28/2025 9 9 - 39 U/L Final     ALT   Date Value Ref Range Status   03/28/2025 8 (L) 10 - 52 U/L Final     Comment:     Patients treated with Sulfasalazine may generate falsely decreased results for ALT.     Hemoglobin   Date Value Ref Range Status   03/28/2025 9.6 (L) 13.5 - 17.5 g/dL Final     Platelets   Date Value Ref Range Status   03/28/2025 276 150 - 450 x10*3/uL Final          PHYSICAL EXAMINATION  Vital Signs:   Vital signs reviewed    No VS with VV     Physical Exam  HENT:      Head: Normocephalic and atraumatic.   Eyes:      Extraocular Movements: Extraocular movements intact.      Conjunctiva/sclera: Conjunctivae normal.   Abdominal:      General: Abdomen is flat.   Musculoskeletal:      Cervical back: Normal range of motion.   Neurological:      General: No focal deficit present.      Mental Status: He is alert.      Comments: No myoclonus   Psychiatric:         Mood and Affect: Mood normal.         Thought Content: Thought content normal.         ASSESSMENT/PLAN    Pain  Pain is:  IO related arthralgia, chronic pain syndrome in the setting of end stage renal disease on dialysis  Type: somatic and neuropathic  -He declined methadone that was previously recommended   -Continue fentanyl TD 50mcg q72 - do not apply direct heat or submerge in water   -Continue oxycodone 10mg q4 PRN (I rotated to hydromorphone but it was not effective  and he is not having any signs of toxicity from the oxycodone at this time)  -He never started lyrica due to concerns of ADRs  -Continue APAP as needed  - uses sparingly   -Autoimmune/Rheum workup per oncology - unremarkable     Opioid Use  Medication Management:   - OARRS report reviewed with no aberrant behavior; consistent with  prescriptions/records and patient history  - .  Overdose Risk Score 390 .   This has been discussed with patient.   - We will continue to closely monitor the patient for signs of prescription misuse including UDS, OARRS review and subjective reports at each visit.  - No concurrent benzodiazepine use   - I am a provider who either is or has consulted and collaborated with a provider certified in Hospice and Palliative Medicine and have conducted a face-face visit and examination for this patient.  - Routine Urine Drug Screen: patient is anuric and drug screen 9 panel appropriate 9/24/24  - Controlled Substance Agreement completed 5/29/24  - Specifically discussed that controlled substance prescriptions will only be provided by our group as outlined in the completed agreement  - Prescribed naloxone 5/1/24  - Red Flags: None      Nausea   Intermittent nausea with vomiting related to opioids and dialysis   -Intolerant to zyprexa 2.5mg   -Continue zofran 8mg q8 as needed   -Continue reglan 5mg QID     Constipation   At risk for constipation related to opioids  -Continue miralax 17 g daily as needed     Medical Decision Making/Goals of Care/Advance Care Planning:  Patient's current clinical condition, including diagnosis, prognosis, and management plan, and goals of care were discussed.   Life limiting disease: RCC  Family: Supportive wife/children   Goals: symptom control and cancer directed therapy    Advance Directives  Existence of Advance Directives:Other address at next visit     Next Follow-Up Visit:  Return to clinic in 4 weeks     Signature and billing  Thank you for allowing  us to participate in the care of this patient. Recommendations will be communicated back to the consulting service by way of shared electronic medical record or face-to-face.    Medical complexity was moderate level due to due to complexity of problems, extensive data review, and high risk of management/treatment.  Time was spent on the following: Prep Time, Time Directly with Patient/Family/Caregiver, Documentation Time. Total time spent: 30 min       DATA   Diagnostic tests and information reviewed for today's visit:  Most recent labs and imaging results, Medications       Plan of Care discussed with: Patient and RN     Some elements copied from Supportive Oncology note on 11/13/24, the elements have been updated and all reflect current decision making from today, 1/22/25.        SIGNATURE: JAIME Rausch-CNP    Contact information:  Supportive and Palliative Oncology  Monday-Friday 8 AM-5 PM  Phone:  [phone], press option #5, then option #1.   Or Epic Secure Chat

## 2025-04-05 ENCOUNTER — APPOINTMENT (OUTPATIENT)
Dept: CARDIOLOGY | Facility: HOSPITAL | Age: 40
DRG: 682 | End: 2025-04-05
Payer: COMMERCIAL

## 2025-04-05 ENCOUNTER — APPOINTMENT (OUTPATIENT)
Dept: RADIOLOGY | Facility: HOSPITAL | Age: 40
DRG: 682 | End: 2025-04-05
Payer: COMMERCIAL

## 2025-04-05 ENCOUNTER — HOSPITAL ENCOUNTER (INPATIENT)
Facility: HOSPITAL | Age: 40
End: 2025-04-05
Attending: INTERNAL MEDICINE | Admitting: INTERNAL MEDICINE
Payer: COMMERCIAL

## 2025-04-05 DIAGNOSIS — Z91.158: Primary | ICD-10-CM

## 2025-04-05 LAB
ALBUMIN SERPL BCP-MCNC: 4.6 G/DL (ref 3.4–5)
ALP SERPL-CCNC: 120 U/L (ref 33–120)
ALT SERPL W P-5'-P-CCNC: 7 U/L (ref 10–52)
ANION GAP SERPL CALC-SCNC: 32 MMOL/L (ref 10–20)
AST SERPL W P-5'-P-CCNC: 10 U/L (ref 9–39)
BASOPHILS # BLD AUTO: 0.05 X10*3/UL (ref 0–0.1)
BASOPHILS NFR BLD AUTO: 0.4 %
BILIRUB SERPL-MCNC: 0.4 MG/DL (ref 0–1.2)
BNP SERPL-MCNC: 37 PG/ML (ref 0–99)
BUN SERPL-MCNC: 176 MG/DL (ref 6–23)
CALCIUM SERPL-MCNC: 7 MG/DL (ref 8.6–10.3)
CARDIAC TROPONIN I PNL SERPL HS: 51 NG/L (ref 0–20)
CARDIAC TROPONIN I PNL SERPL HS: 56 NG/L (ref 0–20)
CHLORIDE SERPL-SCNC: 96 MMOL/L (ref 98–107)
CO2 SERPL-SCNC: 18 MMOL/L (ref 21–32)
CREAT SERPL-MCNC: >25 MG/DL (ref 0.5–1.3)
EGFRCR SERPLBLD CKD-EPI 2021: ABNORMAL ML/MIN/{1.73_M2}
EOSINOPHIL # BLD AUTO: 0.08 X10*3/UL (ref 0–0.7)
EOSINOPHIL NFR BLD AUTO: 0.7 %
ERYTHROCYTE [DISTWIDTH] IN BLOOD BY AUTOMATED COUNT: 16.2 % (ref 11.5–14.5)
GLUCOSE SERPL-MCNC: 164 MG/DL (ref 74–99)
HCT VFR BLD AUTO: 29 % (ref 41–52)
HGB BLD-MCNC: 9.3 G/DL (ref 13.5–17.5)
IMM GRANULOCYTES # BLD AUTO: 0.05 X10*3/UL (ref 0–0.7)
IMM GRANULOCYTES NFR BLD AUTO: 0.4 % (ref 0–0.9)
LYMPHOCYTES # BLD AUTO: 1.14 X10*3/UL (ref 1.2–4.8)
LYMPHOCYTES NFR BLD AUTO: 10.2 %
MAGNESIUM SERPL-MCNC: 2.26 MG/DL (ref 1.6–2.4)
MCH RBC QN AUTO: 28 PG (ref 26–34)
MCHC RBC AUTO-ENTMCNC: 32.1 G/DL (ref 32–36)
MCV RBC AUTO: 87 FL (ref 80–100)
MONOCYTES # BLD AUTO: 0.51 X10*3/UL (ref 0.1–1)
MONOCYTES NFR BLD AUTO: 4.5 %
NEUTROPHILS # BLD AUTO: 9.38 X10*3/UL (ref 1.2–7.7)
NEUTROPHILS NFR BLD AUTO: 83.8 %
NRBC BLD-RTO: 0 /100 WBCS (ref 0–0)
PHOSPHATE SERPL-MCNC: 13.2 MG/DL (ref 2.5–4.9)
PLATELET # BLD AUTO: 229 X10*3/UL (ref 150–450)
POTASSIUM SERPL-SCNC: 4.7 MMOL/L (ref 3.5–5.3)
PROT SERPL-MCNC: 7.3 G/DL (ref 6.4–8.2)
RBC # BLD AUTO: 3.32 X10*6/UL (ref 4.5–5.9)
SODIUM SERPL-SCNC: 141 MMOL/L (ref 136–145)
WBC # BLD AUTO: 11.2 X10*3/UL (ref 4.4–11.3)

## 2025-04-05 PROCEDURE — 84484 ASSAY OF TROPONIN QUANT: CPT | Performed by: PHYSICIAN ASSISTANT

## 2025-04-05 PROCEDURE — 1100000001 HC PRIVATE ROOM DAILY

## 2025-04-05 PROCEDURE — 99285 EMERGENCY DEPT VISIT HI MDM: CPT | Performed by: INTERNAL MEDICINE

## 2025-04-05 PROCEDURE — 71045 X-RAY EXAM CHEST 1 VIEW: CPT

## 2025-04-05 PROCEDURE — 85025 COMPLETE CBC W/AUTO DIFF WBC: CPT | Performed by: PHYSICIAN ASSISTANT

## 2025-04-05 PROCEDURE — 36415 COLL VENOUS BLD VENIPUNCTURE: CPT | Performed by: PHYSICIAN ASSISTANT

## 2025-04-05 PROCEDURE — 80053 COMPREHEN METABOLIC PANEL: CPT | Performed by: PHYSICIAN ASSISTANT

## 2025-04-05 PROCEDURE — 71045 X-RAY EXAM CHEST 1 VIEW: CPT | Performed by: RADIOLOGY

## 2025-04-05 PROCEDURE — 84100 ASSAY OF PHOSPHORUS: CPT | Performed by: PHYSICIAN ASSISTANT

## 2025-04-05 PROCEDURE — 83735 ASSAY OF MAGNESIUM: CPT | Performed by: PHYSICIAN ASSISTANT

## 2025-04-05 PROCEDURE — 99222 1ST HOSP IP/OBS MODERATE 55: CPT | Performed by: NURSE PRACTITIONER

## 2025-04-05 PROCEDURE — 83880 ASSAY OF NATRIURETIC PEPTIDE: CPT | Performed by: PHYSICIAN ASSISTANT

## 2025-04-05 PROCEDURE — 93005 ELECTROCARDIOGRAM TRACING: CPT

## 2025-04-05 SDOH — SOCIAL STABILITY: SOCIAL INSECURITY: WITHIN THE LAST YEAR, HAVE YOU BEEN AFRAID OF YOUR PARTNER OR EX-PARTNER?: NO

## 2025-04-05 SDOH — ECONOMIC STABILITY: FOOD INSECURITY: WITHIN THE PAST 12 MONTHS, YOU WORRIED THAT YOUR FOOD WOULD RUN OUT BEFORE YOU GOT THE MONEY TO BUY MORE.: NEVER TRUE

## 2025-04-05 SDOH — SOCIAL STABILITY: SOCIAL INSECURITY: DOES ANYONE TRY TO KEEP YOU FROM HAVING/CONTACTING OTHER FRIENDS OR DOING THINGS OUTSIDE YOUR HOME?: NO

## 2025-04-05 SDOH — SOCIAL STABILITY: SOCIAL INSECURITY: ARE YOU OR HAVE YOU BEEN THREATENED OR ABUSED PHYSICALLY, EMOTIONALLY, OR SEXUALLY BY ANYONE?: NO

## 2025-04-05 SDOH — HEALTH STABILITY: MENTAL HEALTH: HOW OFTEN DO YOU HAVE SIX OR MORE DRINKS ON ONE OCCASION?: NEVER

## 2025-04-05 SDOH — SOCIAL STABILITY: SOCIAL INSECURITY
WITHIN THE LAST YEAR, HAVE YOU BEEN RAPED OR FORCED TO HAVE ANY KIND OF SEXUAL ACTIVITY BY YOUR PARTNER OR EX-PARTNER?: NO

## 2025-04-05 SDOH — HEALTH STABILITY: MENTAL HEALTH: HOW MANY DRINKS CONTAINING ALCOHOL DO YOU HAVE ON A TYPICAL DAY WHEN YOU ARE DRINKING?: PATIENT DOES NOT DRINK

## 2025-04-05 SDOH — ECONOMIC STABILITY: FOOD INSECURITY: WITHIN THE PAST 12 MONTHS, THE FOOD YOU BOUGHT JUST DIDN'T LAST AND YOU DIDN'T HAVE MONEY TO GET MORE.: NEVER TRUE

## 2025-04-05 SDOH — SOCIAL STABILITY: SOCIAL INSECURITY
WITHIN THE LAST YEAR, HAVE YOU BEEN KICKED, HIT, SLAPPED, OR OTHERWISE PHYSICALLY HURT BY YOUR PARTNER OR EX-PARTNER?: NO

## 2025-04-05 SDOH — HEALTH STABILITY: MENTAL HEALTH: HOW OFTEN DO YOU HAVE A DRINK CONTAINING ALCOHOL?: NEVER

## 2025-04-05 SDOH — SOCIAL STABILITY: SOCIAL INSECURITY: ABUSE: ADULT

## 2025-04-05 SDOH — ECONOMIC STABILITY: INCOME INSECURITY: IN THE PAST 12 MONTHS HAS THE ELECTRIC, GAS, OIL, OR WATER COMPANY THREATENED TO SHUT OFF SERVICES IN YOUR HOME?: NO

## 2025-04-05 SDOH — SOCIAL STABILITY: SOCIAL INSECURITY: WITHIN THE LAST YEAR, HAVE YOU BEEN HUMILIATED OR EMOTIONALLY ABUSED IN OTHER WAYS BY YOUR PARTNER OR EX-PARTNER?: NO

## 2025-04-05 SDOH — SOCIAL STABILITY: SOCIAL INSECURITY: HAVE YOU HAD ANY THOUGHTS OF HARMING ANYONE ELSE?: NO

## 2025-04-05 SDOH — SOCIAL STABILITY: SOCIAL INSECURITY: WERE YOU ABLE TO COMPLETE ALL THE BEHAVIORAL HEALTH SCREENINGS?: YES

## 2025-04-05 SDOH — SOCIAL STABILITY: SOCIAL INSECURITY: HAVE YOU HAD THOUGHTS OF HARMING ANYONE ELSE?: NO

## 2025-04-05 SDOH — SOCIAL STABILITY: SOCIAL INSECURITY: DO YOU FEEL UNSAFE GOING BACK TO THE PLACE WHERE YOU ARE LIVING?: NO

## 2025-04-05 SDOH — SOCIAL STABILITY: SOCIAL INSECURITY: ARE THERE ANY APPARENT SIGNS OF INJURIES/BEHAVIORS THAT COULD BE RELATED TO ABUSE/NEGLECT?: NO

## 2025-04-05 SDOH — SOCIAL STABILITY: SOCIAL INSECURITY: DO YOU FEEL ANYONE HAS EXPLOITED OR TAKEN ADVANTAGE OF YOU FINANCIALLY OR OF YOUR PERSONAL PROPERTY?: NO

## 2025-04-05 SDOH — SOCIAL STABILITY: SOCIAL INSECURITY: HAS ANYONE EVER THREATENED TO HURT YOUR FAMILY OR YOUR PETS?: NO

## 2025-04-05 ASSESSMENT — ACTIVITIES OF DAILY LIVING (ADL)
HEARING - LEFT EAR: FUNCTIONAL
HEARING - RIGHT EAR: FUNCTIONAL
LACK_OF_TRANSPORTATION: NO
PATIENT'S MEMORY ADEQUATE TO SAFELY COMPLETE DAILY ACTIVITIES?: YES
FEEDING YOURSELF: INDEPENDENT
BATHING: INDEPENDENT
JUDGMENT_ADEQUATE_SAFELY_COMPLETE_DAILY_ACTIVITIES: YES
DRESSING YOURSELF: INDEPENDENT
ADEQUATE_TO_COMPLETE_ADL: YES
TOILETING: INDEPENDENT
WALKS IN HOME: INDEPENDENT
GROOMING: INDEPENDENT

## 2025-04-05 ASSESSMENT — LIFESTYLE VARIABLES
HOW MANY STANDARD DRINKS CONTAINING ALCOHOL DO YOU HAVE ON A TYPICAL DAY: PATIENT DOES NOT DRINK
HOW OFTEN DO YOU HAVE 6 OR MORE DRINKS ON ONE OCCASION: NEVER
SKIP TO QUESTIONS 9-10: 1
AUDIT-C TOTAL SCORE: 0
AUDIT-C TOTAL SCORE: 0
HOW OFTEN DO YOU HAVE A DRINK CONTAINING ALCOHOL: NEVER
AUDIT-C TOTAL SCORE: 0
SKIP TO QUESTIONS 9-10: 1

## 2025-04-05 ASSESSMENT — COGNITIVE AND FUNCTIONAL STATUS - GENERAL
DAILY ACTIVITIY SCORE: 24
MOBILITY SCORE: 24
PATIENT BASELINE BEDBOUND: NO

## 2025-04-05 ASSESSMENT — ENCOUNTER SYMPTOMS
PSYCHIATRIC NEGATIVE: 1
CARDIOVASCULAR NEGATIVE: 1
GASTROINTESTINAL NEGATIVE: 1
CONSTITUTIONAL NEGATIVE: 1
NEUROLOGICAL NEGATIVE: 1
RESPIRATORY NEGATIVE: 1
MUSCULOSKELETAL NEGATIVE: 1

## 2025-04-05 ASSESSMENT — PAIN - FUNCTIONAL ASSESSMENT
PAIN_FUNCTIONAL_ASSESSMENT: 0-10
PAIN_FUNCTIONAL_ASSESSMENT: 0-10

## 2025-04-05 ASSESSMENT — PATIENT HEALTH QUESTIONNAIRE - PHQ9
SUM OF ALL RESPONSES TO PHQ9 QUESTIONS 1 & 2: 0
1. LITTLE INTEREST OR PLEASURE IN DOING THINGS: NOT AT ALL
2. FEELING DOWN, DEPRESSED OR HOPELESS: NOT AT ALL

## 2025-04-05 ASSESSMENT — PAIN SCALES - GENERAL
PAINLEVEL_OUTOF10: 0 - NO PAIN
PAINLEVEL_OUTOF10: 7

## 2025-04-05 NOTE — ED PROVIDER NOTES
"HPI     CC: Shortness of Breath     HPI: Siddhartha Orellana is a 40 y.o. male with a history of stage IV RCC on cabozantinib, ESRD on HD, hidradenitis suppurativa, HTN, HLD, DM2, anemia, SHIVA, who presents at the request of his physicians and wife for missed dialysis.  Patient has not gone to dialysis in the past month.  He states that he was feeling terrible after each dialysis session and it was really starting to affect his quality of life.  He was concerned that he was being over dialyzed because all he would do was sleep after his dialysis sessions.  He feels that no one has been listening to him about this.  His nephrologist called him to tell him he needed to go to the ED due to the missed sessions.  He is generally frustrated, feels like he has felt better than he has in a long time since being off dialysis.  He had labs done last week which showed a normal potassium and he states that his labs look better than they have in a long time.  He thinks his kidneys may be recovering.  He states \"I feel fine.\"  He adamantly denies shortness of breath, states that he was little short of breath when he came in here but otherwise has not been.  Denies leg swelling as well.    ROS: 10-point review of systems was performed and is otherwise negative except as noted in HPI.    Limitations to history: N/A    Independent Historians: Wife at bedside    External Records Reviewed: Outpatient notes in EMR    Past Medical History: Noncontributory except per HPI     Past Surgical History: Noncontributory except per HPI     Family History: Reviewed and noncontributory     Social History:  Denies tobacco. Denies ETOH. Denies illicit drugs.    Social Determinants Affecting Care: N/A    No Known Allergies    Home Meds:   Current Outpatient Medications   Medication Instructions    amLODIPine (NORVASC) 10 mg, oral, Daily (0630), Take half  a tablet  daily for 5-7 days. Then if SBP still above 140 afterwards, take one pill daily.    " "atorvastatin (LIPITOR) 20 mg, oral, Nightly    benzoyl peroxide 5 % external wash Topical, Every morning, May bleach fabrics, use an old or white towel    cabozantinib (Cabometyx) 40 mg tablet TAKE ONE (1) TABLET BY MOUTH ONCE A DAY    calcium acetate (PHOSLO) 667 mg, oral, 3 times daily (morning, midday, late afternoon)    carvedilol (COREG) 25 mg, oral, 2 times daily    cyclobenzaprine (FLEXERIL) 5 mg, oral, Nightly PRN    fentaNYL (Duragesic) 50 mcg/hr patch 1 patch, transdermal, Every 72 hours    hydrALAZINE (APRESOLINE) 100 mg, oral, 2 times daily    hydrocortisone 2.5 % cream Topical, As needed    hydrOXYzine HCL (ATARAX) 10 mg, oral, Daily PRN    lidocaine-prilocaine (Emla) 2.5-2.5 % cream Apply thick layer 2 hours prior to dialysis site, cover with plastic wrap.    loratadine (CLARITIN) 10 mg, oral, Daily    menthol-zinc oxide (Calmoseptine) 0.44-20.6 % ointment 1 Application, Topical, As needed    naloxone (NARCAN) 4 mg, nasal, As needed, May repeat every 2-3 minutes if needed, alternating nostrils, until medical assistance becomes available.    non-adherent bandage (Curity Abdominal Pad) 8 X 10 \" bandage 1 Application, topical (top), Daily, To areas of hidradenitis for drainage    olmesartan (BENICAR) 40 mg, oral, Daily    omeprazole (PRILOSEC) 40 mg, oral, Daily    ondansetron (ZOFRAN) 8 mg, oral, Every 8 hours PRN    oxyCODONE (ROXICODONE) 10 mg, oral, Every 4 hours PRN    prochlorperazine (COMPAZINE) 10 mg, oral, Every 8 hours PRN    secukinumab (Cosentyx Pen) 150 mg/mL self-injector pen Inject 300 mg (2 pens) under the skin every 2 weeks    sertraline (ZOLOFT) 50 mg, oral, Daily    tadalafil (CIALIS) 5 mg, oral, Daily    vitamin B complex-vitamin C-folic acid (Nephrocaps) 1 mg capsule 1 capsule, oral, Daily        Physical Exam     ED Triage Vitals [04/05/25 1807]   Temperature Heart Rate Respirations BP   36.3 °C (97.4 °F) 82 18 140/87      Pulse Ox Temp src Heart Rate Source Patient Position   99 % " "-- -- --      BP Location FiO2 (%)     -- --         Heart Rate:  [82]   Temperature:  [36.3 °C (97.4 °F)]   Respirations:  [18]   BP: (140)/(87)   Height:  [170 cm (5' 6.93\")]   Weight:  [81.6 kg (180 lb)]   Pulse Ox:  [99 %]      Physical Exam  Vitals and nursing note reviewed.     CONSTITUTIONAL: Well appearing, well nourished, in no acute distress.   HENMT: Head atraumatic. Airway patent. Nasal mucosa clear. Mouth with normal mucosa, clear oropharynx. Uvula midline. Neck supple.    EYES: Clear bilaterally, pupils equally round and reactive to light.   CARDIOVASCULAR: Normal rate, regular rhythm.  Heart sounds S1, S2.  No murmurs, rubs or gallops. Normal pulses. Capillary refill < 2 sec.   RESPIRATORY: No increased work of breathing. Breath sounds clear and equal bilaterally.  GASTROINTESTINAL: Abdomen soft, non-distended, non-tender. No rebound, no guarding. Normal bowel sounds. No palpable masses.  GENITOURINARY:  No CVA tenderness.  MUSCULOSKELETAL: Spine appears normal, range of motion is not limited, no muscle or joint tenderness.  Trace bilateral lower extremity edema with chronic skin changes.  NEUROLOGICAL: Alert and oriented, no asymmetry, moving all extremities equally.  SKIN: Warm, dry and intact. No rash or notable lesions.  PSYCHIATRIC: Normal mood and affect.  HEME/LYMPH: No adenopathy or splenomegaly.    Diagnostic Results      ECG: ECGs read and interpreted by me. See ED Course, below, for interpretation.    Labs Reviewed   CBC WITH AUTO DIFFERENTIAL - Abnormal       Result Value    WBC 11.2      nRBC 0.0      RBC 3.32 (*)     Hemoglobin 9.3 (*)     Hematocrit 29.0 (*)     MCV 87      MCH 28.0      MCHC 32.1      RDW 16.2 (*)     Platelets 229      Neutrophils % 83.8      Immature Granulocytes %, Automated 0.4      Lymphocytes % 10.2      Monocytes % 4.5      Eosinophils % 0.7      Basophils % 0.4      Neutrophils Absolute 9.38 (*)     Immature Granulocytes Absolute, Automated 0.05      " Lymphocytes Absolute 1.14 (*)     Monocytes Absolute 0.51      Eosinophils Absolute 0.08      Basophils Absolute 0.05     COMPREHENSIVE METABOLIC PANEL - Abnormal    Glucose 164 (*)     Sodium 141      Potassium 4.7      Chloride 96 (*)     Bicarbonate 18 (*)     Anion Gap 32 (*)     Urea Nitrogen 176 (*)     Creatinine >25.00 (*)     eGFR        Calcium 7.0 (*)     Albumin 4.6      Alkaline Phosphatase 120      Total Protein 7.3      AST 10      Bilirubin, Total 0.4      ALT 7 (*)    PHOSPHORUS - Abnormal    Phosphorus 13.2 (*)    SERIAL TROPONIN-INITIAL - Abnormal    Troponin I, High Sensitivity 56 (*)     Narrative:     Less than 99th percentile of normal range cutoff-  Female and children under 18 years old <14 ng/L; Male <21 ng/L: Negative  Repeat testing should be performed if clinically indicated.     Female and children under 18 years old 14-50 ng/L; Male 21-50 ng/L:  Consistent with possible cardiac damage and possible increased clinical   risk. Serial measurements may help to assess extent of myocardial damage.     >50 ng/L: Consistent with cardiac damage, increased clinical risk and  myocardial infarction. Serial measurements may help assess extent of   myocardial damage.      NOTE: Children less than 1 year old may have higher baseline troponin   levels and results should be interpreted in conjunction with the overall   clinical context.     NOTE: Troponin I testing is performed using a different   testing methodology at [facility]) than at other   [facility] hospitals. Direct result comparisons should only   be made within the same method.   MAGNESIUM - Normal    Magnesium 2.26     B-TYPE NATRIURETIC PEPTIDE - Normal    BNP 37      Narrative:        <100 pg/mL - Heart failure unlikely  100-299 pg/mL - Intermediate probability of acute heart                  failure exacerbation. Correlate with clinical                  context and patient history.    >=300 pg/mL - Heart Failure likely.  Correlate with clinical                  context and patient history.    BNP testing is performed using different testing methodology at [facility] than at other [facility] hospitals. Direct result comparisons should only be made within the same method.      TROPONIN SERIES- (INITIAL, 1 HR)    Narrative:     The following orders were created for panel order Troponin I Series, High Sensitivity (0, 1 HR).  Procedure                               Abnormality         Status                     ---------                               -----------         ------                     Troponin I, High Sensiti...[[id number]]  Abnormal            Final result               Troponin, High Sensitivi...[[id number]]                      In process                   Please view results for these tests on the individual orders.   SERIAL TROPONIN, 1 HOUR         XR chest 1 view   Final Result   No radiographic evidence of acute cardiopulmonary pathology.        MACRO:   None.        Signed by: Evan Finkelstein 4/5/2025 7:42 PM   Dictation workstation:   [id number]                    No data recorded                Procedure  Procedures    ED Course & MDM   Assessment/Plan:   Siddhartha Orellana is a 40 y.o. male with a history of stage IV RCC on cabozantinib, ESRD on HD, hidradenitis suppurativa, HTN, HLD, DM2, anemia, SHIVA, who presents at the request of his physicians and wife for missed dialysis for a month.  He is largely asymptomatic.  ECG not suggestive of hyperkalemia.  Will screen for emergent indications for hemodialysis and get him admitted for nephrology consultation as patient is generally frustrated with his nephrology care and is concerned that he is being over dialyzed, it is affecting his quality of life.  See below for details of ED course and ultimate disposition.    Medications - No data to display     ED Course as of 04/05/25 2207   Sat Apr 05, 2025   1938 Labs are notable for CBC without leukocytosis, mild  anemia 9.3, normal platelets, CMP with elevated , undetectably high creatinine, normal potassium 4.7, low bicarb of 18 with anion gap of 32, normal LFTs, normal magnesium, normal BNP, and elevated troponin 56 [CG]   1948 ECG read and interpreted by me.  Normal sinus rhythm, rate 82.  Normal axis.  Normal intervals.  LVH.   No significant ST or T wave derangements.  [CG]   1949 CXR No radiographic evidence of acute cardiopulmonary pathology. [CG]   2206 Repeat troponin stable. Patient handed off to Dr. Salagdo at the end of my shift pending hospital admission.  [CG]      ED Course User Index  [CG] Lelia Taylor MD         Diagnoses as of 04/05/25 2207   Dialysis discontinued by patient       Disposition:   Handoff - Dr. Salgado. Details of clinical presentation, medical decision-making, pending evaluation and disposition were discussed with oncoming physician. Please see their transition of care note for details of further ED course.     ED Prescriptions    None         Lelia Taylor MD  EM/IM/Peds    This note was dictated by speech recognition. Minor errors in transcription may be present.     Lelia Taylor MD  04/05/25 2207

## 2025-04-05 NOTE — ED TRIAGE NOTES
TRIAGE NOTE   I saw the patient as the Clinician in Triage and performed a brief history and physical exam, established acuity, and ordered appropriate tests to develop basic plan of care. Patient will be seen by an JORGE, resident and/or physician who will independently evaluate the patient. Please see subsequent provider notes for further details and disposition.     Brief HPI: In brief, Siddhartha Orellana is a 40 y.o. male that presents for missed dialysis.  He has not gone in about a month and was instructed to go to the ER but did not want to come.  He has stage IV renal cancer.  His wife made him come today due to shortness of breath.  Reports no chest pain.    Focused Physical exam:   Breath sounds clear and equal bilaterally.    Plan/MDM:   Labs initiated, likely needs renal consult, admission, and dialysis.    Please see subsequent provider note for further details and disposition

## 2025-04-05 NOTE — ED TRIAGE NOTES
"Pt reports to ed with c/o sob, pt has missed dialysis for about a month and was instructed to go to er for treatment, pt has been off of his chemo pill for a \"couple days\" and is not allowed to start taking it again until he gets dialysis, pt has stage 4 renal cancer, LUE restrictions, ambulated to triage gait steady, denies cp/palpitations/n/v/d/f/chills   "

## 2025-04-06 ENCOUNTER — APPOINTMENT (OUTPATIENT)
Dept: DIALYSIS | Facility: HOSPITAL | Age: 40
End: 2025-04-06
Payer: COMMERCIAL

## 2025-04-06 VITALS
DIASTOLIC BLOOD PRESSURE: 93 MMHG | RESPIRATION RATE: 16 BRPM | BODY MASS INDEX: 28.25 KG/M2 | HEART RATE: 98 BPM | WEIGHT: 180 LBS | HEIGHT: 67 IN | SYSTOLIC BLOOD PRESSURE: 157 MMHG | OXYGEN SATURATION: 99 % | TEMPERATURE: 98.8 F

## 2025-04-06 LAB
ANION GAP SERPL CALC-SCNC: 29 MMOL/L (ref 10–20)
BASOPHILS # BLD AUTO: 0.04 X10*3/UL (ref 0–0.1)
BASOPHILS NFR BLD AUTO: 0.4 %
BUN SERPL-MCNC: 174 MG/DL (ref 6–23)
CALCIUM SERPL-MCNC: 7 MG/DL (ref 8.6–10.3)
CHLORIDE SERPL-SCNC: 97 MMOL/L (ref 98–107)
CO2 SERPL-SCNC: 18 MMOL/L (ref 21–32)
CREAT SERPL-MCNC: >25 MG/DL (ref 0.5–1.3)
EGFRCR SERPLBLD CKD-EPI 2021: ABNORMAL ML/MIN/{1.73_M2}
EOSINOPHIL # BLD AUTO: 0.12 X10*3/UL (ref 0–0.7)
EOSINOPHIL NFR BLD AUTO: 1.3 %
ERYTHROCYTE [DISTWIDTH] IN BLOOD BY AUTOMATED COUNT: 15.9 % (ref 11.5–14.5)
GLUCOSE SERPL-MCNC: 85 MG/DL (ref 74–99)
HBV SURFACE AB SER-ACNC: <3.1 MIU/ML
HBV SURFACE AG SERPL QL IA: NONREACTIVE
HCT VFR BLD AUTO: 24.1 % (ref 41–52)
HGB BLD-MCNC: 7.8 G/DL (ref 13.5–17.5)
HOLD SPECIMEN: NORMAL
IMM GRANULOCYTES # BLD AUTO: 0.05 X10*3/UL (ref 0–0.7)
IMM GRANULOCYTES NFR BLD AUTO: 0.5 % (ref 0–0.9)
LYMPHOCYTES # BLD AUTO: 1.98 X10*3/UL (ref 1.2–4.8)
LYMPHOCYTES NFR BLD AUTO: 20.8 %
MCH RBC QN AUTO: 28.3 PG (ref 26–34)
MCHC RBC AUTO-ENTMCNC: 32.4 G/DL (ref 32–36)
MCV RBC AUTO: 87 FL (ref 80–100)
MONOCYTES # BLD AUTO: 0.73 X10*3/UL (ref 0.1–1)
MONOCYTES NFR BLD AUTO: 7.7 %
NEUTROPHILS # BLD AUTO: 6.59 X10*3/UL (ref 1.2–7.7)
NEUTROPHILS NFR BLD AUTO: 69.3 %
NRBC BLD-RTO: 0 /100 WBCS (ref 0–0)
PLATELET # BLD AUTO: 161 X10*3/UL (ref 150–450)
POTASSIUM SERPL-SCNC: 3.7 MMOL/L (ref 3.5–5.3)
RBC # BLD AUTO: 2.76 X10*6/UL (ref 4.5–5.9)
SODIUM SERPL-SCNC: 140 MMOL/L (ref 136–145)
WBC # BLD AUTO: 9.5 X10*3/UL (ref 4.4–11.3)

## 2025-04-06 PROCEDURE — 85025 COMPLETE CBC W/AUTO DIFF WBC: CPT | Performed by: NURSE PRACTITIONER

## 2025-04-06 PROCEDURE — 2500000002 HC RX 250 W HCPCS SELF ADMINISTERED DRUGS (ALT 637 FOR MEDICARE OP, ALT 636 FOR OP/ED): Performed by: NURSE PRACTITIONER

## 2025-04-06 PROCEDURE — 99222 1ST HOSP IP/OBS MODERATE 55: CPT | Performed by: INTERNAL MEDICINE

## 2025-04-06 PROCEDURE — 80048 BASIC METABOLIC PNL TOTAL CA: CPT | Performed by: NURSE PRACTITIONER

## 2025-04-06 PROCEDURE — 99232 SBSQ HOSP IP/OBS MODERATE 35: CPT | Performed by: INTERNAL MEDICINE

## 2025-04-06 PROCEDURE — 86706 HEP B SURFACE ANTIBODY: CPT | Mod: AHULAB | Performed by: INTERNAL MEDICINE

## 2025-04-06 PROCEDURE — 8010000001 HC DIALYSIS - HEMODIALYSIS PER DAY

## 2025-04-06 PROCEDURE — 36415 COLL VENOUS BLD VENIPUNCTURE: CPT | Performed by: NURSE PRACTITIONER

## 2025-04-06 PROCEDURE — 1100000001 HC PRIVATE ROOM DAILY

## 2025-04-06 PROCEDURE — 2500000001 HC RX 250 WO HCPCS SELF ADMINISTERED DRUGS (ALT 637 FOR MEDICARE OP): Performed by: NURSE PRACTITIONER

## 2025-04-06 PROCEDURE — 2500000001 HC RX 250 WO HCPCS SELF ADMINISTERED DRUGS (ALT 637 FOR MEDICARE OP): Performed by: INTERNAL MEDICINE

## 2025-04-06 PROCEDURE — 2500000004 HC RX 250 GENERAL PHARMACY W/ HCPCS (ALT 636 FOR OP/ED): Performed by: INTERNAL MEDICINE

## 2025-04-06 PROCEDURE — 87340 HEPATITIS B SURFACE AG IA: CPT | Mod: AHULAB | Performed by: INTERNAL MEDICINE

## 2025-04-06 RX ORDER — ATORVASTATIN CALCIUM 20 MG/1
20 TABLET, FILM COATED ORAL NIGHTLY
Status: DISPENSED | OUTPATIENT
Start: 2025-04-06

## 2025-04-06 RX ORDER — HYDROXYZINE HYDROCHLORIDE 10 MG/1
10 TABLET, FILM COATED ORAL DAILY PRN
Status: ACTIVE | OUTPATIENT
Start: 2025-04-06

## 2025-04-06 RX ORDER — OXYCODONE HYDROCHLORIDE 5 MG/1
10 TABLET ORAL EVERY 4 HOURS PRN
Status: DISPENSED | OUTPATIENT
Start: 2025-04-06

## 2025-04-06 RX ORDER — LIDOCAINE AND PRILOCAINE 25; 25 MG/G; MG/G
CREAM TOPICAL AS NEEDED
Status: DISPENSED | OUTPATIENT
Start: 2025-04-06

## 2025-04-06 RX ORDER — PANTOPRAZOLE SODIUM 40 MG/1
40 TABLET, DELAYED RELEASE ORAL
Status: DISPENSED | OUTPATIENT
Start: 2025-04-06

## 2025-04-06 RX ORDER — VALSARTAN 160 MG/1
320 TABLET ORAL DAILY
Status: DISPENSED | OUTPATIENT
Start: 2025-04-06

## 2025-04-06 RX ORDER — HYDRALAZINE HYDROCHLORIDE 50 MG/1
100 TABLET, FILM COATED ORAL 2 TIMES DAILY
Status: DISPENSED | OUTPATIENT
Start: 2025-04-06

## 2025-04-06 RX ORDER — CARVEDILOL 25 MG/1
25 TABLET ORAL 2 TIMES DAILY
Status: DISPENSED | OUTPATIENT
Start: 2025-04-06

## 2025-04-06 RX ORDER — SERTRALINE HYDROCHLORIDE 50 MG/1
50 TABLET, FILM COATED ORAL DAILY
Status: DISPENSED | OUTPATIENT
Start: 2025-04-06

## 2025-04-06 RX ORDER — CYCLOBENZAPRINE HCL 5 MG
5 TABLET ORAL NIGHTLY PRN
Status: ACTIVE | OUTPATIENT
Start: 2025-04-06

## 2025-04-06 RX ORDER — ONDANSETRON HYDROCHLORIDE 2 MG/ML
4 INJECTION, SOLUTION INTRAVENOUS EVERY 6 HOURS PRN
Status: DISPENSED | OUTPATIENT
Start: 2025-04-06

## 2025-04-06 RX ORDER — CALCIUM ACETATE 667 MG/1
1334 CAPSULE ORAL
Status: DISPENSED | OUTPATIENT
Start: 2025-04-06

## 2025-04-06 RX ORDER — AMLODIPINE BESYLATE 5 MG/1
5 TABLET ORAL DAILY
Status: DISPENSED | OUTPATIENT
Start: 2025-04-06

## 2025-04-06 RX ORDER — ACETAMINOPHEN 160 MG/5ML
650 SOLUTION ORAL EVERY 4 HOURS PRN
Status: ACTIVE | OUTPATIENT
Start: 2025-04-06

## 2025-04-06 RX ORDER — FENTANYL 50 UG/1
1 PATCH TRANSDERMAL
Status: DISPENSED | OUTPATIENT
Start: 2025-04-06

## 2025-04-06 RX ORDER — CALCIUM ACETATE 667 MG/1
667 CAPSULE ORAL
Status: DISCONTINUED | OUTPATIENT
Start: 2025-04-06 | End: 2025-04-06

## 2025-04-06 RX ORDER — ACETAMINOPHEN 325 MG/1
650 TABLET ORAL EVERY 4 HOURS PRN
Status: ACTIVE | OUTPATIENT
Start: 2025-04-06

## 2025-04-06 RX ORDER — ACETAMINOPHEN 650 MG/1
650 SUPPOSITORY RECTAL EVERY 4 HOURS PRN
Status: ACTIVE | OUTPATIENT
Start: 2025-04-06

## 2025-04-06 RX ADMIN — CALCIUM ACETATE 1334 MG: 667 CAPSULE ORAL at 16:16

## 2025-04-06 RX ADMIN — AMLODIPINE BESYLATE 5 MG: 5 TABLET ORAL at 08:49

## 2025-04-06 RX ADMIN — HYDRALAZINE HYDROCHLORIDE 100 MG: 50 TABLET ORAL at 00:38

## 2025-04-06 RX ADMIN — CARVEDILOL 25 MG: 25 TABLET, FILM COATED ORAL at 20:37

## 2025-04-06 RX ADMIN — ONDANSETRON 4 MG: 2 INJECTION, SOLUTION INTRAMUSCULAR; INTRAVENOUS at 09:05

## 2025-04-06 RX ADMIN — ATORVASTATIN CALCIUM 20 MG: 20 TABLET, FILM COATED ORAL at 00:39

## 2025-04-06 RX ADMIN — OXYCODONE HYDROCHLORIDE 10 MG: 5 TABLET ORAL at 16:15

## 2025-04-06 RX ADMIN — ATORVASTATIN CALCIUM 20 MG: 20 TABLET, FILM COATED ORAL at 20:37

## 2025-04-06 RX ADMIN — OXYCODONE HYDROCHLORIDE 10 MG: 5 TABLET ORAL at 08:50

## 2025-04-06 RX ADMIN — CARVEDILOL 25 MG: 25 TABLET, FILM COATED ORAL at 00:38

## 2025-04-06 RX ADMIN — CALCIUM ACETATE 667 MG: 667 CAPSULE ORAL at 11:08

## 2025-04-06 RX ADMIN — OXYCODONE HYDROCHLORIDE 10 MG: 5 TABLET ORAL at 00:38

## 2025-04-06 RX ADMIN — HYDRALAZINE HYDROCHLORIDE 100 MG: 50 TABLET ORAL at 20:37

## 2025-04-06 RX ADMIN — FENTANYL 1 PATCH: 50 PATCH TRANSDERMAL at 08:50

## 2025-04-06 RX ADMIN — CALCIUM ACETATE 667 MG: 667 CAPSULE ORAL at 08:48

## 2025-04-06 RX ADMIN — PANTOPRAZOLE SODIUM 40 MG: 40 TABLET, DELAYED RELEASE ORAL at 08:49

## 2025-04-06 RX ADMIN — SERTRALINE 50 MG: 50 TABLET, FILM COATED ORAL at 08:47

## 2025-04-06 RX ADMIN — Medication 1 CAPSULE: at 08:47

## 2025-04-06 ASSESSMENT — PAIN SCALES - GENERAL
PAINLEVEL_OUTOF10: 8
PAINLEVEL_OUTOF10: 4
PAINLEVEL_OUTOF10: 0 - NO PAIN
PAINLEVEL_OUTOF10: 0 - NO PAIN
PAINLEVEL_OUTOF10: 7
PAINLEVEL_OUTOF10: 7
PAINLEVEL_OUTOF10: 2
PAINLEVEL_OUTOF10: 8
PAINLEVEL_OUTOF10: 0 - NO PAIN

## 2025-04-06 ASSESSMENT — PAIN - FUNCTIONAL ASSESSMENT
PAIN_FUNCTIONAL_ASSESSMENT: NO/DENIES PAIN
PAIN_FUNCTIONAL_ASSESSMENT: 0-10

## 2025-04-06 ASSESSMENT — COGNITIVE AND FUNCTIONAL STATUS - GENERAL
DAILY ACTIVITIY SCORE: 24
MOBILITY SCORE: 24
MOBILITY SCORE: 24
DAILY ACTIVITIY SCORE: 24

## 2025-04-06 ASSESSMENT — PAIN SCALES - WONG BAKER
WONGBAKER_NUMERICALRESPONSE: HURTS LITTLE BIT
WONGBAKER_NUMERICALRESPONSE: HURTS EVEN MORE

## 2025-04-06 ASSESSMENT — PAIN DESCRIPTION - DESCRIPTORS: DESCRIPTORS: ACHING

## 2025-04-06 ASSESSMENT — PAIN DESCRIPTION - ORIENTATION: ORIENTATION: LOWER

## 2025-04-06 ASSESSMENT — PAIN DESCRIPTION - LOCATION: LOCATION: BACK

## 2025-04-06 NOTE — H&P
History Of Present Illness  Siddhartha Orellana is a 40 y.o. male with a past medical history of ESRD, renal cell carcinoma, GERD, hidradenitis suppurativa, NARGIS, psoriasis, type 2 diabetes mellitus,  SHIVA presenting for hemodialysis need.  Patient reports that he had things going on in his life and he has missed multiple sessions totaling 1 month.  Patient reports that he was told by his doctor that if he did not come to the hospital to get dialysis that he may die.  Patient states that he has no current symptoms.  Patient without any fever, chills, dizziness, headache, itching, confusion, chest pain, abdominal pain, nausea, vomiting or diarrhea.  Patient says he feels perfectly fine and he wants to go home to play his game but says he was told he needed dialysis he came in.  Patient's spouse also encouraged patient to come in and she also provides history.  She states that she feels that dialysis has wiped him out, causing his blood pressure to drop and feels that he would do better if he had less sessions in the week.  Patient was discouraged about going to dialysis due to how it made him feel after.  In the emergency department patient is 100% on room air, vital signs are stable blood pressure in the 140s systolic.  , creatinine greater than 25, bicarbonate 18, anion gap 32, troponin 56 with repeat of 51, blood glucose 164, hemoglobin 9.3, hematocrit 29.  Chest x-ray showed no radiographic evidence of acute cardiopulmonary pathology.  Patient to be admitted for further workup.     Past Medical History  He has a past medical history of Dorsalgia, unspecified (01/04/2023), End stage renal disease (Multi) (01/04/2023), Essential (primary) hypertension (01/04/2023), Iron deficiency (02/23/2020), and Other specified postprocedural states.    Surgical History  He has a past surgical history that includes Other surgical history (04/11/2018).     Social History  He reports that he has quit smoking. His smoking use  included cigars. He has never been exposed to tobacco smoke. He has never used smokeless tobacco. He reports that he does not currently use alcohol. He reports current drug use. Drug: Marijuana.    Family History  Family History   Problem Relation Name Age of Onset    Hypertension Mother      Cancer Father      Hypertension Father      Other (renal transplant recipient) Mother's Brother          Allergies  Patient has no known allergies.    Review of Systems   Constitutional: Negative.    HENT: Negative.     Respiratory: Negative.     Cardiovascular: Negative.    Gastrointestinal: Negative.    Musculoskeletal: Negative.    Skin: Negative.    Neurological: Negative.    Psychiatric/Behavioral: Negative.          Physical Exam  HENT:      Head: Normocephalic.      Mouth/Throat:      Mouth: Mucous membranes are moist.   Cardiovascular:      Rate and Rhythm: Normal rate.   Pulmonary:      Effort: Pulmonary effort is normal.   Abdominal:      General: Bowel sounds are normal.      Palpations: Abdomen is soft.   Musculoskeletal:         General: Normal range of motion.      Cervical back: Neck supple.   Skin:     General: Skin is warm.   Neurological:      Mental Status: He is alert and oriented to person, place, and time.   Psychiatric:         Mood and Affect: Mood normal.         Behavior: Behavior normal.          Last Recorded Vitals  /87   Pulse 82   Temp 36.3 °C (97.4 °F)   Resp 18   Wt 81.6 kg (180 lb)   SpO2 98%     Relevant Results        Results for orders placed or performed during the hospital encounter of 04/05/25 (from the past 24 hours)   CBC and Auto Differential   Result Value Ref Range    WBC 11.2 4.4 - 11.3 x10*3/uL    nRBC 0.0 0.0 - 0.0 /100 WBCs    RBC 3.32 (L) 4.50 - 5.90 x10*6/uL    Hemoglobin 9.3 (L) 13.5 - 17.5 g/dL    Hematocrit 29.0 (L) 41.0 - 52.0 %    MCV 87 80 - 100 fL    MCH 28.0 26.0 - 34.0 pg    MCHC 32.1 32.0 - 36.0 g/dL    RDW 16.2 (H) 11.5 - 14.5 %    Platelets 229 150 - 450  x10*3/uL    Neutrophils % 83.8 40.0 - 80.0 %    Immature Granulocytes %, Automated 0.4 0.0 - 0.9 %    Lymphocytes % 10.2 13.0 - 44.0 %    Monocytes % 4.5 2.0 - 10.0 %    Eosinophils % 0.7 0.0 - 6.0 %    Basophils % 0.4 0.0 - 2.0 %    Neutrophils Absolute 9.38 (H) 1.20 - 7.70 x10*3/uL    Immature Granulocytes Absolute, Automated 0.05 0.00 - 0.70 x10*3/uL    Lymphocytes Absolute 1.14 (L) 1.20 - 4.80 x10*3/uL    Monocytes Absolute 0.51 0.10 - 1.00 x10*3/uL    Eosinophils Absolute 0.08 0.00 - 0.70 x10*3/uL    Basophils Absolute 0.05 0.00 - 0.10 x10*3/uL   Comprehensive metabolic panel   Result Value Ref Range    Glucose 164 (H) 74 - 99 mg/dL    Sodium 141 136 - 145 mmol/L    Potassium 4.7 3.5 - 5.3 mmol/L    Chloride 96 (L) 98 - 107 mmol/L    Bicarbonate 18 (L) 21 - 32 mmol/L    Anion Gap 32 (H) 10 - 20 mmol/L    Urea Nitrogen 176 (HH) 6 - 23 mg/dL    Creatinine >25.00 (H) 0.50 - 1.30 mg/dL    eGFR      Calcium 7.0 (L) 8.6 - 10.3 mg/dL    Albumin 4.6 3.4 - 5.0 g/dL    Alkaline Phosphatase 120 33 - 120 U/L    Total Protein 7.3 6.4 - 8.2 g/dL    AST 10 9 - 39 U/L    Bilirubin, Total 0.4 0.0 - 1.2 mg/dL    ALT 7 (L) 10 - 52 U/L   Magnesium   Result Value Ref Range    Magnesium 2.26 1.60 - 2.40 mg/dL   Phosphorus   Result Value Ref Range    Phosphorus 13.2 (H) 2.5 - 4.9 mg/dL   B-Type Natriuretic Peptide   Result Value Ref Range    BNP 37 0 - 99 pg/mL   Troponin I, High Sensitivity, Initial   Result Value Ref Range    Troponin I, High Sensitivity 56 (HH) 0 - 20 ng/L   Troponin, High Sensitivity, 1 Hour   Result Value Ref Range    Troponin I, High Sensitivity 51 (HH) 0 - 20 ng/L     *Note: Due to a large number of results and/or encounters for the requested time period, some results have not been displayed. A complete set of results can be found in Results Review.          Assessment/Plan   Assessment & Plan  Dialysis discontinued by patient  ESRD    Siddhartha Orellana is a 40 y.o. male with a past medical history of ESRD, renal  cell carcinoma, GERD, hidradenitis suppurativa, NARGIS, psoriasis, type 2 diabetes mellitus,  SHIVA presenting for hemodialysis need.  Patient reports that he had things going on in his life and he has missed multiple sessions totaling 1 month.  Patient reports that he was told by his doctor that if he did not come to the hospital to get dialysis that he may die.  Patient states that he has no current symptoms.  Patient without any fever, chills, dizziness, headache, itching, confusion, chest pain, abdominal pain, nausea, vomiting or diarrhea.  Patient says he feels perfectly fine and he wants to go home to play his game but says he was told he needed dialysis he came in.  Patient's spouse also encouraged patient to come in and she also provides history.  She states that she feels that dialysis has wiped him out, causing his blood pressure to drop and feels that he would do better if he had less sessions in the week.  Patient was discouraged about going to dialysis due to how it made him feel after.  In the emergency department patient is 100% on room air, vital signs are stable blood pressure in the 140s systolic.  , creatinine greater than 25, bicarbonate 18, anion gap 32, troponin 56 with repeat of 51, blood glucose 164, hemoglobin 9.3, hematocrit 29.  Chest x-ray showed no radiographic evidence of acute cardiopulmonary pathology.  Patient to be admitted for further workup.    Plan:  Nephrology consult  RFP in the a.m.  Continue Nephrocaps  Continue PhosLo  Patient currently only agreeing to 1 session of dialysis while inpatient    Retention  -Olmesartan  -Hydralazine  -Amlodipine    type 2 diabetes mellitus  - Sliding scale    RCC  -Pain regimen per home meds fentanyl patch and as needed oxycodone  -Follow-up with oncology for resumption of your RCC medication    GERD  - Pantoprazole    DVT prophylaxis-heparin subcutaneous       Jordon Rose, APRN-CNP

## 2025-04-06 NOTE — PRE-PROCEDURE NOTE
Report from Sending RN:    Report From: Diann  Recent Surgery of Procedure: No  Baseline Level of Consciousness (LOC): AOx4  Oxygen Use: No  Type: NA  Diabetic: Yes  Last BP Med Given Day of Dialysis: See EMAR  Last Pain Med Given: See EMAR  Lab Tests to be Obtained with Dialysis: No  Blood Transfusion to be Given During Dialysis: No  Available IV Access: Yes  Medications to be Administered During Dialysis: No  Continuous IV Infusion Running: No  Restraints on Currently or in the Last 24 Hours: No  Hand-Off Communication: Pt stable and ready for his dialysis treatment  Dialysis Catheter Dressing: NA  Last Dressing Change: NA

## 2025-04-06 NOTE — POST-PROCEDURE NOTE
Report to Receiving RN:    Report To: Amalia  Time Report Called: [id number]  Hand-Off Communication: pt tolerated tx well with no c/o, no fluid removed, post /91 HR 82  Complications During Treatment: No  Ultrafiltration Treatment: No  Medications Administered During Dialysis: No  Blood Products Administered During Dialysis: No  Labs Sent During Dialysis: No  Heparin Drip Rate Changes: No  Dialysis Catheter Dressing: NA  Last Dressing Change: NA    Electronic Signatures:  Gabi Santos RN (Signed )   Authored:    (Signed )   Authored:     Last Updated: 4:22 PM by GABI SANTOS

## 2025-04-06 NOTE — CARE PLAN
The clinical goals for the shift include pt will remain free from injury this shift    Over the shift, the patient did make progress toward the following goals. Barriers to progression include       Problem: Pain - Adult  Goal: Verbalizes/displays adequate comfort level or baseline comfort level  Outcome: Progressing  Flowsheets (Taken 4/6/2025 1007)  Verbalizes/displays adequate comfort level or baseline comfort level:   Encourage patient to monitor pain and request assistance   Assess pain using appropriate pain scale   Administer analgesics based on type and severity of pain and evaluate response   Implement non-pharmacological measures as appropriate and evaluate response   Consider cultural and social influences on pain and pain management   Notify Licensed Independent Practitioner if interventions unsuccessful or patient reports new pain     Problem: Safety - Adult  Goal: Free from fall injury  Outcome: Progressing  Flowsheets (Taken 4/6/2025 1007)  Free from fall injury: Instruct family/caregiver on patient safety     Problem: Discharge Planning  Goal: Discharge to home or other facility with appropriate resources  Outcome: Progressing  Flowsheets (Taken 4/6/2025 1007)  Discharge to home or other facility with appropriate resources:   Identify barriers to discharge with patient and caregiver   Arrange for needed discharge resources and transportation as appropriate   Identify discharge learning needs (meds, wound care, etc)   Arrange for interpreters to assist at discharge as needed   Refer to discharge planning if patient needs post-hospital services based on physician order or complex needs related to functional status, cognitive ability or social support system

## 2025-04-06 NOTE — CARE PLAN
The patient's goals for the shift include      The clinical goals for the shift include pt will remain free from injury this shift      Problem: Pain - Adult  Goal: Verbalizes/displays adequate comfort level or baseline comfort level  Outcome: Progressing     Problem: Safety - Adult  Goal: Free from fall injury  Outcome: Progressing     Problem: Fall/Injury  Goal: Not fall by end of shift  Outcome: Progressing  Goal: Be free from injury by end of the shift  Outcome: Progressing  Goal: Verbalize understanding of personal risk factors for fall in the hospital  Outcome: Progressing  Goal: Verbalize understanding of risk factor reduction measures to prevent injury from fall in the home  Outcome: Progressing  Goal: Use assistive devices by end of the shift  Outcome: Progressing  Goal: Pace activities to prevent fatigue by end of the shift  Outcome: Progressing

## 2025-04-06 NOTE — CONSULTS
CONSULT: NEPHROLOGY SERVICE    REASON FOR CONSULT: ESKD  Admit Date: 4/5/2025  6:50 PM       HPI: Patient is a 40 y.o. male admitted 4/5/2025 with h/o ESKD on HD, ESRD, metastatic papillary carcinoma getting tx with cabozantinib on hold for now   Pt came with weakness, sleepy, nausea/vomiting after missing dialysis for 1 month, he claims dialysis makes him fell weak    - dialysis at [facility], Dr Grover, MW  schedule, last session on 3/10. 3h/F180  Still have good UO, started HD 1 year ago, clear CXR, no SOB    Past Medical History:   Diagnosis Date    Dorsalgia, unspecified 01/04/2023    Back pain    End stage renal disease (Multi) 01/04/2023    ESRD (end stage renal disease) on dialysis    Essential (primary) hypertension 01/04/2023    Hypertension    Iron deficiency 02/23/2020    Iron deficiency    Other specified postprocedural states     H/O endoscopy     Allergies: Patient has no known allergies.     Past Surgical History:   Procedure Laterality Date    OTHER SURGICAL HISTORY  04/11/2018    Creation Of A-V Graft Fistula For Dialysis       Family History   Problem Relation Name Age of Onset    Hypertension Mother      Cancer Father      Hypertension Father      Other (renal transplant recipient) Mother's Brother         Social History  He reports that he has quit smoking. His smoking use included cigars. He has never been exposed to tobacco smoke. He has never used smokeless tobacco. He reports that he does not currently use alcohol. He reports current drug use. Drug: Marijuana.    Review of Systems  As above     CURRENT HOSP MEDS:    Current Facility-Administered Medications:     acetaminophen (Tylenol) tablet 650 mg, 650 mg, oral, q4h PRN **OR** acetaminophen (Tylenol) oral liquid 650 mg, 650 mg, oral, q4h PRN **OR** acetaminophen (Tylenol) suppository 650 mg, 650 mg, rectal, q4h PRN, ELIZABETH Chang    amLODIPine (Norvasc) tablet 5 mg, 5 mg, oral, Daily, JAIME Chang-CNP, 5 mg at 04/06/25  "0849    atorvastatin (Lipitor) tablet 20 mg, 20 mg, oral, Nightly, Jordon oRse, APRN-CNP, 20 mg at 04/06/25 0039    calcium acetate (Phoslo) capsule 667 mg, 667 mg, oral, TID, Jordon Rose, APRN-CNP, 667 mg at 04/06/25 1108    carvedilol (Coreg) tablet 25 mg, 25 mg, oral, BID, Jordon Rose, APRN-CNP, 25 mg at 04/06/25 0038    cyclobenzaprine (Flexeril) tablet 5 mg, 5 mg, oral, Nightly PRN, Jordon Rose, APRN-CNP    fentaNYL (Duragesic) 50 mcg/hr patch 1 patch, 1 patch, transdermal, q72h, Jordon Rose, APRN-CNP, 1 patch at 04/06/25 0850    hydrALAZINE (Apresoline) tablet 100 mg, 100 mg, oral, BID, Jordon Rose APRN-CNP, 100 mg at 04/06/25 0038    hydrOXYzine HCL (Atarax) tablet 10 mg, 10 mg, oral, Daily PRN, Jordon Rose, APRN-CNP    ondansetron (Zofran) injection 4 mg, 4 mg, intravenous, q6h PRN, Dinesh Lopez, DO, 4 mg at 04/06/25 0905    oxyCODONE (Roxicodone) immediate release tablet 10 mg, 10 mg, oral, q4h PRN, Jordon Rose, APRN-CNP, 10 mg at 04/06/25 0850    pantoprazole (ProtoNix) EC tablet 40 mg, 40 mg, oral, Daily before breakfast, Jordon Rose, APRN-CNP, 40 mg at 04/06/25 0849    sertraline (Zoloft) tablet 50 mg, 50 mg, oral, Daily, Jordon Rose, APRN-CNP, 50 mg at 04/06/25 0847    valsartan (Diovan) tablet 320 mg, 320 mg, oral, Daily, Jordon Rose, APRN-CNP    vitamin B complex-vitamin C-folic acid (Nephrocaps) capsule 1 capsule, 1 capsule, oral, Daily, Jordon Rose, APRN-CNP, 1 capsule at 04/06/25 0847     PHYSICAL EXAM:  /83 (BP Location: Right arm, Patient Position: Lying)   Pulse 81   Temp 36.7 °C (98.1 °F) (Temporal)   Resp 18   Ht 1.7 m (5' 6.93\")   Wt 81.6 kg (180 lb)   SpO2 99%   BMI 28.25 kg/m²     Intake/Output Summary (Last 24 hours) at 4/6/2025 1206  Last data filed at 4/6/2025 0900  Gross per 24 hour   Intake 860 ml   Output 650 ml   Net 210 ml     Gen: Awake, sleepy, NAD  Neck: No JVD  Cardiac: RRR  Resp: clear BS  Abd: Soft, non tender, +BS, non distended "   Ext: No edema   Access: LUE AVF  Neuro: moves 4 ext  Peripheral Pulses: Capillary refill <2secs, strong peripheral pulses.  Skin: Skin color, texture, turgor normal, no suspicious rashes or lesions.    LABS:   Results for orders placed or performed during the hospital encounter of 04/05/25 (from the past 24 hours)   CBC and Auto Differential   Result Value Ref Range    WBC 11.2 4.4 - 11.3 x10*3/uL    nRBC 0.0 0.0 - 0.0 /100 WBCs    RBC 3.32 (L) 4.50 - 5.90 x10*6/uL    Hemoglobin 9.3 (L) 13.5 - 17.5 g/dL    Hematocrit 29.0 (L) 41.0 - 52.0 %    MCV 87 80 - 100 fL    MCH 28.0 26.0 - 34.0 pg    MCHC 32.1 32.0 - 36.0 g/dL    RDW 16.2 (H) 11.5 - 14.5 %    Platelets 229 150 - 450 x10*3/uL    Neutrophils % 83.8 40.0 - 80.0 %    Immature Granulocytes %, Automated 0.4 0.0 - 0.9 %    Lymphocytes % 10.2 13.0 - 44.0 %    Monocytes % 4.5 2.0 - 10.0 %    Eosinophils % 0.7 0.0 - 6.0 %    Basophils % 0.4 0.0 - 2.0 %    Neutrophils Absolute 9.38 (H) 1.20 - 7.70 x10*3/uL    Immature Granulocytes Absolute, Automated 0.05 0.00 - 0.70 x10*3/uL    Lymphocytes Absolute 1.14 (L) 1.20 - 4.80 x10*3/uL    Monocytes Absolute 0.51 0.10 - 1.00 x10*3/uL    Eosinophils Absolute 0.08 0.00 - 0.70 x10*3/uL    Basophils Absolute 0.05 0.00 - 0.10 x10*3/uL   Comprehensive metabolic panel   Result Value Ref Range    Glucose 164 (H) 74 - 99 mg/dL    Sodium 141 136 - 145 mmol/L    Potassium 4.7 3.5 - 5.3 mmol/L    Chloride 96 (L) 98 - 107 mmol/L    Bicarbonate 18 (L) 21 - 32 mmol/L    Anion Gap 32 (H) 10 - 20 mmol/L    Urea Nitrogen 176 (HH) 6 - 23 mg/dL    Creatinine >25.00 (H) 0.50 - 1.30 mg/dL    eGFR      Calcium 7.0 (L) 8.6 - 10.3 mg/dL    Albumin 4.6 3.4 - 5.0 g/dL    Alkaline Phosphatase 120 33 - 120 U/L    Total Protein 7.3 6.4 - 8.2 g/dL    AST 10 9 - 39 U/L    Bilirubin, Total 0.4 0.0 - 1.2 mg/dL    ALT 7 (L) 10 - 52 U/L   Magnesium   Result Value Ref Range    Magnesium 2.26 1.60 - 2.40 mg/dL   Phosphorus   Result Value Ref Range    Phosphorus  13.2 (H) 2.5 - 4.9 mg/dL   B-Type Natriuretic Peptide   Result Value Ref Range    BNP 37 0 - 99 pg/mL   Troponin I, High Sensitivity, Initial   Result Value Ref Range    Troponin I, High Sensitivity 56 (HH) 0 - 20 ng/L   Troponin, High Sensitivity, 1 Hour   Result Value Ref Range    Troponin I, High Sensitivity 51 (HH) 0 - 20 ng/L   CBC and Auto Differential   Result Value Ref Range    WBC 9.5 4.4 - 11.3 x10*3/uL    nRBC 0.0 0.0 - 0.0 /100 WBCs    RBC 2.76 (L) 4.50 - 5.90 x10*6/uL    Hemoglobin 7.8 (L) 13.5 - 17.5 g/dL    Hematocrit 24.1 (L) 41.0 - 52.0 %    MCV 87 80 - 100 fL    MCH 28.3 26.0 - 34.0 pg    MCHC 32.4 32.0 - 36.0 g/dL    RDW 15.9 (H) 11.5 - 14.5 %    Platelets 161 150 - 450 x10*3/uL    Neutrophils % 69.3 40.0 - 80.0 %    Immature Granulocytes %, Automated 0.5 0.0 - 0.9 %    Lymphocytes % 20.8 13.0 - 44.0 %    Monocytes % 7.7 2.0 - 10.0 %    Eosinophils % 1.3 0.0 - 6.0 %    Basophils % 0.4 0.0 - 2.0 %    Neutrophils Absolute 6.59 1.20 - 7.70 x10*3/uL    Immature Granulocytes Absolute, Automated 0.05 0.00 - 0.70 x10*3/uL    Lymphocytes Absolute 1.98 1.20 - 4.80 x10*3/uL    Monocytes Absolute 0.73 0.10 - 1.00 x10*3/uL    Eosinophils Absolute 0.12 0.00 - 0.70 x10*3/uL    Basophils Absolute 0.04 0.00 - 0.10 x10*3/uL   Basic metabolic panel   Result Value Ref Range    Glucose 85 74 - 99 mg/dL    Sodium 140 136 - 145 mmol/L    Potassium 3.7 3.5 - 5.3 mmol/L    Chloride 97 (L) 98 - 107 mmol/L    Bicarbonate 18 (L) 21 - 32 mmol/L    Anion Gap 29 (H) 10 - 20 mmol/L    Urea Nitrogen 174 (HH) 6 - 23 mg/dL    Creatinine >25.00 (H) 0.50 - 1.30 mg/dL    eGFR      Calcium 7.0 (L) 8.6 - 10.3 mg/dL   SST TOP   Result Value Ref Range    Extra Tube Hold for add-ons.      *Note: Due to a large number of results and/or encounters for the requested time period, some results have not been displayed. A complete set of results can be found in Results Review.       DATA:   Diagnostic tests reviewed for today's visit:    Labs  and meds    ASSESSMENT AND PLAN:  - ESKD on HD: pt is uremic with very BUN/Scr after missing 1 month of dialysis, still with acceptable volume status   He agrees to restart dialysis, gentle session today and tomorrow to avoid DDS  Will need to make sure HD spot still active [facility]  HTN: controlled  AGMA  BMD: increasing Ca acetate dose, hypoCa and HyperP  Hb 7.8, adding epogen      Greatly appreciate the opportunity to assist in the care of this patient. Will continue to follow.     Signature: Teofilo Simmons MD  Division of Nephrology and Hypertension

## 2025-04-06 NOTE — PROGRESS NOTES
Received patient in signout from Dr. Taylor. In short the patient is a 40 y.o. male presenting with ***. In the ED, ***. Patient *** in *** condition.

## 2025-04-07 ENCOUNTER — APPOINTMENT (OUTPATIENT)
Dept: DIALYSIS | Facility: HOSPITAL | Age: 40
End: 2025-04-07
Payer: COMMERCIAL

## 2025-04-07 LAB
ALBUMIN SERPL BCP-MCNC: 4 G/DL (ref 3.4–5)
ANION GAP SERPL CALC-SCNC: 24 MMOL/L (ref 10–20)
ATRIAL RATE: 82 BPM
BUN SERPL-MCNC: 100 MG/DL (ref 6–23)
CALCIUM SERPL-MCNC: 8.1 MG/DL (ref 8.6–10.3)
CHLORIDE SERPL-SCNC: 99 MMOL/L (ref 98–107)
CO2 SERPL-SCNC: 21 MMOL/L (ref 21–32)
CREAT SERPL-MCNC: 22.96 MG/DL (ref 0.5–1.3)
EGFRCR SERPLBLD CKD-EPI 2021: 2 ML/MIN/1.73M*2
ERYTHROCYTE [DISTWIDTH] IN BLOOD BY AUTOMATED COUNT: 15.5 % (ref 11.5–14.5)
GLUCOSE SERPL-MCNC: 80 MG/DL (ref 74–99)
HCT VFR BLD AUTO: 25.3 % (ref 41–52)
HGB BLD-MCNC: 8.1 G/DL (ref 13.5–17.5)
MCH RBC QN AUTO: 28.4 PG (ref 26–34)
MCHC RBC AUTO-ENTMCNC: 32 G/DL (ref 32–36)
MCV RBC AUTO: 89 FL (ref 80–100)
NRBC BLD-RTO: 0 /100 WBCS (ref 0–0)
P OFFSET: 160 MS
P ONSET: 140 MS
PHOSPHATE SERPL-MCNC: 8.1 MG/DL (ref 2.5–4.9)
PLATELET # BLD AUTO: 169 X10*3/UL (ref 150–450)
POTASSIUM SERPL-SCNC: 3.5 MMOL/L (ref 3.5–5.3)
PR INTERVAL: 124 MS
Q ONSET: 202 MS
QRS COUNT: 13 BEATS
QRS DURATION: 94 MS
QT INTERVAL: 418 MS
QTC CALCULATION(BAZETT): 488 MS
QTC FREDERICIA: 463 MS
R AXIS: -11 DEGREES
RBC # BLD AUTO: 2.85 X10*6/UL (ref 4.5–5.9)
SODIUM SERPL-SCNC: 140 MMOL/L (ref 136–145)
T AXIS: 22 DEGREES
T OFFSET: 411 MS
VENTRICULAR RATE: 82 BPM
WBC # BLD AUTO: 8.6 X10*3/UL (ref 4.4–11.3)

## 2025-04-07 PROCEDURE — 99232 SBSQ HOSP IP/OBS MODERATE 35: CPT | Performed by: INTERNAL MEDICINE

## 2025-04-07 PROCEDURE — 80069 RENAL FUNCTION PANEL: CPT | Performed by: INTERNAL MEDICINE

## 2025-04-07 PROCEDURE — 2500000002 HC RX 250 W HCPCS SELF ADMINISTERED DRUGS (ALT 637 FOR MEDICARE OP, ALT 636 FOR OP/ED): Performed by: NURSE PRACTITIONER

## 2025-04-07 PROCEDURE — 6350000001 HC RX 635 EPOETIN >10,000 UNITS: Mod: JZ | Performed by: INTERNAL MEDICINE

## 2025-04-07 PROCEDURE — 1100000001 HC PRIVATE ROOM DAILY

## 2025-04-07 PROCEDURE — 2500000001 HC RX 250 WO HCPCS SELF ADMINISTERED DRUGS (ALT 637 FOR MEDICARE OP): Performed by: INTERNAL MEDICINE

## 2025-04-07 PROCEDURE — 36415 COLL VENOUS BLD VENIPUNCTURE: CPT | Performed by: INTERNAL MEDICINE

## 2025-04-07 PROCEDURE — 2500000004 HC RX 250 GENERAL PHARMACY W/ HCPCS (ALT 636 FOR OP/ED): Performed by: INTERNAL MEDICINE

## 2025-04-07 PROCEDURE — 85027 COMPLETE CBC AUTOMATED: CPT | Performed by: INTERNAL MEDICINE

## 2025-04-07 PROCEDURE — 5A1D70Z PERFORMANCE OF URINARY FILTRATION, INTERMITTENT, LESS THAN 6 HOURS PER DAY: ICD-10-PCS | Performed by: INTERNAL MEDICINE

## 2025-04-07 PROCEDURE — 8010000001 HC DIALYSIS - HEMODIALYSIS PER DAY

## 2025-04-07 PROCEDURE — 2500000001 HC RX 250 WO HCPCS SELF ADMINISTERED DRUGS (ALT 637 FOR MEDICARE OP): Performed by: NURSE PRACTITIONER

## 2025-04-07 RX ORDER — PROCHLORPERAZINE MALEATE 10 MG
1 TABLET ORAL EVERY 8 HOURS PRN
COMMUNITY
Start: 2025-03-30

## 2025-04-07 RX ORDER — TORSEMIDE 20 MG/1
20 TABLET ORAL 3 TIMES DAILY PRN
COMMUNITY
Start: 2025-03-14

## 2025-04-07 RX ADMIN — OXYCODONE HYDROCHLORIDE 10 MG: 5 TABLET ORAL at 18:16

## 2025-04-07 RX ADMIN — OXYCODONE HYDROCHLORIDE 10 MG: 5 TABLET ORAL at 22:20

## 2025-04-07 RX ADMIN — SERTRALINE 50 MG: 50 TABLET, FILM COATED ORAL at 12:37

## 2025-04-07 RX ADMIN — EPOETIN ALFA-EPBX 10000 UNITS: 10000 INJECTION, SOLUTION INTRAVENOUS; SUBCUTANEOUS at 17:18

## 2025-04-07 RX ADMIN — PANTOPRAZOLE SODIUM 40 MG: 40 TABLET, DELAYED RELEASE ORAL at 06:10

## 2025-04-07 RX ADMIN — CALCIUM ACETATE 1334 MG: 667 CAPSULE ORAL at 12:37

## 2025-04-07 RX ADMIN — ONDANSETRON 4 MG: 2 INJECTION, SOLUTION INTRAMUSCULAR; INTRAVENOUS at 08:48

## 2025-04-07 RX ADMIN — OXYCODONE HYDROCHLORIDE 10 MG: 5 TABLET ORAL at 01:49

## 2025-04-07 RX ADMIN — CARVEDILOL 25 MG: 25 TABLET, FILM COATED ORAL at 12:37

## 2025-04-07 RX ADMIN — LIDOCAINE AND PRILOCAINE: 25; 25 CREAM TOPICAL at 08:30

## 2025-04-07 RX ADMIN — VALSARTAN 320 MG: 160 TABLET, FILM COATED ORAL at 12:37

## 2025-04-07 RX ADMIN — HYDRALAZINE HYDROCHLORIDE 100 MG: 50 TABLET ORAL at 12:36

## 2025-04-07 RX ADMIN — AMLODIPINE BESYLATE 5 MG: 5 TABLET ORAL at 12:37

## 2025-04-07 RX ADMIN — ATORVASTATIN CALCIUM 20 MG: 20 TABLET, FILM COATED ORAL at 21:54

## 2025-04-07 RX ADMIN — Medication 1 CAPSULE: at 12:36

## 2025-04-07 RX ADMIN — CALCIUM ACETATE 1334 MG: 667 CAPSULE ORAL at 17:17

## 2025-04-07 ASSESSMENT — COGNITIVE AND FUNCTIONAL STATUS - GENERAL
MOBILITY SCORE: 24
DAILY ACTIVITIY SCORE: 24
MOBILITY SCORE: 24
DAILY ACTIVITIY SCORE: 24

## 2025-04-07 ASSESSMENT — PAIN - FUNCTIONAL ASSESSMENT
PAIN_FUNCTIONAL_ASSESSMENT: 0-10
PAIN_FUNCTIONAL_ASSESSMENT: 0-10
PAIN_FUNCTIONAL_ASSESSMENT: NO/DENIES PAIN
PAIN_FUNCTIONAL_ASSESSMENT: 0-10

## 2025-04-07 ASSESSMENT — PAIN SCALES - GENERAL
PAINLEVEL_OUTOF10: 8
PAINLEVEL_OUTOF10: 4
PAINLEVEL_OUTOF10: 7
PAINLEVEL_OUTOF10: 5 - MODERATE PAIN
PAINLEVEL_OUTOF10: 6
PAINLEVEL_OUTOF10: 8
PAINLEVEL_OUTOF10: 2
PAINLEVEL_OUTOF10: 4

## 2025-04-07 ASSESSMENT — PAIN DESCRIPTION - LOCATION
LOCATION: BACK

## 2025-04-07 ASSESSMENT — PAIN DESCRIPTION - ORIENTATION
ORIENTATION: LOWER

## 2025-04-07 NOTE — PROGRESS NOTES
04/07/25 0924   Discharge Planning   Living Arrangements Spouse/significant other;Children  (4 kids)   Support Systems Spouse/significant other   Assistance Needed A&Ox4, independent with ADLs, no DME, room air at basline except during dialysis he wears oxygen. Pt is set up at [facility] [place] MWF @ 0600. Drives.   Type of Residence Private residence   Number of Stairs to Enter Residence 10   Number of Stairs Within Residence 0  (lives on 1st floor)   Do you have animals or pets at home? No   Home or Post Acute Services None   Expected Discharge Disposition Home   Does the patient need discharge transport arranged? No   Stroke Family Assessment   Stroke Family Assessment Needed No   Intensity of Service   Intensity of Service 0-30 min

## 2025-04-07 NOTE — POST-PROCEDURE NOTE
Report to Receiving RN:    Report To: Moon  Time Report Called: 1201  Hand-Off Communication: 2hrs HD completed via LAVF without complications, no fluid was removed. Post HD /98 HR 84. Pt stable at time of discharge back to the nursing floor  Complications During Treatment: No  Ultrafiltration : No, 0  Medications Administered During Dialysis: No  Blood Products Administered During Dialysis: No  Labs Sent During Dialysis: No  Heparin Drip Rate Changes: N/A  Dialysis Catheter Dressing: N/A  Last Dressing Change: N/A    Electronic Signatures:   (Signed Caitlin)   Authored:    (Signed )   Authored:     Last Updated: 1:08 PM by ARNOLDO LORD

## 2025-04-07 NOTE — PROCEDURES
"Patient seen on dialysis.  F160 dialyzer, BFR//500 mL/minute, 2K bath, 2.5 calcium with no ultrafiltration.  Patient is agreeable for only 2 hours of dialysis.  Erythropoietin and phosphorus binder ordered.  Tolerating dialysis at the moment.  Continue per submitted orders.    /76 (BP Location: Right arm, Patient Position: Lying)   Pulse 86   Temp 36.6 °C (97.9 °F)   Resp 18   Ht 1.7 m (5' 6.93\")   Wt 81.6 kg (180 lb)   SpO2 98%   BMI 28.25 kg/m²       No current facility-administered medications on file prior to encounter.     Current Outpatient Medications on File Prior to Encounter   Medication Sig Dispense Refill    amLODIPine (Norvasc) 10 mg tablet Take 1 tablet (10 mg) by mouth early in the morning.. Take half  a tablet  daily for 5-7 days. Then if SBP still above 140 afterwards, take one pill daily. 90 tablet 3    atorvastatin (Lipitor) 20 mg tablet TAKE 1 TABLET BY MOUTH EVERY NIGHT AT BEDTIME 30 tablet 10    benzoyl peroxide 5 % external wash Apply topically once daily in the morning. May bleach fabrics, use an old or white towel 142 g 3    cabozantinib (Cabometyx) 40 mg tablet TAKE ONE (1) TABLET BY MOUTH ONCE A DAY 90 tablet 6    calcium acetate (Phoslo) 667 mg capsule Take 1 capsule (667 mg) by mouth 3 times daily (morning, midday, late afternoon). 270 capsule 3    carvedilol (Coreg) 25 mg tablet TAKE 1 TABLET BY MOUTH TWICE A DAY 60 tablet 11    cyclobenzaprine (Flexeril) 5 mg tablet Take 1 tablet (5 mg) by mouth as needed at bedtime for muscle spasms. 30 tablet 0    fentaNYL (Duragesic) 50 mcg/hr patch Place 1 patch over 72 hours on the skin every 3rd day. 10 patch 0    hydrALAZINE (Apresoline) 100 mg tablet Take 1 tablet (100 mg) by mouth 2 times a day. 180 tablet 3    hydrocortisone 2.5 % cream Apply topically if needed for irritation. 56.7 g 3    hydrOXYzine HCL (Atarax) 10 mg tablet Take 1 tablet (10 mg) by mouth once daily as needed for itching. 30 tablet 0    " "lidocaine-prilocaine (Emla) 2.5-2.5 % cream Apply thick layer 2 hours prior to dialysis site, cover with plastic wrap. 30 g 11    loratadine (Claritin) 10 mg tablet Take 1 tablet (10 mg) by mouth once daily. 30 tablet 11    menthol-zinc oxide (Calmoseptine) 0.44-20.6 % ointment Apply 1 Application topically if needed for irritation. 113 g 11    naloxone (Narcan) 4 mg/0.1 mL nasal spray Administer 1 spray (4 mg) into affected nostril(s) if needed for opioid reversal. May repeat every 2-3 minutes if needed, alternating nostrils, until medical assistance becomes available. 2 each 0    non-adherent bandage (Curity Abdominal Pad) 8 X 10 \" bandage Apply 1 Application topically once daily. To areas of hidradenitis for drainage 18 each 11    olmesartan (BENIcar) 40 mg tablet Take 1 tablet (40 mg) by mouth once daily. 30 tablet 11    omeprazole (PriLOSEC) 40 mg DR capsule TAKE 1 CAPSULE BY MOUTH DAILY 30 capsule 10    ondansetron (Zofran) 8 mg tablet Take 1 tablet (8 mg) by mouth every 8 hours if needed for nausea. 60 tablet 11    oxyCODONE (Roxicodone) 10 mg immediate release tablet Take 1 tablet (10 mg) by mouth every 4 hours if needed for severe pain (7 - 10). 180 tablet 0    [] prochlorperazine (Compazine) 10 mg tablet Take 1 tablet (10 mg) by mouth every 8 hours if needed for nausea or vomiting. 30 tablet 2    secukinumab (Cosentyx Pen) 150 mg/mL self-injector pen Inject 300 mg (2 pens) under the skin every 2 weeks 4 mL 5    sertraline (Zoloft) 50 mg tablet Take 1 tablet (50 mg) by mouth once daily. 30 tablet 1    tadalafil (Cialis) 5 mg tablet Take 1 tablet (5 mg) by mouth once daily. 30 tablet 0    vitamin B complex-vitamin C-folic acid (Nephrocaps) 1 mg capsule Take 1 capsule by mouth once daily. 90 capsule 3       "

## 2025-04-07 NOTE — PROGRESS NOTES
Siddhartha Orellana is a 40 y.o. male on day 2 of admission presenting with Dialysis discontinued by patient.      Subjective   Patient was seen and examined bedside this morning, he was still in the dialysis unit, stated that he is tolerating despite admitted to some discomfort as usual.  Patient denies having any symptom at that time.  We discussed importance of compliance with dialysis, to avoid dealing with similar situation in the future.  Spouse was present, and patient expressed verbal understanding.       Objective     Last Recorded Vitals  /60 (BP Location: Right arm, Patient Position: Lying)   Pulse 86   Temp 36.9 °C (98.5 °F) (Oral)   Resp 18   Wt 81.6 kg (180 lb)   SpO2 95%   Intake/Output last 3 Shifts:    Intake/Output Summary (Last 24 hours) at 4/7/2025 1951  Last data filed at 4/7/2025 1153  Gross per 24 hour   Intake 1200 ml   Output 700 ml   Net 500 ml       Admission Weight  Weight: 81.6 kg (180 lb) (04/05/25 1807)    Daily Weight  04/05/25 : 81.6 kg (180 lb)    Image Results  ECG 12 lead  Normal sinus rhythm  Moderate voltage criteria for LVH, may be normal variant ( R in aVL , Juan product )  ST & T wave abnormality, consider inferior ischemia  QTcB >= 480 msec  Abnormal ECG  When compared with ECG of 05-AUG-2024 09:46,  ST now depressed in Anterior leads  Nonspecific T wave abnormality now evident in Inferior leads  See ED provider note for full interpretation and clinical correlation  Confirmed by Bernarda Dawn ([id number]) on 4/7/2025 7:23:58 PM      Physical Exam  Constitutional: Alert active, cooperative not in acute distress  Eyes: PERRLA, clear sclera  ENMT: Moist mucosal membranes, no exudate  Head / Neck: Atraumatic, normocephalic, supple neck, JVP not visualized  Lungs: Patent airways, CTABL  Heart: RRR, S1S2, no murmurs appreciated, palpable pulses in all extremities  GI: Soft, NT, ND, bowel sounds present in all quadrants  MSK: Moves all extremities freely, no restriction   of ROM, no joint edema  Extremities: No dialysis access via AV fistula on the left forearm, patent with bruit, no peripheral edema  : No Chapman catheter inserted  Breast: Deferred  Neurological: AAO x 3 to person, place and date, facial muscles symmetrical, sensation intact, strength 4/4, no acute focal neurological deficits appreciated  Psychological: Appropriate mood and behavior  Relevant Results             Scheduled medications  amLODIPine, 5 mg, oral, Daily  atorvastatin, 20 mg, oral, Nightly  calcium acetate, 1,334 mg, oral, TID  carvedilol, 25 mg, oral, BID  epoetin mannie or biosimilar, 10,000 Units, subcutaneous, Once per day on Monday Wednesday Friday  fentaNYL, 1 patch, transdermal, q72h  hydrALAZINE, 100 mg, oral, BID  pantoprazole, 40 mg, oral, Daily before breakfast  sertraline, 50 mg, oral, Daily  valsartan, 320 mg, oral, Daily  vitamin B complex-vitamin C-folic acid, 1 capsule, oral, Daily      Continuous medications     PRN medications  PRN medications: acetaminophen **OR** acetaminophen **OR** acetaminophen, cyclobenzaprine, hydrOXYzine HCL, lidocaine-prilocaine, ondansetron, oxyCODONE    Assessment/Plan       40 y.o. male with a past medical history of ESRD, renal cell carcinoma, GERD, hidradenitis suppurativa, NARGIS, psoriasis, type 2 diabetes mellitus,  SHIVA presenting for hemodialysis need.  Patient reports that he had things going on in his life and he has missed multiple sessions totaling 1 month      Stage renal disease on hemodialysis  -Labs Reviewed  - Continue Nephrocaps  - Continue PhosLo  - Patient currently only agreeing to 1 session of dialysis while inpatient  - Nephrology consulted: Appreciate evaluation and management     Hypertension: Stable, appears controlled  -Olmesartan  -Hydralazine  -Amlodipine     type 2 diabetes mellitus  - Sliding scale     RCC  -Pain regimen per home meds fentanyl patch and as needed oxycodone  -Follow-up with oncology for resumption of your RCC  medication     GERD  - Pantoprazole     DVT prophylaxis-heparin subcutaneous     Disposition: Presenting with missed hemodialysis done due to noncompliance, need further management, discharge pending clinical stability     Anticipate discharge pending clearance by nephrology.            Dinesh Lopez, DO

## 2025-04-07 NOTE — PROGRESS NOTES
Pharmacy Medication History     Source of Information: PATIENT    Additional concerns with the patient's PTA list.     Notified Provider via Haiku : No    The following updates were made to the Prior to Admission medication list:     Medications ADDED:   TORSEMIDE 20 MG  Medications CHANGED:  N/A  Medications REMOVED:   N/A  Medications NOT TAKING:   N/A    Allergy reviewed : Yes    Meds 2 Beds : Yes    Outpatient pharmacy confirmed and updated in chart : Yes    Pharmacy name: [facility]    The list below reflectives the updated PTA list. Please review each medication in order reconciliation for additional clarification and justification.    Prior to Admission Medications   Prescriptions Last Dose Informant     amLODIPine (Norvasc) 10 mg tablet 4/5/2025 Morning      Sig: Take 1 tablet (10 mg) by mouth early in the morning.. Take half  a tablet  daily for 5-7 days. Then if SBP still above 140 afterwards, take one pill daily.   atorvastatin (Lipitor) 20 mg tablet 4/4/2025 Bedtime      Sig: TAKE 1 TABLET BY MOUTH EVERY NIGHT AT BEDTIME   benzoyl peroxide 5 % external wash Past Week      Sig: Apply topically once daily in the morning. May bleach fabrics, use an old or white towel   cabozantinib (Cabometyx) 40 mg tablet 4/5/2025 Morning      Sig: TAKE ONE (1) TABLET BY MOUTH ONCE A DAY   calcium acetate (Phoslo) 667 mg capsule 4/5/2025 Morning      Sig: Take 1 capsule (667 mg) by mouth 3 times daily (morning, midday, late afternoon).   carvedilol (Coreg) 25 mg tablet 4/5/2025 Morning      Sig: TAKE 1 TABLET BY MOUTH TWICE A DAY   cyclobenzaprine (Flexeril) 5 mg tablet Past Week      Sig: Take 1 tablet (5 mg) by mouth as needed at bedtime for muscle spasms.   fentaNYL (Duragesic) 50 mcg/hr patch Past Week      Sig: Place 1 patch over 72 hours on the skin every 3rd day.   hydrALAZINE (Apresoline) 100 mg tablet 4/5/2025 Morning      Sig: Take 1 tablet (100 mg) by mouth 2 times a day.   hydrOXYzine HCL (Atarax) 10 mg tablet   "     Sig: Take 1 tablet (10 mg) by mouth once daily as needed for itching.   hydrocortisone 2.5 % cream Past Week      Sig: Apply topically if needed for irritation.   lidocaine-prilocaine (Emla) 2.5-2.5 % cream Past Week      Sig: Apply thick layer 2 hours prior to dialysis site, cover with plastic wrap.   loratadine (Claritin) 10 mg tablet Past Week      Sig: Take 1 tablet (10 mg) by mouth once daily.   menthol-zinc oxide (Calmoseptine) 0.44-20.6 % ointment Past Week      Sig: Apply 1 Application topically if needed for irritation.   naloxone (Narcan) 4 mg/0.1 mL nasal spray       Sig: Administer 1 spray (4 mg) into affected nostril(s) if needed for opioid reversal. May repeat every 2-3 minutes if needed, alternating nostrils, until medical assistance becomes available.   non-adherent bandage (Curity Abdominal Pad) 8 X 10 \" bandage       Sig: Apply 1 Application topically once daily. To areas of hidradenitis for drainage   olmesartan (BENIcar) 40 mg tablet 4/5/2025 Morning      Sig: Take 1 tablet (40 mg) by mouth once daily.   omeprazole (PriLOSEC) 40 mg DR capsule 4/5/2025 Morning      Sig: TAKE 1 CAPSULE BY MOUTH DAILY   ondansetron (Zofran) 8 mg tablet Past Week      Sig: Take 1 tablet (8 mg) by mouth every 8 hours if needed for nausea.   oxyCODONE (Roxicodone) 10 mg immediate release tablet Past Week      Sig: Take 1 tablet (10 mg) by mouth every 4 hours if needed for severe pain (7 - 10).   prochlorperazine (Compazine) 10 mg tablet       Sig: Take 1 tablet (10 mg) by mouth every 8 hours if needed for nausea or vomiting.             secukinumab (Cosentyx Pen) 150 mg/mL self-injector pen Past Week      Sig: Inject 300 mg (2 pens) under the skin every 2 weeks   sertraline (Zoloft) 50 mg tablet 4/5/2025 Morning      Sig: Take 1 tablet (50 mg) by mouth once daily.   tadalafil (Cialis) 5 mg tablet       Sig: Take 1 tablet (5 mg) by mouth once daily.   torsemide (Demadex) 20 mg tablet Past Week      Sig: Take 1 " tablet (20 mg) by mouth 3 times a day as needed.   vitamin B complex-vitamin C-folic acid (Nephrocaps) 1 mg capsule 4/5/2025 Morning      Sig: Take 1 capsule by mouth once daily.      Facility-Administered Medications: None       The list below reflectives the updated allergy list. Please review each documented allergy for additional clarification and justification.    No Known Allergies       04/07/25 at 2:16 PM - Clarisse Sanchez

## 2025-04-07 NOTE — PROGRESS NOTES
Siddhartha Orellana is a 40 y.o. male on day 1 of admission presenting with Dialysis discontinued by patient.      Subjective   Patient was seen and examined bedside this morning, stated that looking forward to getting dialysis.  Patient admitted to nausea, but resolved after 1 dose of Zofran.       Objective     Last Recorded Vitals  BP (!) 148/91 (BP Location: Right arm, Patient Position: Lying)   Pulse 82   Temp 36.6 °C (97.9 °F) (Temporal)   Resp 18   Wt 81.6 kg (180 lb)   SpO2 99%   Intake/Output last 3 Shifts:    Intake/Output Summary (Last 24 hours) at 4/6/2025 2000  Last data filed at 4/6/2025 1620  Gross per 24 hour   Intake 2060 ml   Output 1050 ml   Net 1010 ml       Admission Weight  Weight: 81.6 kg (180 lb) (04/05/25 1807)    Daily Weight  04/05/25 : 81.6 kg (180 lb)    Image Results  XR chest 1 view  Narrative: Interpreted By:  Finkelstein, Evan,   STUDY:  XR CHEST 1 VIEW;  4/5/2025 7:37 pm      INDICATION:  Signs/Symptoms:shortness of breath.          COMPARISON:  Chest radiograph 08/05/2024      ACCESSION NUMBER(S):  [id number]      ORDERING CLINICIAN:  JUJU CARRIZALES      FINDINGS:          CARDIOMEDIASTINAL SILHOUETTE:  Enlarged cardiac silhouette, similar compared to prior imaging.      LUNGS:  No pulmonary consolidation, pleural effusion or pneumothorax.      ABDOMEN:  No remarkable upper abdominal findings.      BONES:  No acute osseous abnormality.      Impression: No radiographic evidence of acute cardiopulmonary pathology.      MACRO:  None.      Signed by: Evan Finkelstein 4/5/2025 7:42 PM  Dictation workstation:   [id number]      Physical Exam  Constitutional: Alert active, cooperative not in acute distress  Eyes: PERRLA, clear sclera  ENMT: Moist mucosal membranes, no exudate  Head / Neck: Atraumatic, normocephalic, supple neck, JVP not visualized  Lungs: Patent airways, CTABL  Heart: RRR, S1S2, no murmurs appreciated, palpable pulses in all extremities  GI: Soft, NT, ND, bowel sounds  present in all quadrants  MSK: Moves all extremities freely, no restriction  of ROM, no joint edema  Extremities: No dialysis access via AV fistula on the left forearm, patent with bruit, no peripheral edema  : No Chapman catheter inserted  Breast: Deferred  Neurological: AAO x 3 to person, place and date, facial muscles symmetrical, sensation intact, strength 4/4, no acute focal neurological deficits appreciated  Psychological: Appropriate mood and behavior    Relevant Results           Scheduled medications  amLODIPine, 5 mg, oral, Daily  atorvastatin, 20 mg, oral, Nightly  calcium acetate, 1,334 mg, oral, TID  carvedilol, 25 mg, oral, BID  [START ON 4/7/2025] epoetin mannie or biosimilar, 10,000 Units, subcutaneous, Once per day on Monday Wednesday Friday  fentaNYL, 1 patch, transdermal, q72h  hydrALAZINE, 100 mg, oral, BID  pantoprazole, 40 mg, oral, Daily before breakfast  sertraline, 50 mg, oral, Daily  valsartan, 320 mg, oral, Daily  vitamin B complex-vitamin C-folic acid, 1 capsule, oral, Daily      Continuous medications     PRN medications  PRN medications: acetaminophen **OR** acetaminophen **OR** acetaminophen, cyclobenzaprine, hydrOXYzine HCL, lidocaine-prilocaine, ondansetron, oxyCODONE      Assessment/Plan     40 y.o. male with a past medical history of ESRD, renal cell carcinoma, GERD, hidradenitis suppurativa, NARGIS, psoriasis, type 2 diabetes mellitus,  SHIVA presenting for hemodialysis need.  Patient reports that he had things going on in his life and he has missed multiple sessions totaling 1 month     Stage renal disease on hemodialysis  -Nephrology consult: Hemodialysis pending  -Labs Reviewed  - Continue Nephrocaps  - Continue PhosLo  - Patient currently only agreeing to 1 session of dialysis while inpatient     Hypertension: Stable, appears controlled  -Olmesartan  -Hydralazine  -Amlodipine     type 2 diabetes mellitus  - Sliding scale     RCC  -Pain regimen per home meds fentanyl patch and as  needed oxycodone  -Follow-up with oncology for resumption of your RCC medication     GERD  - Pantoprazole     DVT prophylaxis-heparin subcutaneous    Disposition: Presenting with missed hemodialysis done due to noncompliance, need further management, discharge pending clinical stability    Anticipate discharge pending clearance by nephrology.  Possibly tomorrow has patient intent to only have 1 dialysis session              Dinesh Lopez, DO

## 2025-04-07 NOTE — CARE PLAN
The patient's goals for the shift include      The clinical goals for the shift include Pt will remain comfortable throughout shift

## 2025-04-07 NOTE — PRE-PROCEDURE NOTE
Report from Sending RN:    Report From: Moon Parikh  Recent Surgery of Procedure: No  Baseline Level of Consciousness (LOC): A&Ox4  Oxygen Use: No  Type: RA  Diabetic: Yes  Last BP Med Given Day of Dialysis: see MAR  Last Pain Med Given: see MAR  Lab Tests to be Obtained with Dialysis: No  Blood Transfusion to be Given During Dialysis: No  Available IV Access: Yes  Medications to be Administered During Dialysis: see MAR  Continuous IV Infusion Running: No  Restraints on Currently or in the Last 24 Hours: No  Hand-Off Communication: full code/lavf stable for tx/missed hd  Dialysis Catheter Dressing: N/A  Last Dressing Change: N/A

## 2025-04-08 VITALS
HEIGHT: 67 IN | OXYGEN SATURATION: 97 % | SYSTOLIC BLOOD PRESSURE: 126 MMHG | TEMPERATURE: 98.2 F | WEIGHT: 180 LBS | BODY MASS INDEX: 28.25 KG/M2 | DIASTOLIC BLOOD PRESSURE: 73 MMHG | HEART RATE: 70 BPM | RESPIRATION RATE: 18 BRPM

## 2025-04-08 LAB
ALBUMIN SERPL BCP-MCNC: 4 G/DL (ref 3.4–5)
ANION GAP SERPL CALC-SCNC: 19 MMOL/L (ref 10–20)
BUN SERPL-MCNC: 64 MG/DL (ref 6–23)
CALCIUM SERPL-MCNC: 8.5 MG/DL (ref 8.6–10.3)
CHLORIDE SERPL-SCNC: 100 MMOL/L (ref 98–107)
CO2 SERPL-SCNC: 26 MMOL/L (ref 21–32)
CREAT SERPL-MCNC: 17.18 MG/DL (ref 0.5–1.3)
EGFRCR SERPLBLD CKD-EPI 2021: 3 ML/MIN/1.73M*2
ERYTHROCYTE [DISTWIDTH] IN BLOOD BY AUTOMATED COUNT: 15 % (ref 11.5–14.5)
GLUCOSE SERPL-MCNC: 83 MG/DL (ref 74–99)
HCT VFR BLD AUTO: 23.6 % (ref 41–52)
HGB BLD-MCNC: 7.9 G/DL (ref 13.5–17.5)
MCH RBC QN AUTO: 28.5 PG (ref 26–34)
MCHC RBC AUTO-ENTMCNC: 33.5 G/DL (ref 32–36)
MCV RBC AUTO: 85 FL (ref 80–100)
NRBC BLD-RTO: 0 /100 WBCS (ref 0–0)
PHOSPHATE SERPL-MCNC: 7.4 MG/DL (ref 2.5–4.9)
PLATELET # BLD AUTO: 168 X10*3/UL (ref 150–450)
POTASSIUM SERPL-SCNC: 3.1 MMOL/L (ref 3.5–5.3)
RBC # BLD AUTO: 2.77 X10*6/UL (ref 4.5–5.9)
SODIUM SERPL-SCNC: 142 MMOL/L (ref 136–145)
WBC # BLD AUTO: 8.6 X10*3/UL (ref 4.4–11.3)

## 2025-04-08 PROCEDURE — 80069 RENAL FUNCTION PANEL: CPT | Performed by: INTERNAL MEDICINE

## 2025-04-08 PROCEDURE — 2500000001 HC RX 250 WO HCPCS SELF ADMINISTERED DRUGS (ALT 637 FOR MEDICARE OP): Performed by: INTERNAL MEDICINE

## 2025-04-08 PROCEDURE — 2500000001 HC RX 250 WO HCPCS SELF ADMINISTERED DRUGS (ALT 637 FOR MEDICARE OP): Performed by: NURSE PRACTITIONER

## 2025-04-08 PROCEDURE — 2500000004 HC RX 250 GENERAL PHARMACY W/ HCPCS (ALT 636 FOR OP/ED): Performed by: INTERNAL MEDICINE

## 2025-04-08 PROCEDURE — 36415 COLL VENOUS BLD VENIPUNCTURE: CPT | Performed by: INTERNAL MEDICINE

## 2025-04-08 PROCEDURE — 85027 COMPLETE CBC AUTOMATED: CPT | Performed by: INTERNAL MEDICINE

## 2025-04-08 PROCEDURE — 99239 HOSP IP/OBS DSCHRG MGMT >30: CPT | Performed by: INTERNAL MEDICINE

## 2025-04-08 RX ADMIN — ONDANSETRON 4 MG: 2 INJECTION, SOLUTION INTRAMUSCULAR; INTRAVENOUS at 14:22

## 2025-04-08 RX ADMIN — OXYCODONE HYDROCHLORIDE 10 MG: 5 TABLET ORAL at 06:28

## 2025-04-08 RX ADMIN — CALCIUM ACETATE 1334 MG: 667 CAPSULE ORAL at 17:13

## 2025-04-08 RX ADMIN — CALCIUM ACETATE 1334 MG: 667 CAPSULE ORAL at 11:53

## 2025-04-08 RX ADMIN — CARVEDILOL 25 MG: 25 TABLET, FILM COATED ORAL at 08:35

## 2025-04-08 RX ADMIN — AMLODIPINE BESYLATE 5 MG: 5 TABLET ORAL at 08:34

## 2025-04-08 RX ADMIN — PANTOPRAZOLE SODIUM 40 MG: 40 TABLET, DELAYED RELEASE ORAL at 06:26

## 2025-04-08 RX ADMIN — CALCIUM ACETATE 1334 MG: 667 CAPSULE ORAL at 08:35

## 2025-04-08 RX ADMIN — OXYCODONE HYDROCHLORIDE 10 MG: 5 TABLET ORAL at 14:21

## 2025-04-08 ASSESSMENT — PAIN DESCRIPTION - DESCRIPTORS: DESCRIPTORS: ACHING

## 2025-04-08 ASSESSMENT — PAIN DESCRIPTION - LOCATION
LOCATION: GENERALIZED
LOCATION: BACK

## 2025-04-08 ASSESSMENT — PAIN SCALES - GENERAL
PAINLEVEL_OUTOF10: 0 - NO PAIN
PAINLEVEL_OUTOF10: 7
PAINLEVEL_OUTOF10: 0 - NO PAIN
PAINLEVEL_OUTOF10: 0 - NO PAIN
PAINLEVEL_OUTOF10: 7
PAINLEVEL_OUTOF10: 0 - NO PAIN
PAINLEVEL_OUTOF10: 3

## 2025-04-08 ASSESSMENT — PAIN - FUNCTIONAL ASSESSMENT
PAIN_FUNCTIONAL_ASSESSMENT: 0-10

## 2025-04-08 NOTE — CARE PLAN
The patient's goals for the shift include      The clinical goals for the shift include Maintain patient safety and manage patient's pain throughout the shift       Over the shift patient did make progress on her goals.

## 2025-04-08 NOTE — PROGRESS NOTES
Siddhartha Orellana is a 40 y.o. male who was referred to the Clinical Pharmacy Team to complete a Transitions of Care encounter for discharge medication optimization. The patient was referred for their T2DM.    Attending: Asa Escoto MD    PCP: Davina Pratt MD    _______________________________________________________________________  PHARMACY ASSESSMENT    Home Pharmacy: [facility]  Meds to beds? yes    Affordability/Accessibility: reports no concerns affording medications  Adherence/Organization: no concerns  Adverse Effects: reports no adverse effects  _______________________________________________________________________  DIABETES ASSESSMENT    - The patient was listed on the pre-platinum list with a diagnosis of diabetes. However, upon speaking with him, he stated that the diagnosis is outdated and he no longer has diabetes. He also reported that he is not taking any diabetes medications and does not monitor his blood sugar levels.  - A1c as of 2/11/2025 is 5.1%.  _______________________________________________________________________  PATIENT EDUCATION/GOALS  - Answered all patient questions and concerns to best of my ability  _______________________________________________________________________  RECOMMENDATIONS/PLAN  Continue all medications per medical team  Please send prescriptions to [facility] pharmacy for assistance on insurance prior authorization and copay. Prescriptions will be delivered to the patient's bedside prior to discharge with the Meds to Beds program.   Continuity of care will be provided by PCP and clinical pharmacy team      Imani Vazquez PharmD  PGY-1 Pharmacy Resident     Verbal consent to manage patient's drug therapy was obtained from the patient. They were informed they may decline to participate or withdraw from participation in pharmacy services at any time.

## 2025-04-08 NOTE — PROGRESS NOTES
Siddhartha Orellana is a 40 y.o. male on day 3 of admission presenting with Dialysis discontinued by patient.      Subjective   Patient was seen and examined bedside this morning. Patient denies having any symptom at that time.  We discussed importance of compliance with dialysis, to avoid dealing with similar situation in the future.  Spouse was present, and patient expressed verbal understanding.       Objective     Last Recorded Vitals  /69 (BP Location: Right arm, Patient Position: Lying)   Pulse 79   Temp 36.7 °C (98.1 °F) (Oral)   Resp 18   Wt 81.6 kg (180 lb)   SpO2 99%   Intake/Output last 3 Shifts:    Intake/Output Summary (Last 24 hours) at 4/8/2025 1213  Last data filed at 4/8/2025 0900  Gross per 24 hour   Intake 480 ml   Output 490 ml   Net -10 ml       Admission Weight  Weight: 81.6 kg (180 lb) (04/05/25 1807)    Daily Weight  04/05/25 : 81.6 kg (180 lb)    Image Results  ECG 12 lead  Normal sinus rhythm  Moderate voltage criteria for LVH, may be normal variant ( R in aVL , Juan product )  ST & T wave abnormality, consider inferior ischemia  QTcB >= 480 msec  Abnormal ECG  When compared with ECG of 05-AUG-2024 09:46,  ST now depressed in Anterior leads  Nonspecific T wave abnormality now evident in Inferior leads  See ED provider note for full interpretation and clinical correlation  Confirmed by Bernarda Dawn ([id number]) on 4/7/2025 7:23:58 PM      Physical Exam  Constitutional: Alert active, cooperative not in acute distress  Eyes: PERRLA, clear sclera  ENMT: Moist mucosal membranes, no exudate  Head / Neck: Atraumatic, normocephalic, supple neck, JVP not visualized  Lungs: Patent airways, CTABL  Heart: RRR, S1S2, no murmurs appreciated, palpable pulses in all extremities  GI: Soft, NT, ND, bowel sounds present in all quadrants  MSK: Moves all extremities freely, no restriction  of ROM, no joint edema  Extremities: No dialysis access via AV fistula on the left forearm, patent with bruit, no  peripheral edema  Neurological: AAO x 3 to person, place and date, facial muscles symmetrical, sensation intact, strength 4/4, no acute focal neurological deficits appreciated  Psychological: Appropriate mood and behavior  Relevant Results             Scheduled medications  amLODIPine, 5 mg, oral, Daily  atorvastatin, 20 mg, oral, Nightly  calcium acetate, 1,334 mg, oral, TID  carvedilol, 25 mg, oral, BID  epoetin mannie or biosimilar, 10,000 Units, subcutaneous, Once per day on Monday Wednesday Friday  fentaNYL, 1 patch, transdermal, q72h  hydrALAZINE, 100 mg, oral, BID  pantoprazole, 40 mg, oral, Daily before breakfast  sertraline, 50 mg, oral, Daily  valsartan, 320 mg, oral, Daily  vitamin B complex-vitamin C-folic acid, 1 capsule, oral, Daily      Continuous medications     PRN medications  PRN medications: acetaminophen **OR** acetaminophen **OR** acetaminophen, cyclobenzaprine, hydrOXYzine HCL, lidocaine-prilocaine, ondansetron, oxyCODONE    Assessment/Plan       40 y.o. male with a past medical history of ESRD, renal cell carcinoma, GERD, hidradenitis suppurativa, NARGIS, psoriasis, type 2 diabetes mellitus,  SHIVA presenting for hemodialysis need.  Patient reports that he had things going on in his life and he has missed multiple sessions totaling 1 month      Stage renal disease on hemodialysis  -Labs Reviewed  - Continue Nephrocaps  - Continue PhosLo  - Patient currently only agreeing to 1 session of dialysis while inpatient  - Nephrology consulted: Appreciate evaluation and management     Hypertension: Stable, appears controlled  -Olmesartan  -Hydralazine  -Amlodipine     type 2 diabetes mellitus  - Sliding scale     RCC  -Pain regimen per home meds fentanyl patch and as needed oxycodone  -Follow-up with oncology for resumption of your RCC medication     GERD  - Pantoprazole     DVT prophylaxis-heparin subcutaneous     Disposition: Presenting with missed hemodialysis done due to noncompliance, need further  management, discharge pending clinical stability     Anticipate discharge pending clearance by nephrology.            Asa Escoto MD

## 2025-04-08 NOTE — PROGRESS NOTES
04/08/25 1407   Discharge Planning   Expected Discharge Disposition Home     TCC confirmed with [facility] pt still has HD chair MWF 6 am. last HD 3/10. will need new orders for HD when dc.

## 2025-04-08 NOTE — CARE PLAN
The patient's goals for the shift include      The clinical goals for the shift include Maintain patient safety and manage patient's pain throughout the shift    Over the shift, the patient did not make progress toward the following goals. Barriers to progression include . Recommendations to address these barriers include   Problem: Pain - Adult  Goal: Verbalizes/displays adequate comfort level or baseline comfort level  Outcome: Progressing   .

## 2025-04-08 NOTE — PROGRESS NOTES
Siddhartha Orellana is a 40 y.o. male on day 3 of admission presenting with Dialysis discontinued by patient.      Subjective   Seen and examined.   DAVID.  He does not offer specific complaints.  Uneventful dialysis yesterday.       Objective          Vitals 24HR  Heart Rate:  [70-84]   Temp:  [36.7 °C (98.1 °F)-36.8 °C (98.3 °F)]   Resp:  [18]   BP: (111-126)/(63-73)   SpO2:  [97 %-100 %]     Intake/Output last 3 Shifts:    Intake/Output Summary (Last 24 hours) at 4/8/2025 1645  Last data filed at 4/8/2025 0900  Gross per 24 hour   Intake 480 ml   Output 490 ml   Net -10 ml       Physical Exam  General:  Well-appearing.  In no acute distress.  HENT:      Head: Normocephalic.      Mouth: Mucous membranes are moist.   Neck:     Supple.  No JVP elevation.  Cardiovascular:      Rate and Rhythm: Normal rate and regular rhythm.  Pulmonary:      Effort: Pulmonary effort is normal.  No wheezing, rales or rhonchi.  Abdominal:      General: Bowel sounds are normal.      Palpations: Abdomen is soft.   Musculoskeletal:         General: Normal range of motion.  No joint swelling.  No lower limb edema.  Skin:     General: Skin is warm and dry.  Neurological:      Mental Status: He is alert and oriented to person, place, and time.   Psychiatric:         Mood and Affect: Mood normal.         Behavior: Behavior normal.     Scheduled Medications  amLODIPine, 5 mg, oral, Daily  atorvastatin, 20 mg, oral, Nightly  calcium acetate, 1,334 mg, oral, TID  carvedilol, 25 mg, oral, BID  epoetin mannie or biosimilar, 10,000 Units, subcutaneous, Once per day on Monday Wednesday Friday  fentaNYL, 1 patch, transdermal, q72h  hydrALAZINE, 100 mg, oral, BID  pantoprazole, 40 mg, oral, Daily before breakfast  sertraline, 50 mg, oral, Daily  valsartan, 320 mg, oral, Daily  vitamin B complex-vitamin C-folic acid, 1 capsule, oral, Daily      Continuous medications       PRN medications: acetaminophen **OR** acetaminophen **OR** acetaminophen, cyclobenzaprine,  hydrOXYzine HCL, lidocaine-prilocaine, ondansetron, oxyCODONE     Relevant Results  Results from last 7 days   Lab Units 04/08/25  0611 04/07/25  0627 04/06/25  0733 04/05/25  1849   WBC AUTO x10*3/uL 8.6 8.6 9.5 11.2   HEMOGLOBIN g/dL 7.9* 8.1* 7.8* 9.3*   HEMATOCRIT % 23.6* 25.3* 24.1* 29.0*   PLATELETS AUTO x10*3/uL 168 169 161 229   NEUTROS PCT AUTO %  --   --  69.3 83.8   LYMPHS PCT AUTO %  --   --  20.8 10.2   MONOS PCT AUTO %  --   --  7.7 4.5   EOS PCT AUTO %  --   --  1.3 0.7     Results from last 7 days   Lab Units 04/08/25  0611 04/07/25 0627 04/06/25  0733   SODIUM mmol/L 142 140 140   POTASSIUM mmol/L 3.1* 3.5 3.7   CHLORIDE mmol/L 100 99 97*   CO2 mmol/L 26 21 18*   BUN mg/dL 64* 100* 174*   CREATININE mg/dL 17.18* 22.96* >25.00*   GLUCOSE mg/dL 83 80 85   CALCIUM mg/dL 8.5* 8.1* 7.0*       XR chest 1 view   Final Result   No radiographic evidence of acute cardiopulmonary pathology.        MACRO:   None.        Signed by: Evan Finkelstein 4/5/2025 7:42 PM   Dictation workstation:   [id number]               Assessment/Plan      Siddhartha Orellana is a 40-year-old male with a past medical history of ESRD on hemodialysis via left arm aVF at [facility], metastatic papillary carcinoma on cabozantinib, type 2 diabetes, hypertension, hyperlipidemia, gastric ulcers, obstructive sleep apnea, psoriasis who presented with missed dialysis since 3/10.  He was instructed to present to the hospital due to his risk for sudden death.  He reportedly does not tolerate dialysis well.  He feels worse after treatment.  States he is more fatigued and generally does not feel well.  He is aware of the outcome if he stops dialysis for prolonged period of time.  He was agreeable to resume dialysis albeit has requested to reduce his dialysis time.  We have dialyzed him uneventfully.  He was agreeable to hours yesterday.  He is anxious for discharge.  He states that he will resume his usual dialysis tomorrow.  There are no  renal barriers to discharge.  He is encouraged to maintain his usual dialysis appointments.    Assessment & Plan  Dialysis discontinued by patient      I spent 35 minutes in the professional and overall care of this patient.      Erick Isabel, DO

## 2025-04-09 ENCOUNTER — OFFICE VISIT (OUTPATIENT)
Dept: PALLIATIVE MEDICINE | Facility: CLINIC | Age: 40
End: 2025-04-09
Payer: COMMERCIAL

## 2025-04-09 VITALS
TEMPERATURE: 97.9 F | OXYGEN SATURATION: 99 % | RESPIRATION RATE: 18 BRPM | DIASTOLIC BLOOD PRESSURE: 65 MMHG | WEIGHT: 176.4 LBS | HEART RATE: 80 BPM | SYSTOLIC BLOOD PRESSURE: 109 MMHG | BODY MASS INDEX: 27.69 KG/M2

## 2025-04-09 DIAGNOSIS — C64.9 RENAL CELL CARCINOMA, UNSPECIFIED LATERALITY: ICD-10-CM

## 2025-04-09 DIAGNOSIS — R11.0 NAUSEA: ICD-10-CM

## 2025-04-09 DIAGNOSIS — G89.3 CANCER ASSOCIATED PAIN: ICD-10-CM

## 2025-04-09 DIAGNOSIS — T45.1X5A CHEMOTHERAPY INDUCED NAUSEA AND VOMITING: ICD-10-CM

## 2025-04-09 DIAGNOSIS — K59.03 CONSTIPATION DUE TO PAIN MEDICATION THERAPY: Primary | ICD-10-CM

## 2025-04-09 DIAGNOSIS — R11.2 CHEMOTHERAPY INDUCED NAUSEA AND VOMITING: ICD-10-CM

## 2025-04-09 PROCEDURE — 3078F DIAST BP <80 MM HG: CPT | Performed by: NURSE PRACTITIONER

## 2025-04-09 PROCEDURE — 3074F SYST BP LT 130 MM HG: CPT | Performed by: NURSE PRACTITIONER

## 2025-04-09 PROCEDURE — 99214 OFFICE O/P EST MOD 30 MIN: CPT | Performed by: NURSE PRACTITIONER

## 2025-04-09 PROCEDURE — 4010F ACE/ARB THERAPY RXD/TAKEN: CPT | Performed by: NURSE PRACTITIONER

## 2025-04-09 PROCEDURE — 3044F HG A1C LEVEL LT 7.0%: CPT | Performed by: NURSE PRACTITIONER

## 2025-04-09 ASSESSMENT — PAIN SCALES - GENERAL: PAINLEVEL_OUTOF10: 4

## 2025-04-09 NOTE — PROGRESS NOTES
SUPPORTIVE AND PALLIATIVE ONCOLOGY CONSULT - OUTPATIENT FOLLOW UP    SERVICE DATE: 4/9/2025    Referred by:  Renan Thomson MD   Medical Oncologist: Renan Thomson MD   Radiation Oncologist: No care team member to display  Primary Physician: Davina Pratt  [phone]    REASON FOR CONSULT/CHIEF CONSULT COMPLAINT: Intro to Supportive Oncology and Symptom Management.     Subjective   HISTORY OF PRESENT ILLNESS: Siddhartha Orellana is a 40 y.o. 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 Discontinued nivolumab in January 2023 due to arthralgias requiring high dose steroids. Course c/b by hidradenitis suppurativa and delayed fistula healing. He was started on oxycodone 10mg TID for pain management.      Additional PMHx:  Lymphadenopathy and a pre surgical consult for kidney transplant  End stage renal failure on dialysis   Type II DM  HTN  HLD  Parathyroidism   Anemia with NARGIS component   Gastric ulcer  SHIVA  Vitamin D deficiency   Morbid obesity    5/29/24:   Just received fentanyl patches 2 days ago and hasn't started yet. Pain remains about the same in quality intensity but noticing more in R calf and knee today. Still using about 60mg of oxycodone per day.   Stopped Zyprexa after 2 days after he felt it made the nausea worse.   CRP/ESR mildly elevated. RF nl. Citrulline antibody, IgG nl,  CLOVIS pending.     7/10/24: Pain overall better controlled with additional of fentanyl patch. Still using oxy 10 about 4-5 pills per day. Ongoing issues with nausea and vomiting.     8/13/24: Pain better controlled. Much more active. Still using about 4-6 oxy per day but feeling much better overall.  Hesitant about lyrica so he didn't start it. Nausea and appetite much better with reglan.     9/24/24: Oxycodone rotated hydromorphone but wasn't as effective. No other complaints today.     11/13/24: Arrives from dialysis. Pain stable. Intermittent nausea controlled with antiemetics. Tolerating  cabo.    1/22/25: Pain stable.     4/9/25: Patient has not been tolerating dialysis well. He has decided to reduce dialysis time. Overall, symptoms stable.     Pain Assessment:    Lower back and legs - throbbing - constant - legs feel heavy  Denies edema, erythema, warmth to touch     Symptom Assessment:    Pain:a little  Lack of energy: somewhat   Difficulty sleeping: none   Lack of appetite: a little -  improving    Nausea: a little   Vomiting: none  Constipation: none  Numbness or tingling in hands/feet/other: none      Information obtained from: interview of patient  ______________________________________________________________________     Oncology History   Renal cell carcinoma   12/1/2022 -  Chemotherapy    Cabozantinib, 28 Day Cycles     8/28/2023 Initial Diagnosis    Renal cell carcinoma (CMS/HCC)         Past Medical History:   Diagnosis Date    Dorsalgia, unspecified 01/04/2023    Back pain    End stage renal disease (Multi) 01/04/2023    ESRD (end stage renal disease) on dialysis    Essential (primary) hypertension 01/04/2023    Hypertension    Iron deficiency 02/23/2020    Iron deficiency    Other specified postprocedural states     H/O endoscopy     Past Surgical History:   Procedure Laterality Date    OTHER SURGICAL HISTORY  04/11/2018    Creation Of A-V Graft Fistula For Dialysis     Family History   Problem Relation Name Age of Onset    Hypertension Mother      Cancer Father      Hypertension Father      Other (renal transplant recipient) Mother's Brother          SOCIAL HISTORY  . 5 children. Thompson.  Social History:  reports that he has quit smoking. His smoking use included cigars. He has never been exposed to tobacco smoke. He has never used smokeless tobacco. He reports that he does not currently use alcohol. He reports current drug use. Drug: Marijuana.      REVIEW OF SYSTEMS  Review of systems negative unless noted in HPI.       Objective       Current Outpatient Medications  "  Medication Instructions    amLODIPine (NORVASC) 10 mg, oral, Daily (0630), Take half  a tablet  daily for 5-7 days. Then if SBP still above 140 afterwards, take one pill daily.    atorvastatin (LIPITOR) 20 mg, oral, Nightly    benzoyl peroxide 5 % external wash Topical, Every morning, May bleach fabrics, use an old or white towel    cabozantinib (Cabometyx) 40 mg tablet TAKE ONE (1) TABLET BY MOUTH ONCE A DAY    calcium acetate (PHOSLO) 667 mg, oral, 3 times daily (morning, midday, late afternoon)    carvedilol (COREG) 25 mg, oral, 2 times daily    cyclobenzaprine (FLEXERIL) 5 mg, oral, Nightly PRN    fentaNYL (Duragesic) 50 mcg/hr patch 1 patch, transdermal, Every 72 hours    hydrALAZINE (APRESOLINE) 100 mg, oral, 2 times daily    hydrocortisone 2.5 % cream Topical, As needed    hydrOXYzine HCL (ATARAX) 10 mg, oral, Daily PRN    lidocaine-prilocaine (Emla) 2.5-2.5 % cream Apply thick layer 2 hours prior to dialysis site, cover with plastic wrap.    loratadine (CLARITIN) 10 mg, oral, Daily    menthol-zinc oxide (Calmoseptine) 0.44-20.6 % ointment 1 Application, Topical, As needed    naloxone (NARCAN) 4 mg, nasal, As needed, May repeat every 2-3 minutes if needed, alternating nostrils, until medical assistance becomes available.    non-adherent bandage (Curity Abdominal Pad) 8 X 10 \" bandage 1 Application, topical (top), Daily, To areas of hidradenitis for drainage    olmesartan (BENICAR) 40 mg, oral, Daily    omeprazole (PRILOSEC) 40 mg, oral, Daily    ondansetron (ZOFRAN) 8 mg, oral, Every 8 hours PRN    oxyCODONE (ROXICODONE) 10 mg, oral, Every 4 hours PRN    prochlorperazine (Compazine) 10 mg tablet 1 tablet, Every 8 hours PRN    secukinumab (Cosentyx Pen) 150 mg/mL self-injector pen Inject 300 mg (2 pens) under the skin every 2 weeks    sertraline (ZOLOFT) 50 mg, oral, Daily    tadalafil (CIALIS) 5 mg, oral, Daily    torsemide (DEMADEX) 20 mg, 3 times daily PRN    vitamin B complex-vitamin C-folic acid " (Nephrocaps) 1 mg capsule 1 capsule, oral, Daily       Allergies: No Known Allergies          Creatinine   Date Value Ref Range Status   04/08/2025 17.18 (H) 0.50 - 1.30 mg/dL Final     AST   Date Value Ref Range Status   04/05/2025 10 9 - 39 U/L Final     ALT   Date Value Ref Range Status   04/05/2025 7 (L) 10 - 52 U/L Final     Comment:     Patients treated with Sulfasalazine may generate falsely decreased results for ALT.     Hemoglobin   Date Value Ref Range Status   04/08/2025 7.9 (L) 13.5 - 17.5 g/dL Final     Platelets   Date Value Ref Range Status   04/08/2025 168 150 - 450 x10*3/uL Final          PHYSICAL EXAMINATION  Vital Signs:       4/7/2025    11:53 AM 4/7/2025     3:00 PM 4/7/2025     9:00 PM 4/8/2025    12:00 AM 4/8/2025     7:00 AM 4/8/2025    12:45 PM 4/9/2025     9:05 AM   Vitals   Systolic  109 121 111 120 126 109   Diastolic  60 69 63 69 73 65   BP Location  Right arm   Right arm Right arm    Heart Rate 84 86 84 84 79 70 80   Temp 36.6 °C (97.9 °F) 36.9 °C (98.5 °F) 36.8 °C (98.2 °F) 36.8 °C (98.3 °F) 36.7 °C (98.1 °F) 36.8 °C (98.2 °F) 36.6 °C (97.9 °F)   Resp  18 18 18 18 18 18   Weight (lb)       176.4   BMI       27.69 kg/m2   BSA (m2)       1.94 m2   Visit Report       Report        Physical Exam  HENT:      Head: Normocephalic and atraumatic.   Eyes:      Extraocular Movements: Extraocular movements intact.      Conjunctiva/sclera: Conjunctivae normal.   Abdominal:      General: Abdomen is flat.   Musculoskeletal:      Cervical back: Normal range of motion.   Neurological:      General: No focal deficit present.      Mental Status: He is alert.      Comments: No myoclonus   Psychiatric:         Mood and Affect: Mood normal.         Thought Content: Thought content normal.         ASSESSMENT/PLAN    Pain  Pain is:  IO related arthralgia, chronic pain syndrome in the setting of end stage renal disease on dialysis  Type: somatic and neuropathic  -He declined methadone that was previously  recommended   -Continue fentanyl TD 50mcg q72 - do not apply direct heat or submerge in water   -Continue oxycodone 10mg q4 PRN (I rotated to hydromorphone but it was not effective and he is not having any signs of toxicity from the oxycodone at this time)  -He never started lyrica due to concerns of ADRs  -Continue APAP as needed  - uses sparingly   -Autoimmune/Rheum workup per oncology - unremarkable     Opioid Use  Medication Management:   - OARRS report reviewed with no aberrant behavior; consistent with  prescriptions/records and patient history  - .  Overdose Risk Score 360 .   This has been discussed with patient.   - We will continue to closely monitor the patient for signs of prescription misuse including UDS, OARRS review and subjective reports at each visit.  - No concurrent benzodiazepine use   - I am a provider who either is or has consulted and collaborated with a provider certified in Hospice and Palliative Medicine and have conducted a face-face visit and examination for this patient.  - Routine Urine Drug Screen: patient is anuric and drug screen 9 panel appropriate 2/11/25  - Controlled Substance Agreement completed 5/29/24  - Specifically discussed that controlled substance prescriptions will only be provided by our group as outlined in the completed agreement  - Prescribed naloxone 5/1/24  - Red Flags: None      Nausea   Intermittent nausea with vomiting related to opioids and dialysis   -Intolerant to zyprexa 2.5mg   -Previously on reglan but discontinued   -Continue zofran 8mg q8 as needed   -Continue compazine 10mg q6 as needed    Constipation   At risk for constipation related to opioids  -Continue miralax 17 g daily as needed     Medical Decision Making/Goals of Care/Advance Care Planning:  Patient's current clinical condition, including diagnosis, prognosis, and management plan, and goals of care were discussed.   Life limiting disease: RCC  Family: Supportive wife/children    Goals: symptom control and cancer directed therapy  Full code confirmed today 4/9/25    Advance Directives  Existence of Advance Directives: [place] is wife, Wendy Orelalna    Next Follow-Up Visit:  Return to clinic in 4 weeks     Signature and billing  Thank you for allowing us to participate in the care of this patient. Recommendations will be communicated back to the consulting service by way of shared electronic medical record or face-to-face.    Medical complexity was moderate level due to due to complexity of problems, extensive data review, and high risk of management/treatment.  Time was spent on the following: Prep Time, Time Directly with Patient/Family/Caregiver, Documentation Time. Total time spent: 30 min       DATA   Diagnostic tests and information reviewed for today's visit:  Most recent labs and imaging results, Medications       Plan of Care discussed with: Patient and RN     Some elements copied from Supportive Oncology note on 1/22/25 , the elements have been updated and all reflect current decision making from today, 4/9/25.        SIGNATURE: Audrey Jones, APRN-CNP    Contact information:  Supportive and Palliative Oncology  Monday-Friday 8 AM-5 PM  Phone:  [phone], press option #5, then option #1.   Or Epic Secure Chat

## 2025-04-09 NOTE — DISCHARGE SUMMARY
"Discharge Diagnosis  Dialysis discontinued by patient    Issues Requiring Follow-Up  PCP follow up    Discharge Meds     Medication List      CHANGE how you take these medications     prochlorperazine 10 mg tablet; Commonly known as: Compazine; What   changed: Another medication with the same name was removed. Continue   taking this medication, and follow the directions you see here.     CONTINUE taking these medications     amLODIPine 10 mg tablet; Commonly known as: Norvasc; Take 1 tablet (10   mg) by mouth early in the morning.. Take half  a tablet  daily for 5-7   days. Then if SBP still above 140 afterwards, take one pill daily.   atorvastatin 20 mg tablet; Commonly known as: Lipitor; TAKE 1 TABLET BY   MOUTH EVERY NIGHT AT BEDTIME   benzoyl peroxide 5 % external wash; Apply topically once daily in the   morning. May bleach fabrics, use an old or white towel   Cabometyx 40 mg tablet; Generic drug: cabozantinib; TAKE ONE (1) TABLET   BY MOUTH ONCE A DAY   calcium acetate 667 mg capsule; Commonly known as: Phoslo; Take 1   capsule (667 mg) by mouth 3 times daily (morning, midday, late afternoon).   carvedilol 25 mg tablet; Commonly known as: Coreg; TAKE 1 TABLET BY   MOUTH TWICE A DAY   Cosentyx Pen 150 mg/mL self-injector pen; Generic drug: secukinumab;   Inject 300 mg (2 pens) under the skin every 2 weeks   Curity Abdominal Pad 8 X 10 \" bandage; Generic drug: non-adherent   bandage; Apply 1 Application topically once daily. To areas of   hidradenitis for drainage   cyclobenzaprine 5 mg tablet; Commonly known as: Flexeril; Take 1 tablet   (5 mg) by mouth as needed at bedtime for muscle spasms.   fentaNYL 50 mcg/hr patch; Commonly known as: Duragesic; Place 1 patch   over 72 hours on the skin every 3rd day.   hydrALAZINE 100 mg tablet; Commonly known as: Apresoline; Take 1 tablet   (100 mg) by mouth 2 times a day.   hydrocortisone 2.5 % cream; Apply topically if needed for irritation.   hydrOXYzine HCL 10 mg " tablet; Commonly known as: Atarax; Take 1 tablet   (10 mg) by mouth once daily as needed for itching.   lidocaine-prilocaine 2.5-2.5 % cream; Commonly known as: Emla; Apply   thick layer 2 hours prior to dialysis site, cover with plastic wrap.   loratadine 10 mg tablet; Commonly known as: Claritin; Take 1 tablet (10   mg) by mouth once daily.   menthol-zinc oxide 0.44-20.6 % ointment; Commonly known as:   Calmoseptine; Apply 1 Application topically if needed for irritation.   naloxone 4 mg/0.1 mL nasal spray; Commonly known as: Narcan; Administer   1 spray (4 mg) into affected nostril(s) if needed for opioid reversal. May   repeat every 2-3 minutes if needed, alternating nostrils, until medical   assistance becomes available.   olmesartan 40 mg tablet; Commonly known as: BENIcar; Take 1 tablet (40   mg) by mouth once daily.   omeprazole 40 mg DR capsule; Commonly known as: PriLOSEC; TAKE 1 CAPSULE   BY MOUTH DAILY   ondansetron 8 mg tablet; Commonly known as: Zofran; Take 1 tablet (8 mg)   by mouth every 8 hours if needed for nausea.   oxyCODONE 10 mg immediate release tablet; Commonly known as: Roxicodone;   Take 1 tablet (10 mg) by mouth every 4 hours if needed for severe pain (7   - 10).   tadalafil 5 mg tablet; Commonly known as: Cialis; Take 1 tablet (5 mg)   by mouth once daily.   torsemide 20 mg tablet; Commonly known as: Demadex   vitamin B complex-vitamin C-folic acid 1 mg capsule; Commonly known as:   Nephrocaps; Take 1 capsule by mouth once daily.       Test Results Pending At Discharge  Pending Labs       No current pending labs.            Hospital Course     40 y.o. male with a past medical history of ESRD, renal cell carcinoma, GERD, hidradenitis suppurativa, NARGIS, psoriasis, type 2 diabetes mellitus,  SHIVA presenting for hemodialysis need.  Patient reports that he had things going on in his life and he has missed multiple sessions totaling 1 month      Stage renal disease on hemodialysis  -Labs  Reviewed  - Continue Nephrocaps  - Continue PhosLo  - Patient currently only agreeing to 1 session of dialysis while inpatient  - Nephrology consulted: Appreciate evaluation and management     Hypertension: Stable, appears controlled  -Olmesartan  -Hydralazine  -Amlodipine     type 2 diabetes mellitus  - Sliding scale     RCC  -Pain regimen per home meds fentanyl patch and as needed oxycodone  -Follow-up with oncology for resumption of your RCC medication     GERD  - Pantoprazole      Outpatient dialysis chair has been set up.  Patient has been cleared for discharge from nephrology standpoint.  Advised him to follow with his PCP and nephrology as outpatient.      Discharge time spent more than 30 minutes.    Pertinent Physical Exam At Time of Discharge  Physical Exam    Constitutional: No acute distress, awake, alert  Head/Neck: Neck supple  Respiratory/Thorax: Lungs are Clear to auscultation  Cardiovascular: Regular, rate and rhythm,  2+ equal pulses of the extremities, normal S 1and S 2  Gastrointestinal: Nondistended, soft, non-tender, no rebound tenderness or guarding  Extremities: No edema. No calf tenderness.  Neurological: Awake and alert. No focal neurological deficits  Psychological: Appropriate mood and behavior    Outpatient Follow-Up  Future Appointments   Date Time Provider Department Center   4/24/2025  4:40 PM Tatiana Grover MD [id number] [facility]   5/12/2025 11:00 AM JAIME Karimi-CNP [id number] [facility]   5/14/2025  9:30 AM JAIME Rausch-CNP [id number] [facility]   6/9/2025  1:45 PM Laura Engle MD [id number] [facility]         Asa Escoto MD

## 2025-04-10 ENCOUNTER — SPECIALTY PHARMACY (OUTPATIENT)
Dept: PHARMACY | Facility: CLINIC | Age: 40
End: 2025-04-10

## 2025-04-10 PROCEDURE — RXMED WILLOW AMBULATORY MEDICATION CHARGE

## 2025-04-14 ENCOUNTER — PHARMACY VISIT (OUTPATIENT)
Dept: PHARMACY | Facility: CLINIC | Age: 40
End: 2025-04-14
Payer: COMMERCIAL

## 2025-04-18 ENCOUNTER — SPECIALTY PHARMACY (OUTPATIENT)
Dept: PHARMACY | Facility: CLINIC | Age: 40
End: 2025-04-18

## 2025-04-18 NOTE — PROGRESS NOTES
"  [facility] Clinical Note  Patient Reassessment     Introduction  Siddhartha Orellana is a 40 y.o. male who is on the specialty pharmacy service for management of: Dermatology Core.      [facility] supplied medication: secukinumab (Cosentyx Pen) 150 mg/mL self-injector pen   Inject 300 mg (2 pens) under the skin every 2 weeks        Duration of therapy: Maintenance    The most recent encounter visit with the referring prescriber Laura Engle MD on 02/20/25 was reviewed.  Pharmacy will continue to collaborate in the care of this patient with the referring prescriber.    Discussion  Siddhartha was contacted on 4/18/2025 at 5:05 PM for a pharmacy visit with encounter number [id number] from:   [facility]  [address] [place] [id number]  Dept: [phone]  Dept Fax: [phone]  Siddhartha consented to a/an Telephone visit, which was performed.    Efficacy  Patient has developed new symptoms of condition: No  Patient/caregiver feels medication is affecting the disease state: Patient reports having improvement in skin and symptoms since starting the Cosentyx. He reports a 60% improvement currently. He denies any recent flares and denies any recent use of topical agents.    Goals  Provided education on goals and possible outcomes of therapy:  Adherence with therapy  Timely completion of appropriate labs  Timely and appropriate follow up with provider  Identify and address medication interactions with presciption medications, OTC medications and supplements  Optimize or maintain quality of life  Dermatology: Prevent or reduce disease flares  Reduce track depth, pain, inflammation, skin lesions (HS)  Patient has documented target(s) for goals of therapy: Yes    Targets       Target Due Completed Completed By Outcome Source     Goal: Prevent and reduce disease flares -- -- -- -- --         Goal: Reduce use of ancillary or \"prn\" medications -- -- -- -- --         " "Goal: Prevent and reduce disease flares 8/18/2025 4/18/2025 Amena Walton PharmD On Track --    Patient reports 60% improvement. Denies any recent flares.       Goal: Reduce use of ancillary or \"prn\" medications 8/18/2025 4/18/2025 Amena Walton PharmD On Track --    Patient denies any recent use of topical agents.              Tolerance  Patient has experienced side effects from this medication: No  Changes to current therapy regimen: No    The follow-up timeline was discussed. Every person responds to and reacts to therapy differently. Patient should be assessed for efficacy and tolerability in approximately: other - 4 months             Adherence  Patient Information  Informant: Self (Patient)  Demonstrates Understanding of Importance of Adherence: Yes  Does the patient have any barriers to self-administration (including physical and mental?): No  Medication Information  Medication: secukinumab (Cosentyx Pen)  Patient Reported Missed Doses in the Last 4 Weeks: 0  Estimated Medication Adherence Level: Good  Adherence Estimation Source: Claims history  Barriers to Adherence: No Problems identified   The importance of adherence was discussed and patient/caregiver was advised to take the medication as prescribed by their provider. Encouraged patient/caregiver to call physician's office or specialty pharmacy if they have a question regarding a missed dose.    General Assessment  Changes to home medications, OTCs or supplements: No  Current Medications[1]  Reported new allergies: No  Reported new medical conditions: No  Additional monitoring reviewed: Dermatology- For Biologics- TB:   Lab Results   Component Value Date    TBSIN Negative 09/24/2024     Is laboratory follow up needed? No    Advised to contact the pharmacy if there are any changes to the patient's medication list, including prescriptions, OTC medications, herbal products, or supplements.    Impression/Plan  This patient has not been identified as high " risk due to Lack of high risk qualifiers.  The following action was taken:N/A          QOL/Patient Satisfaction  Rate your quality of life on scale of 1-10: 10 - Completely satisfied  Rate your satisfaction with  Specialty Pharmacy on scale of 1-10: 10 - Completely satisfied    Provided contact information ([phone]) for [facility] and reviewed dispensing process, refill timeline and patient management follow up. Confirmed understanding of education conducted during assessment. All questions and concerns were addressed and patient/caregiver was encouraged to reach out for additional questions or concerns.    Based on the patient's diagnosis, medication list, progress towards goals, adherence, tolerance, and medication list, medication remains appropriate: Therapy remains appropriate (I attest)    Amena Walton, PharmD         [1]   Current Outpatient Medications   Medication Sig Dispense Refill    amLODIPine (Norvasc) 10 mg tablet Take 1 tablet (10 mg) by mouth early in the morning.. Take half  a tablet  daily for 5-7 days. Then if SBP still above 140 afterwards, take one pill daily. 90 tablet 3    atorvastatin (Lipitor) 20 mg tablet TAKE 1 TABLET BY MOUTH EVERY NIGHT AT BEDTIME 30 tablet 10    benzoyl peroxide 5 % external wash Apply topically once daily in the morning. May bleach fabrics, use an old or white towel 142 g 3    cabozantinib (Cabometyx) 40 mg tablet TAKE ONE (1) TABLET BY MOUTH ONCE A DAY 90 tablet 6    calcium acetate (Phoslo) 667 mg capsule Take 1 capsule (667 mg) by mouth 3 times daily (morning, midday, late afternoon). 270 capsule 3    carvedilol (Coreg) 25 mg tablet TAKE 1 TABLET BY MOUTH TWICE A DAY 60 tablet 11    cyclobenzaprine (Flexeril) 5 mg tablet Take 1 tablet (5 mg) by mouth as needed at bedtime for muscle spasms. 30 tablet 0    fentaNYL (Duragesic) 50 mcg/hr patch Place 1 patch over 72 hours on the skin every 3rd day. 10 patch 0    hydrALAZINE  "(Apresoline) 100 mg tablet Take 1 tablet (100 mg) by mouth 2 times a day. 180 tablet 3    hydrocortisone 2.5 % cream Apply topically if needed for irritation. 56.7 g 3    hydrOXYzine HCL (Atarax) 10 mg tablet Take 1 tablet (10 mg) by mouth once daily as needed for itching. 30 tablet 0    lidocaine-prilocaine (Emla) 2.5-2.5 % cream Apply thick layer 2 hours prior to dialysis site, cover with plastic wrap. 30 g 11    loratadine (Claritin) 10 mg tablet Take 1 tablet (10 mg) by mouth once daily. 30 tablet 11    menthol-zinc oxide (Calmoseptine) 0.44-20.6 % ointment Apply 1 Application topically if needed for irritation. 113 g 11    naloxone (Narcan) 4 mg/0.1 mL nasal spray Administer 1 spray (4 mg) into affected nostril(s) if needed for opioid reversal. May repeat every 2-3 minutes if needed, alternating nostrils, until medical assistance becomes available. 2 each 0    non-adherent bandage (Curity Abdominal Pad) 8 X 10 \" bandage Apply 1 Application topically once daily. To areas of hidradenitis for drainage 18 each 11    olmesartan (BENIcar) 40 mg tablet Take 1 tablet (40 mg) by mouth once daily. 30 tablet 11    omeprazole (PriLOSEC) 40 mg DR capsule TAKE 1 CAPSULE BY MOUTH DAILY 30 capsule 10    ondansetron (Zofran) 8 mg tablet Take 1 tablet (8 mg) by mouth every 8 hours if needed for nausea. 60 tablet 11    oxyCODONE (Roxicodone) 10 mg immediate release tablet Take 1 tablet (10 mg) by mouth every 4 hours if needed for severe pain (7 - 10). 180 tablet 0    prochlorperazine (Compazine) 10 mg tablet Take 1 tablet (10 mg) by mouth every 8 hours if needed for moderate pain (4 - 6).      secukinumab (Cosentyx Pen) 150 mg/mL self-injector pen Inject 300 mg (2 pens) under the skin every 2 weeks 4 mL 5    tadalafil (Cialis) 5 mg tablet Take 1 tablet (5 mg) by mouth once daily. 30 tablet 0    torsemide (Demadex) 20 mg tablet Take 1 tablet (20 mg) by mouth 3 times a day as needed.      vitamin B complex-vitamin C-folic acid " (Nephrocaps) 1 mg capsule Take 1 capsule by mouth once daily. 90 capsule 3     No current facility-administered medications for this visit.

## 2025-04-21 DIAGNOSIS — G89.3 CANCER ASSOCIATED PAIN: ICD-10-CM

## 2025-04-21 RX ORDER — OXYCODONE HYDROCHLORIDE 10 MG/1
10 TABLET ORAL EVERY 4 HOURS PRN
Qty: 180 TABLET | Refills: 0 | Status: SHIPPED | OUTPATIENT
Start: 2025-04-21 | End: 2025-05-21

## 2025-04-21 RX ORDER — FENTANYL 50 UG/1
1 PATCH TRANSDERMAL
Qty: 10 PATCH | Refills: 0 | Status: SHIPPED | OUTPATIENT
Start: 2025-04-21 | End: 2025-05-21

## 2025-04-21 NOTE — TELEPHONE ENCOUNTER
Reason for Conversation  Med Refill       Refills pended to provider for fentanyl 50 mcg patch 10/30 and oxycodone IR 10 mg every 4 hours 180/30.  OARRS reviewed.  Both due for refill on 4/22.  Patient to follow up with Audrey Jones on 5/14.

## 2025-04-24 ENCOUNTER — APPOINTMENT (OUTPATIENT)
Dept: NEPHROLOGY | Facility: CLINIC | Age: 40
End: 2025-04-24
Payer: MEDICARE

## 2025-04-24 VITALS
BODY MASS INDEX: 30.05 KG/M2 | HEART RATE: 78 BPM | DIASTOLIC BLOOD PRESSURE: 62 MMHG | HEIGHT: 66 IN | WEIGHT: 187 LBS | SYSTOLIC BLOOD PRESSURE: 110 MMHG

## 2025-04-24 DIAGNOSIS — L40.9 PSORIASIS: ICD-10-CM

## 2025-04-24 PROCEDURE — 3008F BODY MASS INDEX DOCD: CPT | Performed by: INTERNAL MEDICINE

## 2025-04-24 PROCEDURE — 4010F ACE/ARB THERAPY RXD/TAKEN: CPT | Performed by: INTERNAL MEDICINE

## 2025-04-24 PROCEDURE — 3074F SYST BP LT 130 MM HG: CPT | Performed by: INTERNAL MEDICINE

## 2025-04-24 PROCEDURE — 3044F HG A1C LEVEL LT 7.0%: CPT | Performed by: INTERNAL MEDICINE

## 2025-04-24 PROCEDURE — 3078F DIAST BP <80 MM HG: CPT | Performed by: INTERNAL MEDICINE

## 2025-04-24 RX ORDER — LORATADINE 10 MG/1
10 TABLET ORAL DAILY
Qty: 90 TABLET | Refills: 3 | Status: SHIPPED | OUTPATIENT
Start: 2025-04-24 | End: 2026-04-24

## 2025-04-25 NOTE — PATIENT INSTRUCTIONS
No changes in medication  Will proceed with referral to psychiatry for anxiety during dialysis  See you in follow up in the office in 4 months.

## 2025-04-28 ENCOUNTER — TELEPHONE (OUTPATIENT)
Dept: NEPHROLOGY | Facility: HOSPITAL | Age: 40
End: 2025-04-28
Payer: COMMERCIAL

## 2025-05-12 ENCOUNTER — OFFICE VISIT (OUTPATIENT)
Dept: HEMATOLOGY/ONCOLOGY | Facility: CLINIC | Age: 40
End: 2025-05-12
Payer: COMMERCIAL

## 2025-05-12 VITALS
WEIGHT: 193.9 LBS | BODY MASS INDEX: 31.3 KG/M2 | RESPIRATION RATE: 18 BRPM | SYSTOLIC BLOOD PRESSURE: 132 MMHG | TEMPERATURE: 97.9 F | HEART RATE: 80 BPM | DIASTOLIC BLOOD PRESSURE: 77 MMHG | OXYGEN SATURATION: 97 %

## 2025-05-12 DIAGNOSIS — C61 PROSTATE CANCER (MULTI): Primary | ICD-10-CM

## 2025-05-12 DIAGNOSIS — C64.9 RENAL CELL CARCINOMA, UNSPECIFIED LATERALITY: ICD-10-CM

## 2025-05-12 DIAGNOSIS — N18.6 ESRD (END STAGE RENAL DISEASE) (MULTI): Primary | ICD-10-CM

## 2025-05-12 PROCEDURE — 3044F HG A1C LEVEL LT 7.0%: CPT

## 2025-05-12 PROCEDURE — 3078F DIAST BP <80 MM HG: CPT

## 2025-05-12 PROCEDURE — 99214 OFFICE O/P EST MOD 30 MIN: CPT

## 2025-05-12 PROCEDURE — 4010F ACE/ARB THERAPY RXD/TAKEN: CPT

## 2025-05-12 PROCEDURE — 3075F SYST BP GE 130 - 139MM HG: CPT

## 2025-05-12 ASSESSMENT — ENCOUNTER SYMPTOMS
EYES NEGATIVE: 1
MYALGIAS: 1
EXTREMITY WEAKNESS: 0
FATIGUE: 0
ROS SKIN COMMENTS: HS
FEVER: 0
LIGHT-HEADEDNESS: 0
ENDOCRINE NEGATIVE: 1
WOUND: 0
NAUSEA: 1
UNEXPECTED WEIGHT CHANGE: 0
ARTHRALGIAS: 1
VOMITING: 0
HEMATOLOGIC/LYMPHATIC NEGATIVE: 1
NUMBNESS: 0
BACK PAIN: 1
APPETITE CHANGE: 0
CONSTIPATION: 0
LEG SWELLING: 0
DIARRHEA: 0
PSYCHIATRIC NEGATIVE: 1
CHILLS: 0
COUGH: 0
SHORTNESS OF BREATH: 0

## 2025-05-12 ASSESSMENT — PAIN SCALES - GENERAL: PAINLEVEL_OUTOF10: 8

## 2025-05-12 NOTE — PROGRESS NOTES
Patient ID: Siddhartha Orellana is a 40 y.o. male.  Diagnosis:  Papillary RCC  MedOnc: Dr. Thomson  Nephrologist: Dr. Grover  Vascular Surgeon: Dr. Daigle    Current Therapy: Cabozantinib     ONCOLOGIC HISTORY  11/7/22 - CT-guided biopsy of retroperitoneal lymph node revealed findings consistent with metastatic papillary carcinoma.  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  2/7/23 - On Prednisone 20mg daily  2/17/23 - Scans show SD (RECIST) with some mild tumor shrinkage RPLN  2/21/23 - Increase Prednisone 40mg daily for continued arthralgia; continue Cabo 40 mg daily  3/15/23 - Continue Pred 40mg PO daily, continue Cabo  End March - hidradenitis suppurativa  4/4/23 - Hold Cabo; prednisone 30 mg daily  4/25/23 - Resume cabo  5/18/23 - Hold Cabo 2/2 vascular fistula repair  5/23/23 - Continue hold due to extensive edema, blistering  6/2023 - resumed cabo. Nivo on hold with PDN 25 mg  8/16/23 - continue cabo, reduce PDN to 20 mg daily  8/23/23 - Left upper extremity wound debridement down to subcutaneous tissue and vessels- Cabo on hold  8/31/23 - continue to hold cabo, PDN back at 30mg   9/15/23 - resumed cabozantinib 40 mg   Mid Sep 23 - admission hidradenitis suppurativa  10/10/23 - cabo on hold. Will start PDN 20 mg  10/17/23 - cabo on hold for graft, PDN 20 mg  10/25/23 - cabo on hold for graft, PDN 15 mg daily  11/16/23 - continue cabozantinib hold, PDN 10 mg daily  1/18/24 - mild inguinal/iliac LN growth + stable bone mets in staging scans.   01/2024 - dialysis started  3/7/24 - he got back on cabozantinib 40 mg 2-3 w ago  4/11/24 - scans show SD. Continue cabo 40 alone  4/12/24 - held for AVF infiltration  5/1/24 - No AVF revision required - restart cabo at  40 mg  5/29/24 - Continue cabozantinib  7/2/24 - Continue cabozantinib 40 mg with breaks PRN  9/24/24 - got back on cabo 1 week ago, off for about 1 month  2/11/25 - he was lost to follow up.   2/17/25 - Continue cabozantinib 40 mg with breaks PRN  3/31/25 - holding cabo for one week until after HD       PMH: Stage V CKD on HD, Type II DM, HTN, HLD, Parathryroidism, Anemia, gastric ulcer, SHIVA, Vit D def     Review of Systems   Constitutional:  Negative for appetite change, chills, fatigue, fever and unexpected weight change.   HENT:  Negative.     Eyes: Negative.    Respiratory:  Negative for cough and shortness of breath.    Cardiovascular:  Negative for chest pain and leg swelling.   Gastrointestinal:  Positive for nausea. Negative for constipation, diarrhea and vomiting.   Endocrine: Negative.    Genitourinary: Negative.     Musculoskeletal:  Positive for arthralgias, back pain, gait problem and myalgias.   Skin:  Negative for rash and wound.        HS   Neurological:  Positive for gait problem. Negative for extremity weakness, light-headedness and numbness.   Hematological: Negative.    Psychiatric/Behavioral: Negative.       Allergies  No Known Allergies     Medications  Current Outpatient Medications   Medication Instructions    amLODIPine (NORVASC) 10 mg, oral, Daily (0630), Take half  a tablet  daily for 5-7 days. Then if SBP still above 140 afterwards, take one pill daily.    atorvastatin (LIPITOR) 20 mg, oral, Nightly    benzoyl peroxide 5 % external wash Topical, Every morning, May bleach fabrics, use an old or white towel    cabozantinib (Cabometyx) 40 mg tablet TAKE ONE (1) TABLET BY MOUTH ONCE A DAY    calcium acetate (PHOSLO) 667 mg, oral, 3 times daily (morning, midday, late afternoon)    carvedilol (COREG) 25 mg, oral, 2 times daily    fentaNYL (Duragesic) 50 mcg/hr patch 1 patch, transdermal, Every 72 hours    hydrALAZINE (APRESOLINE) 100 mg, oral, 2 times daily    hydrocortisone 2.5 % cream  "Topical, As needed    hydrOXYzine HCL (ATARAX) 10 mg, oral, Daily PRN    lidocaine-prilocaine (Emla) 2.5-2.5 % cream Apply thick layer 2 hours prior to dialysis site, cover with plastic wrap.    loratadine (CLARITIN) 10 mg, oral, Daily    menthol-zinc oxide (Calmoseptine) 0.44-20.6 % ointment 1 Application, Topical, As needed    naloxone (NARCAN) 4 mg, nasal, As needed, May repeat every 2-3 minutes if needed, alternating nostrils, until medical assistance becomes available.    non-adherent bandage (Curity Abdominal Pad) 8 X 10 \" bandage 1 Application, topical (top), Daily, To areas of hidradenitis for drainage    olmesartan (BENICAR) 40 mg, oral, Daily    omeprazole (PRILOSEC) 40 mg, oral, Daily    ondansetron (ZOFRAN) 8 mg, oral, Every 8 hours PRN    oxyCODONE (ROXICODONE) 10 mg, oral, Every 4 hours PRN    prochlorperazine (Compazine) 10 mg tablet 1 tablet, Every 8 hours PRN    secukinumab (Cosentyx Pen) 150 mg/mL self-injector pen Inject 300 mg (2 pens) under the skin every 2 weeks    tadalafil (CIALIS) 5 mg, oral, Daily    torsemide (DEMADEX) 20 mg, 3 times daily PRN    vitamin B complex-vitamin C-folic acid (Nephrocaps) 1 mg capsule 1 capsule, oral, Daily        OBJECTIVE:    VS / Pain:  /77   Pulse 80   Temp 36.6 °C (97.9 °F)   Resp 18   Wt 88 kg (193 lb 14.4 oz)   SpO2 97%   BMI 31.30 kg/m²   BSA: 2.02 meters squared  Wt Readings from Last 5 Encounters:   05/12/25 88 kg (193 lb 14.4 oz)   04/24/25 84.8 kg (187 lb)   04/09/25 80 kg (176 lb 6.4 oz)   04/05/25 81.6 kg (180 lb)   03/31/25 82 kg (180 lb 12.4 oz)     Physical Exam  Constitutional:       General: He is not in acute distress.     Appearance: Normal appearance. He is not toxic-appearing.   HENT:      Head: Normocephalic and atraumatic.      Mouth/Throat:      Mouth: Mucous membranes are moist.      Pharynx: Oropharynx is clear.   Eyes:      Pupils: Pupils are equal, round, and reactive to light.   Pulmonary:      Effort: Pulmonary effort is " normal.   Musculoskeletal:         General: Normal range of motion.      Cervical back: Normal range of motion.      Right lower leg: No edema.      Left lower leg: No edema.   Skin:     General: Skin is warm and dry.      Findings: No rash.   Neurological:      General: No focal deficit present.      Mental Status: He is alert and oriented to person, place, and time.      Motor: No weakness.   Psychiatric:         Mood and Affect: Mood normal.         Behavior: Behavior normal.         Thought Content: Thought content normal.         Judgment: Judgment normal.       Performance Status:  ECOG Score: 1- Restricted in physically strenuous activity.  Carries out light duty.  Karnofsky Score: 80 - Normal activity with effort; some signs or symptoms of disease    Diagnostic Results   No results found. However, due to the size of the patient record, not all encounters were searched. Please check Results Review for a complete set of results.      Scans 06/2024 - stable disease    Scans 9/24/24 -   Renal cancer restaging scan. When compared to the prior examination  dated 06/27/2024:  1. Interval increase in size of the right retroperitoneal lymph node  2. Osseous tumor burden as described above, please refer to nuclear  medicine study for osseous disease evaluation  3. Interval resolution of the ground-glass opacities of the right  lower lobe.  4. Additional stable chronic incidental findings described above.    2/11/25 -   IMPRESSION:  CHEST:  1.  Restaging renal cell carcinoma.  2. No metastatic disease to the lungs.  3. Stable lytic foci with discrete sclerotic borders involving ribs  bilaterally consistent with metastatic disease. There is no  pathologic fracture.      ABDOMEN AND PELVIS:  1.  Restaging renal cell carcinoma.  2. Decrease in size of a retroperitoneal lymph node to the right of  the inferior vena cava.  3. Unchanged 1.2 cm hypodense mass lateral cortex right kidney.  4. Unchanged bony metastatic disease  to the pelvis.  5. Unchanged 0.9 cm right adrenal nodule compared to 02/23/2022.  Unchanged adreniform enlargement of the left adrenal gland compared  to 02/23/2022.      MACRO:  None    Assessment/Plan   38yo AA man with hx of stage IV CKD with metastatic papRCC to LNs now on cabo/nivo. He has CKD and planned for kidney dialysis. Known proteinuria +++. Still with sig joint pains and dealing with hyperglycemias. On cabo (nivo on hold d/t arthritis - 30 mg PDN BUT pt self discontinued his prednisone and arthralgias are actually better). Pain is under control on opioids and arthralgias might not be fully IO-related.   He was lost to follow up for last 6 months. Still on cabozantinib. Doing well and scans continue to show stable disease / mild shrinkage of Lns (bx proven papRCC). CPM. Scans ordered June 2025.     Noncompliant with HD d/t significant anxiety around how he feels when hooked up to machine. Again discussed need for HD and risk of further complications and possible death without treatment. He is interested in changing nephrology providers, I will help get this appointment set up today.       Patient verbalizes understanding of above plan. Time provided for patient's questions. Patient instructed to reach out for any new concerning issues.     Yu Mariee, MSN, APRN-CNP  Acute Care Nurse Practitioner   Genitourinary () Oncology  [facility]  Phone: [phone]

## 2025-05-13 DIAGNOSIS — N18.6 ESRD (END STAGE RENAL DISEASE) ON DIALYSIS (MULTI): ICD-10-CM

## 2025-05-13 DIAGNOSIS — Z99.2 ESRD (END STAGE RENAL DISEASE) ON DIALYSIS (MULTI): ICD-10-CM

## 2025-05-13 DIAGNOSIS — N52.1 ERECTILE DYSFUNCTION DUE TO DISEASES CLASSIFIED ELSEWHERE: ICD-10-CM

## 2025-05-13 PROCEDURE — RXMED WILLOW AMBULATORY MEDICATION CHARGE

## 2025-05-13 RX ORDER — TADALAFIL 5 MG/1
5 TABLET ORAL DAILY
Qty: 30 TABLET | Refills: 11 | Status: SHIPPED | OUTPATIENT
Start: 2025-05-13 | End: 2026-05-13

## 2025-05-13 RX ORDER — TORSEMIDE 20 MG/1
60 TABLET ORAL DAILY
Qty: 90 TABLET | Refills: 0 | Status: SHIPPED | OUTPATIENT
Start: 2025-05-13

## 2025-05-14 ENCOUNTER — TELEMEDICINE (OUTPATIENT)
Dept: PALLIATIVE MEDICINE | Facility: CLINIC | Age: 40
End: 2025-05-14
Payer: COMMERCIAL

## 2025-05-14 DIAGNOSIS — F32.A ANXIETY AND DEPRESSION: ICD-10-CM

## 2025-05-14 DIAGNOSIS — G89.3 CANCER RELATED PAIN: Primary | ICD-10-CM

## 2025-05-14 DIAGNOSIS — K59.03 CONSTIPATION DUE TO PAIN MEDICATION THERAPY: ICD-10-CM

## 2025-05-14 DIAGNOSIS — F41.9 ANXIETY AND DEPRESSION: ICD-10-CM

## 2025-05-14 DIAGNOSIS — G89.3 CANCER ASSOCIATED PAIN: ICD-10-CM

## 2025-05-14 DIAGNOSIS — R11.0 NAUSEA: ICD-10-CM

## 2025-05-14 PROCEDURE — 3044F HG A1C LEVEL LT 7.0%: CPT | Performed by: NURSE PRACTITIONER

## 2025-05-14 PROCEDURE — 99214 OFFICE O/P EST MOD 30 MIN: CPT | Performed by: NURSE PRACTITIONER

## 2025-05-14 PROCEDURE — 4010F ACE/ARB THERAPY RXD/TAKEN: CPT | Performed by: NURSE PRACTITIONER

## 2025-05-14 RX ORDER — OXYCODONE HYDROCHLORIDE 10 MG/1
10 TABLET ORAL EVERY 4 HOURS PRN
Qty: 180 TABLET | Refills: 0 | Status: SHIPPED | OUTPATIENT
Start: 2025-05-14 | End: 2025-06-13

## 2025-05-14 RX ORDER — FENTANYL 50 UG/1
1 PATCH TRANSDERMAL
Qty: 10 PATCH | Refills: 0 | Status: SHIPPED | OUTPATIENT
Start: 2025-05-14 | End: 2025-06-13

## 2025-05-14 NOTE — PROGRESS NOTES
SUPPORTIVE AND PALLIATIVE ONCOLOGY CONSULT - OUTPATIENT FOLLOW UP    Virtual or Telephone Consent     An interactive audio and video telecommunication system which permits real time communications between the patient (at the originating site) and provider (at the distant site) was utilized to provide this telehealth service.   Verbal consent was requested and obtained from Siddhartha Orellana on this date, 5/14/25 for a telehealth visit.       SERVICE DATE: 5/14/2025    Referred by:  Renan Thomson MD   Medical Oncologist: Renan Thomson MD   Radiation Oncologist: No care team member to display  Primary Physician: Davina Pratt  [phone]    REASON FOR CONSULT/CHIEF CONSULT COMPLAINT: Intro to Supportive Oncology and Symptom Management.     Subjective   HISTORY OF PRESENT ILLNESS: Siddhartha Orellana is a 40 y.o. 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 Discontinued nivolumab in January 2023 due to arthralgias requiring high dose steroids. Course c/b by hidradenitis suppurativa and delayed fistula healing. He was started on oxycodone 10mg TID for pain management.      Additional PMHx:  Lymphadenopathy and a pre surgical consult for kidney transplant  End stage renal failure on dialysis   Type II DM  HTN  HLD  Parathyroidism   Anemia with NARGIS component   Gastric ulcer  SHIVA  Vitamin D deficiency   Morbid obesity    5/29/24: Just received fentanyl patches 2 days ago and hasn't started yet. Pain remains about the same in quality intensity but noticing more in R calf and knee today. Still using about 60mg of oxycodone per day.   Stopped Zyprexa after 2 days after he felt it made the nausea worse.   CRP/ESR mildly elevated. RF nl. Citrulline antibody, IgG nl,  CLOVIS pending.     7/10/24: Pain overall better controlled with additional of fentanyl patch. Still using oxy 10 about 4-5 pills per day. Ongoing issues with nausea and vomiting.     8/13/24: Pain better controlled. Much more active.  Still using about 4-6 oxy per day but feeling much better overall.  Hesitant about lyrica so he didn't start it. Nausea and appetite much better with reglan.     9/24/24: Oxycodone rotated hydromorphone but wasn't as effective. No other complaints today.     11/13/24: Arrives from dialysis. Pain stable. Intermittent nausea controlled with antiemetics. Tolerating cabo.    1/22/25: Pain stable.     4/9/25: Patient has not been tolerating dialysis well. He has decided to reduce dialysis time. Overall, symptoms stable.     5/14/25: Continues with dialysis associated with anxiety. Previously on Zoloft but ineffective. Pain stable.     Pain Assessment:    Lower back and legs - throbbing - constant - legs feel heavy    Symptom Assessment:    Pain:a little  Lack of energy: somewhat   Difficulty sleeping: none   Lack of appetite: a little -  improving    Nausea: a little   Vomiting: none  Constipation: none  Numbness or tingling in hands/feet/other: none  Anxiety: somewhat       Information obtained from: interview of patient  ______________________________________________________________________     Oncology History   Renal cell carcinoma   12/1/2022 -  Chemotherapy    Cabozantinib, 28 Day Cycles     8/28/2023 Initial Diagnosis    Renal cell carcinoma (CMS/HCC)         Past Medical History:   Diagnosis Date    Dorsalgia, unspecified 01/04/2023    Back pain    End stage renal disease (Multi) 01/04/2023    ESRD (end stage renal disease) on dialysis    Essential (primary) hypertension 01/04/2023    Hypertension    Iron deficiency 02/23/2020    Iron deficiency    Other specified postprocedural states     H/O endoscopy     Past Surgical History:   Procedure Laterality Date    OTHER SURGICAL HISTORY  04/11/2018    Creation Of A-V Graft Fistula For Dialysis     Family History   Problem Relation Name Age of Onset    Hypertension Mother      Cancer Father      Hypertension Father      Other (renal transplant recipient) Mother's  "Brother          SOCIAL HISTORY  . 5 children. Jay.  Social History:  reports that he has quit smoking. His smoking use included cigars. He has never been exposed to tobacco smoke. He has never used smokeless tobacco. He reports that he does not currently use alcohol. He reports current drug use. Drug: Marijuana.      REVIEW OF SYSTEMS  Review of systems negative unless noted in HPI.       Objective       Current Outpatient Medications   Medication Instructions    amLODIPine (NORVASC) 10 mg, oral, Daily (0630), Take half  a tablet  daily for 5-7 days. Then if SBP still above 140 afterwards, take one pill daily.    atorvastatin (LIPITOR) 20 mg, oral, Nightly    benzoyl peroxide 5 % external wash Topical, Every morning, May bleach fabrics, use an old or white towel    cabozantinib (Cabometyx) 40 mg tablet TAKE ONE (1) TABLET BY MOUTH ONCE A DAY    calcium acetate (PHOSLO) 667 mg, oral, 3 times daily (morning, midday, late afternoon)    carvedilol (COREG) 25 mg, oral, 2 times daily    fentaNYL (Duragesic) 50 mcg/hr patch 1 patch, transdermal, Every 72 hours    hydrALAZINE (APRESOLINE) 100 mg, oral, 2 times daily    hydrocortisone 2.5 % cream Topical, As needed    hydrOXYzine HCL (ATARAX) 10 mg, oral, Daily PRN    lidocaine-prilocaine (Emla) 2.5-2.5 % cream Apply thick layer 2 hours prior to dialysis site, cover with plastic wrap.    loratadine (CLARITIN) 10 mg, oral, Daily    menthol-zinc oxide (Calmoseptine) 0.44-20.6 % ointment 1 Application, Topical, As needed    naloxone (NARCAN) 4 mg, nasal, As needed, May repeat every 2-3 minutes if needed, alternating nostrils, until medical assistance becomes available.    non-adherent bandage (Curity Abdominal Pad) 8 X 10 \" bandage 1 Application, topical (top), Daily, To areas of hidradenitis for drainage    olmesartan (BENICAR) 40 mg, oral, Daily    omeprazole (PRILOSEC) 40 mg, oral, Daily    ondansetron (ZOFRAN) 8 mg, oral, Every 8 hours PRN    oxyCODONE " "(ROXICODONE) 10 mg, oral, Every 4 hours PRN    prochlorperazine (Compazine) 10 mg tablet 1 tablet, Every 8 hours PRN    secukinumab (Cosentyx Pen) 150 mg/mL self-injector pen Inject 300 mg (2 pens) under the skin every 2 weeks    tadalafil (CIALIS) 5 mg, oral, Daily    torsemide (DEMADEX) 60 mg, oral, Daily    vitamin B complex-vitamin C-folic acid (Nephrocaps) 1 mg capsule 1 capsule, oral, Daily       Allergies: No Known Allergies          Creatinine   Date Value Ref Range Status   04/08/2025 17.18 (H) 0.50 - 1.30 mg/dL Final     AST   Date Value Ref Range Status   04/05/2025 10 9 - 39 U/L Final     ALT   Date Value Ref Range Status   04/05/2025 7 (L) 10 - 52 U/L Final     Comment:     Patients treated with Sulfasalazine may generate falsely decreased results for ALT.     Hemoglobin   Date Value Ref Range Status   04/08/2025 7.9 (L) 13.5 - 17.5 g/dL Final     Platelets   Date Value Ref Range Status   04/08/2025 168 150 - 450 x10*3/uL Final          PHYSICAL EXAMINATION  Vital Signs:       4/7/2025     9:00 PM 4/8/2025    12:00 AM 4/8/2025     7:00 AM 4/8/2025    12:45 PM 4/9/2025     9:05 AM 4/24/2025     4:13 PM 5/12/2025    10:58 AM   Vitals   Systolic 121 111 120 126 109 110 132   Diastolic 69 63 69 73 65 62 77   BP Location   Right arm Right arm      Heart Rate 84 84 79 70 80 78 80   Temp 36.8 °C (98.2 °F) 36.8 °C (98.3 °F) 36.7 °C (98.1 °F) 36.8 °C (98.2 °F) 36.6 °C (97.9 °F)  36.6 °C (97.9 °F)   Resp 18 18 18 18 18  18   Height      1.676 m (5' 6\")    Weight (lb)     176.4 187 193.9   BMI     27.69 kg/m2 30.18 kg/m2 31.3 kg/m2   BSA (m2)     1.94 m2 1.99 m2 2.02 m2   Visit Report     Report Report Report        Physical Exam  HENT:      Head: Normocephalic and atraumatic.   Eyes:      Extraocular Movements: Extraocular movements intact.      Conjunctiva/sclera: Conjunctivae normal.   Abdominal:      General: Abdomen is flat.   Musculoskeletal:      Cervical back: Normal range of motion.   Neurological:      " General: No focal deficit present.      Mental Status: He is alert.      Comments: No myoclonus   Psychiatric:         Mood and Affect: Mood normal.         Thought Content: Thought content normal.         ASSESSMENT/PLAN    Pain  Pain is: IO related arthralgia, chronic pain syndrome in the setting of end stage renal disease on dialysis  Type: somatic and neuropathic  -He declined methadone that was previously recommended   -Continue fentanyl TD 50mcg q72 - do not apply direct heat or submerge in water   -Continue oxycodone 10mg q4 PRN (I rotated to hydromorphone but it was not effective and he is not having any signs of toxicity from the oxycodone at this time)  -He never started lyrica due to concerns of ADRs  -Continue APAP as needed  - uses sparingly   -Autoimmune/Rheum workup per oncology - unremarkable     Opioid Use  Medication Management:   - OARRS report reviewed with no aberrant behavior; consistent with  prescriptions/records and patient history  - .  Overdose Risk Score 460 .   This has been discussed with patient.   - We will continue to closely monitor the patient for signs of prescription misuse including UDS, OARRS review and subjective reports at each visit.  - No concurrent benzodiazepine use   - I am a provider who either is or has consulted and collaborated with a provider certified in Hospice and Palliative Medicine and have conducted a face-face visit and examination for this patient.  - Routine Urine Drug Screen: patient is anuric and drug screen 9 panel appropriate 2/11/25  - Controlled Substance Agreement completed 5/29/24  - Specifically discussed that controlled substance prescriptions will only be provided by our group as outlined in the completed agreement  - Prescribed naloxone 5/1/24  - Red Flags: None      Nausea   Intermittent nausea with vomiting related to opioids and dialysis   -Intolerant to zyprexa 2.5mg   -Previously on reglan but discontinued   -Continue zofran 8mg q8  as needed   -Continue compazine 10mg q6 as needed    Constipation   At risk for constipation related to opioids  -Continue miralax 17 g daily as needed     Anxiety   -2/2 HD  -Trial hydroxyzine 10-20mg 1 hr prior to HD  -zoloft previously  ineffective   -declines another antidepressant     Medical Decision Making/Goals of Care/Advance Care Planning:  Patient's current clinical condition, including diagnosis, prognosis, and management plan, and goals of care were discussed.   Life limiting disease: RCC  Family: Supportive wife/children   Goals: symptom control and cancer directed therapy  Full code confirmed today 4/9/25    Advance Directives  Existence of Advance Directives: [place] is wifeWendy    Next Follow-Up Visit:  Return to clinic in 4 weeks     Signature and billing  Thank you for allowing us to participate in the care of this patient. Recommendations will be communicated back to the consulting service by way of shared electronic medical record or face-to-face.    Medical complexity was moderate level due to due to complexity of problems, extensive data review, and high risk of management/treatment.  Time was spent on the following: Prep Time, Time Directly with Patient/Family/Caregiver, Documentation Time. Total time spent: 30 min       DATA   Diagnostic tests and information reviewed for today's visit:  Most recent labs and imaging results, Medications       Plan of Care discussed with: Patient and RN     Some elements copied from Supportive Oncology note on 4/9/25 , the elements have been updated and all reflect current decision making from today, 5/14/25.        SIGNATURE: JAIME Rausch-CNP    Contact information:  Supportive and Palliative Oncology  Monday-Friday 8 AM-5 PM  Phone:  [phone], press option #5, then option #1.   Or Epic Secure Chat

## 2025-05-16 ENCOUNTER — SPECIALTY PHARMACY (OUTPATIENT)
Dept: PHARMACY | Facility: CLINIC | Age: 40
End: 2025-05-16

## 2025-05-17 ENCOUNTER — PHARMACY VISIT (OUTPATIENT)
Dept: PHARMACY | Facility: CLINIC | Age: 40
End: 2025-05-17
Payer: COMMERCIAL

## 2025-05-19 ENCOUNTER — PHARMACY VISIT (OUTPATIENT)
Dept: PHARMACY | Facility: CLINIC | Age: 40
End: 2025-05-19

## 2025-06-02 ENCOUNTER — HOSPITAL ENCOUNTER (EMERGENCY)
Facility: HOSPITAL | Age: 40
Discharge: HOME | End: 2025-06-03
Attending: INTERNAL MEDICINE
Payer: COMMERCIAL

## 2025-06-02 VITALS
DIASTOLIC BLOOD PRESSURE: 87 MMHG | TEMPERATURE: 97.5 F | SYSTOLIC BLOOD PRESSURE: 155 MMHG | HEART RATE: 88 BPM | OXYGEN SATURATION: 99 % | RESPIRATION RATE: 20 BRPM

## 2025-06-02 DIAGNOSIS — E83.51 HYPOCALCEMIA: ICD-10-CM

## 2025-06-02 DIAGNOSIS — D63.8 ANEMIA, CHRONIC DISEASE: Primary | ICD-10-CM

## 2025-06-02 LAB
ABO GROUP (TYPE) IN BLOOD: NORMAL
ANION GAP SERPL CALC-SCNC: 24 MMOL/L (ref 10–20)
ANTIBODY SCREEN: NORMAL
BASOPHILS # BLD AUTO: 0.05 X10*3/UL (ref 0–0.1)
BASOPHILS NFR BLD AUTO: 0.5 %
BUN SERPL-MCNC: 99 MG/DL (ref 6–23)
CALCIUM SERPL-MCNC: 6.9 MG/DL (ref 8.6–10.3)
CHLORIDE SERPL-SCNC: 100 MMOL/L (ref 98–107)
CO2 SERPL-SCNC: 23 MMOL/L (ref 21–32)
CREAT SERPL-MCNC: 19.46 MG/DL (ref 0.5–1.3)
EGFRCR SERPLBLD CKD-EPI 2021: 3 ML/MIN/1.73M*2
EOSINOPHIL # BLD AUTO: 0.33 X10*3/UL (ref 0–0.7)
EOSINOPHIL NFR BLD AUTO: 3.5 %
ERYTHROCYTE [DISTWIDTH] IN BLOOD BY AUTOMATED COUNT: 15.4 % (ref 11.5–14.5)
GLUCOSE SERPL-MCNC: 122 MG/DL (ref 74–99)
HCT VFR BLD AUTO: 23.3 % (ref 41–52)
HGB BLD-MCNC: 7.2 G/DL (ref 13.5–17.5)
IMM GRANULOCYTES # BLD AUTO: 0.04 X10*3/UL (ref 0–0.7)
IMM GRANULOCYTES NFR BLD AUTO: 0.4 % (ref 0–0.9)
LYMPHOCYTES # BLD AUTO: 1.9 X10*3/UL (ref 1.2–4.8)
LYMPHOCYTES NFR BLD AUTO: 20.4 %
MCH RBC QN AUTO: 29.5 PG (ref 26–34)
MCHC RBC AUTO-ENTMCNC: 30.9 G/DL (ref 32–36)
MCV RBC AUTO: 96 FL (ref 80–100)
MONOCYTES # BLD AUTO: 0.55 X10*3/UL (ref 0.1–1)
MONOCYTES NFR BLD AUTO: 5.9 %
NEUTROPHILS # BLD AUTO: 6.45 X10*3/UL (ref 1.2–7.7)
NEUTROPHILS NFR BLD AUTO: 69.3 %
NRBC BLD-RTO: 0 /100 WBCS (ref 0–0)
PLATELET # BLD AUTO: 266 X10*3/UL (ref 150–450)
POTASSIUM SERPL-SCNC: 4.2 MMOL/L (ref 3.5–5.3)
RBC # BLD AUTO: 2.44 X10*6/UL (ref 4.5–5.9)
RH FACTOR (ANTIGEN D): NORMAL
SODIUM SERPL-SCNC: 143 MMOL/L (ref 136–145)
WBC # BLD AUTO: 9.3 X10*3/UL (ref 4.4–11.3)

## 2025-06-02 PROCEDURE — 85025 COMPLETE CBC W/AUTO DIFF WBC: CPT | Performed by: PHYSICIAN ASSISTANT

## 2025-06-02 PROCEDURE — 99283 EMERGENCY DEPT VISIT LOW MDM: CPT | Performed by: INTERNAL MEDICINE

## 2025-06-02 PROCEDURE — 80048 BASIC METABOLIC PNL TOTAL CA: CPT | Performed by: PHYSICIAN ASSISTANT

## 2025-06-02 PROCEDURE — 86901 BLOOD TYPING SEROLOGIC RH(D): CPT | Performed by: PHYSICIAN ASSISTANT

## 2025-06-02 PROCEDURE — 36415 COLL VENOUS BLD VENIPUNCTURE: CPT | Performed by: PHYSICIAN ASSISTANT

## 2025-06-02 ASSESSMENT — COLUMBIA-SUICIDE SEVERITY RATING SCALE - C-SSRS
6. HAVE YOU EVER DONE ANYTHING, STARTED TO DO ANYTHING, OR PREPARED TO DO ANYTHING TO END YOUR LIFE?: NO
1. IN THE PAST MONTH, HAVE YOU WISHED YOU WERE DEAD OR WISHED YOU COULD GO TO SLEEP AND NOT WAKE UP?: NO
2. HAVE YOU ACTUALLY HAD ANY THOUGHTS OF KILLING YOURSELF?: NO

## 2025-06-02 ASSESSMENT — PAIN SCALES - GENERAL: PAINLEVEL_OUTOF10: 0 - NO PAIN

## 2025-06-02 ASSESSMENT — PAIN - FUNCTIONAL ASSESSMENT: PAIN_FUNCTIONAL_ASSESSMENT: 0-10

## 2025-06-03 NOTE — ED TRIAGE NOTES
Patient reports that his hemoglobin as 6.5 on blood work. Patient pale and weak in triage. Increased fatigue per wife. Denies chest pain/shortness of breath. VSS. AAOx4. No outward signs of bleeding. Gets dialysis M/W/Fri. Did not go today due to having to come here, but aside from that has not missed any treatments. Has L arm AV fistula.

## 2025-06-03 NOTE — ED TRIAGE NOTES
As provider-in-triage, I performed a medical screening history and physical exam on this patient.  HISTORY OF PRESENT ILLNESS  (include at least one item)     Hgb 6.5 told needs transfusion. Cancer and dialysis patient. Has had transfusion in the past. Last dialysis Friday, told could not get dialysis until had transfusion    PHYSICAL EXAM  Vital Signs reviewed.  (include at least one additional item)     Aox3 appears well    MDM  (describe briefly what was initiated or planned)    Will order 1 unit and labs    I evaluated this patient in triage with the RN. Due to the patients complaint labs and or imaging were ordered by myself in an attempt to expedite patient care however I am not participating in care after evaluation. This is a preliminary assessment. Pt does not appear in acute distress at this time. They will have a full evaluation as soon as possible. They will be cared for by another provider who will possibly order more labs, imaging or interventions. Pt did not have a full ROS or PE completed by myself however below is a summary with reasons for orders.  For the remainder of the patient's workup and ED course, please refer to the main ED provider note. We discussed need for diagnostic testing including laboratory studies and imaging.  We also discussed that patient may be asked to wait in the waiting room while these tests are pending.  They understand that if they choose to leave without having the testing completed or resulted that we cannot rule out acute life threatening illnesses and the risks involved could lead to worsening condition, permanent disability or even death.

## 2025-06-03 NOTE — ED PROVIDER NOTES
HPI     CC: Anemia     HPI: Siddhartha Orellana is a 40 y.o. male with a history of ESRD on HD, RCC, GERD, hidradenitis suppurativa, NARGIS, psoriasis, T2DM, SHIVA, who presents for anemia on outpatient labs.  Patient has unfortunately been waiting in the ED for a few hours to be seen.  He states that he gets dialysis Monday, Wednesday, and Friday.  He has been compliant with this.  He was told that his hemoglobin was low and that he needed a transfusion before he got dialysis.  Denies any symptoms other than feeling a little fatigued.  He denies any hematochezia or melena chest pain, or shortness of breath.  States he has needed transfusions before.    ROS: 10-point review of systems was performed and is otherwise negative except as noted in HPI.    Limitations to history: N/A    Independent Historians: N/A    External Records Reviewed: Outpatient notes in EMR    Past Medical History: Noncontributory except per HPI     Past Surgical History: Noncontributory except per HPI     Family History: Reviewed and noncontributory     Social History:  Denies tobacco. Denies ETOH. Denies illicit drugs.    Social Determinants Affecting Care: N/A    RX Allergies[1]    Home Meds:   Current Outpatient Medications   Medication Instructions    amLODIPine (NORVASC) 10 mg, oral, Daily (0630), Take half  a tablet  daily for 5-7 days. Then if SBP still above 140 afterwards, take one pill daily.    atorvastatin (LIPITOR) 20 mg, oral, Nightly    benzoyl peroxide 5 % external wash Topical, Every morning, May bleach fabrics, use an old or white towel    cabozantinib (Cabometyx) 40 mg tablet TAKE ONE (1) TABLET BY MOUTH ONCE A DAY    calcium acetate (PHOSLO) 667 mg, oral, 3 times daily (morning, midday, late afternoon)    carvedilol (COREG) 25 mg, oral, 2 times daily    fentaNYL (Duragesic) 50 mcg/hr patch 1 patch, transdermal, Every 72 hours    hydrALAZINE (APRESOLINE) 100 mg, oral, 2 times daily    hydrocortisone 2.5 % cream Topical, As needed     "hydrOXYzine HCL (ATARAX) 10 mg, oral, Daily PRN    lidocaine-prilocaine (Emla) 2.5-2.5 % cream Apply thick layer 2 hours prior to dialysis site, cover with plastic wrap.    loratadine (CLARITIN) 10 mg, oral, Daily    menthol-zinc oxide (Calmoseptine) 0.44-20.6 % ointment 1 Application, Topical, As needed    naloxone (NARCAN) 4 mg, nasal, As needed, May repeat every 2-3 minutes if needed, alternating nostrils, until medical assistance becomes available.    non-adherent bandage (Curity Abdominal Pad) 8 X 10 \" bandage 1 Application, topical (top), Daily, To areas of hidradenitis for drainage    olmesartan (BENICAR) 40 mg, oral, Daily    omeprazole (PRILOSEC) 40 mg, oral, Daily    ondansetron (ZOFRAN) 8 mg, oral, Every 8 hours PRN    oxyCODONE (ROXICODONE) 10 mg, oral, Every 4 hours PRN    prochlorperazine (Compazine) 10 mg tablet 1 tablet, Every 8 hours PRN    secukinumab (Cosentyx Pen) 150 mg/mL self-injector pen Inject 300 mg (2 pens) under the skin every 2 weeks    tadalafil (CIALIS) 5 mg, oral, Daily    torsemide (DEMADEX) 60 mg, oral, Daily    vitamin B complex-vitamin C-folic acid (Nephrocaps) 1 mg capsule 1 capsule, oral, Daily        Physical Exam     ED Triage Vitals [06/02/25 2030]   Temperature Heart Rate Respirations BP   36.4 °C (97.5 °F) 88 20 155/87      Pulse Ox Temp Source Heart Rate Source Patient Position   99 % Oral Monitor Sitting      BP Location FiO2 (%)     Right arm --         Heart Rate:  [88]   Temperature:  [36.4 °C (97.5 °F)]   Respirations:  [20]   BP: (155)/(87)   Pulse Ox:  [99 %]      Physical Exam  Vitals and nursing note reviewed.     CONSTITUTIONAL: Well appearing, well nourished, in no acute distress.   HENMT: Head atraumatic. Airway patent. Nasal mucosa clear. Mouth with normal mucosa, clear oropharynx. Uvula midline. Neck supple.    EYES: Clear bilaterally, pupils equally round and reactive to light.   CARDIOVASCULAR: Normal rate, regular rhythm.  Heart sounds S1, S2.  No murmurs, " rubs or gallops. Normal pulses. Capillary refill < 2 sec.   RESPIRATORY: No increased work of breathing. Breath sounds clear and equal bilaterally.  GASTROINTESTINAL: Abdomen soft, non-distended, non-tender. No rebound, no guarding. Normal bowel sounds. No palpable masses.  GENITOURINARY:  No CVA tenderness.  MUSCULOSKELETAL: Spine appears normal, range of motion is not limited, no muscle or joint tenderness. No edema.   NEUROLOGICAL: Alert and oriented, no asymmetry, moving all extremities equally.  SKIN: Warm, dry and intact. No rash or notable lesions.  PSYCHIATRIC: Normal mood and affect.  HEME/LYMPH: No adenopathy or splenomegaly.    Diagnostic Results      ECG: Refused    Labs Reviewed   CBC WITH AUTO DIFFERENTIAL - Abnormal       Result Value    WBC 9.3      nRBC 0.0      RBC 2.44 (*)     Hemoglobin 7.2 (*)     Hematocrit 23.3 (*)     MCV 96      MCH 29.5      MCHC 30.9 (*)     RDW 15.4 (*)     Platelets 266      Neutrophils % 69.3      Immature Granulocytes %, Automated 0.4      Lymphocytes % 20.4      Monocytes % 5.9      Eosinophils % 3.5      Basophils % 0.5      Neutrophils Absolute 6.45      Immature Granulocytes Absolute, Automated 0.04      Lymphocytes Absolute 1.90      Monocytes Absolute 0.55      Eosinophils Absolute 0.33      Basophils Absolute 0.05     BASIC METABOLIC PANEL - Abnormal    Glucose 122 (*)     Sodium 143      Potassium 4.2      Chloride 100      Bicarbonate 23      Anion Gap 24 (*)     Urea Nitrogen 99 (*)     Creatinine 19.46 (*)     eGFR 3 (*)     Calcium 6.9 (*)    TYPE AND SCREEN    ABO TYPE A      Rh TYPE POS      ANTIBODY SCREEN NEG           No orders to display                 [place] Coma Scale Score: 15                  Procedure  Procedures    ED Course & MDM   Assessment/Plan:   Siddhartha Orellana is a 40 y.o. male with a history of ESRD on HD, RCC, GERD, hidradenitis suppurativa, NARGIS, psoriasis, T2DM, SHIVA, who presents for anemia on outpatient labs where hemoglobin was  reportedly 6.5.  Patient was seen by triage provider and ordered for unit of PRBCs but his hemoglobin is actually 7.2.  I advised him that we do not absolutely have to transfuse him unless his hemoglobin is less than 7 but offered him a transfusion given his generalized fatigue and to ensure he is not turned away from dialysis.  Patient does not accept the risk of transfusion in this setting and would rather defer for now as it is >7.  He appears to be at his baseline hemoglobin level between 7 and 8.  He is notably hypocalcemic at 6.9.  I advised that we should at least get an ECG prior to discharge but patient and partner at bedside are angry at the long wait time and refusing this, insisting on being discharged.  It is unclear why ECG was not already performed as it has been ordered for a while.  He was advised to continue his calcium acetate and further discuss his calcium level with his nephrology team when he goes for dialysis but he apparently refused his discharge paperwork.     Medications - No data to display     Diagnoses as of 06/03/25 0008   Anemia, chronic disease   Hypocalcemia       Disposition:   DISCHARGE.  The patient was discharged with verbal anticipatory guidance and strict return precautions. Discharge diagnosis, instructions and plan were discussed and understood. I emphasized the importance of following up with their primary care provider within 24-48 hours and with any referrals in the timeframe recommended. At the time of discharge the patient was comfortable and was in no apparent distress. Patient is aware of diagnostic uncertainty and was notified though testing is negative here, there is a very small chance that pathology may be missed.  The patient understands these risks and the patient/family understood to call 911 or return immediately to the emergency department if the symptoms worsen or if they have any additional concerns.      FOLLOW UP  Primary care provider in 1-2 days.       ED Prescriptions    None         Lelia Taylor MD  EM/IM/Peds    This note was dictated by speech recognition. Minor errors in transcription may be present.         [1] No Known Allergies       Lelia Taylor MD  06/03/25 2204

## 2025-06-03 NOTE — DISCHARGE INSTRUCTIONS
You were seen today for anemia.  Your hemoglobin today was 7.2.  Typically we do not transfuse unless your hemoglobin is less than 7.  We discussed the risks and benefits of transfusion and you have declined.  Your calcium is low, please discuss this with your nephrology team at dialysis.  Continue your calcium acetate supplementation.   your evaluation was not concerning for an emergency at this time. Please see the attached information sheet for information about your condition, how to care for your condition at home, and reasons to return to the emergency department. Take any prescriptions written today as prescribed. You should call your primary care provider within 24 hours to tell them about today's visit, including any new medications or medication changes, as he or she may want to see you in the office for further evaluation. If you do not have a primary care provider, call  [phone] for an appointment. We offer in-person office visits as well as virtual options. Please do not hesitate to call  [phone] or return to the emergency department with any new or unresolved concerns or symptoms. Thank you for choosing [facility] for your care.

## 2025-06-04 ENCOUNTER — PATIENT OUTREACH (OUTPATIENT)
Dept: CARE COORDINATION | Facility: CLINIC | Age: 40
End: 2025-06-04
Payer: COMMERCIAL

## 2025-06-04 NOTE — PROGRESS NOTES
Outreach call to patient to support a smooth transition of care from recent admission.  Unable to leave a voicemail message for patient with my contact information.    Jaimee Betancur RN, Care Manager  [facility], [facility]  [phone]

## 2025-06-12 DIAGNOSIS — N18.6 ESRD (END STAGE RENAL DISEASE) (MULTI): ICD-10-CM

## 2025-06-13 RX ORDER — TORSEMIDE 20 MG/1
60 TABLET ORAL DAILY
Qty: 90 TABLET | Refills: 11 | OUTPATIENT
Start: 2025-06-13

## 2025-06-18 ENCOUNTER — SPECIALTY PHARMACY (OUTPATIENT)
Dept: PHARMACY | Facility: CLINIC | Age: 40
End: 2025-06-18

## 2025-06-18 PROCEDURE — RXMED WILLOW AMBULATORY MEDICATION CHARGE

## 2025-06-19 ENCOUNTER — PHARMACY VISIT (OUTPATIENT)
Dept: PHARMACY | Facility: CLINIC | Age: 40
End: 2025-06-19
Payer: COMMERCIAL

## 2025-07-09 ENCOUNTER — APPOINTMENT (OUTPATIENT)
Dept: PALLIATIVE MEDICINE | Facility: CLINIC | Age: 40
End: 2025-07-09
Payer: COMMERCIAL

## 2025-07-12 ASSESSMENT — ENCOUNTER SYMPTOMS
LIGHT-HEADEDNESS: 0
ARTHRALGIAS: 1
ROS SKIN COMMENTS: HS
EYES NEGATIVE: 1
PSYCHIATRIC NEGATIVE: 1
NAUSEA: 1
HEMATOLOGIC/LYMPHATIC NEGATIVE: 1
EXTREMITY WEAKNESS: 0
DIARRHEA: 0
MYALGIAS: 1
FEVER: 0
BACK PAIN: 1
SHORTNESS OF BREATH: 0
VOMITING: 0
NUMBNESS: 0
APPETITE CHANGE: 0
ENDOCRINE NEGATIVE: 1
WOUND: 0
CONSTIPATION: 0
LEG SWELLING: 0
COUGH: 0
FATIGUE: 0
UNEXPECTED WEIGHT CHANGE: 0
CHILLS: 0

## 2025-07-12 NOTE — PROGRESS NOTES
Patient ID: Siddhartha Orellana is a 40 y.o. male.  Diagnosis:  Papillary RCC  MedOnc: Dr. Thomson  Nephrologist: Dr. Grover  Vascular Surgeon: Dr. Daigle    Current Therapy: Cabozantinib     ONCOLOGIC HISTORY  11/7/22 - CT-guided biopsy of retroperitoneal lymph node revealed findings consistent with metastatic papillary carcinoma.  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  2/7/23 - On Prednisone 20mg daily  2/17/23 - Scans show SD (RECIST) with some mild tumor shrinkage RPLN  2/21/23 - Increase Prednisone 40mg daily for continued arthralgia; continue Cabo 40 mg daily  3/15/23 - Continue Pred 40mg PO daily, continue Cabo  End March - hidradenitis suppurativa  4/4/23 - Hold Cabo; prednisone 30 mg daily  4/25/23 - Resume cabo  5/18/23 - Hold Cabo 2/2 vascular fistula repair  5/23/23 - Continue hold due to extensive edema, blistering  6/2023 - resumed cabo. Nivo on hold with PDN 25 mg  8/16/23 - continue cabo, reduce PDN to 20 mg daily  8/23/23 - Left upper extremity wound debridement down to subcutaneous tissue and vessels- Cabo on hold  8/31/23 - continue to hold cabo, PDN back at 30mg   9/15/23 - resumed cabozantinib 40 mg   Mid Sep 23 - admission hidradenitis suppurativa  10/10/23 - cabo on hold. Will start PDN 20 mg  10/17/23 - cabo on hold for graft, PDN 20 mg  10/25/23 - cabo on hold for graft, PDN 15 mg daily  11/16/23 - continue cabozantinib hold, PDN 10 mg daily  1/18/24 - mild inguinal/iliac LN growth + stable bone mets in staging scans.   01/2024 - dialysis started  3/7/24 - he got back on cabozantinib 40 mg 2-3 w ago  4/11/24 - scans show SD. Continue cabo 40 alone  4/12/24 - held for AVF infiltration  5/1/24 - No AVF revision required - restart cabo at  40 mg  5/29/24 - Continue cabozantinib  7/2/24 - Continue cabozantinib 40 mg with breaks PRN  9/24/24 - got back on cabo 1 week ago, off for about 1 month  2/11/25 - he was lost to follow up.   2/17/25 - Continue cabozantinib 40 mg with breaks PRN  3/31/25 - holding cabo for one week until after HD       PMH: Stage V CKD on HD, Type II DM, HTN, HLD, Parathryroidism, Anemia, gastric ulcer, SHIVA, Vit D def     Review of Systems   Constitutional:  Negative for appetite change, chills, fatigue, fever and unexpected weight change.   HENT:  Negative.     Eyes: Negative.    Respiratory:  Negative for cough and shortness of breath.    Cardiovascular:  Negative for chest pain and leg swelling.   Gastrointestinal:  Positive for nausea. Negative for constipation, diarrhea and vomiting.   Endocrine: Negative.    Genitourinary: Negative.     Musculoskeletal:  Positive for arthralgias, back pain, gait problem and myalgias.   Skin:  Negative for rash and wound.        HS   Neurological:  Positive for gait problem. Negative for extremity weakness, light-headedness and numbness.   Hematological: Negative.    Psychiatric/Behavioral: Negative.       Allergies  No Known Allergies     Medications  Current Outpatient Medications   Medication Instructions    amLODIPine (NORVASC) 10 mg, oral, Daily (0630), Take half  a tablet  daily for 5-7 days. Then if SBP still above 140 afterwards, take one pill daily.    atorvastatin (LIPITOR) 20 mg, oral, Nightly    benzoyl peroxide 5 % external wash Topical, Every morning, May bleach fabrics, use an old or white towel    cabozantinib (Cabometyx) 40 mg tablet TAKE ONE (1) TABLET BY MOUTH ONCE A DAY    calcium acetate (PHOSLO) 667 mg, oral, 3 times daily (morning, midday, late afternoon)    carvedilol (COREG) 25 mg, oral, 2 times daily    fentaNYL (Duragesic) 50 mcg/hr patch 1 patch, transdermal, Every 72 hours    hydrALAZINE (APRESOLINE) 100 mg, oral, 2 times daily    hydrocortisone 2.5 % cream  "Topical, As needed    hydrOXYzine HCL (ATARAX) 10 mg, oral, Daily PRN    lidocaine-prilocaine (Emla) 2.5-2.5 % cream Apply thick layer 2 hours prior to dialysis site, cover with plastic wrap.    loratadine (CLARITIN) 10 mg, oral, Daily    menthol-zinc oxide (Calmoseptine) 0.44-20.6 % ointment 1 Application, Topical, As needed    naloxone (NARCAN) 4 mg, nasal, As needed, May repeat every 2-3 minutes if needed, alternating nostrils, until medical assistance becomes available.    non-adherent bandage (Curity Abdominal Pad) 8 X 10 \" bandage 1 Application, topical (top), Daily, To areas of hidradenitis for drainage    olmesartan (BENICAR) 40 mg, oral, Daily    omeprazole (PRILOSEC) 40 mg, oral, Daily    ondansetron (ZOFRAN) 8 mg, oral, Every 8 hours PRN    oxyCODONE (ROXICODONE) 10 mg, oral, Every 4 hours PRN    prochlorperazine (Compazine) 10 mg tablet 1 tablet, Every 8 hours PRN    secukinumab (Cosentyx Pen) 150 mg/mL self-injector pen Inject 300 mg (2 pens) under the skin every 2 weeks    tadalafil (CIALIS) 5 mg, oral, Daily    torsemide (DEMADEX) 60 mg, oral, Daily    vitamin B complex-vitamin C-folic acid (Nephrocaps) 1 mg capsule 1 capsule, oral, Daily        OBJECTIVE:    VS / Pain:  There were no vitals taken for this visit.  BSA: There is no height or weight on file to calculate BSA.  Wt Readings from Last 5 Encounters:   05/12/25 88 kg (193 lb 14.4 oz)   04/24/25 84.8 kg (187 lb)   04/09/25 80 kg (176 lb 6.4 oz)   04/05/25 81.6 kg (180 lb)   03/31/25 82 kg (180 lb 12.4 oz)     Physical Exam  Constitutional:       General: He is not in acute distress.     Appearance: Normal appearance. He is not toxic-appearing.   HENT:      Head: Normocephalic and atraumatic.      Mouth/Throat:      Mouth: Mucous membranes are moist.      Pharynx: Oropharynx is clear.   Eyes:      Pupils: Pupils are equal, round, and reactive to light.   Pulmonary:      Effort: Pulmonary effort is normal.   Musculoskeletal:         General: " Normal range of motion.      Cervical back: Normal range of motion.      Right lower leg: No edema.      Left lower leg: No edema.   Skin:     General: Skin is warm and dry.      Findings: No rash.   Neurological:      General: No focal deficit present.      Mental Status: He is alert and oriented to person, place, and time.      Motor: No weakness.   Psychiatric:         Mood and Affect: Mood normal.         Behavior: Behavior normal.         Thought Content: Thought content normal.         Judgment: Judgment normal.       Performance Status:  ECOG Score: 1- Restricted in physically strenuous activity.  Carries out light duty.  Karnofsky Score: 80 - Normal activity with effort; some signs or symptoms of disease    Diagnostic Results   No results found. However, due to the size of the patient record, not all encounters were searched. Please check Results Review for a complete set of results.      Scans 06/2024 - stable disease    Scans 9/24/24 -   Renal cancer restaging scan. When compared to the prior examination  dated 06/27/2024:  1. Interval increase in size of the right retroperitoneal lymph node  2. Osseous tumor burden as described above, please refer to nuclear  medicine study for osseous disease evaluation  3. Interval resolution of the ground-glass opacities of the right  lower lobe.  4. Additional stable chronic incidental findings described above.    2/11/25 -   IMPRESSION:  CHEST:  1.  Restaging renal cell carcinoma.  2. No metastatic disease to the lungs.  3. Stable lytic foci with discrete sclerotic borders involving ribs  bilaterally consistent with metastatic disease. There is no  pathologic fracture.      ABDOMEN AND PELVIS:  1.  Restaging renal cell carcinoma.  2. Decrease in size of a retroperitoneal lymph node to the right of  the inferior vena cava.  3. Unchanged 1.2 cm hypodense mass lateral cortex right kidney.  4. Unchanged bony metastatic disease to the pelvis.  5. Unchanged 0.9 cm right  adrenal nodule compared to 02/23/2022.  Unchanged adreniform enlargement of the left adrenal gland compared  to 02/23/2022.      MACRO:  None    Assessment/Plan   40yo AA man with hx of stage IV CKD with metastatic papRCC to LNs now on cabo/nivo. He has CKD and planned for kidney dialysis. Known proteinuria +++. Still with sig joint pains and dealing with hyperglycemias. On cabo (nivo on hold d/t arthritis - 30 mg PDN BUT pt self discontinued his prednisone and arthralgias are actually better). Pain is under control on opioids and arthralgias might not be fully IO-related.   He was lost to follow up for last 6 months. Still on cabozantinib. Doing well and scans continue to show stable disease / mild shrinkage of Lns (bx proven papRCC). CPM. Scans ordered June 2025 though not yet completed.     Noncompliant with HD d/t significant anxiety around how he feels when hooked up to machine. Again discussed need for HD and risk of further complications and possible death without treatment. He is interested in changing nephrology providers, I will help get this appointment set up today.       Patient verbalizes understanding of above plan. Time provided for patient's questions. Patient instructed to reach out for any new concerning issues.     Yu Mariee, MSN, APRN-CNP  Acute Care Nurse Practitioner   Genitourinary () Oncology  [facility]  Phone: [phone]

## 2025-07-14 ENCOUNTER — APPOINTMENT (OUTPATIENT)
Dept: HEMATOLOGY/ONCOLOGY | Facility: CLINIC | Age: 40
End: 2025-07-14
Payer: COMMERCIAL

## 2025-07-16 ENCOUNTER — APPOINTMENT (OUTPATIENT)
Dept: PALLIATIVE MEDICINE | Facility: CLINIC | Age: 40
End: 2025-07-16
Payer: COMMERCIAL

## 2025-07-16 VITALS
RESPIRATION RATE: 16 BRPM | TEMPERATURE: 98.1 F | WEIGHT: 192 LBS | BODY MASS INDEX: 30.99 KG/M2 | SYSTOLIC BLOOD PRESSURE: 133 MMHG | DIASTOLIC BLOOD PRESSURE: 80 MMHG | OXYGEN SATURATION: 98 % | HEART RATE: 82 BPM

## 2025-07-16 DIAGNOSIS — F32.A ANXIETY AND DEPRESSION: ICD-10-CM

## 2025-07-16 DIAGNOSIS — G89.3 CANCER RELATED PAIN: Primary | ICD-10-CM

## 2025-07-16 DIAGNOSIS — R11.0 NAUSEA: ICD-10-CM

## 2025-07-16 DIAGNOSIS — K59.03 CONSTIPATION DUE TO PAIN MEDICATION THERAPY: ICD-10-CM

## 2025-07-16 DIAGNOSIS — F41.9 ANXIETY AND DEPRESSION: ICD-10-CM

## 2025-07-16 PROCEDURE — 99214 OFFICE O/P EST MOD 30 MIN: CPT | Performed by: NURSE PRACTITIONER

## 2025-07-16 PROCEDURE — 4010F ACE/ARB THERAPY RXD/TAKEN: CPT | Performed by: NURSE PRACTITIONER

## 2025-07-16 PROCEDURE — 3079F DIAST BP 80-89 MM HG: CPT | Performed by: NURSE PRACTITIONER

## 2025-07-16 PROCEDURE — 3075F SYST BP GE 130 - 139MM HG: CPT | Performed by: NURSE PRACTITIONER

## 2025-07-16 ASSESSMENT — ENCOUNTER SYMPTOMS
OCCASIONAL FEELINGS OF UNSTEADINESS: 0
LOSS OF SENSATION IN FEET: 0
DEPRESSION: 0

## 2025-07-16 ASSESSMENT — PAIN SCALES - GENERAL: PAINLEVEL_OUTOF10: 6

## 2025-07-16 NOTE — PROGRESS NOTES
SUPPORTIVE AND PALLIATIVE ONCOLOGY CONSULT - OUTPATIENT FOLLOW UP    SERVICE DATE: 7/16/2025    Referred by:  Renan Thomson MD   Medical Oncologist: Renan Thomson MD   Radiation Oncologist: No care team member to display  Primary Physician: Davina Pratt  [phone]    REASON FOR CONSULT/CHIEF CONSULT COMPLAINT: Intro to Supportive Oncology and Symptom Management.     Subjective   HISTORY OF PRESENT ILLNESS: Siddhartha Orellana is a 40 y.o. 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 Discontinued nivolumab in January 2023 due to arthralgias requiring high dose steroids. Course c/b by hidradenitis suppurativa and delayed fistula healing. He was started on oxycodone 10mg TID for pain management.      Additional PMHx:  Lymphadenopathy and a pre surgical consult for kidney transplant  End stage renal failure on dialysis   Type II DM  HTN  HLD  Parathyroidism   Anemia with NARGIS component   Gastric ulcer  SHIVA  Vitamin D deficiency   Morbid obesity    5/29/24: Just received fentanyl patches 2 days ago and hasn't started yet. Pain remains about the same in quality intensity but noticing more in R calf and knee today. Still using about 60mg of oxycodone per day.   Stopped Zyprexa after 2 days after he felt it made the nausea worse.   CRP/ESR mildly elevated. RF nl. Citrulline antibody, IgG nl,  CLOVIS pending.     7/10/24: Pain overall better controlled with additional of fentanyl patch. Still using oxy 10 about 4-5 pills per day. Ongoing issues with nausea and vomiting.     8/13/24: Pain better controlled. Much more active. Still using about 4-6 oxy per day but feeling much better overall.  Hesitant about lyrica so he didn't start it. Nausea and appetite much better with reglan.     9/24/24: Oxycodone rotated hydromorphone but wasn't as effective. No other complaints today.     11/13/24: Arrives from dialysis. Pain stable. Intermittent nausea controlled with antiemetics. Tolerating  cabo.    1/22/25: Pain stable.     4/9/25: Patient has not been tolerating dialysis well. He has decided to reduce dialysis time. Overall, symptoms stable.     5/14/25: Continues with dialysis associated with anxiety. Previously on Zoloft but ineffective. Pain stable.     7/16/25: Anxiety associated with HD is better. Leg and back pain are table.     Pain Assessment:    Lower back and legs - throbbing - constant - legs feel heavy    Symptom Assessment:    Pain:a little  Lack of energy: somewhat   Difficulty sleeping: none   Lack of appetite: a little  Nausea: a little   Vomiting: none  Constipation: none  Numbness or tingling in hands/feet/other: none  Anxiety: none     Information obtained from: interview of patient  ______________________________________________________________________     Oncology History   Renal cell carcinoma   12/1/2022 -  Chemotherapy    Cabozantinib, 28 Day Cycles     8/28/2023 Initial Diagnosis    Renal cell carcinoma (CMS/HCC)         Past Medical History:   Diagnosis Date    Dorsalgia, unspecified 01/04/2023    Back pain    End stage renal disease (Multi) 01/04/2023    ESRD (end stage renal disease) on dialysis    Essential (primary) hypertension 01/04/2023    Hypertension    Iron deficiency 02/23/2020    Iron deficiency    Other specified postprocedural states     H/O endoscopy     Past Surgical History:   Procedure Laterality Date    OTHER SURGICAL HISTORY  04/11/2018    Creation Of A-V Graft Fistula For Dialysis     Family History   Problem Relation Name Age of Onset    Hypertension Mother      Cancer Father      Hypertension Father      Other (renal transplant recipient) Mother's Brother          SOCIAL HISTORY  . 5 children. Thompson.  Social History:  reports that he has quit smoking. His smoking use included cigars. He has never been exposed to tobacco smoke. He has never used smokeless tobacco. He reports that he does not currently use alcohol. He reports current drug use.  "Drug: Marijuana.      REVIEW OF SYSTEMS  Review of systems negative unless noted in HPI.       Objective       Current Outpatient Medications   Medication Instructions    amLODIPine (NORVASC) 10 mg, oral, Daily (0630), Take half  a tablet  daily for 5-7 days. Then if SBP still above 140 afterwards, take one pill daily.    atorvastatin (LIPITOR) 20 mg, oral, Nightly    benzoyl peroxide 5 % external wash Topical, Every morning, May bleach fabrics, use an old or white towel    cabozantinib (Cabometyx) 40 mg tablet TAKE ONE (1) TABLET BY MOUTH ONCE A DAY    calcium acetate (PHOSLO) 667 mg, oral, 3 times daily (morning, midday, late afternoon)    carvedilol (COREG) 25 mg, oral, 2 times daily    fentaNYL (Duragesic) 50 mcg/hr patch 1 patch, transdermal, Every 72 hours    hydrALAZINE (APRESOLINE) 100 mg, oral, 2 times daily    hydrocortisone 2.5 % cream Topical, As needed    hydrOXYzine HCL (ATARAX) 10 mg, oral, Daily PRN    lidocaine-prilocaine (Emla) 2.5-2.5 % cream Apply thick layer 2 hours prior to dialysis site, cover with plastic wrap.    loratadine (CLARITIN) 10 mg, oral, Daily    menthol-zinc oxide (Calmoseptine) 0.44-20.6 % ointment 1 Application, Topical, As needed    naloxone (NARCAN) 4 mg, nasal, As needed, May repeat every 2-3 minutes if needed, alternating nostrils, until medical assistance becomes available.    non-adherent bandage (Curity Abdominal Pad) 8 X 10 \" bandage 1 Application, topical (top), Daily, To areas of hidradenitis for drainage    olmesartan (BENICAR) 40 mg, oral, Daily    omeprazole (PRILOSEC) 40 mg, oral, Daily    ondansetron (ZOFRAN) 8 mg, oral, Every 8 hours PRN    oxyCODONE (ROXICODONE) 10 mg, oral, Every 4 hours PRN    prochlorperazine (Compazine) 10 mg tablet 1 tablet, Every 8 hours PRN    secukinumab (Cosentyx Pen) 150 mg/mL self-injector pen Inject 300 mg (2 pens) under the skin every 2 weeks    tadalafil (CIALIS) 5 mg, oral, Daily    torsemide (DEMADEX) 60 mg, oral, Daily    " "vitamin B complex-vitamin C-folic acid (Nephrocaps) 1 mg capsule 1 capsule, oral, Daily       Allergies: No Known Allergies          Creatinine   Date Value Ref Range Status   06/02/2025 19.46 (H) 0.50 - 1.30 mg/dL Final     AST   Date Value Ref Range Status   04/05/2025 10 9 - 39 U/L Final     ALT   Date Value Ref Range Status   04/05/2025 7 (L) 10 - 52 U/L Final     Comment:     Patients treated with Sulfasalazine may generate falsely decreased results for ALT.     Hemoglobin   Date Value Ref Range Status   06/02/2025 7.2 (L) 13.5 - 17.5 g/dL Final     Platelets   Date Value Ref Range Status   06/02/2025 266 150 - 450 x10*3/uL Final          PHYSICAL EXAMINATION  Vital Signs:       4/8/2025     7:00 AM 4/8/2025    12:45 PM 4/9/2025     9:05 AM 4/24/2025     4:13 PM 5/12/2025    10:58 AM 6/2/2025     8:30 PM 7/16/2025    11:11 AM   Vitals   Systolic 120 126 109 110 132 155 133   Diastolic 69 73 65 62 77 87 80   BP Location Right arm Right arm    Right arm    Heart Rate 79 70 80 78 80 88 82   Temp 36.7 °C (98.1 °F) 36.8 °C (98.2 °F) 36.6 °C (97.9 °F)  36.6 °C (97.9 °F) 36.4 °C (97.5 °F) 36.7 °C (98.1 °F)   Resp 18 18 18  18 20 16   Height    1.676 m (5' 6\")      Weight (lb)   176.4 187 193.9  192   BMI   27.69 kg/m2 30.18 kg/m2 31.3 kg/m2  30.99 kg/m2   BSA (m2)   1.94 m2 1.99 m2 2.02 m2  2.01 m2   Visit Report   Report Report Report  Report          Physical Exam  HENT:      Head: Normocephalic and atraumatic.     Eyes:      Extraocular Movements: Extraocular movements intact.      Conjunctiva/sclera: Conjunctivae normal.     Abdominal:      General: Abdomen is flat.     Musculoskeletal:      Cervical back: Normal range of motion.     Neurological:      General: No focal deficit present.      Mental Status: He is alert.      Comments: No myoclonus   Psychiatric:         Mood and Affect: Mood normal.         Thought Content: Thought content normal.         ASSESSMENT/PLAN    Pain  Pain is: IO related arthralgia, " chronic pain syndrome in the setting of end stage renal disease on dialysis  Type: somatic and neuropathic  -He declined methadone that was previously recommended   -Continue fentanyl TD 50mcg q72 - do not apply direct heat or submerge in water   -Continue oxycodone 10mg q4 PRN (I rotated to hydromorphone but it was not effective and he is not having any signs of toxicity from the oxycodone at this time)  -He never started lyrica due to concerns of ADRs  -Continue APAP as needed  - uses sparingly   -Autoimmune/Rheum workup per oncology - unremarkable     Opioid Use  Medication Management:   - OARRS report reviewed with no aberrant behavior; consistent with  prescriptions/records and patient history  - .  Overdose Risk Score 460 .   This has been discussed with patient.   - We will continue to closely monitor the patient for signs of prescription misuse including UDS, OARRS review and subjective reports at each visit.  - No concurrent benzodiazepine use   - I am a provider who either is or has consulted and collaborated with a provider certified in Hospice and Palliative Medicine and have conducted a face-face visit and examination for this patient.  - Routine Urine Drug Screen: patient is anuric and drug screen 9 panel appropriate 2/11/25  - Controlled Substance Agreement completed 7/16/25  - Specifically discussed that controlled substance prescriptions will only be provided by our group as outlined in the completed agreement  - Prescribed naloxone 5/1/24  - Red Flags: None      Nausea   Intermittent nausea with vomiting related to opioids and dialysis   -Intolerant to zyprexa 2.5mg   -Previously on reglan but discontinued   -Continue zofran 8mg q8 as needed   -Continue compazine 10mg q6 as needed    Constipation   At risk for constipation related to opioids  -Continue miralax 17 g daily as needed     Anxiety   -2/2 HD  -No longer using hydroxyzine  -zoloft previously  ineffective   -declines another  antidepressant     Medical Decision Making/Goals of Care/Advance Care Planning:  Patient's current clinical condition, including diagnosis, prognosis, and management plan, and goals of care were discussed.   Life limiting disease: RCC  Family: Supportive wife/children   Goals: symptom control and cancer directed therapy  Full code confirmed  4/9/25    Advance Directives  Existence of Advance Directives: [place] is wifeWendy    Next Follow-Up Visit:  Return to clinic in 12 weeks     Signature and billing  Thank you for allowing us to participate in the care of this patient. Recommendations will be communicated back to the consulting service by way of shared electronic medical record or face-to-face.    Medical complexity was moderate level due to due to complexity of problems, extensive data review, and high risk of management/treatment.  Time was spent on the following: Prep Time, Time Directly with Patient/Family/Caregiver, Documentation Time. Total time spent: 30 min       DATA   Diagnostic tests and information reviewed for today's visit:  Most recent labs and imaging results, Medications       Plan of Care discussed with: Patient and RN     Some elements copied from Supportive Oncology note on 5/14/25 , the elements have been updated and all reflect current decision making from today, 7/16/25.        SIGNATURE: Audrey Jones, APRN-CNP    Contact information:  Supportive and Palliative Oncology  Monday-Friday 8 AM-5 PM  Phone:  [phone], press option #5, then option #1.   Or Epic Secure Chat

## 2025-07-17 ENCOUNTER — PATIENT MESSAGE (OUTPATIENT)
Dept: PALLIATIVE MEDICINE | Facility: CLINIC | Age: 40
End: 2025-07-17
Payer: COMMERCIAL

## 2025-07-17 DIAGNOSIS — R11.2 CHEMOTHERAPY INDUCED NAUSEA AND VOMITING: ICD-10-CM

## 2025-07-17 DIAGNOSIS — T45.1X5A CHEMOTHERAPY INDUCED NAUSEA AND VOMITING: ICD-10-CM

## 2025-07-17 DIAGNOSIS — G89.3 CANCER ASSOCIATED PAIN: ICD-10-CM

## 2025-07-17 DIAGNOSIS — C64.9 RENAL CELL CARCINOMA, UNSPECIFIED LATERALITY: ICD-10-CM

## 2025-07-17 RX ORDER — PROCHLORPERAZINE MALEATE 10 MG
10 TABLET ORAL EVERY 8 HOURS PRN
Qty: 90 TABLET | Refills: 3 | Status: SHIPPED | OUTPATIENT
Start: 2025-07-17 | End: 2025-11-14

## 2025-07-17 RX ORDER — OXYCODONE HYDROCHLORIDE 10 MG/1
10 TABLET ORAL EVERY 4 HOURS PRN
Qty: 180 TABLET | Refills: 0 | Status: SHIPPED | OUTPATIENT
Start: 2025-07-17 | End: 2025-08-16

## 2025-07-17 RX ORDER — FENTANYL 50 UG/1
1 PATCH TRANSDERMAL
Qty: 10 PATCH | Refills: 0 | Status: SHIPPED | OUTPATIENT
Start: 2025-07-17 | End: 2025-08-16

## 2025-07-17 RX ORDER — ONDANSETRON 8 MG/1
8 TABLET, FILM COATED ORAL EVERY 8 HOURS PRN
Qty: 60 TABLET | Refills: 11 | Status: SHIPPED | OUTPATIENT
Start: 2025-07-17

## 2025-07-17 NOTE — TELEPHONE ENCOUNTER
Patient thought rx refills were sent yesterday, messaged provider to resend refills for oxycodone, fentanyl, zofran, and compazine. OARRS report reviewed and reflects  prescription history, no aberrancy noted. Per OARRS, patient last filled 30 day supply of fentanyl and oxycodone on 6/19. Per last visit with Audrey Jones Yesterday patient to continue fentanyl TD 50mcg q72h, and oxycodone 10mg q4h prn. Patient with follow up visit scheduled with Audrey on . Patient updated that medication will be sent to [facility]. Refill request routed to provider.

## 2025-07-21 ENCOUNTER — SPECIALTY PHARMACY (OUTPATIENT)
Dept: PHARMACY | Facility: CLINIC | Age: 40
End: 2025-07-21

## 2025-07-21 PROCEDURE — RXMED WILLOW AMBULATORY MEDICATION CHARGE

## 2025-07-22 ENCOUNTER — PHARMACY VISIT (OUTPATIENT)
Dept: PHARMACY | Facility: CLINIC | Age: 40
End: 2025-07-22
Payer: COMMERCIAL

## 2025-07-29 ENCOUNTER — HOSPITAL ENCOUNTER (OUTPATIENT)
Dept: RADIOLOGY | Facility: CLINIC | Age: 40
Discharge: HOME | End: 2025-07-29
Payer: COMMERCIAL

## 2025-07-29 DIAGNOSIS — C64.9 RENAL CELL CARCINOMA, UNSPECIFIED LATERALITY: ICD-10-CM

## 2025-07-29 PROCEDURE — 71250 CT THORAX DX C-: CPT | Performed by: INTERNAL MEDICINE

## 2025-07-29 PROCEDURE — 74176 CT ABD & PELVIS W/O CONTRAST: CPT | Performed by: INTERNAL MEDICINE

## 2025-07-29 PROCEDURE — 74176 CT ABD & PELVIS W/O CONTRAST: CPT

## 2025-08-01 ASSESSMENT — ENCOUNTER SYMPTOMS
ENDOCRINE NEGATIVE: 1
FEVER: 0
DIARRHEA: 0
LIGHT-HEADEDNESS: 0
LEG SWELLING: 0
SHORTNESS OF BREATH: 0
CHILLS: 0
WOUND: 0
FATIGUE: 0
BACK PAIN: 1
EYES NEGATIVE: 1
UNEXPECTED WEIGHT CHANGE: 0
HEMATOLOGIC/LYMPHATIC NEGATIVE: 1
NUMBNESS: 0
EXTREMITY WEAKNESS: 0
PSYCHIATRIC NEGATIVE: 1
VOMITING: 0
APPETITE CHANGE: 0
MYALGIAS: 1
ARTHRALGIAS: 1
COUGH: 0
CONSTIPATION: 0
ROS SKIN COMMENTS: HS
NAUSEA: 1

## 2025-08-01 NOTE — PROGRESS NOTES
Patient ID: Siddhartha Orellana is a 40 y.o. male.  Diagnosis:  Papillary RCC  MedOnc: Dr. Thomson  Nephrologist: Dr. Grover  Vascular Surgeon: Dr. Daigle    Current Therapy: Cabozantinib     ONCOLOGIC HISTORY  11/7/22 - CT-guided biopsy of retroperitoneal lymph node revealed findings consistent with metastatic papillary carcinoma.  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  2/7/23 - On Prednisone 20mg daily  2/17/23 - Scans show SD (RECIST) with some mild tumor shrinkage RPLN  2/21/23 - Increase Prednisone 40mg daily for continued arthralgia; continue Cabo 40 mg daily  3/15/23 - Continue Pred 40mg PO daily, continue Cabo  End March - hidradenitis suppurativa  4/4/23 - Hold Cabo; prednisone 30 mg daily  4/25/23 - Resume cabo  5/18/23 - Hold Cabo 2/2 vascular fistula repair  5/23/23 - Continue hold due to extensive edema, blistering  6/2023 - resumed cabo. Nivo on hold with PDN 25 mg  8/16/23 - continue cabo, reduce PDN to 20 mg daily  8/23/23 - Left upper extremity wound debridement down to subcutaneous tissue and vessels- Cabo on hold  8/31/23 - continue to hold cabo, PDN back at 30mg   9/15/23 - resumed cabozantinib 40 mg   Mid Sep 23 - admission hidradenitis suppurativa  10/10/23 - cabo on hold. Will start PDN 20 mg  10/17/23 - cabo on hold for graft, PDN 20 mg  10/25/23 - cabo on hold for graft, PDN 15 mg daily  11/16/23 - continue cabozantinib hold, PDN 10 mg daily  1/18/24 - mild inguinal/iliac LN growth + stable bone mets in staging scans.   01/2024 - dialysis started  3/7/24 - he got back on cabozantinib 40 mg 2-3 w ago  4/11/24 - scans show SD. Continue cabo 40 alone  4/12/24 - held for AVF infiltration  5/1/24 - No AVF revision required - restart cabo at  40 mg  5/29/24 - Continue cabozantinib  7/2/24 - Continue cabozantinib 40 mg with breaks PRN  9/24/24 - got back on cabo 1 week ago, off for about 1 month  2/11/25 - he was lost to follow up.   2/17/25 - Continue cabozantinib 40 mg with breaks PRN  3/31/25 - holding cabo for one week until after HD   8/4/25 - continues cabo 40mg.       PMH: Stage V CKD on HD, Type II DM, HTN, HLD, Parathryroidism, Anemia, gastric ulcer, SHIVA, Vit D def     Review of Systems   Constitutional:  Negative for appetite change, chills, fatigue, fever and unexpected weight change.   HENT:  Negative.     Eyes: Negative.    Respiratory:  Negative for cough and shortness of breath.    Cardiovascular:  Negative for chest pain and leg swelling.   Gastrointestinal:  Positive for nausea. Negative for constipation, diarrhea and vomiting.   Endocrine: Negative.    Genitourinary: Negative.     Musculoskeletal:  Positive for arthralgias, back pain, gait problem and myalgias.   Skin:  Negative for rash and wound.        HS   Neurological:  Positive for gait problem. Negative for extremity weakness, light-headedness and numbness.   Hematological: Negative.    Psychiatric/Behavioral: Negative.       Allergies  No Known Allergies     Medications  Current Outpatient Medications   Medication Instructions    amLODIPine (NORVASC) 10 mg, oral, Daily (0630), Take half  a tablet  daily for 5-7 days. Then if SBP still above 140 afterwards, take one pill daily.    atorvastatin (LIPITOR) 20 mg, oral, Nightly    benzoyl peroxide 5 % external wash Topical, Every morning, May bleach fabrics, use an old or white towel    cabozantinib (Cabometyx) 40 mg tablet TAKE ONE (1) TABLET BY MOUTH ONCE A DAY    calcium acetate (PHOSLO) 667 mg, oral, 3 times daily (morning, midday, late afternoon)    carvedilol (COREG) 25 mg, oral, 2 times daily    fentaNYL (Duragesic) 50 mcg/hr patch 1 patch, transdermal, Every 72 hours    hydrALAZINE (APRESOLINE) 100 mg, oral, 2 times daily     "hydrocortisone 2.5 % cream Topical, As needed    hydrOXYzine HCL (ATARAX) 10 mg, oral, Daily PRN    lidocaine-prilocaine (Emla) 2.5-2.5 % cream Apply thick layer 2 hours prior to dialysis site, cover with plastic wrap.    loratadine (CLARITIN) 10 mg, oral, Daily    menthol-zinc oxide (Calmoseptine) 0.44-20.6 % ointment 1 Application, Topical, As needed    naloxone (NARCAN) 4 mg, nasal, As needed, May repeat every 2-3 minutes if needed, alternating nostrils, until medical assistance becomes available.    non-adherent bandage (Curity Abdominal Pad) 8 X 10 \" bandage 1 Application, topical (top), Daily, To areas of hidradenitis for drainage    olmesartan (BENICAR) 40 mg, oral, Daily    omeprazole (PRILOSEC) 40 mg, oral, Daily    ondansetron (ZOFRAN) 8 mg, oral, Every 8 hours PRN    oxyCODONE (ROXICODONE) 10 mg, oral, Every 4 hours PRN    prochlorperazine (COMPAZINE) 10 mg, oral, Every 8 hours PRN    secukinumab (Cosentyx Pen) 150 mg/mL self-injector pen Inject 300 mg (2 pens) under the skin every 2 weeks    tadalafil (CIALIS) 5 mg, oral, Daily    torsemide (DEMADEX) 60 mg, oral, Daily    vitamin B complex-vitamin C-folic acid (Nephrocaps) 1 mg capsule 1 capsule, oral, Daily        OBJECTIVE:    VS / Pain:  There were no vitals taken for this visit.  BSA: There is no height or weight on file to calculate BSA.  Wt Readings from Last 5 Encounters:   07/16/25 87.1 kg (192 lb)   05/12/25 88 kg (193 lb 14.4 oz)   04/24/25 84.8 kg (187 lb)   04/09/25 80 kg (176 lb 6.4 oz)   04/05/25 81.6 kg (180 lb)     Physical Exam  Constitutional:       General: He is not in acute distress.     Appearance: Normal appearance. He is not toxic-appearing.   HENT:      Head: Normocephalic and atraumatic.      Mouth/Throat:      Mouth: Mucous membranes are moist.      Pharynx: Oropharynx is clear.     Eyes:      Pupils: Pupils are equal, round, and reactive to light.     Pulmonary:      Effort: Pulmonary effort is normal.     Musculoskeletal:    "      General: Normal range of motion.      Cervical back: Normal range of motion.      Right lower leg: No edema.      Left lower leg: No edema.     Skin:     General: Skin is warm and dry.      Findings: No rash.     Neurological:      General: No focal deficit present.      Mental Status: He is alert and oriented to person, place, and time.      Motor: No weakness.     Psychiatric:         Mood and Affect: Mood normal.         Behavior: Behavior normal.         Thought Content: Thought content normal.         Judgment: Judgment normal.       Performance Status:  ECOG Score: 1- Restricted in physically strenuous activity.  Carries out light duty.  Karnofsky Score: 80 - Normal activity with effort; some signs or symptoms of disease    Diagnostic Results   No results found. However, due to the size of the patient record, not all encounters were searched. Please check Results Review for a complete set of results.      Scans 06/2024 - stable disease    Scans 9/24/24 -   Renal cancer restaging scan. When compared to the prior examination  dated 06/27/2024:  1. Interval increase in size of the right retroperitoneal lymph node  2. Osseous tumor burden as described above, please refer to nuclear  medicine study for osseous disease evaluation  3. Interval resolution of the ground-glass opacities of the right  lower lobe.  4. Additional stable chronic incidental findings described above.    2/11/25 -   IMPRESSION:  CHEST:  1.  Restaging renal cell carcinoma.  2. No metastatic disease to the lungs.  3. Stable lytic foci with discrete sclerotic borders involving ribs  bilaterally consistent with metastatic disease. There is no  pathologic fracture.      ABDOMEN AND PELVIS:  1.  Restaging renal cell carcinoma.  2. Decrease in size of a retroperitoneal lymph node to the right of  the inferior vena cava.  3. Unchanged 1.2 cm hypodense mass lateral cortex right kidney.  4. Unchanged bony metastatic disease to the pelvis.  5.  Unchanged 0.9 cm right adrenal nodule compared to 02/23/2022.  Unchanged adreniform enlargement of the left adrenal gland compared  to 02/23/2022.      MACRO:  None    Assessment/Plan   38yo AA man with hx of stage IV CKD with metastatic papRCC to LNs now on cabo/nivo. He has CKD and planned for kidney dialysis. Known proteinuria +++. Still with sig joint pains and dealing with hyperglycemias. On cabo (nivo on hold d/t arthritis - 30 mg PDN BUT pt self discontinued his prednisone and arthralgias are actually better). Pain is under control on opioids and arthralgias might not be fully IO-related.   Still on cabozantinib. Doing well. CPM. Scans ordered June 2025 though not yet completed. Now doing dialysis weekly (prev noncompliant).       Patient verbalizes understanding of above plan. Time provided for patient's questions. Patient instructed to reach out for any new concerning issues.     Yu Mariee, MSN, APRN-CNP  Acute Care Nurse Practitioner   Genitourinary () Oncology  [facility]  Phone: [phone]

## 2025-08-04 ENCOUNTER — OFFICE VISIT (OUTPATIENT)
Dept: HEMATOLOGY/ONCOLOGY | Facility: CLINIC | Age: 40
End: 2025-08-04
Payer: COMMERCIAL

## 2025-08-04 VITALS
DIASTOLIC BLOOD PRESSURE: 92 MMHG | WEIGHT: 193.78 LBS | BODY MASS INDEX: 31.28 KG/M2 | OXYGEN SATURATION: 98 % | RESPIRATION RATE: 16 BRPM | SYSTOLIC BLOOD PRESSURE: 148 MMHG | TEMPERATURE: 97.5 F | HEART RATE: 72 BPM

## 2025-08-04 DIAGNOSIS — C64.9 RENAL CELL CARCINOMA, UNSPECIFIED LATERALITY: ICD-10-CM

## 2025-08-04 PROCEDURE — 4010F ACE/ARB THERAPY RXD/TAKEN: CPT

## 2025-08-04 PROCEDURE — 3077F SYST BP >= 140 MM HG: CPT

## 2025-08-04 PROCEDURE — 99214 OFFICE O/P EST MOD 30 MIN: CPT

## 2025-08-04 PROCEDURE — 3080F DIAST BP >= 90 MM HG: CPT

## 2025-08-04 ASSESSMENT — PAIN SCALES - GENERAL: PAINLEVEL_OUTOF10: 7

## 2025-08-12 ENCOUNTER — SPECIALTY PHARMACY (OUTPATIENT)
Dept: PHARMACY | Facility: CLINIC | Age: 40
End: 2025-08-12

## 2025-08-15 ENCOUNTER — SPECIALTY PHARMACY (OUTPATIENT)
Dept: PHARMACY | Facility: CLINIC | Age: 40
End: 2025-08-15

## 2025-08-18 DIAGNOSIS — G89.3 CANCER ASSOCIATED PAIN: ICD-10-CM

## 2025-08-18 RX ORDER — FENTANYL 50 UG/1
1 PATCH TRANSDERMAL
Qty: 10 PATCH | Refills: 0 | Status: SHIPPED | OUTPATIENT
Start: 2025-08-18 | End: 2025-09-17

## 2025-08-18 RX ORDER — OXYCODONE HYDROCHLORIDE 10 MG/1
10 TABLET ORAL EVERY 4 HOURS PRN
Qty: 180 TABLET | Refills: 0 | Status: SHIPPED | OUTPATIENT
Start: 2025-08-18 | End: 2025-09-17

## 2025-08-21 ENCOUNTER — APPOINTMENT (OUTPATIENT)
Dept: NEPHROLOGY | Facility: CLINIC | Age: 40
End: 2025-08-21
Payer: COMMERCIAL

## 2025-08-22 PROCEDURE — RXMED WILLOW AMBULATORY MEDICATION CHARGE

## 2025-08-25 ENCOUNTER — PHARMACY VISIT (OUTPATIENT)
Dept: PHARMACY | Facility: CLINIC | Age: 40
End: 2025-08-25
Payer: COMMERCIAL

## 2026-01-21 ENCOUNTER — APPOINTMENT (OUTPATIENT)
Dept: NEPHROLOGY | Facility: CLINIC | Age: 41
End: 2026-01-21
Payer: COMMERCIAL

## (undated) DEVICE — STOCKINETTE, DOUBLE PLY, 4 X 48 IN, STERILE

## (undated) DEVICE — INSERT, CLAMP, SURGICAL, SOFT/TRACTION, STEALTH, 1 MM

## (undated) DEVICE — SUTURE, SILK, 2-0, 30 IN, SH, BLACK

## (undated) DEVICE — DRAPE, PAD, PREP, W/ 9 IN CUFF, 24 X 41, LF, NS

## (undated) DEVICE — DRAPE, TIBURON, SPLIT SHEET, REINF ADH STRIP, 77X122

## (undated) DEVICE — SUTURE, MONOCRYL, 4-0, 18 IN, PS2, UNDYED

## (undated) DEVICE — SUTURE, SILK, 3-0, 30 IN, BR SH, BLACK

## (undated) DEVICE — DRESSING, ISLAND, TELFA, 4 X 5 IN

## (undated) DEVICE — Device

## (undated) DEVICE — COVER, CART, 45 X 27 X 48 IN, CLEAR

## (undated) DEVICE — SPONGE, LAP, XRAY DECT, 18IN X 18IN, W/MASTER DMT, STERILE

## (undated) DEVICE — MANIFOLD, 4 PORT NEPTUNE STANDARD

## (undated) DEVICE — SUTURE, VICRYL, 3-0, 27 IN, SH

## (undated) DEVICE — GEL, ULTRASOUND, AQUASONIC 100, 20 GM, STERILE

## (undated) DEVICE — GLOVE, SURGICAL, PROTEXIS PI MICRO, 5.5, PF, LF

## (undated) DEVICE — TRAY, MINOR, SINGLE BASIN, STERILE

## (undated) DEVICE — CONTAINER, SPECIMEN, 120 ML, STERILE

## (undated) DEVICE — SUTURE, PROLENE, 6-0, 30 IN, BV-1 BV-1

## (undated) DEVICE — PROTECTOR, NERVE, ULNAR, PINK

## (undated) DEVICE — SUTURE, PROLENE, 7-0, 30 IN, BV1, DA, BLUE